# Patient Record
Sex: FEMALE | Race: WHITE | NOT HISPANIC OR LATINO | Employment: OTHER | URBAN - METROPOLITAN AREA
[De-identification: names, ages, dates, MRNs, and addresses within clinical notes are randomized per-mention and may not be internally consistent; named-entity substitution may affect disease eponyms.]

---

## 2017-01-30 RX ORDER — LISINOPRIL 10 MG/1
TABLET ORAL
Qty: 90 TABLET | Refills: 0 | OUTPATIENT
Start: 2017-01-30

## 2017-01-31 RX ORDER — LOVASTATIN 20 MG/1
TABLET ORAL
Qty: 90 TABLET | Refills: 1 | Status: SHIPPED | OUTPATIENT
Start: 2017-01-31 | End: 2017-04-26 | Stop reason: SDUPTHER

## 2017-02-21 ENCOUNTER — OFFICE VISIT (OUTPATIENT)
Dept: FAMILY MEDICINE CLINIC | Facility: CLINIC | Age: 82
End: 2017-02-21

## 2017-02-21 VITALS
WEIGHT: 188 LBS | OXYGEN SATURATION: 97 % | RESPIRATION RATE: 17 BRPM | HEIGHT: 68 IN | SYSTOLIC BLOOD PRESSURE: 156 MMHG | HEART RATE: 72 BPM | DIASTOLIC BLOOD PRESSURE: 70 MMHG | BODY MASS INDEX: 28.49 KG/M2

## 2017-02-21 DIAGNOSIS — Z96.652 STATUS POST TOTAL LEFT KNEE REPLACEMENT: ICD-10-CM

## 2017-02-21 DIAGNOSIS — E55.9 HYPOVITAMINOSIS D: ICD-10-CM

## 2017-02-21 DIAGNOSIS — D64.89 ANEMIA DUE TO OTHER CAUSE: ICD-10-CM

## 2017-02-21 DIAGNOSIS — I10 ESSENTIAL HYPERTENSION: ICD-10-CM

## 2017-02-21 DIAGNOSIS — Z79.899 DRUG THERAPY: ICD-10-CM

## 2017-02-21 DIAGNOSIS — E78.5 HYPERLIPIDEMIA, UNSPECIFIED HYPERLIPIDEMIA TYPE: ICD-10-CM

## 2017-02-21 DIAGNOSIS — M15.9 GENERALIZED OSTEOARTHRITIS: ICD-10-CM

## 2017-02-21 DIAGNOSIS — R29.898 WEAKNESS OF EXTREMITY: Primary | ICD-10-CM

## 2017-02-21 DIAGNOSIS — R53.83 FATIGUE, UNSPECIFIED TYPE: ICD-10-CM

## 2017-02-21 PROBLEM — Z96.649 HIP JOINT REPLACEMENT STATUS: Status: ACTIVE | Noted: 2017-02-21

## 2017-02-21 PROBLEM — K63.5 BENIGN COLONIC POLYP: Status: ACTIVE | Noted: 2017-02-21

## 2017-02-21 PROBLEM — H33.22 LEFT RETINAL DETACHMENT: Status: ACTIVE | Noted: 2017-02-21

## 2017-02-21 PROBLEM — M77.9 TENDINITIS: Status: ACTIVE | Noted: 2017-02-21

## 2017-02-21 PROBLEM — I87.2 CHRONIC VENOUS INSUFFICIENCY: Status: ACTIVE | Noted: 2017-02-21

## 2017-02-21 PROBLEM — M70.70 BURSITIS OF HIP: Status: ACTIVE | Noted: 2017-02-21

## 2017-02-21 PROBLEM — S86.919A MUSCLE STRAIN OF LOWER EXTREMITY: Status: ACTIVE | Noted: 2017-02-21

## 2017-02-21 PROBLEM — J06.9 ACUTE UPPER RESPIRATORY INFECTION: Status: ACTIVE | Noted: 2017-02-21

## 2017-02-21 LAB
25(OH)D3+25(OH)D2 SERPL-MCNC: 23.7 NG/ML (ref 30–100)
ALBUMIN SERPL-MCNC: 4.5 G/DL (ref 3.5–5.2)
ALBUMIN/GLOB SERPL: 1.7 G/DL
ALP SERPL-CCNC: 84 U/L (ref 39–117)
ALT SERPL-CCNC: 15 U/L (ref 1–33)
AST SERPL-CCNC: 17 U/L (ref 1–32)
BASOPHILS # BLD AUTO: 0.04 10*3/MM3 (ref 0–0.2)
BASOPHILS NFR BLD AUTO: 0.4 % (ref 0–1.5)
BILIRUB SERPL-MCNC: 0.7 MG/DL (ref 0.1–1.2)
BUN SERPL-MCNC: 16 MG/DL (ref 8–23)
BUN/CREAT SERPL: 16.5 (ref 7–25)
CALCIUM SERPL-MCNC: 9.7 MG/DL (ref 8.6–10.5)
CHLORIDE SERPL-SCNC: 104 MMOL/L (ref 98–107)
CHOLEST SERPL-MCNC: 170 MG/DL (ref 0–200)
CHOLEST/HDLC SERPL: 2.15 {RATIO}
CO2 SERPL-SCNC: 25.7 MMOL/L (ref 22–29)
CREAT SERPL-MCNC: 0.97 MG/DL (ref 0.57–1)
EOSINOPHIL # BLD AUTO: 0.57 10*3/MM3 (ref 0–0.7)
EOSINOPHIL NFR BLD AUTO: 6.3 % (ref 0.3–6.2)
ERYTHROCYTE [DISTWIDTH] IN BLOOD BY AUTOMATED COUNT: 23.5 % (ref 11.7–13)
GLOBULIN SER CALC-MCNC: 2.6 GM/DL
GLUCOSE SERPL-MCNC: 94 MG/DL (ref 65–99)
HCT VFR BLD AUTO: 33.6 % (ref 35.6–45.5)
HDLC SERPL-MCNC: 79 MG/DL (ref 40–60)
HGB BLD-MCNC: 10.5 G/DL (ref 11.9–15.5)
IMM GRANULOCYTES # BLD: 0.03 10*3/MM3 (ref 0–0.03)
IMM GRANULOCYTES NFR BLD: 0.3 % (ref 0–0.5)
LDLC SERPL CALC-MCNC: 76 MG/DL (ref 0–100)
LYMPHOCYTES # BLD AUTO: 1.86 10*3/MM3 (ref 0.9–4.8)
LYMPHOCYTES NFR BLD AUTO: 20.6 % (ref 19.6–45.3)
MCH RBC QN AUTO: 26.7 PG (ref 26.9–32)
MCHC RBC AUTO-ENTMCNC: 31.3 G/DL (ref 32.4–36.3)
MCV RBC AUTO: 85.5 FL (ref 80.5–98.2)
MONOCYTES # BLD AUTO: 0.79 10*3/MM3 (ref 0.2–1.2)
MONOCYTES NFR BLD AUTO: 8.7 % (ref 5–12)
NEUTROPHILS # BLD AUTO: 5.74 10*3/MM3 (ref 1.9–8.1)
NEUTROPHILS NFR BLD AUTO: 63.7 % (ref 42.7–76)
NRBC BLD AUTO-RTO: 0.4 /100 WBC (ref 0–0)
PLATELET # BLD AUTO: 314 10*3/MM3 (ref 140–500)
POTASSIUM SERPL-SCNC: 4.6 MMOL/L (ref 3.5–5.2)
PROT SERPL-MCNC: 7.1 G/DL (ref 6–8.5)
RBC # BLD AUTO: 3.93 10*6/MM3 (ref 3.9–5.2)
SODIUM SERPL-SCNC: 143 MMOL/L (ref 136–145)
TRIGL SERPL-MCNC: 74 MG/DL (ref 0–150)
TSH SERPL DL<=0.005 MIU/L-ACNC: 1.3 MIU/ML (ref 0.27–4.2)
VLDLC SERPL CALC-MCNC: 14.8 MG/DL (ref 5–40)
WBC # BLD AUTO: 9.03 10*3/MM3 (ref 4.5–10.7)

## 2017-02-21 PROCEDURE — 99214 OFFICE O/P EST MOD 30 MIN: CPT | Performed by: FAMILY MEDICINE

## 2017-02-21 RX ORDER — LOVASTATIN 20 MG/1
20 TABLET ORAL
COMMUNITY
End: 2017-02-21

## 2017-02-21 RX ORDER — NABUMETONE 500 MG/1
500 TABLET, FILM COATED ORAL
Status: ON HOLD | COMMUNITY
End: 2017-10-13

## 2017-02-21 RX ORDER — PROMETHAZINE HYDROCHLORIDE 25 MG/1
25 TABLET ORAL
Status: ON HOLD | COMMUNITY
Start: 2015-12-07 | End: 2017-10-13

## 2017-02-21 RX ORDER — ASPIRIN 81 MG/1
81 TABLET ORAL
COMMUNITY
End: 2017-02-21

## 2017-02-21 RX ORDER — ASPIRIN 81 MG
100 TABLET, DELAYED RELEASE (ENTERIC COATED) ORAL NIGHTLY
COMMUNITY
End: 2021-04-15

## 2017-02-21 RX ORDER — PREDNISOLONE ACETATE 10 MG/ML
1 SUSPENSION/ DROPS OPHTHALMIC
Status: ON HOLD | COMMUNITY
Start: 2015-12-07 | End: 2017-10-13

## 2017-02-21 RX ORDER — HYDROCORTISONE ACETATE 0.5 %
CREAM (GRAM) TOPICAL
COMMUNITY
End: 2017-02-21

## 2017-02-21 RX ORDER — MULTIVIT-MIN/IRON/FOLIC ACID/K 18-600-40
CAPSULE ORAL
COMMUNITY
End: 2017-02-21

## 2017-02-21 RX ORDER — LISINOPRIL 10 MG/1
10 TABLET ORAL
COMMUNITY
End: 2017-02-21

## 2017-02-21 NOTE — PROGRESS NOTES
"Subjective   Ama Billy is a 83 y.o. female.     History of Present Illness L knee is weak. No pain. Not red hot or swollen. Hard to up and known steps. No pain per se. She does not want to go to ortho, thinks meniscus is gone. Has had R knee replaced. Dr Linda 2014. Has had L hip replaced.  NO trouble walking, just steps.    Sees Dr Bello for low hgb. It has remained the same. He has decided that is \"just me\" 10.x range. Appt is due in April.    In general feels her health is good.  Knows she needs labs to be done.  The following portions of the patient's history were reviewed and updated as appropriate: allergies, current medications, past social history and problem list.    Review of Systems   Constitutional: Negative for activity change, appetite change and unexpected weight change.   HENT: Negative for nosebleeds and trouble swallowing.    Eyes: Negative for pain and visual disturbance.   Respiratory: Negative for chest tightness, shortness of breath and wheezing.    Cardiovascular: Negative for chest pain and palpitations.   Gastrointestinal: Negative for abdominal pain and blood in stool.   Endocrine: Negative.    Genitourinary: Negative for difficulty urinating and hematuria.   Musculoskeletal: Positive for arthralgias and myalgias. Negative for joint swelling.   Skin: Negative for color change and rash.   Allergic/Immunologic: Negative.    Neurological: Negative for syncope and speech difficulty.   Hematological: Negative for adenopathy.   Psychiatric/Behavioral: Negative for agitation and confusion.   All other systems reviewed and are negative.      Objective   Visit Vitals   • /70   • Pulse 72   • Resp 17   • Ht 68\" (172.7 cm)   • Wt 188 lb (85.3 kg)   • SpO2 97%   • BMI 28.59 kg/m2     Physical Exam   Constitutional: She is oriented to person, place, and time. Vital signs are normal. She appears well-developed and well-nourished. No distress.   Looks younger than stated age, MICHAEL AHMADI   HENT: "   Head: Normocephalic.   Neck: Carotid bruit is not present. No thyromegaly present.   Cardiovascular: Normal rate, regular rhythm and normal heart sounds.    Pulmonary/Chest: Effort normal and breath sounds normal.   Musculoskeletal: Normal range of motion.   L knee FROM with no crepitus, warmth or vera. Gen nontender. R knee surg scar, IS slightly warm, much crepitus. FROM as well.   Neurological: She is alert and oriented to person, place, and time. Gait normal.   Psychiatric: She has a normal mood and affect. Her behavior is normal. Judgment and thought content normal.   Vitals reviewed.      Assessment/Plan   Problem List Items Addressed This Visit        Cardiovascular and Mediastinum    Hyperlipidemia    Relevant Orders    Lipid Panel With / Chol / HDL Ratio    Hypertension       Digestive    Hypovitaminosis D    Relevant Orders    Vitamin D 25 Hydroxy       Musculoskeletal and Integument    Generalized osteoarthritis       Hematopoietic and Hemostatic    Anemia    Relevant Orders    CBC & Differential       Other    Fatigue    Relevant Orders    TSH    Status post total left knee replacement    Weakness of extremity - Primary      Other Visit Diagnoses     Drug therapy        Relevant Orders    CBC & Differential    Comprehensive Metabolic Panel           Stationary bike. Has this. Will help leg strength immed!!

## 2017-04-12 ENCOUNTER — APPOINTMENT (OUTPATIENT)
Dept: LAB | Facility: HOSPITAL | Age: 82
End: 2017-04-12

## 2017-04-12 ENCOUNTER — APPOINTMENT (OUTPATIENT)
Dept: ONCOLOGY | Facility: CLINIC | Age: 82
End: 2017-04-12

## 2017-04-19 ENCOUNTER — LAB (OUTPATIENT)
Dept: LAB | Facility: HOSPITAL | Age: 82
End: 2017-04-19
Attending: INTERNAL MEDICINE

## 2017-04-19 ENCOUNTER — OFFICE VISIT (OUTPATIENT)
Dept: ONCOLOGY | Facility: CLINIC | Age: 82
End: 2017-04-19
Attending: INTERNAL MEDICINE

## 2017-04-19 VITALS
SYSTOLIC BLOOD PRESSURE: 156 MMHG | HEIGHT: 67 IN | TEMPERATURE: 98 F | HEART RATE: 56 BPM | BODY MASS INDEX: 29.7 KG/M2 | WEIGHT: 189.2 LBS | DIASTOLIC BLOOD PRESSURE: 90 MMHG | OXYGEN SATURATION: 98 % | RESPIRATION RATE: 12 BRPM

## 2017-04-19 DIAGNOSIS — D50.8 IRON DEFICIENCY ANEMIA SECONDARY TO INADEQUATE DIETARY IRON INTAKE: Primary | ICD-10-CM

## 2017-04-19 DIAGNOSIS — D64.9 NORMOCYTIC ANEMIA: Primary | ICD-10-CM

## 2017-04-19 LAB
BASOPHILS # BLD AUTO: 0.07 10*3/MM3 (ref 0–0.1)
BASOPHILS NFR BLD AUTO: 0.7 % (ref 0–1.1)
DEPRECATED RDW RBC AUTO: 70.2 FL (ref 37–49)
EOSINOPHIL # BLD AUTO: 0.66 10*3/MM3 (ref 0–0.36)
EOSINOPHIL NFR BLD AUTO: 6.4 % (ref 1–5)
ERYTHROCYTE [DISTWIDTH] IN BLOOD BY AUTOMATED COUNT: 22.4 % (ref 11.7–14.5)
HCT VFR BLD AUTO: 32.8 % (ref 34–45)
HGB BLD-MCNC: 10.6 G/DL (ref 11.5–14.9)
IMM GRANULOCYTES # BLD: 0.08 10*3/MM3 (ref 0–0.03)
IMM GRANULOCYTES NFR BLD: 0.8 % (ref 0–0.5)
LYMPHOCYTES # BLD AUTO: 1.91 10*3/MM3 (ref 1–3.5)
LYMPHOCYTES NFR BLD AUTO: 18.6 % (ref 20–49)
MCH RBC QN AUTO: 27.9 PG (ref 27–33)
MCHC RBC AUTO-ENTMCNC: 32.3 G/DL (ref 32–35)
MCV RBC AUTO: 86.3 FL (ref 83–97)
MONOCYTES # BLD AUTO: 1.08 10*3/MM3 (ref 0.25–0.8)
MONOCYTES NFR BLD AUTO: 10.5 % (ref 4–12)
NEUTROPHILS # BLD AUTO: 6.46 10*3/MM3 (ref 1.5–7)
NEUTROPHILS NFR BLD AUTO: 63 % (ref 39–75)
NRBC BLD MANUAL-RTO: 0.4 /100 WBC (ref 0–0)
PLATELET # BLD AUTO: 335 10*3/MM3 (ref 150–375)
PMV BLD AUTO: 11.2 FL (ref 8.9–12.1)
RBC # BLD AUTO: 3.8 10*6/MM3 (ref 3.9–5)
WBC NRBC COR # BLD: 10.26 10*3/MM3 (ref 4–10)

## 2017-04-19 PROCEDURE — 85025 COMPLETE CBC W/AUTO DIFF WBC: CPT

## 2017-04-19 PROCEDURE — 36416 COLLJ CAPILLARY BLOOD SPEC: CPT

## 2017-04-19 PROCEDURE — 99213 OFFICE O/P EST LOW 20 MIN: CPT | Performed by: INTERNAL MEDICINE

## 2017-04-19 NOTE — PROGRESS NOTES
Subjective .     REASONS FOR FOLLOWUP:    1. Normocytic anemia of unclear etiology, hemoglobin in the 10 to 11 range.    2. Persistent mild eosinophilia of unknown etiology.    HISTORY OF PRESENT ILLNESS:  The patient is a 83 y.o. year old female who is here for follow-up with the above-mentioned history.    History of Present Illness   patient returns today for an annual follow-up visit with laboratory studies to review.  She reports some very mild fatigue.  She remains very active.  She does have a borderline elevated white count today at 10.2 however denies any infectious symptoms recently.    PAST MEDICAL HISTORY:    1. Hypertension.  2. Hyperlipidemia.  3. Osteoarthritis involving knees and left shoulder.  Previous steroid injection in the left shoulder with Dr. Carrero.  4. Status post hemorrhoid surgery.  5. Status post partial hysterectomy in 1971 secondary to endometriosis.  6. Status post left knee arthroscopic surgery in 1997.  7. Status post right knee arthroscopic surgery in 2003.  8. History of colonic polyps with regular colonoscopies at five year intervals.  Colonoscopy performed in 2013 by Dr. Gudino, no polyps identified.    9. Status post left total hip arthroplasty in 07/2014.  10. Status post right total knee arthroplasty in May 2015.    11. Left retinal detachment 2016.    OB/GYN HISTORY:  G-2, P-2.     HEMATOLOGIC HISTORY:  (History from previous dates can be found in the separate document.)    Current Outpatient Prescriptions on File Prior to Visit   Medication Sig Dispense Refill   • Ascorbic Acid (VITAMIN C) 500 MG capsule Take  by mouth.     • aspirin 81 MG EC tablet Take  by mouth.     • Calcium Carbonate-Vitamin D (CALCIUM 500 + D) 500-125 MG-UNIT tablet Take  by mouth.     • Docusate Sodium 100 MG capsule Take 100 mg by mouth.     • Glucosamine-Chondroit-Vit C-Mn (GLUCOSAMINE CHONDR 1500 COMPLX PO) Take  by mouth.     • lisinopril (PRINIVIL,ZESTRIL) 10 MG tablet TAKE 1 TABLET DAILY FOR  BLOOD PRESSURE 90 tablet 1   • lovastatin (MEVACOR) 20 MG tablet TAKE 1 TABLET AT BEDTIME 90 tablet 1   • nabumetone (RELAFEN) 500 MG tablet Take 500 mg by mouth.     • prednisoLONE acetate (OMNIPRED) 1 % ophthalmic suspension Apply 1 drop to eye.     • promethazine (PHENERGAN) 25 MG tablet Take 25 mg by mouth.       No current facility-administered medications on file prior to visit.        ALLERGIES:     Allergies   Allergen Reactions   • Diclofenac Sodium        SOCIAL HISTORY:  The patient is  and lives with her  in [place].  She is a retired  with [employer].  She denies any history of smoking, reports only occasional alcohol use.    FAMILY HISTORY:  Significant for father with coronary artery disease, MI at age 72.  Brother with coronary artery disease.  There is no known family history of any hematologic disorder.  There is no known family history of anemia      Review of Systems   Constitutional: Negative for activity change, appetite change, fatigue, fever and unexpected weight change.   HENT: Negative for congestion, mouth sores, nosebleeds, sore throat and voice change.    Respiratory: Negative for cough, shortness of breath and wheezing.    Cardiovascular: Negative for chest pain, palpitations and leg swelling.   Gastrointestinal: Negative for abdominal distention, abdominal pain, blood in stool, constipation, diarrhea, nausea and vomiting.   Endocrine: Negative for cold intolerance and heat intolerance.   Genitourinary: Negative for difficulty urinating, dysuria, frequency and hematuria.   Musculoskeletal: Negative for arthralgias, back pain, joint swelling and myalgias.   Skin: Negative for rash.   Neurological: Negative for dizziness, syncope, weakness, light-headedness, numbness and headaches.   Hematological: Negative for adenopathy. Does not bruise/bleed easily.   Psychiatric/Behavioral: Negative for confusion and sleep disturbance. The  patient is not nervous/anxious.          Objective      Vitals:    04/19/17 0954   BP: 156/90   Pulse: 56   Resp: 12   Temp: 98 °F (36.7 °C)   SpO2: 98%      Current Status 4/19/2017   ECOG score 0       Physical Exam   Constitutional: She is oriented to person, place, and time. She appears well-developed and well-nourished.   HENT:   Mouth/Throat: Oropharynx is clear and moist.   Eyes: Conjunctivae are normal.   Neck: No thyromegaly present.   Cardiovascular: Normal rate and regular rhythm.  Exam reveals no gallop and no friction rub.    No murmur heard.  Pulmonary/Chest: Breath sounds normal. No respiratory distress.   Abdominal: Soft. Bowel sounds are normal. She exhibits no distension. There is no tenderness.   Musculoskeletal: She exhibits edema (trace chronic bilateral lower extremity edema.).   Lymphadenopathy:        Head (right side): No submandibular adenopathy present.     She has no cervical adenopathy.     She has no axillary adenopathy.        Right: No inguinal and no supraclavicular adenopathy present.        Left: No inguinal and no supraclavicular adenopathy present.   Neurological: She is alert and oriented to person, place, and time. She displays normal reflexes. No cranial nerve deficit. She exhibits normal muscle tone.   Skin: Skin is warm and dry. No rash noted.   Psychiatric: She has a normal mood and affect. Her behavior is normal.       RECENT LABS:  Hematology WBC   Date Value Ref Range Status   04/19/2017 10.26 (H) 4.00 - 10.00 10*3/mm3 Final     RBC   Date Value Ref Range Status   04/19/2017 3.80 (L) 3.90 - 5.00 10*6/mm3 Final     Hemoglobin   Date Value Ref Range Status   04/19/2017 10.6 (L) 11.5 - 14.9 g/dL Final     Hematocrit   Date Value Ref Range Status   04/19/2017 32.8 (L) 34.0 - 45.0 % Final     Platelets   Date Value Ref Range Status   04/19/2017 335 150 - 375 10*3/mm3 Final        Assessment/Plan     1. Chronic normocytic anemia: This is of unclear etiology. The patient's  hemoglobin remains in the 10-11 range, currently at 10.6. It is certainly suspicious for possible underlying myelodysplasia. We have elected, however, to hold off on pursuing a bone marrow biopsy until her hemoglobin declines below 10, consistently. She will return for a CBC with RN review in 6 months and I will see her again a year.   2. Mild eosinophilia: The patient has been evaluated extensively for underlying rheumatologic disorder and malignancy. There is no clear etiology. Her eosinophil count has been running in the 600-700 range.  We will continue to monitor this.   3. Borderline leukocytosis: The patient has had a white count in the past in the 8-10 range.  Today her white count is 10.2 with no infectious symptoms identified.    PLAN:   1. Return in 6 months with a CBC and RN review.   2. MD visit in 12 months with a CBC.

## 2017-04-26 RX ORDER — LISINOPRIL 10 MG/1
10 TABLET ORAL DAILY
Qty: 90 TABLET | Refills: 1 | Status: SHIPPED | OUTPATIENT
Start: 2017-04-26 | End: 2017-10-18 | Stop reason: SDUPTHER

## 2017-04-26 RX ORDER — LOVASTATIN 20 MG/1
20 TABLET ORAL NIGHTLY
Qty: 90 TABLET | Refills: 1 | Status: SHIPPED | OUTPATIENT
Start: 2017-04-26 | End: 2017-10-23 | Stop reason: SDUPTHER

## 2017-10-04 ENCOUNTER — LAB (OUTPATIENT)
Dept: LAB | Facility: HOSPITAL | Age: 82
End: 2017-10-04

## 2017-10-04 ENCOUNTER — CLINICAL SUPPORT (OUTPATIENT)
Dept: ONCOLOGY | Facility: HOSPITAL | Age: 82
End: 2017-10-04

## 2017-10-04 DIAGNOSIS — D64.9 NORMOCYTIC ANEMIA: ICD-10-CM

## 2017-10-04 LAB
BASOPHILS # BLD AUTO: 0.07 10*3/MM3 (ref 0–0.1)
BASOPHILS NFR BLD AUTO: 0.7 % (ref 0–1.1)
DEPRECATED RDW RBC AUTO: 73 FL (ref 37–49)
EOSINOPHIL # BLD AUTO: 0.65 10*3/MM3 (ref 0–0.36)
EOSINOPHIL NFR BLD AUTO: 6.9 % (ref 1–5)
ERYTHROCYTE [DISTWIDTH] IN BLOOD BY AUTOMATED COUNT: 23.1 % (ref 11.7–14.5)
HCT VFR BLD AUTO: 32.2 % (ref 34–45)
HGB BLD-MCNC: 10.2 G/DL (ref 11.5–14.9)
IMM GRANULOCYTES # BLD: 0.04 10*3/MM3 (ref 0–0.03)
IMM GRANULOCYTES NFR BLD: 0.4 % (ref 0–0.5)
LYMPHOCYTES # BLD AUTO: 1.95 10*3/MM3 (ref 1–3.5)
LYMPHOCYTES NFR BLD AUTO: 20.7 % (ref 20–49)
MCH RBC QN AUTO: 27.6 PG (ref 27–33)
MCHC RBC AUTO-ENTMCNC: 31.7 G/DL (ref 32–35)
MCV RBC AUTO: 87 FL (ref 83–97)
MONOCYTES # BLD AUTO: 1.24 10*3/MM3 (ref 0.25–0.8)
MONOCYTES NFR BLD AUTO: 13.1 % (ref 4–12)
NEUTROPHILS # BLD AUTO: 5.48 10*3/MM3 (ref 1.5–7)
NEUTROPHILS NFR BLD AUTO: 58.2 % (ref 39–75)
NRBC BLD MANUAL-RTO: 0.5 /100 WBC (ref 0–0)
PLATELET # BLD AUTO: 308 10*3/MM3 (ref 150–375)
PMV BLD AUTO: 10.4 FL (ref 8.9–12.1)
RBC # BLD AUTO: 3.7 10*6/MM3 (ref 3.9–5)
WBC NRBC COR # BLD: 9.43 10*3/MM3 (ref 4–10)

## 2017-10-04 PROCEDURE — 36416 COLLJ CAPILLARY BLOOD SPEC: CPT | Performed by: INTERNAL MEDICINE

## 2017-10-04 PROCEDURE — 85025 COMPLETE CBC W/AUTO DIFF WBC: CPT | Performed by: INTERNAL MEDICINE

## 2017-10-12 ENCOUNTER — APPOINTMENT (OUTPATIENT)
Dept: GENERAL RADIOLOGY | Facility: HOSPITAL | Age: 82
End: 2017-10-12

## 2017-10-12 ENCOUNTER — APPOINTMENT (OUTPATIENT)
Dept: CT IMAGING | Facility: HOSPITAL | Age: 82
End: 2017-10-12

## 2017-10-12 ENCOUNTER — HOSPITAL ENCOUNTER (INPATIENT)
Facility: HOSPITAL | Age: 82
LOS: 3 days | Discharge: HOME | End: 2017-10-15
Attending: EMERGENCY MEDICINE | Admitting: INTERNAL MEDICINE

## 2017-10-12 DIAGNOSIS — J18.9 PNEUMONIA OF BOTH LOWER LOBES DUE TO INFECTIOUS ORGANISM: Primary | ICD-10-CM

## 2017-10-12 DIAGNOSIS — D72.829 LEUKOCYTOSIS, UNSPECIFIED TYPE: ICD-10-CM

## 2017-10-12 DIAGNOSIS — R11.2 NAUSEA AND VOMITING, INTRACTABILITY OF VOMITING NOT SPECIFIED, UNSPECIFIED VOMITING TYPE: ICD-10-CM

## 2017-10-12 DIAGNOSIS — S32.010A CLOSED COMPRESSION FRACTURE OF FIRST LUMBAR VERTEBRA, INITIAL ENCOUNTER: ICD-10-CM

## 2017-10-12 LAB
ALBUMIN SERPL-MCNC: 4.4 G/DL (ref 3.5–5.2)
ALBUMIN/GLOB SERPL: 1.2 G/DL
ALP SERPL-CCNC: 92 U/L (ref 39–117)
ALT SERPL W P-5'-P-CCNC: 13 U/L (ref 1–33)
ANION GAP SERPL CALCULATED.3IONS-SCNC: 17.8 MMOL/L
AST SERPL-CCNC: 19 U/L (ref 1–32)
BACTERIA UR QL AUTO: ABNORMAL /HPF
BASOPHILS # BLD AUTO: 0.04 10*3/MM3 (ref 0–0.2)
BASOPHILS NFR BLD AUTO: 0.2 % (ref 0–1.5)
BILIRUB SERPL-MCNC: 1.6 MG/DL (ref 0.1–1.2)
BILIRUB UR QL STRIP: NEGATIVE
BUN BLD-MCNC: 14 MG/DL (ref 8–23)
BUN/CREAT SERPL: 13.9 (ref 7–25)
CALCIUM SPEC-SCNC: 9.9 MG/DL (ref 8.6–10.5)
CHLORIDE SERPL-SCNC: 97 MMOL/L (ref 98–107)
CLARITY UR: ABNORMAL
CO2 SERPL-SCNC: 22.2 MMOL/L (ref 22–29)
COLOR UR: ABNORMAL
CREAT BLD-MCNC: 1.01 MG/DL (ref 0.57–1)
D-LACTATE SERPL-SCNC: 1.2 MMOL/L (ref 0.5–2)
DEPRECATED RDW RBC AUTO: 73.8 FL (ref 37–54)
EOSINOPHIL # BLD AUTO: 0.03 10*3/MM3 (ref 0–0.7)
EOSINOPHIL NFR BLD AUTO: 0.1 % (ref 0.3–6.2)
ERYTHROCYTE [DISTWIDTH] IN BLOOD BY AUTOMATED COUNT: 23.7 % (ref 11.7–13)
GFR SERPL CREATININE-BSD FRML MDRD: 52 ML/MIN/1.73
GLOBULIN UR ELPH-MCNC: 3.6 GM/DL
GLUCOSE BLD-MCNC: 134 MG/DL (ref 65–99)
GLUCOSE UR STRIP-MCNC: NEGATIVE MG/DL
HCT VFR BLD AUTO: 33.2 % (ref 35.6–45.5)
HGB BLD-MCNC: 10.5 G/DL (ref 11.9–15.5)
HGB UR QL STRIP.AUTO: NEGATIVE
HOLD SPECIMEN: NORMAL
HOLD SPECIMEN: NORMAL
HYALINE CASTS UR QL AUTO: ABNORMAL /LPF
IMM GRANULOCYTES # BLD: 0.09 10*3/MM3 (ref 0–0.03)
IMM GRANULOCYTES NFR BLD: 0.4 % (ref 0–0.5)
KETONES UR QL STRIP: ABNORMAL
LEUKOCYTE ESTERASE UR QL STRIP.AUTO: NEGATIVE
LIPASE SERPL-CCNC: 20 U/L (ref 13–60)
LYMPHOCYTES # BLD AUTO: 0.9 10*3/MM3 (ref 0.9–4.8)
LYMPHOCYTES NFR BLD AUTO: 3.9 % (ref 19.6–45.3)
MCH RBC QN AUTO: 27.1 PG (ref 26.9–32)
MCHC RBC AUTO-ENTMCNC: 31.6 G/DL (ref 32.4–36.3)
MCV RBC AUTO: 85.8 FL (ref 80.5–98.2)
MONOCYTES # BLD AUTO: 1.29 10*3/MM3 (ref 0.2–1.2)
MONOCYTES NFR BLD AUTO: 5.6 % (ref 5–12)
NEUTROPHILS # BLD AUTO: 20.7 10*3/MM3 (ref 1.9–8.1)
NEUTROPHILS NFR BLD AUTO: 89.8 % (ref 42.7–76)
NITRITE UR QL STRIP: NEGATIVE
NRBC BLD MANUAL-RTO: 0.4 /100 WBC (ref 0–0)
PH UR STRIP.AUTO: 5.5 [PH] (ref 5–8)
PLATELET # BLD AUTO: 299 10*3/MM3 (ref 140–500)
PMV BLD AUTO: 11.2 FL (ref 6–12)
POTASSIUM BLD-SCNC: 3.5 MMOL/L (ref 3.5–5.2)
PROT SERPL-MCNC: 8 G/DL (ref 6–8.5)
PROT UR QL STRIP: ABNORMAL
RBC # BLD AUTO: 3.87 10*6/MM3 (ref 3.9–5.2)
RBC # UR: ABNORMAL /HPF
REF LAB TEST METHOD: ABNORMAL
SODIUM BLD-SCNC: 137 MMOL/L (ref 136–145)
SP GR UR STRIP: 1.02 (ref 1–1.03)
SQUAMOUS #/AREA URNS HPF: ABNORMAL /HPF
UROBILINOGEN UR QL STRIP: ABNORMAL
WBC NRBC COR # BLD: 23.05 10*3/MM3 (ref 4.5–10.7)
WBC UR QL AUTO: ABNORMAL /HPF
WHOLE BLOOD HOLD SPECIMEN: NORMAL
WHOLE BLOOD HOLD SPECIMEN: NORMAL

## 2017-10-12 PROCEDURE — 83605 ASSAY OF LACTIC ACID: CPT | Performed by: PHYSICIAN ASSISTANT

## 2017-10-12 PROCEDURE — 83690 ASSAY OF LIPASE: CPT | Performed by: EMERGENCY MEDICINE

## 2017-10-12 PROCEDURE — 81001 URINALYSIS AUTO W/SCOPE: CPT | Performed by: EMERGENCY MEDICINE

## 2017-10-12 PROCEDURE — 99284 EMERGENCY DEPT VISIT MOD MDM: CPT

## 2017-10-12 PROCEDURE — 71020 HC CHEST PA AND LATERAL: CPT

## 2017-10-12 PROCEDURE — 87040 BLOOD CULTURE FOR BACTERIA: CPT | Performed by: PHYSICIAN ASSISTANT

## 2017-10-12 PROCEDURE — 85025 COMPLETE CBC W/AUTO DIFF WBC: CPT | Performed by: EMERGENCY MEDICINE

## 2017-10-12 PROCEDURE — 36415 COLL VENOUS BLD VENIPUNCTURE: CPT | Performed by: EMERGENCY MEDICINE

## 2017-10-12 PROCEDURE — 25010000002 CEFTRIAXONE PER 250 MG: Performed by: PHYSICIAN ASSISTANT

## 2017-10-12 PROCEDURE — 0 IOPAMIDOL 61 % SOLUTION: Performed by: PHYSICIAN ASSISTANT

## 2017-10-12 PROCEDURE — 80053 COMPREHEN METABOLIC PANEL: CPT | Performed by: EMERGENCY MEDICINE

## 2017-10-12 PROCEDURE — 74177 CT ABD & PELVIS W/CONTRAST: CPT

## 2017-10-12 RX ORDER — SODIUM CHLORIDE 0.9 % (FLUSH) 0.9 %
10 SYRINGE (ML) INJECTION AS NEEDED
Status: DISCONTINUED | OUTPATIENT
Start: 2017-10-12 | End: 2017-10-15 | Stop reason: HOSPADM

## 2017-10-12 RX ORDER — CEFTRIAXONE SODIUM 1 G/50ML
1 INJECTION, SOLUTION INTRAVENOUS ONCE
Status: COMPLETED | OUTPATIENT
Start: 2017-10-12 | End: 2017-10-12

## 2017-10-12 RX ADMIN — IOPAMIDOL 85 ML: 612 INJECTION, SOLUTION INTRAVENOUS at 21:20

## 2017-10-12 RX ADMIN — SODIUM CHLORIDE 1000 ML: 9 INJECTION, SOLUTION INTRAVENOUS at 20:31

## 2017-10-12 RX ADMIN — CEFTRIAXONE SODIUM 1 G: 1 INJECTION, SOLUTION INTRAVENOUS at 22:41

## 2017-10-12 NOTE — ED PROVIDER NOTES
EMERGENCY DEPARTMENT ENCOUNTER    CHIEF COMPLAINT  Chief Complaint: vomiting  History given by: patient  History limited by: none  Room Number: 35/35  PMD: Yoly Patel MD      HPI:  Pt is a 83 y.o. female who presents complaining of nausea, decreased appetite, vomiting, and constipation for three days ago. Pt also reports falling approximately 3 days ago and c/o bilateral hip pain. Pt had an xray at the chiropractor. Pt denies striking head during the fall and has been ambulatory with her walker since then. She had used an enema yesterday without relief.     Duration:  3 days  Onset: gradual  Timing: intermittent  Location: n/a  Radiation: n/a  Quality: n/a  Intensity/Severity: moderate  Progression: unchanged  Associated Symptoms: nausea, decreased appetite, constipation.  Aggravating Factors: none  Alleviating Factors: none  Previous Episodes: none  Treatment before arrival: enema    PAST MEDICAL HISTORY  Active Ambulatory Problems     Diagnosis Date Noted   • Normocytic anemia 04/15/2016   • Abdominal cramps 06/24/2013   • Left lower quadrant pain 06/24/2013   • Acute upper respiratory infection 02/21/2017   • Benign colonic polyp 02/21/2017   • Bursitis of hip 02/21/2017   • Diarrhea 06/24/2013   • Fatigue 02/21/2017   • Generalized osteoarthritis 02/21/2017   • Globus sensation 06/24/2013   • Hyperlipidemia 02/21/2017   • Hypertension 02/21/2017   • Irritable bowel syndrome 06/24/2013   • Muscle strain of lower extremity 02/21/2017   • Tendinitis 02/21/2017   • Chronic venous insufficiency 02/21/2017   • Status post total left knee replacement 02/21/2017   • 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   • Left retinal detachment 02/21/2017   • Hypovitaminosis D 02/21/2017   • Weakness of extremity 02/21/2017     Resolved Ambulatory Problems     Diagnosis Date Noted   • No Resolved Ambulatory Problems     Past Medical History:   Diagnosis Date   • Anemia    • Arthritis    • Eosinophilia    • History of  colonic polyps    • Hyperlipidemia    • Hypertension        PAST SURGICAL HISTORY  Past Surgical History:   Procedure Laterality Date   • COLONOSCOPY      H/O colonic polyps with regular colonoscopies at 5 year intervals.    • COLONOSCOPY  2013    By Dr. Gudino, no polyps identified.   • HEMORRHOIDECTOMY  1965   • HYSTERECTOMY  1971    Partial, secondary to endometriosis   • JOINT REPLACEMENT  2014    Left total hip arthroplasy   • KNEE SURGERY  1997    Arthroscopy of left knee 1997; right knee 2003       FAMILY HISTORY  Family History   Problem Relation Age of Onset   • Coronary artery disease Father    • Heart disease Father    • Heart attack Father 72   • Coronary artery disease Brother    • Heart disease Brother      CABG       SOCIAL HISTORY  Social History     Social History   • Marital status:      Spouse name: Trino   • Number of children: 2   • Years of education: College +     Occupational History   •  [employer] [place]     Worked as a teach in the past.   •  Retired     Social History Main Topics   • Smoking status: Never Smoker   • Smokeless tobacco: Not on file   • Alcohol use Yes      Comment: Occasional   • Drug use: No   • Sexual activity: Not on file     Other Topics Concern   • Not on file     Social History Narrative       ALLERGIES  Diclofenac sodium    REVIEW OF SYSTEMS  Review of Systems   Constitutional: Negative for fever.   HENT: Negative for sore throat.    Eyes: Negative.    Respiratory: Negative for cough and shortness of breath.    Cardiovascular: Negative for chest pain.   Gastrointestinal: Positive for constipation, nausea and vomiting. Negative for abdominal pain and diarrhea.   Genitourinary: Negative for dysuria.   Musculoskeletal: Negative for neck pain.   Skin: Negative for rash.   Allergic/Immunologic: Negative.    Neurological: Negative for weakness, numbness and headaches.   Hematological: Negative.    Psychiatric/Behavioral: Negative.     All other systems reviewed and are negative.      PHYSICAL EXAM  ED Triage Vitals   Temp Heart Rate Resp BP SpO2   10/12/17 1633 10/12/17 1633 10/12/17 1633 10/12/17 1644 10/12/17 1633   98.4 °F (36.9 °C) 84 20 180/65 93 %      Temp src Heart Rate Source Patient Position BP Location FiO2 (%)   10/12/17 1633 -- -- -- --   Tympanic           Physical Exam   Constitutional: She is oriented to person, place, and time and well-developed, well-nourished, and in no distress. No distress.   HENT:   Head: Normocephalic and atraumatic.   Eyes: EOM are normal. Pupils are equal, round, and reactive to light.   Neck: Normal range of motion. Neck supple.   Cardiovascular: Normal rate, regular rhythm and normal heart sounds.    Pulmonary/Chest: Effort normal and breath sounds normal. No respiratory distress.   Abdominal: Soft. Bowel sounds are normal. There is no tenderness. There is no rebound and no guarding.   Musculoskeletal: Normal range of motion. She exhibits no edema.   Neurological: She is alert and oriented to person, place, and time. She has normal sensation and normal strength.   Skin: Skin is warm and dry. No rash noted.   Psychiatric: Mood and affect normal.   Nursing note and vitals reviewed.      LAB RESULTS  Lab Results (last 24 hours)     Procedure Component Value Units Date/Time    CBC & Differential [[id number]] Collected:  10/12/17 1650    Specimen:  Blood Updated:  10/12/17 1713    Narrative:       The following orders were created for panel order CBC & Differential.  Procedure                               Abnormality         Status                     ---------                               -----------         ------                     Scan Slide[[id number]]                                                                  CBC Auto Differential[[id number]]        Abnormal            Final result                 Please view results for these tests on the individual orders.    Comprehensive Metabolic Panel  [[id number]]  (Abnormal) Collected:  10/12/17 1650    Specimen:  Blood Updated:  10/12/17 1721     Glucose 134 (H) mg/dL      BUN 14 mg/dL      Creatinine 1.01 (H) mg/dL      Sodium 137 mmol/L      Potassium 3.5 mmol/L      Chloride 97 (L) mmol/L      CO2 22.2 mmol/L      Calcium 9.9 mg/dL      Total Protein 8.0 g/dL      Albumin 4.40 g/dL      ALT (SGPT) 13 U/L      AST (SGOT) 19 U/L      Alkaline Phosphatase 92 U/L      Total Bilirubin 1.6 (H) mg/dL      eGFR Non African Amer 52 (L) mL/min/1.73      Globulin 3.6 gm/dL      A/G Ratio 1.2 g/dL      BUN/Creatinine Ratio 13.9     Anion Gap 17.8 mmol/L     Narrative:       The MDRD GFR formula is only valid for adults with stable renal function between ages 18 and 70.    Lipase [[id number]]  (Normal) Collected:  10/12/17 1650    Specimen:  Blood Updated:  10/12/17 1721     Lipase 20 U/L     CBC Auto Differential [[id number]]  (Abnormal) Collected:  10/12/17 1650    Specimen:  Blood Updated:  10/12/17 1713     WBC 23.05 (H) 10*3/mm3      RBC 3.87 (L) 10*6/mm3      Hemoglobin 10.5 (L) g/dL      Hematocrit 33.2 (L) %      MCV 85.8 fL      MCH 27.1 pg      MCHC 31.6 (L) g/dL      RDW 23.7 (H) %      RDW-SD 73.8 (H) fl      MPV 11.2 fL      Platelets 299 10*3/mm3      Neutrophil % 89.8 (H) %      Lymphocyte % 3.9 (L) %      Monocyte % 5.6 %      Eosinophil % 0.1 (L) %      Basophil % 0.2 %      Immature Grans % 0.4 %      Neutrophils, Absolute 20.70 (H) 10*3/mm3      Lymphocytes, Absolute 0.90 10*3/mm3      Monocytes, Absolute 1.29 (H) 10*3/mm3      Eosinophils, Absolute 0.03 10*3/mm3      Basophils, Absolute 0.04 10*3/mm3      Immature Grans, Absolute 0.09 (H) 10*3/mm3      nRBC 0.4 (H) /100 WBC     Urinalysis With / Culture If Indicated - Urine, Clean Catch [[id number]]  (Abnormal) Collected:  10/12/17 1919    Specimen:  Urine from Urine, Clean Catch Updated:  10/12/17 1943     Color, UA Dark Yellow (A)     Appearance, UA Cloudy (A)     pH, UA 5.5     Specific Gravity, UA  1.021     Glucose, UA Negative     Ketones, UA 40 mg/dL (2+) (A)     Bilirubin, UA Negative     Blood, UA Negative     Protein,  mg/dL (2+) (A)     Leuk Esterase, UA Negative     Nitrite, UA Negative     Urobilinogen, UA 1.0 E.U./dL    Urinalysis, Microscopic Only - Urine, Clean Catch [[id number]]  (Abnormal) Collected:  10/12/17 1919    Specimen:  Urine from Urine, Clean Catch Updated:  10/12/17 2043     RBC, UA 3-5 (A) /HPF      WBC, UA 3-5 (A) /HPF      Bacteria, UA 1+ (A) /HPF      Squamous Epithelial Cells, UA 3-6 (A) /HPF      Hyaline Casts, UA 7-12 /LPF      Methodology Automated Microscopy    Lactic Acid, Plasma [[id number]]  (Normal) Collected:  10/12/17 2030    Specimen:  Blood from Arm, Left Updated:  10/12/17 2055     Lactate 1.2 mmol/L     Blood Culture - Blood, [[id number]] Collected:  10/12/17 2035    Specimen:  Blood from Arm, Left Updated:  10/12/17 2035          I ordered the above labs and reviewed the results    RADIOLOGY  CT Abdomen Pelvis With Contrast   Final Result   IMPRESSION :    1. Pulmonary findings as discussed, follow-up will be needed to exclude   neoplastic process.   2. Small hiatal hernia.   3. Mild colonic diverticulosis.   4. Recent, if not acute fracture of the L1 vertebra superiorly with some   bony retropulsion contributing to moderate canal narrowing           This report was finalized on 10/12/2017 9:45 PM by Jm Hernandez MD.          XR Chest 2 View              I ordered the above noted radiological studies. Interpreted by radiologist. Reviewed by me in PACS.       PROCEDURES  Procedures      PROGRESS AND CONSULTS  ED Course   1910  Blood culture, Lactic acid, CT abd/pelvis, CXR ordered. IVF hydration.      2210   BP- 157/62 HR- 77 Temp- 98.4 °F (36.9 °C) (Tympanic) O2 sat- 94%  Rechecked the patient who is in NAD and is resting comfortably. Discussed imaging and lab results showing possible pneumonia and a compression fx to the L1. Discussed the plan for admission  and treatment with abx. Pt understands and agrees with the plan, all questions answered.    2213  Call placed to A.    2230  Dr. Gonzales, [facility] consulted and agreed to admit to telemetry.     MEDICAL DECISION MAKING  Results were reviewed/discussed with the patient and they were also made aware of online access. Pt also made aware that some labs, such as cultures, will not be resulted during ER visit and follow up with PMD is necessary.     MDM  Number of Diagnoses or Management Options     Amount and/or Complexity of Data Reviewed  Clinical lab tests: reviewed (WBC 23.05, Lipase 20, Creatinine 1.01)  Tests in the radiology section of CPT®: reviewed and ordered (CXR-There is mild atelectasis/infiltrate at the left  lung base with no evidence of consolidation or of effusion. Calcified granuloma is noted involving the left upper lobe superolaterally. The diaphragm is flattened and AP diameter increased consistent with COPD.    CT Abd/pelvis-1. Pulmonary findings as discussed, follow-up will be needed to exclude neoplastic process.  2. Small hiatal hernia. 3. Mild colonic diverticulosis.  4. Recent, if not acute fracture of the L1 vertebra superiorly with some bony retropulsion contributing to moderate canal narrowing)  Decide to obtain previous medical records or to obtain history from someone other than the patient: yes  Discuss the patient with other providers: yes (Dr. Gonzales, [facility]  Independent visualization of images, tracings, or specimens: yes           DIAGNOSIS  Final diagnoses:   Pneumonia of both lower lobes due to infectious organism   Leukocytosis, unspecified type   Nausea and vomiting, intractability of vomiting not specified, unspecified vomiting type   Closed compression fracture of first lumbar vertebra, initial encounter       DISPOSITION  ADMISSION    Discussed treatment plan and reason for admission with pt/family and admitting physician.  Pt/family voiced understanding of the plan for  admission for further testing/treatment as needed.     Latest Documented Vital Signs:  As of 10:46 PM  BP- 157/62 HR- 77 Temp- 98.4 °F (36.9 °C) (Tympanic) O2 sat- 94%    --  Documentation assistance provided by lauren Roman for Manuel Mason PA-C.  Information recorded by the scribe was done at my direction and has been verified and validated by me.       Meseret Roman  10/12/17 2234       DEMI Tyler  10/12/17 [id number]

## 2017-10-13 ENCOUNTER — APPOINTMENT (OUTPATIENT)
Dept: MRI IMAGING | Facility: HOSPITAL | Age: 82
End: 2017-10-13
Attending: INTERNAL MEDICINE
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
## 2017-10-13 PROBLEM — D72.829 LEUKOCYTOSIS: Status: ACTIVE | Noted: 2017-10-13

## 2017-10-13 PROBLEM — S32.010A CLOSED WEDGE COMPRESSION FRACTURE OF FIRST LUMBAR VERTEBRA: Status: ACTIVE | Noted: 2017-10-13

## 2017-10-13 PROBLEM — K59.00 CONSTIPATION: Status: ACTIVE | Noted: 2017-10-13

## 2017-10-13 PROBLEM — R11.2 NAUSEA & VOMITING: Status: ACTIVE | Noted: 2017-10-13

## 2017-10-13 LAB
ANION GAP SERPL CALCULATED.3IONS-SCNC: 14.9 MMOL/L
ANISOCYTOSIS BLD QL: NORMAL
B PERT DNA SPEC QL NAA+PROBE: NOT DETECTED
BASOPHILS # BLD AUTO: 0.02 10*3/MM3 (ref 0–0.2)
BASOPHILS NFR BLD AUTO: 0.1 % (ref 0–1.5)
BUN BLD-MCNC: 14 MG/DL (ref 8–23)
BUN/CREAT SERPL: 15.6 (ref 7–25)
C PNEUM DNA NPH QL NAA+NON-PROBE: NOT DETECTED
CALCIUM SPEC-SCNC: 8.9 MG/DL (ref 8.6–10.5)
CHLORIDE SERPL-SCNC: 102 MMOL/L (ref 98–107)
CO2 SERPL-SCNC: 22.1 MMOL/L (ref 22–29)
CREAT BLD-MCNC: 0.9 MG/DL (ref 0.57–1)
DACRYOCYTES BLD QL SMEAR: NORMAL
DEPRECATED RDW RBC AUTO: 72.6 FL (ref 37–54)
EOSINOPHIL # BLD AUTO: 0.28 10*3/MM3 (ref 0–0.7)
EOSINOPHIL NFR BLD AUTO: 1.6 % (ref 0.3–6.2)
ERYTHROCYTE [DISTWIDTH] IN BLOOD BY AUTOMATED COUNT: 23.3 % (ref 11.7–13)
FLUAV H1 2009 PAND RNA NPH QL NAA+PROBE: NOT DETECTED
FLUAV H1 HA GENE NPH QL NAA+PROBE: NOT DETECTED
FLUAV H3 RNA NPH QL NAA+PROBE: NOT DETECTED
FLUAV SUBTYP SPEC NAA+PROBE: NOT DETECTED
FLUBV RNA ISLT QL NAA+PROBE: NOT DETECTED
GFR SERPL CREATININE-BSD FRML MDRD: 60 ML/MIN/1.73
GLUCOSE BLD-MCNC: 104 MG/DL (ref 65–99)
HADV DNA SPEC NAA+PROBE: NOT DETECTED
HCOV 229E RNA SPEC QL NAA+PROBE: NOT DETECTED
HCOV HKU1 RNA SPEC QL NAA+PROBE: NOT DETECTED
HCOV NL63 RNA SPEC QL NAA+PROBE: NOT DETECTED
HCOV OC43 RNA SPEC QL NAA+PROBE: NOT DETECTED
HCT VFR BLD AUTO: 30.9 % (ref 35.6–45.5)
HGB BLD-MCNC: 9.6 G/DL (ref 11.9–15.5)
HMPV RNA NPH QL NAA+NON-PROBE: NOT DETECTED
HPIV1 RNA SPEC QL NAA+PROBE: NOT DETECTED
HPIV2 RNA SPEC QL NAA+PROBE: NOT DETECTED
HPIV3 RNA NPH QL NAA+PROBE: NOT DETECTED
HPIV4 P GENE NPH QL NAA+PROBE: NOT DETECTED
HYPOCHROMIA BLD QL: NORMAL
IMM GRANULOCYTES # BLD: 0.08 10*3/MM3 (ref 0–0.03)
IMM GRANULOCYTES NFR BLD: 0.4 % (ref 0–0.5)
L PNEUMO1 AG UR QL IA: NEGATIVE
LYMPHOCYTES # BLD AUTO: 1.51 10*3/MM3 (ref 0.9–4.8)
LYMPHOCYTES NFR BLD AUTO: 8.4 % (ref 19.6–45.3)
M PNEUMO IGG SER IA-ACNC: NOT DETECTED
MCH RBC QN AUTO: 26.7 PG (ref 26.9–32)
MCHC RBC AUTO-ENTMCNC: 31.1 G/DL (ref 32.4–36.3)
MCV RBC AUTO: 85.8 FL (ref 80.5–98.2)
MONOCYTES # BLD AUTO: 1.75 10*3/MM3 (ref 0.2–1.2)
MONOCYTES NFR BLD AUTO: 9.7 % (ref 5–12)
NEUTROPHILS # BLD AUTO: 14.42 10*3/MM3 (ref 1.9–8.1)
NEUTROPHILS NFR BLD AUTO: 79.8 % (ref 42.7–76)
NRBC BLD MANUAL-RTO: 0.3 /100 WBC (ref 0–0)
PLAT MORPH BLD: NORMAL
PLATELET # BLD AUTO: 272 10*3/MM3 (ref 140–500)
PMV BLD AUTO: 11.5 FL (ref 6–12)
POTASSIUM BLD-SCNC: 3.6 MMOL/L (ref 3.5–5.2)
RBC # BLD AUTO: 3.6 10*6/MM3 (ref 3.9–5.2)
RHINOVIRUS RNA SPEC NAA+PROBE: NOT DETECTED
RSV RNA NPH QL NAA+NON-PROBE: NOT DETECTED
S PNEUM AG SPEC QL LA: NEGATIVE
SODIUM BLD-SCNC: 139 MMOL/L (ref 136–145)
WBC MORPH BLD: NORMAL
WBC NRBC COR # BLD: 18.06 10*3/MM3 (ref 4.5–10.7)

## 2017-10-13 PROCEDURE — 25010000002 CEFTRIAXONE PER 250 MG: Performed by: INTERNAL MEDICINE

## 2017-10-13 PROCEDURE — 25010000002 ENOXAPARIN PER 10 MG: Performed by: INTERNAL MEDICINE

## 2017-10-13 PROCEDURE — 80048 BASIC METABOLIC PNL TOTAL CA: CPT | Performed by: INTERNAL MEDICINE

## 2017-10-13 PROCEDURE — 87581 M.PNEUMON DNA AMP PROBE: CPT | Performed by: INTERNAL MEDICINE

## 2017-10-13 PROCEDURE — 87899 AGENT NOS ASSAY W/OPTIC: CPT | Performed by: INTERNAL MEDICINE

## 2017-10-13 PROCEDURE — 85025 COMPLETE CBC W/AUTO DIFF WBC: CPT | Performed by: INTERNAL MEDICINE

## 2017-10-13 PROCEDURE — 25010000002 ONDANSETRON PER 1 MG: Performed by: INTERNAL MEDICINE

## 2017-10-13 PROCEDURE — 85007 BL SMEAR W/DIFF WBC COUNT: CPT | Performed by: INTERNAL MEDICINE

## 2017-10-13 PROCEDURE — 87486 CHLMYD PNEUM DNA AMP PROBE: CPT | Performed by: INTERNAL MEDICINE

## 2017-10-13 PROCEDURE — 87798 DETECT AGENT NOS DNA AMP: CPT | Performed by: INTERNAL MEDICINE

## 2017-10-13 PROCEDURE — 87633 RESP VIRUS 12-25 TARGETS: CPT | Performed by: INTERNAL MEDICINE

## 2017-10-13 PROCEDURE — 72148 MRI LUMBAR SPINE W/O DYE: CPT

## 2017-10-13 RX ORDER — ACETAMINOPHEN 325 MG/1
650 TABLET ORAL EVERY 4 HOURS PRN
Status: DISCONTINUED | OUTPATIENT
Start: 2017-10-13 | End: 2017-10-15 | Stop reason: HOSPADM

## 2017-10-13 RX ORDER — DOCUSATE SODIUM 100 MG/1
100 CAPSULE, LIQUID FILLED ORAL DAILY
Status: DISCONTINUED | OUTPATIENT
Start: 2017-10-13 | End: 2017-10-15 | Stop reason: HOSPADM

## 2017-10-13 RX ORDER — BISACODYL 10 MG
10 SUPPOSITORY, RECTAL RECTAL DAILY PRN
Status: DISCONTINUED | OUTPATIENT
Start: 2017-10-13 | End: 2017-10-15 | Stop reason: HOSPADM

## 2017-10-13 RX ORDER — ASCORBIC ACID 500 MG
500 TABLET ORAL DAILY
Status: DISCONTINUED | OUTPATIENT
Start: 2017-10-13 | End: 2017-10-15 | Stop reason: HOSPADM

## 2017-10-13 RX ORDER — SODIUM CHLORIDE 0.9 % (FLUSH) 0.9 %
1-10 SYRINGE (ML) INJECTION AS NEEDED
Status: DISCONTINUED | OUTPATIENT
Start: 2017-10-13 | End: 2017-10-15 | Stop reason: HOSPADM

## 2017-10-13 RX ORDER — ONDANSETRON 2 MG/ML
4 INJECTION INTRAMUSCULAR; INTRAVENOUS EVERY 6 HOURS PRN
Status: DISCONTINUED | OUTPATIENT
Start: 2017-10-13 | End: 2017-10-15 | Stop reason: HOSPADM

## 2017-10-13 RX ORDER — LISINOPRIL 10 MG/1
10 TABLET ORAL NIGHTLY
Status: DISCONTINUED | OUTPATIENT
Start: 2017-10-13 | End: 2017-10-15 | Stop reason: HOSPADM

## 2017-10-13 RX ORDER — CEFTRIAXONE SODIUM 1 G/50ML
1 INJECTION, SOLUTION INTRAVENOUS EVERY 24 HOURS
Status: DISCONTINUED | OUTPATIENT
Start: 2017-10-13 | End: 2017-10-15 | Stop reason: HOSPADM

## 2017-10-13 RX ORDER — POLYETHYLENE GLYCOL 3350 17 G/17G
17 POWDER, FOR SOLUTION ORAL 2 TIMES DAILY
Status: DISCONTINUED | OUTPATIENT
Start: 2017-10-13 | End: 2017-10-15 | Stop reason: HOSPADM

## 2017-10-13 RX ORDER — ATORVASTATIN CALCIUM 10 MG/1
10 TABLET, FILM COATED ORAL DAILY
Status: DISCONTINUED | OUTPATIENT
Start: 2017-10-13 | End: 2017-10-15 | Stop reason: HOSPADM

## 2017-10-13 RX ORDER — HYDROCODONE BITARTRATE AND ACETAMINOPHEN 5; 325 MG/1; MG/1
1 TABLET ORAL EVERY 4 HOURS PRN
Status: DISCONTINUED | OUTPATIENT
Start: 2017-10-13 | End: 2017-10-15 | Stop reason: HOSPADM

## 2017-10-13 RX ADMIN — ONDANSETRON 4 MG: 2 INJECTION INTRAMUSCULAR; INTRAVENOUS at 05:21

## 2017-10-13 RX ADMIN — OXYCODONE HYDROCHLORIDE AND ACETAMINOPHEN 500 MG: 500 TABLET ORAL at 08:15

## 2017-10-13 RX ADMIN — AZITHROMYCIN DIHYDRATE 500 MG: 500 INJECTION, POWDER, LYOPHILIZED, FOR SOLUTION INTRAVENOUS at 02:29

## 2017-10-13 RX ADMIN — POLYETHYLENE GLYCOL 3350 17 G: 17 POWDER, FOR SOLUTION ORAL at 08:15

## 2017-10-13 RX ADMIN — ENOXAPARIN SODIUM 40 MG: 40 INJECTION SUBCUTANEOUS at 08:15

## 2017-10-13 RX ADMIN — LISINOPRIL 10 MG: 10 TABLET ORAL at 20:24

## 2017-10-13 RX ADMIN — POLYETHYLENE GLYCOL 3350 17 G: 17 POWDER, FOR SOLUTION ORAL at 18:41

## 2017-10-13 RX ADMIN — LISINOPRIL 10 MG: 10 TABLET ORAL at 05:17

## 2017-10-13 RX ADMIN — CEFTRIAXONE SODIUM 1 G: 1 INJECTION, SOLUTION INTRAVENOUS at 18:41

## 2017-10-13 RX ADMIN — DOCUSATE SODIUM 100 MG: 100 CAPSULE, LIQUID FILLED ORAL at 08:15

## 2017-10-13 RX ADMIN — BISACODYL 10 MG: 10 SUPPOSITORY RECTAL at 05:20

## 2017-10-13 NOTE — PROGRESS NOTES
Discharge Planning Assessment  [facility]     Patient Name: Ama Billy  MRN: [id number]  Today's Date: 10/13/2017    Admit Date: 10/12/2017          Discharge Needs Assessment       10/13/17 1244    Living Environment    Lives With spouse    Living Arrangements house    Home Accessibility bed and bath on same level    Stair Railings at Home none    Type of Financial/Environmental Concern none    Transportation Available car;family or friend will provide    Living Environment    Provides Primary Care For no one    Quality Of Family Relationships supportive    Able to Return to Prior Living Arrangements yes    Discharge Needs Assessment    Concerns To Be Addressed denies needs/concerns at this time    Outpatient/Agency/Support Group Needs other (see comments)    Community Agency Name(S) pt states she has used [facility] thru [facility] in the past where [facility] sends someone to her home after a surgery or hospitalization.     Anticipated Changes Related to Illness none    Equipment Currently Used at Home rollator   states she does not use her rollator much--home equipment in the pt's room near dresser    Equipment Needed After Discharge none    Discharge Facility/Level Of Care Needs other (see comments)   feels like what the insurance will provide will be enough and she will not need HH at [place]    Discharge Disposition still a patient            Discharge Plan       10/13/17 1247    Case Management/Social Work Plan    Plan home via private auto with no anticipated needs    Patient/Family In Agreement With Plan yes    Additional Comments Introduced self/explained role of CCP. Face sheet data confirmed. Confirmed PCP and pharmacy, stating her local pharmacy is [facility] but most of her meds are thru mail order.  Pt denies difficulty obtaining or affording her meds. States she is IADLs at home. States she has a rollator that she does not use much. Denies previous use of HH or SNF. States there is a program thru  her insurance called [facility] and they will send nurses out to check on her for a period of time after surgery or hospitalization. Pt feels their visits will be sufficient for when she gets home and she will not need HH. Plans to have her spouse drive her home when discharged. CCP to follow..............JW        Discharge Placement     No information found                Demographic Summary       10/13/17 1243    Referral Information    Admission Type inpatient    Arrived From admitted as an inpatient;home or self-care    Referral Source admission list    Reason For Consult discharge planning    Record Reviewed medical record    Contact Information    Permission Granted to Share Information With ;family/designee            Functional Status       10/13/17 1243    Functional Status Current    Ambulation 3-->assistive equipment and person    Transferring 2-->assistive person    Toileting 3-->assistive equipment and person    Bathing 2-->assistive person    Dressing 2-->assistive person    Eating 0-->independent    Communication 0-->understands/communicates without difficulty    Swallowing (if score 2 or more for any item, consult Rehab Services) 0-->swallows foods/liquids without difficulty    Current Functional Level Comment pt encouraged to call for staff assist for needs    Change in Functional Status Since Onset of Current Illness/Injury yes    Functional Status Prior    Ambulation 1-->assistive equipment    Transferring 1-->assistive equipment    Toileting 0-->independent    Bathing 0-->independent    Dressing 0-->independent    Eating 0-->independent    Communication 0-->understands/communicates without difficulty    Swallowing 0-->swallows foods/liquids without difficulty    IADL    Medications independent    Meal Preparation independent    Housekeeping independent    Laundry independent    Shopping independent    Oral Care independent    Activity Tolerance    Current Activity Limitations none     Usual Activity Tolerance moderate    Current Activity Tolerance poor    Cognitive/Perceptual/Developmental    Current Mental Status/Cognitive Functioning no deficits noted            Psychosocial     None            Abuse/Neglect     None            Legal     None            Substance Abuse     None            Patient Forms     None          Anna Jackson, RN

## 2017-10-13 NOTE — PLAN OF CARE
Problem: Patient Care Overview (Adult)  Goal: Plan of Care Review  Outcome: Ongoing (interventions implemented as appropriate)    10/13/17 6691   Coping/Psychosocial Response Interventions   Plan Of Care Reviewed With patient   Patient Care Overview   Progress improving   Outcome Evaluation   Outcome Summary/Follow up Plan No SOA noted. O2 sats stable on RA. lungs clear and very infreq nonprod cough noted. c/o no appetite, bloating and gas, nausea and emesis after trying to eat lunch. Encouraged to only take in ice chips and sips until nausea resolves. Pt had large BM and passing flatus and started to feel better. Enc ambulation. using rolling walker when needed. Had MRI this AM. Awaiting neurosurgery to see        Goal: Adult Individualization and Mutuality  Outcome: Ongoing (interventions implemented as appropriate)  Goal: Discharge Needs Assessment  Outcome: Ongoing (interventions implemented as appropriate)    Problem: Pneumonia (Adult)  Goal: Signs and Symptoms of Listed Potential Problems Will be Absent or Manageable (Pneumonia)  Outcome: Ongoing (interventions implemented as appropriate)    Problem: Fall Risk (Adult)  Goal: Absence of Falls  Outcome: Ongoing (interventions implemented as appropriate)

## 2017-10-13 NOTE — ED PROVIDER NOTES
Pt resents with nausea and constipation for the past 3 days. She also reports a fall 3 days ago. On exam, she is in no distress and has tenderness in her low back, otherwise unremarkable exam. CT shows nodular pneumonia in the bases with leukocytosis so she will be admitted.       I supervised care provided by the midlevel provider.   We have discussed this patient's history, physical exam, and treatment plan.  I have reviewed the note and personally saw and examined the patient and agree with the plan of care. See attending note.  Documentation assistance provided by lauren Yoder for Dr. Back.  Information recorded by the scrlarrye was done at my direction and has been verified and validated by me.         Daylin Yoder  10/12/17 2515       Dk Back MD  10/12/17 6046

## 2017-10-13 NOTE — PLAN OF CARE
Problem: Patient Care Overview (Adult)  Goal: Plan of Care Review  Outcome: Ongoing (interventions implemented as appropriate)    10/13/17 0022   Coping/Psychosocial Response Interventions   Plan Of Care Reviewed With patient   Patient Care Overview   Progress no change   Outcome Evaluation   Outcome Summary/Follow up Plan admitted with Pneumonia, plan of care initiated, started on IV antibiotics, IVF given in ER,encourgaed deep breathing and coughing, lungs sound clear diminished. no SOA reported, will continue to monitor       Goal: Adult Individualization and Mutuality  Outcome: Ongoing (interventions implemented as appropriate)  Goal: Discharge Needs Assessment  Outcome: Ongoing (interventions implemented as appropriate)    Problem: Pneumonia (Adult)  Goal: Signs and Symptoms of Listed Potential Problems Will be Absent or Manageable (Pneumonia)  Outcome: Ongoing (interventions implemented as appropriate)    Problem: Fall Risk (Adult)  Goal: Identify Related Risk Factors and Signs and Symptoms  Outcome: Outcome(s) achieved Date Met:  10/13/17  Goal: Absence of Falls  Outcome: Ongoing (interventions implemented as appropriate)

## 2017-10-13 NOTE — H&P
"    Name: Ama Billy ADMIT: 10/12/2017   : 1933  PCP: Yoly Patel MD    MRN: [id number] LOS: 1 days   AGE/SEX: 83 y.o. female  ROOM: [facility]/     Chief Complaint   Patient presents with   • Cough     patient stated her last good bm was  night. patient stated she also has nonproductive cough. \"everything I eat I vomit\"   • Constipation       Subjective   Ms. Billy is a 83 y.o. female who presents to [facility] complaining of  Constipation, nausea vomiting and coughing    History of Present Illness     The patient is a very pleasant 83-year-old female who was in her usual state of health up until  evening into Monday morning.  She woke up around 2 AM on Monday morning and went to the bathroom.  Afterwards she got dizzy and fell to the ground hitting her left hip and her back.  She denies any loss of consciousness.  She experienced pain at that point he will back to bed.  The pain is progressively worsened but is mostly in the left hip.  She has also experienced nausea, vomiting and constipation.  Her last bowel movement was on  evening.  She is developed a cough and shortness of breath over the last 3 days as well.  Denies any fevers or chills or sick contacts.  Chest x-ray showed possible pneumonia and CT abdomen which was obtained for her nausea and vomiting showed bibasilar infiltrates.  Additionally on the CT scan she had an acute L1 fracture with canal narrowing.  She was started on azithromycin and ceftriaxone in the emergency room.  Her White blood cell count was quite elevated at 23,000.  She was admitted to our service for further care and management.    Past Medical History:   Diagnosis Date   • Anemia     Normocytic anemia of unclear ediology   • Arthritis     Osteoarthritis involving knees and left shoulder   • Eosinophilia     Persistent mild eosinophilia of unknown etiology   • History of colonic polyps    • Hyperlipidemia    • Hypertension  "     Past Surgical History:   Procedure Laterality Date   • COLONOSCOPY      H/O colonic polyps with regular colonoscopies at 5 year intervals.    • COLONOSCOPY  2013    By Dr. Gudino, no polyps identified.   • HEMORRHOIDECTOMY  1965   • HYSTERECTOMY  1971    Partial, secondary to endometriosis   • JOINT REPLACEMENT  2014    Left total hip arthroplasy   • KNEE SURGERY  1997    Arthroscopy of left knee 1997; right knee 2003     Family History   Problem Relation Age of Onset   • Coronary artery disease Father    • Heart disease Father    • Heart attack Father 72   • Coronary artery disease Brother    • Heart disease Brother      CABG     Social History   Substance Use Topics   • Smoking status: Never Smoker   • Smokeless tobacco: None   • Alcohol use Yes      Comment: Occasional     Prescriptions Prior to Admission   Medication Sig Dispense Refill Last Dose   • Ascorbic Acid (VITAMIN C) 500 MG capsule Take  by mouth.   Taking   • aspirin 81 MG EC tablet Take 81 mg by mouth Every Night.   Taking   • Calcium Carbonate-Vitamin D (CALCIUM 500 + D) 500-125 MG-UNIT tablet Take  by mouth.   Taking   • Docusate Sodium 100 MG capsule Take 100 mg by mouth Every Night.   Taking   • Glucosamine-Chondroit-Vit C-Mn (GLUCOSAMINE CHONDR 1500 COMPLX PO) Take  by mouth.   Taking   • lisinopril (PRINIVIL,ZESTRIL) 10 MG tablet Take 1 tablet by mouth Daily. (Patient taking differently: Take 10 mg by mouth Every Night.) 90 tablet 1    • lovastatin (MEVACOR) 20 MG tablet Take 1 tablet by mouth Every Night. 90 tablet 1      Allergies:  Diclofenac sodium    Review of Systems   Constitutional: Negative for activity change, appetite change, fatigue, fever and unexpected weight change.   HENT: Negative for nosebleeds, sore throat and trouble swallowing.    Eyes: Negative for pain and visual disturbance.   Respiratory: Positive for cough and shortness of breath. Negative for chest tightness.    Cardiovascular: Negative for chest pain, palpitations  and leg swelling.   Gastrointestinal: Positive for constipation, nausea and vomiting. Negative for abdominal pain and diarrhea.   Endocrine:        Negative for Diabetes or thyroid disease   Genitourinary: Negative for difficulty urinating, dyspareunia and hematuria.   Musculoskeletal: Positive for back pain and gait problem. Negative for neck pain and neck stiffness.   Skin: Negative for rash and wound.   Neurological: Negative for dizziness, syncope, weakness, light-headedness and headaches.   Hematological: Negative for adenopathy. Does not bruise/bleed easily.   Psychiatric/Behavioral: Negative for agitation, behavioral problems and confusion.        Objective    Vital Signs  Temp:  [97.1 °F (36.2 °C)-98.4 °F (36.9 °C)] 97.1 °F (36.2 °C)  Heart Rate:  [76-84] 76  Resp:  [16-20] 16  BP: (148-180)/(62-69) 148/69  SpO2:  [93 %-96 %] 96 %  on   ;   O2 Device: room air  Body mass index is 28.65 kg/(m^2).    Physical Exam   Constitutional: She is oriented to person, place, and time. She appears well-developed and well-nourished.   HENT:   Head: Normocephalic and atraumatic.   Mouth/Throat: Oropharynx is clear and moist. No oropharyngeal exudate.   Eyes: Conjunctivae and EOM are normal. Pupils are equal, round, and reactive to light. No scleral icterus.   Neck: Normal range of motion. Neck supple. No JVD present. No thyromegaly present.   Cardiovascular: Normal rate, regular rhythm and normal heart sounds.  Exam reveals no gallop and no friction rub.    No murmur heard.  Pulmonary/Chest: Effort normal. No stridor. No respiratory distress. She has rales (Bilateral but right greater than left).   Abdominal: Soft. Bowel sounds are normal. She exhibits distension (Minimal but good bowel sounds). There is no tenderness. There is no rebound and no guarding.   Musculoskeletal: She exhibits no edema or tenderness.   Mild tenderness to palpation of her lumbar spine   Lymphadenopathy:     She has no cervical adenopathy.    Neurological: She is alert and oriented to person, place, and time. No cranial nerve deficit.   Skin: Skin is warm and dry.   Psychiatric: She has a normal mood and affect. Her behavior is normal.       Results Review:   I reviewed the patient's new clinical results.    Results from last 7 days  Lab Units 10/12/17  1650   WBC 10*3/mm3 23.05*   HEMOGLOBIN g/dL 10.5*   PLATELETS 10*3/mm3 299     Results from last 7 days  Lab Units 10/12/17  1650   SODIUM mmol/L 137   POTASSIUM mmol/L 3.5   CHLORIDE mmol/L 97*   CO2 mmol/L 22.2   BUN mg/dL 14   CREATININE mg/dL 1.01*   GLUCOSE mg/dL 134*   ALBUMIN g/dL 4.40   BILIRUBIN mg/dL 1.6*   ALK PHOS U/L 92   AST (SGOT) U/L 19   ALT (SGPT) U/L 13   Estimated Creatinine Clearance: 48.3 mL/min (by C-G formula based on Cr of 1.01).      Results from last 7 days  Lab Units 10/12/17  1919   NITRITE UA  Negative   WBC UA /HPF 3-5*   BACTERIA UA /HPF 1+*   SQUAM EPITHEL UA /HPF 3-6*       CT Abdomen Pelvis With Contrast   Final Result   IMPRESSION :    1. Pulmonary findings as discussed, follow-up will be needed to exclude   neoplastic process.   2. Small hiatal hernia.   3. Mild colonic diverticulosis.   4. Recent, if not acute fracture of the L1 vertebra superiorly with some   bony retropulsion contributing to moderate canal narrowing           This report was finalized on 10/12/2017 9:45 PM by Jm Hernandez MD.          XR Chest 2 View         MRI Lumbar Spine Without Contrast    (Results Pending)     Assessment/Plan   Assessment:     Active Hospital Problems (** Indicates Principal Problem)    Diagnosis Date Noted   • **Pneumonia of both lower lobes due to infectious organism [J18.9] 10/12/2017   • Closed wedge compression fracture of first lumbar vertebra [S32.010A] 10/13/2017   • Fall [W19.XXXA] 10/13/2017   • Constipation [K59.00] 10/13/2017   • Nausea & vomiting [R11.2] 10/13/2017   • Leukocytosis [D72.829] 10/13/2017      Resolved Hospital Problems    Diagnosis Date Noted  Date Resolved   No resolved problems to display.       Plan:     Treat for community acquired pneumonia.  Obtain strep pneumo and legionella antigens, RVP.  Findings seen on CT scan but overall very minimal symptoms, however, she has significant leukocytosis of 23,000.  Continue ceftriaxone and azithromycin.  Consider dedicated CT of her chest but hold off for now.  With her recent fall she sustained an L1 compression fracture and I'm checking MRI and consulting neurosurgery. Fairly mild symptoms at this point.   pharmacologic DVT prophylaxis But hold aspirin for now.  Further care and management based on her condition and as her course unfolds.  I do believe a lot of her nausea and vomiting is related to her constipation and will give her an aggressive bowel regimen.    The patient's  is her healthcare surrogate and she confirms that she is a full code    I discussed the patients findings and my recommendations with patient and nursing staff.          Miguel Gonzales MD  [facility]  10/13/17  1:09 AM

## 2017-10-14 ENCOUNTER — APPOINTMENT (OUTPATIENT)
Dept: CT IMAGING | Facility: HOSPITAL | Age: 82
End: 2017-10-14

## 2017-10-14 PROBLEM — D64.9 ANEMIA: Status: ACTIVE | Noted: 2017-10-14

## 2017-10-14 LAB
ANION GAP SERPL CALCULATED.3IONS-SCNC: 13.4 MMOL/L
BASOPHILS # BLD AUTO: 0.02 10*3/MM3 (ref 0–0.2)
BASOPHILS NFR BLD AUTO: 0.2 % (ref 0–1.5)
BUN BLD-MCNC: 14 MG/DL (ref 8–23)
BUN/CREAT SERPL: 17.3 (ref 7–25)
CALCIUM SPEC-SCNC: 8.8 MG/DL (ref 8.6–10.5)
CHLORIDE SERPL-SCNC: 104 MMOL/L (ref 98–107)
CO2 SERPL-SCNC: 23.6 MMOL/L (ref 22–29)
CREAT BLD-MCNC: 0.81 MG/DL (ref 0.57–1)
DEPRECATED RDW RBC AUTO: 74.2 FL (ref 37–54)
EOSINOPHIL # BLD AUTO: 0.97 10*3/MM3 (ref 0–0.7)
EOSINOPHIL NFR BLD AUTO: 7.6 % (ref 0.3–6.2)
ERYTHROCYTE [DISTWIDTH] IN BLOOD BY AUTOMATED COUNT: 23.8 % (ref 11.7–13)
GFR SERPL CREATININE-BSD FRML MDRD: 68 ML/MIN/1.73
GLUCOSE BLD-MCNC: 85 MG/DL (ref 65–99)
HCT VFR BLD AUTO: 29.1 % (ref 35.6–45.5)
HGB BLD-MCNC: 9.2 G/DL (ref 11.9–15.5)
IMM GRANULOCYTES # BLD: 0.07 10*3/MM3 (ref 0–0.03)
IMM GRANULOCYTES NFR BLD: 0.5 % (ref 0–0.5)
LYMPHOCYTES # BLD AUTO: 1.67 10*3/MM3 (ref 0.9–4.8)
LYMPHOCYTES NFR BLD AUTO: 13 % (ref 19.6–45.3)
MCH RBC QN AUTO: 27.1 PG (ref 26.9–32)
MCHC RBC AUTO-ENTMCNC: 31.6 G/DL (ref 32.4–36.3)
MCV RBC AUTO: 85.8 FL (ref 80.5–98.2)
MONOCYTES # BLD AUTO: 1.54 10*3/MM3 (ref 0.2–1.2)
MONOCYTES NFR BLD AUTO: 12 % (ref 5–12)
NEUTROPHILS # BLD AUTO: 8.53 10*3/MM3 (ref 1.9–8.1)
NEUTROPHILS NFR BLD AUTO: 66.7 % (ref 42.7–76)
NRBC BLD MANUAL-RTO: 0.4 /100 WBC (ref 0–0)
PLATELET # BLD AUTO: 285 10*3/MM3 (ref 140–500)
PMV BLD AUTO: 11.2 FL (ref 6–12)
POTASSIUM BLD-SCNC: 3.7 MMOL/L (ref 3.5–5.2)
PROCALCITONIN SERPL-MCNC: 0.09 NG/ML (ref 0.1–0.25)
RBC # BLD AUTO: 3.39 10*6/MM3 (ref 3.9–5.2)
SODIUM BLD-SCNC: 141 MMOL/L (ref 136–145)
WBC NRBC COR # BLD: 12.8 10*3/MM3 (ref 4.5–10.7)

## 2017-10-14 PROCEDURE — 80048 BASIC METABOLIC PNL TOTAL CA: CPT | Performed by: HOSPITALIST

## 2017-10-14 PROCEDURE — 71250 CT THORAX DX C-: CPT

## 2017-10-14 PROCEDURE — 85025 COMPLETE CBC W/AUTO DIFF WBC: CPT | Performed by: INTERNAL MEDICINE

## 2017-10-14 PROCEDURE — 84145 PROCALCITONIN (PCT): CPT | Performed by: HOSPITALIST

## 2017-10-14 PROCEDURE — 25010000002 CEFTRIAXONE PER 250 MG: Performed by: INTERNAL MEDICINE

## 2017-10-14 PROCEDURE — 25010000002 ENOXAPARIN PER 10 MG: Performed by: INTERNAL MEDICINE

## 2017-10-14 RX ADMIN — AZITHROMYCIN DIHYDRATE 500 MG: 500 INJECTION, POWDER, LYOPHILIZED, FOR SOLUTION INTRAVENOUS at 01:39

## 2017-10-14 RX ADMIN — ENOXAPARIN SODIUM 40 MG: 40 INJECTION SUBCUTANEOUS at 08:25

## 2017-10-14 RX ADMIN — POLYETHYLENE GLYCOL 3350 17 G: 17 POWDER, FOR SOLUTION ORAL at 08:25

## 2017-10-14 RX ADMIN — DOCUSATE SODIUM 100 MG: 100 CAPSULE, LIQUID FILLED ORAL at 08:25

## 2017-10-14 RX ADMIN — ATORVASTATIN CALCIUM 10 MG: 10 TABLET, FILM COATED ORAL at 08:25

## 2017-10-14 RX ADMIN — POLYETHYLENE GLYCOL 3350 17 G: 17 POWDER, FOR SOLUTION ORAL at 18:05

## 2017-10-14 RX ADMIN — OXYCODONE HYDROCHLORIDE AND ACETAMINOPHEN 500 MG: 500 TABLET ORAL at 08:25

## 2017-10-14 RX ADMIN — LISINOPRIL 10 MG: 10 TABLET ORAL at 20:30

## 2017-10-14 RX ADMIN — CEFTRIAXONE SODIUM 1 G: 1 INJECTION, SOLUTION INTRAVENOUS at 18:05

## 2017-10-14 RX ADMIN — AZITHROMYCIN DIHYDRATE 500 MG: 500 INJECTION, POWDER, LYOPHILIZED, FOR SOLUTION INTRAVENOUS at 23:56

## 2017-10-14 NOTE — PLAN OF CARE
Problem: Patient Care Overview (Adult)  Goal: Plan of Care Review  Outcome: Ongoing (interventions implemented as appropriate)    10/14/17 0530   Coping/Psychosocial Response Interventions   Plan Of Care Reviewed With patient   Patient Care Overview   Progress improving   Outcome Evaluation   Outcome Summary/Follow up Plan No c/o pain. infrequent cough present. Afebrile. Bedalarm on for safety d/t previous fall . Pt is alert and oriented. Waiting for Neuro to see for back. Was vomiting but now has had a BM and no longer vomiting and no c/o pain       Goal: Adult Individualization and Mutuality  Outcome: Ongoing (interventions implemented as appropriate)  Goal: Discharge Needs Assessment  Outcome: Ongoing (interventions implemented as appropriate)    Problem: Pneumonia (Adult)  Goal: Signs and Symptoms of Listed Potential Problems Will be Absent or Manageable (Pneumonia)  Outcome: Ongoing (interventions implemented as appropriate)    Problem: Fall Risk (Adult)  Goal: Absence of Falls  Outcome: Ongoing (interventions implemented as appropriate)

## 2017-10-14 NOTE — CONSULTS
Inpatient Consult to Neurosurgery  Consult performed by: LASHONDA RAUSCH  Consult ordered by: ALISSON LOPES          Patient Care Team:  Yoly Patel MD as PCP - General  Manuel Bello Jr., MD as Consulting Physician (Hematology and Oncology)  Adamaris Padron MD as Referring Physician (Internal Medicine)    Chief complaint: Back pain    Subjective     This patient fell several days ago.  Immediately after the fall she had fairly severe pain in her back.  Over the course of the last day or 2 however the back pain has gotten a lot better and she is now able to walk up and down the wright with minimal assistance.  There was never any radiation into her legs.    History of Present Illness    Review of Systems   Respiratory: Negative for shortness of breath.    Cardiovascular: Negative for chest pain.   All other systems reviewed and are negative.       Past Medical History:   Diagnosis Date   • Anemia     Normocytic anemia of unclear ediology   • Arthritis     Osteoarthritis involving knees and left shoulder   • Eosinophilia     Persistent mild eosinophilia of unknown etiology   • History of colonic polyps    • Hyperlipidemia    • Hypertension    ,   Past Surgical History:   Procedure Laterality Date   • COLONOSCOPY      H/O colonic polyps with regular colonoscopies at 5 year intervals.    • COLONOSCOPY  2013    By Dr. Gudino, no polyps identified.   • HEMORRHOIDECTOMY  1965   • HYSTERECTOMY  1971    Partial, secondary to endometriosis   • JOINT REPLACEMENT  2014    Left total hip arthroplasy   • KNEE SURGERY  1997    Arthroscopy of left knee 1997; right knee 2003   ,   Family History   Problem Relation Age of Onset   • Coronary artery disease Father    • Heart disease Father    • Heart attack Father 72   • Coronary artery disease Brother    • Heart disease Brother      CABG   ,   Social History   Substance Use Topics   • Smoking status: Never Smoker   • Smokeless tobacco: None   • Alcohol use Yes       Comment: Occasional   ,   Prescriptions Prior to Admission   Medication Sig Dispense Refill Last Dose   • Ascorbic Acid (VITAMIN C) 500 MG capsule Take  by mouth.   Taking   • aspirin 81 MG EC tablet Take 81 mg by mouth Every Night.   Taking   • Calcium Carbonate-Vitamin D (CALCIUM 500 + D) 500-125 MG-UNIT tablet Take  by mouth.   Taking   • Docusate Sodium 100 MG capsule Take 100 mg by mouth Every Night.   Taking   • Glucosamine-Chondroit-Vit C-Mn (GLUCOSAMINE CHONDR 1500 COMPLX PO) Take  by mouth.   Taking   • lisinopril (PRINIVIL,ZESTRIL) 10 MG tablet Take 1 tablet by mouth Daily. (Patient taking differently: Take 10 mg by mouth Every Night.) 90 tablet 1    • lovastatin (MEVACOR) 20 MG tablet Take 1 tablet by mouth Every Night. 90 tablet 1     and Allergies:  Diclofenac sodium    Objective      Vital Signs  Temp:  [97.2 °F (36.2 °C)-98.6 °F (37 °C)] 97.9 °F (36.6 °C)  Heart Rate:  [72-80] 72  Resp:  [16] 16  BP: (112-150)/(68-75) 112/73    Physical Exam   Constitutional: She is oriented to person, place, and time. She appears well-developed and well-nourished.   HENT:   Head: Normocephalic and atraumatic.   Eyes: Conjunctivae are normal. Pupils are equal, round, and reactive to light.   Fundoscopic exam:       The right eye shows no papilledema. The right eye shows venous pulsations.        The left eye shows no papilledema. The left eye shows venous pulsations.   Neck: Carotid bruit is not present.   Neurological: She is alert and oriented to person, place, and time. She has normal strength and normal reflexes. No cranial nerve deficit or sensory deficit. She displays a negative Romberg sign. GCS eye subscore is 4. GCS verbal subscore is 5. GCS motor subscore is 6.       Results Review:    I reviewed the patient's new clinical results.  I reviewed her imaging which shows an L1 compression fracture.        Assessment/Plan     Principal Problem:    Pneumonia of both lower lobes due to infectious  organism  Active Problems:    Closed wedge compression fracture of first lumbar vertebra    Fall    Constipation    Nausea & vomiting    Leukocytosis    Anemia    I told the patient that since she is feeling somewhat better we should really try treating this conservatively.  I would not recommend a kyphoplasty because I think the risk and complications of a kyphoplasty are greater in the potential benefit.  In addition since she is feeling better I really don't think I would even put her in a brace.  She agrees.  We will sign off and please let us know if there is anything further we can do for her.    Assessment & Plan    I discussed the patients findings and my recommendations with patient    Rufus Marcus MD  10/14/17  3:35 PM    Time: na

## 2017-10-14 NOTE — PLAN OF CARE
Problem: Patient Care Overview (Adult)  Goal: Plan of Care Review  Outcome: Ongoing (interventions implemented as appropriate)    10/14/17 0159   Coping/Psychosocial Response Interventions   Plan Of Care Reviewed With patient   Patient Care Overview   Progress improving   Outcome Evaluation   Outcome Summary/Follow up Plan pt doing well, no complaints of pain, up ambulating with asst, infrequent NP cough, afebrile, vss.         Problem: Pneumonia (Adult)  Goal: Signs and Symptoms of Listed Potential Problems Will be Absent or Manageable (Pneumonia)  Outcome: Ongoing (interventions implemented as appropriate)    Problem: Fall Risk (Adult)  Goal: Absence of Falls  Outcome: Ongoing (interventions implemented as appropriate)

## 2017-10-14 NOTE — PROGRESS NOTES
"DAILY PROGRESS NOTE  [facility]    Patient Identification:  Name: Ama Billy  Age: 83 y.o.  Sex: female  :  1933  MRN: [id number]         Primary Care Physician: Yoly Patel MD      Subjective  Overall pt feeling much better.  BM yesterday and that helped a lot.  Tolerated breakfast well.  Back pain minimal when she is lying down.     Reviewed her history again.  State she started coughing about 2 days after her fall (which would be about Tu).  No F/S/C.  Cough is mostly dry.  No SOA.      Objective:  General Appearance:  Comfortable, well-appearing, in no acute distress and not in pain.    Vital signs: (most recent): Blood pressure 150/74, pulse 77, temperature 98.6 °F (37 °C), temperature source Oral, resp. rate 16, height 68\" (172.7 cm), weight 188 lb 6.4 oz (85.5 kg), SpO2 92 %, not currently breastfeeding.    Lungs:  Normal respiratory rate and normal effort.  Breath sounds clear to auscultation.    Heart: Normal rate.  Regular rhythm.    Abdomen: Abdomen is soft and non-distended.  Bowel sounds are normal.   There is no abdominal tenderness.     Extremities: There is no dependent edema.    Neurological: Patient is alert and oriented to person, place and time.    Skin:  Warm and dry.                Vital signs in last 24 hours:  Temp:  [97.2 °F (36.2 °C)-98.6 °F (37 °C)] 98.6 °F (37 °C)  Heart Rate:  [68-80] 77  Resp:  [16] 16  BP: (130-150)/(68-76) 150/74    Intake/Output:    Intake/Output Summary (Last 24 hours) at 10/14/17 0952  Last data filed at 10/14/17 0850   Gross per 24 hour   Intake              380 ml   Output              975 ml   Net             -595 ml           Results from last 7 days  Lab Units 10/14/17  0655 10/13/17  0532 10/12/17  1650   WBC 10*3/mm3 12.80* 18.06* 23.05*   HEMOGLOBIN g/dL 9.2* 9.6* 10.5*   PLATELETS 10*3/mm3 285 272 299     Results from last 7 days  Lab Units 10/14/17  0655 10/13/17  0532 10/12/17  1650   SODIUM mmol/L 141 139 137 "   POTASSIUM mmol/L 3.7 3.6 3.5   CHLORIDE mmol/L 104 102 97*   CO2 mmol/L 23.6 22.1 22.2   BUN mg/dL 14 14 14   CREATININE mg/dL 0.81 0.90 1.01*   GLUCOSE mg/dL 85 104* 134*   Estimated Creatinine Clearance: 60.2 mL/min (by C-G formula based on Cr of 0.81).  Results from last 7 days  Lab Units 10/14/17  0655 10/13/17  0532 10/12/17  1650   CALCIUM mg/dL 8.8 8.9 9.9   ALBUMIN g/dL  --   --  4.40     Results from last 7 days  Lab Units 10/12/17  1650   ALBUMIN g/dL 4.40   BILIRUBIN mg/dL 1.6*   ALK PHOS U/L 92   AST (SGOT) U/L 19   ALT (SGPT) U/L 13       Assessment:  Principal Problem:    Pneumonia of both lower lobes due to infectious organism:  Working dx.  Atypical. (nl ProCal) Check CT, TSpot, Cont antibiotics for now...   Active Problems:    Closed wedge compression fracture of first lumbar vertebra:  PT eval.  NS eval pending.     Fall    Constipation:  Improved.     Nausea & vomiting:  None today.     Leukocytosis:  Reactive vs infectious.     Anemia:  Check iron panel, b12....      Plan:  Please see above.     Ilia Jaeger MD  10/14/2017  9:52 AM

## 2017-10-15 VITALS
BODY MASS INDEX: 28.55 KG/M2 | HEIGHT: 68 IN | TEMPERATURE: 97 F | HEART RATE: 70 BPM | SYSTOLIC BLOOD PRESSURE: 174 MMHG | OXYGEN SATURATION: 94 % | DIASTOLIC BLOOD PRESSURE: 78 MMHG | WEIGHT: 188.4 LBS | RESPIRATION RATE: 16 BRPM

## 2017-10-15 LAB
BASOPHILS # BLD AUTO: 0.02 10*3/MM3 (ref 0–0.2)
BASOPHILS NFR BLD AUTO: 0.2 % (ref 0–1.5)
DEPRECATED RDW RBC AUTO: 73.8 FL (ref 37–54)
EOSINOPHIL # BLD AUTO: 0.9 10*3/MM3 (ref 0–0.7)
EOSINOPHIL NFR BLD AUTO: 9 % (ref 0.3–6.2)
ERYTHROCYTE [DISTWIDTH] IN BLOOD BY AUTOMATED COUNT: 23.7 % (ref 11.7–13)
ERYTHROCYTE [SEDIMENTATION RATE] IN BLOOD: 35 MM/HR (ref 0–30)
FOLATE SERPL-MCNC: 8.7 NG/ML (ref 4.78–24.2)
HCT VFR BLD AUTO: 29.2 % (ref 35.6–45.5)
HGB BLD-MCNC: 9 G/DL (ref 11.9–15.5)
IMM GRANULOCYTES # BLD: 0.05 10*3/MM3 (ref 0–0.03)
IMM GRANULOCYTES NFR BLD: 0.5 % (ref 0–0.5)
IRON 24H UR-MRATE: 33 MCG/DL (ref 37–145)
IRON SATN MFR SERPL: 13 % (ref 20–50)
LYMPHOCYTES # BLD AUTO: 1.23 10*3/MM3 (ref 0.9–4.8)
LYMPHOCYTES NFR BLD AUTO: 12.3 % (ref 19.6–45.3)
MCH RBC QN AUTO: 26.6 PG (ref 26.9–32)
MCHC RBC AUTO-ENTMCNC: 30.8 G/DL (ref 32.4–36.3)
MCV RBC AUTO: 86.4 FL (ref 80.5–98.2)
MONOCYTES # BLD AUTO: 1.14 10*3/MM3 (ref 0.2–1.2)
MONOCYTES NFR BLD AUTO: 11.4 % (ref 5–12)
NEUTROPHILS # BLD AUTO: 6.64 10*3/MM3 (ref 1.9–8.1)
NEUTROPHILS NFR BLD AUTO: 66.6 % (ref 42.7–76)
NRBC BLD MANUAL-RTO: 0.3 /100 WBC (ref 0–0)
PLATELET # BLD AUTO: 337 10*3/MM3 (ref 140–500)
PMV BLD AUTO: 10.7 FL (ref 6–12)
PROCALCITONIN SERPL-MCNC: 0.06 NG/ML (ref 0.1–0.25)
RBC # BLD AUTO: 3.38 10*6/MM3 (ref 3.9–5.2)
RETICS/RBC NFR AUTO: 1.66 % (ref 0.5–1.5)
TIBC SERPL-MCNC: 256 MCG/DL
TRANSFERRIN SERPL-MCNC: 172 MG/DL (ref 200–360)
VIT B12 BLD-MCNC: 253 PG/ML (ref 211–946)
WBC NRBC COR # BLD: 9.98 10*3/MM3 (ref 4.5–10.7)

## 2017-10-15 PROCEDURE — 85025 COMPLETE CBC W/AUTO DIFF WBC: CPT | Performed by: INTERNAL MEDICINE

## 2017-10-15 PROCEDURE — G8979 MOBILITY GOAL STATUS: HCPCS

## 2017-10-15 PROCEDURE — G8980 MOBILITY D/C STATUS: HCPCS

## 2017-10-15 PROCEDURE — 25010000002 ENOXAPARIN PER 10 MG: Performed by: INTERNAL MEDICINE

## 2017-10-15 PROCEDURE — 85045 AUTOMATED RETICULOCYTE COUNT: CPT | Performed by: HOSPITALIST

## 2017-10-15 PROCEDURE — 82607 VITAMIN B-12: CPT | Performed by: HOSPITALIST

## 2017-10-15 PROCEDURE — G8978 MOBILITY CURRENT STATUS: HCPCS

## 2017-10-15 PROCEDURE — 84466 ASSAY OF TRANSFERRIN: CPT | Performed by: HOSPITALIST

## 2017-10-15 PROCEDURE — 97110 THERAPEUTIC EXERCISES: CPT

## 2017-10-15 PROCEDURE — 84145 PROCALCITONIN (PCT): CPT | Performed by: HOSPITALIST

## 2017-10-15 PROCEDURE — 97161 PT EVAL LOW COMPLEX 20 MIN: CPT

## 2017-10-15 PROCEDURE — 83540 ASSAY OF IRON: CPT | Performed by: HOSPITALIST

## 2017-10-15 PROCEDURE — 85652 RBC SED RATE AUTOMATED: CPT | Performed by: HOSPITALIST

## 2017-10-15 PROCEDURE — 82746 ASSAY OF FOLIC ACID SERUM: CPT | Performed by: HOSPITALIST

## 2017-10-15 RX ORDER — BISACODYL 10 MG
10 SUPPOSITORY, RECTAL RECTAL ONCE
Status: COMPLETED | OUTPATIENT
Start: 2017-10-15 | End: 2017-10-15

## 2017-10-15 RX ORDER — POLYETHYLENE GLYCOL 3350 17 G/17G
17 POWDER, FOR SOLUTION ORAL 2 TIMES DAILY
Qty: 60 EACH | Refills: 0 | Status: SHIPPED | OUTPATIENT
Start: 2017-10-15 | End: 2018-01-16

## 2017-10-15 RX ORDER — AZITHROMYCIN 250 MG/1
TABLET, FILM COATED ORAL
Qty: 6 TABLET | Refills: 0 | Status: SHIPPED | OUTPATIENT
Start: 2017-10-15 | End: 2017-11-17 | Stop reason: HOSPADM

## 2017-10-15 RX ORDER — FERROUS SULFATE TAB EC 324 MG (65 MG FE EQUIVALENT) 324 (65 FE) MG
324 TABLET DELAYED RESPONSE ORAL
Qty: 30 TABLET | Refills: 0 | Status: SHIPPED | OUTPATIENT
Start: 2017-10-15 | End: 2017-11-17 | Stop reason: HOSPADM

## 2017-10-15 RX ADMIN — ATORVASTATIN CALCIUM 10 MG: 10 TABLET, FILM COATED ORAL at 09:09

## 2017-10-15 RX ADMIN — OXYCODONE HYDROCHLORIDE AND ACETAMINOPHEN 500 MG: 500 TABLET ORAL at 09:09

## 2017-10-15 RX ADMIN — ENOXAPARIN SODIUM 40 MG: 40 INJECTION SUBCUTANEOUS at 09:09

## 2017-10-15 RX ADMIN — DOCUSATE SODIUM 100 MG: 100 CAPSULE, LIQUID FILLED ORAL at 09:09

## 2017-10-15 RX ADMIN — POLYETHYLENE GLYCOL 3350 17 G: 17 POWDER, FOR SOLUTION ORAL at 09:09

## 2017-10-15 RX ADMIN — BISACODYL 10 MG: 10 SUPPOSITORY RECTAL at 13:05

## 2017-10-15 NOTE — PLAN OF CARE
Problem: Patient Care Overview (Adult)  Goal: Plan of Care Review  Outcome: Ongoing (interventions implemented as appropriate)    10/15/17 0441 10/15/17 0444   Coping/Psychosocial Response Interventions   Plan Of Care Reviewed With --  patient   Patient Care Overview   Progress --  no change   Outcome Evaluation   Outcome Summary/Follow up Plan pt slept well throughout the night, states she continues to feel constipated- pt refused ducolax supp , VSS, --        Goal: Adult Individualization and Mutuality  Outcome: Ongoing (interventions implemented as appropriate)  Goal: Discharge Needs Assessment  Outcome: Ongoing (interventions implemented as appropriate)    Problem: Pneumonia (Adult)  Goal: Signs and Symptoms of Listed Potential Problems Will be Absent or Manageable (Pneumonia)  Outcome: Ongoing (interventions implemented as appropriate)    Problem: Fall Risk (Adult)  Goal: Absence of Falls  Outcome: Ongoing (interventions implemented as appropriate)

## 2017-10-15 NOTE — DISCHARGE SUMMARY
PHYSICIAN DISCHARGE SUMMARY                                                                        [facility]    Patient Identification:  Name: Ama Billy  Age: 83 y.o.  Sex: female  :  1933  MRN: [id number]  Primary Care Physician: Yoly Patel MD    Admit date: 10/12/2017  Discharge date and time: 10/15/2017     Discharged Condition: good    Discharge Diagnoses:Principal Problem:    Atypical pneumonia  Active Problems:    Closed wedge compression fracture of first lumbar vertebra    Fall    Constipation    Nausea & vomiting    Leukocytosis    Anemia         Hospital Course:  Pleasant 83-year-old female presenting with several complaints.  This included her constipation social nausea and vomiting.  Involve the fall resulting in back and hip pain as well as complaints of a cough with an abnormal initial CT.    Concerning the cough and CT scan.  Punctate reticular nodular densities are noted in the lung bases on the CT of the abdomen.  She did have a marked leukocytosis on presentation of approximately 18 K but a normal pro-calcitonin level.  She was initially put on Rocephin and Zithromax.  Max temperature during hospitalization was 99.1.  Her cough has improved significantly.  Follow-up CT of the chest showed minimal reticular nodular changes.  Bronchial thickening at the lung bases most consistent with area of inflammatory changes or possible atypical pneumonia.  At this point her Rocephin can be discontinued and I would complete a course of Zithromax for possible atypical pneumonia.  Concerning the fall.  Workup did not show any evidence of hip injury however she did have a T1 compression fracture.  Neurosurgical consultation was obtained.  Symptomatically she has recovered very nicely with very conservative treatment however.  Was recommended to continue with conservative treatment and not to proceed with  kyphoplasty.  She has done quite well and is actually ambulatory again.  Concerning her constipation, which today is her major complaint.  She was started on MiraLAX and did have a reasonable bowel movement day before yesterday.  None since then however.  She still complains of constipation but has been declining the use of Dulcolax suppositories which have been ordered.  I did convince her to go ahead and have one today and after her bowel movement she should be able to be discharge remainder treatment follow-up as an outpatient.  Would recommend she continue her MiraLAX and follow-up with primary care physician for further instructions about regime.  Her nausea and vomiting did improve after first bowel movement.        Consults:     Consults     Date and Time Order Name Status Description    10/13/2017 0104 Inpatient Consult to Neurosurgery Completed     10/12/2017 9640 [facility] (on-call MD unless specified) Completed             Discharge Exam:  Physical Exam   Afebrile vital signs stable.  Well-developed well-nourished female in no apparent distress.  Lungs clear to auscultation good air movement.  Heart regular rate and rhythm.  Abdomen with normal bowel sounds.  No tenderness organomegaly guarding or masses.  Extremities with no clubbing cyanosis or edema.  Integument with normal color and turgor.  Alert oriented conversant cooperative and pleasant.       Disposition:  Home    Patient Instructions:    Ama Billy   Home Medication Instructions GARRICK:[id number]    Printed on:10/15/17 1310   Medication Information                      Ascorbic Acid (VITAMIN C) 500 MG capsule  Take  by mouth.             aspirin 81 MG EC tablet  Take 81 mg by mouth Every Night.             azithromycin (ZITHROMAX) 250 MG tablet  Take 2 tablets the first day, then 1 tablet daily for 4 days.             Calcium Carbonate-Vitamin D (CALCIUM 500 + D) 500-125 MG-UNIT tablet  Take  by mouth.             Docusate Sodium 100 MG  capsule  Take 100 mg by mouth Every Night.             ferrous sulfate 324 (65 Fe) MG tablet delayed-release EC tablet  Take 1 tablet by mouth Daily With Breakfast.             Glucosamine-Chondroit-Vit C-Mn (GLUCOSAMINE CHONDR 1500 COMPLX PO)  Take  by mouth.             lisinopril (PRINIVIL,ZESTRIL) 10 MG tablet  Take 1 tablet by mouth Daily.             lovastatin (MEVACOR) 20 MG tablet  Take 1 tablet by mouth Every Night.             polyethylene glycol (MIRALAX) packet  Take 17 g by mouth 2 (Two) Times a Day.               No future appointments.  Additional Instructions for the Follow-ups that You Need to Schedule     Discharge Follow-up with PCP    As directed    Follow Up Details:  1 week             Follow-up Information     Follow up with Yoly Patel MD .    Specialty:  Family Medicine    Why:  1 week    Contact information:    [address] [facility] [address]  [phone]          Discharge Order     Start     Ordered    10/15/17 1306  Discharge patient  Once     Expected Discharge Date:  10/15/17    Discharge Disposition:  Home or Self Care        10/15/17 1309            Total time spent discharging patient including evaluation,post hospitalization follow up,  medication and post hospitalization instructions and education total time exceeds 30 minutes.    Signed:  Ilia Jaeger MD  10/15/2017  1:10 PM    EMR Dragon/Transcription disclaimer:   Much of this encounter note is an electronic transcription/translation of spoken language to printed text. The electronic translation of spoken language may permit erroneous, or at times, nonsensical words or phrases to be inadvertently transcribed; Although I have reviewed the note for such errors, some may still exist.

## 2017-10-15 NOTE — PLAN OF CARE
Problem: Patient Care Overview (Adult)  Goal: Plan of Care Review    10/15/17 1039   Outcome Evaluation   Outcome Summary/Follow up Plan Pt is an 84 y/o female admitted to hospital for pneumonia. Pt has a recent history of a fall. Pt presents at baseline function but with increased pain demonstrating indpendence with majority of mobility and requiring supervision/SBA for gait and stairs. Pt scored a 25/28 on the Tinetti indicating a low fall risk. Pt was educated on monitoring for dizziness upon standing and before beginning to work and to infform her physician if she notices an increase in dizziness with turning her head or other movements. Pt presents at baseline and does not require skilled PT at this time.

## 2017-10-16 NOTE — PROGRESS NOTES
Continued Stay Note  [facility]     Patient Name: Ama Billy  MRN: [id number]  Today's Date: 10/16/2017    Admit Date: 10/12/2017          Discharge Plan     None              Discharge Codes       10/16/17 1309    Discharge Codes    Discharge Codes 01  Discharge to home        Expected Discharge Date and Time     Expected Discharge Date Expected Discharge Time    Oct 15, 2017             Melissa Trejo RN

## 2017-10-17 LAB — BACTERIA SPEC AEROBE CULT: NORMAL

## 2017-10-18 RX ORDER — LISINOPRIL 10 MG/1
TABLET ORAL
Qty: 90 TABLET | Refills: 0 | Status: SHIPPED | OUTPATIENT
Start: 2017-10-18 | End: 2017-11-17 | Stop reason: HOSPADM

## 2017-10-20 ENCOUNTER — OFFICE VISIT (OUTPATIENT)
Dept: FAMILY MEDICINE CLINIC | Facility: CLINIC | Age: 82
End: 2017-10-20

## 2017-10-20 VITALS
HEART RATE: 83 BPM | HEIGHT: 68 IN | OXYGEN SATURATION: 97 % | WEIGHT: 182 LBS | DIASTOLIC BLOOD PRESSURE: 90 MMHG | RESPIRATION RATE: 18 BRPM | SYSTOLIC BLOOD PRESSURE: 150 MMHG | BODY MASS INDEX: 27.58 KG/M2

## 2017-10-20 DIAGNOSIS — Z23 ENCOUNTER FOR IMMUNIZATION: ICD-10-CM

## 2017-10-20 DIAGNOSIS — D72.829 LEUKOCYTOSIS, UNSPECIFIED TYPE: ICD-10-CM

## 2017-10-20 DIAGNOSIS — J18.9 ATYPICAL PNEUMONIA: Primary | ICD-10-CM

## 2017-10-20 DIAGNOSIS — S32.010D CLOSED WEDGE COMPRESSION FRACTURE OF FIRST LUMBAR VERTEBRA WITH ROUTINE HEALING, SUBSEQUENT ENCOUNTER: ICD-10-CM

## 2017-10-20 DIAGNOSIS — D50.0 IRON DEFICIENCY ANEMIA DUE TO CHRONIC BLOOD LOSS: ICD-10-CM

## 2017-10-20 PROCEDURE — 99214 OFFICE O/P EST MOD 30 MIN: CPT | Performed by: FAMILY MEDICINE

## 2017-10-20 PROCEDURE — 90662 IIV NO PRSV INCREASED AG IM: CPT | Performed by: FAMILY MEDICINE

## 2017-10-20 PROCEDURE — G0008 ADMIN INFLUENZA VIRUS VAC: HCPCS | Performed by: FAMILY MEDICINE

## 2017-10-20 NOTE — PROGRESS NOTES
"Subjective   Ama Billy is a 83 y.o. female.     History of Present Illness Fell 10/9 bc was dizzy and went to hosp 10/12 and found out had pneumonia. No fever or cough. Went bc of pain in the back and R hip.  Had lumbar compression fx and then an acute L1 fx as well.  Doing better in that can walk, albeit slowly. Breathing is fine. Does not have to go up steps at home. Front steps are OK.  No appetite yet.Cannot sleep in bed bc of pain. Sleeping in recliner.   Has lost 6 pounds.   Bruises easily, on ASA.  She says Hgb on 10/4 was 10.2/. Sees Dr Bello. Is taking iron every am.Hosps hgb was in the 9 range however. Was sent home on iron.    The following portions of the patient's history were reviewed and updated as appropriate: allergies, current medications, past social history and problem list.    Review of Systems   Constitutional: Negative for activity change, appetite change and unexpected weight change.   HENT: Negative for nosebleeds and trouble swallowing.    Eyes: Negative for pain and visual disturbance.   Respiratory: Negative for chest tightness, shortness of breath and wheezing.    Cardiovascular: Negative for chest pain and palpitations.   Gastrointestinal: Negative for abdominal pain and blood in stool.   Endocrine: Negative.    Genitourinary: Negative for difficulty urinating and hematuria.   Musculoskeletal: Positive for back pain. Negative for joint swelling.   Skin: Negative for color change and rash.   Allergic/Immunologic: Negative.    Neurological: Negative for syncope and speech difficulty.   Hematological: Negative for adenopathy.   Psychiatric/Behavioral: Negative for agitation and confusion.   All other systems reviewed and are negative.      Objective   /90  Pulse 83  Resp 18  Ht 68\" (172.7 cm)  Wt 182 lb (82.6 kg)  SpO2 97%  BMI 27.67 kg/m2  Physical Exam   Constitutional: She is oriented to person, place, and time. She appears well-developed and well-nourished. No " distress.   HENT:   Head: Normocephalic and atraumatic.   Eyes: Conjunctivae and EOM are normal. Pupils are equal, round, and reactive to light. Right eye exhibits no discharge. Left eye exhibits no discharge. No scleral icterus.   Neck: Normal range of motion. Neck supple. No tracheal deviation present. No thyromegaly present.   No bruits   Cardiovascular: Normal rate, regular rhythm, normal heart sounds, intact distal pulses and normal pulses.  Exam reveals no gallop.    No murmur heard.  Pulmonary/Chest: Effort normal and breath sounds normal. No respiratory distress. She has no wheezes. She has no rales.   Musculoskeletal: Normal range of motion. She exhibits tenderness (L34 (L1 nontender)).   Lymphadenopathy:     She has no cervical adenopathy.   Neurological: She is alert and oriented to person, place, and time. She exhibits normal muscle tone. Coordination normal.   Skin: Skin is warm. No rash noted. No erythema. No pallor.   Psychiatric: She has a normal mood and affect. Her behavior is normal. Judgment and thought content normal.   Nursing note and vitals reviewed.      Assessment/Plan   Problem List Items Addressed This Visit        Respiratory    Atypical pneumonia - Primary       Musculoskeletal and Integument    Closed wedge compression fracture of first lumbar vertebra       Immune and Lymphatic    Leukocytosis       Hematopoietic and Hemostatic    Anemia    Relevant Orders    CBC & Differential        Doing well overall   Continue to try to sleep in her own bed.  Sees Dr Bello for CBC next in April.

## 2017-10-21 LAB
BASOPHILS # BLD AUTO: 0.05 10*3/MM3 (ref 0–0.2)
BASOPHILS NFR BLD AUTO: 0.5 % (ref 0–1.5)
DIFFERENTIAL COMMENT: NORMAL
EOSINOPHIL # BLD AUTO: 0.43 10*3/MM3 (ref 0–0.7)
EOSINOPHIL NFR BLD AUTO: 4.3 % (ref 0.3–6.2)
ERYTHROCYTE [DISTWIDTH] IN BLOOD BY AUTOMATED COUNT: 24 % (ref 11.7–13)
HCT VFR BLD AUTO: 36.3 % (ref 35.6–45.5)
HGB BLD-MCNC: 11.2 G/DL (ref 11.9–15.5)
IMM GRANULOCYTES # BLD: 0.1 10*3/MM3 (ref 0–0.03)
IMM GRANULOCYTES NFR BLD: 1 % (ref 0–0.5)
LYMPHOCYTES # BLD AUTO: 2.27 10*3/MM3 (ref 0.9–4.8)
LYMPHOCYTES NFR BLD AUTO: 22.5 % (ref 19.6–45.3)
MCH RBC QN AUTO: 27.3 PG (ref 26.9–32)
MCHC RBC AUTO-ENTMCNC: 30.9 G/DL (ref 32.4–36.3)
MCV RBC AUTO: 88.3 FL (ref 80.5–98.2)
MONOCYTES # BLD AUTO: 1.17 10*3/MM3 (ref 0.2–1.2)
MONOCYTES NFR BLD AUTO: 11.6 % (ref 5–12)
NEUTROPHILS # BLD AUTO: 6.09 10*3/MM3 (ref 1.9–8.1)
NEUTROPHILS NFR BLD AUTO: 60.1 % (ref 42.7–76)
NRBC BLD AUTO-RTO: 0 /100 WBC (ref 0–0)
PLATELET # BLD AUTO: 440 10*3/MM3 (ref 140–500)
PLATELET BLD QL SMEAR: NORMAL
RBC # BLD AUTO: 4.11 10*6/MM3 (ref 3.9–5.2)
RBC MORPH BLD: NORMAL
WBC # BLD AUTO: 10.11 10*3/MM3 (ref 4.5–10.7)

## 2017-10-23 RX ORDER — LOVASTATIN 20 MG/1
TABLET ORAL
Qty: 90 TABLET | Refills: 4 | Status: SHIPPED | OUTPATIENT
Start: 2017-10-23 | End: 2018-01-16 | Stop reason: SDUPTHER

## 2017-11-14 ENCOUNTER — HOSPITAL ENCOUNTER (INPATIENT)
Facility: HOSPITAL | Age: 82
LOS: 3 days | Discharge: HOME | End: 2017-11-17
Attending: EMERGENCY MEDICINE | Admitting: HOSPITALIST

## 2017-11-14 ENCOUNTER — APPOINTMENT (OUTPATIENT)
Dept: CT IMAGING | Facility: HOSPITAL | Age: 82
End: 2017-11-14

## 2017-11-14 DIAGNOSIS — E86.0 MILD DEHYDRATION: ICD-10-CM

## 2017-11-14 DIAGNOSIS — R19.7 NAUSEA VOMITING AND DIARRHEA: ICD-10-CM

## 2017-11-14 DIAGNOSIS — D64.9 SYMPTOMATIC ANEMIA: Primary | ICD-10-CM

## 2017-11-14 DIAGNOSIS — K92.0 COFFEE GROUND EMESIS: ICD-10-CM

## 2017-11-14 DIAGNOSIS — D72.829 LEUKOCYTOSIS, UNSPECIFIED TYPE: ICD-10-CM

## 2017-11-14 DIAGNOSIS — R11.2 NAUSEA VOMITING AND DIARRHEA: ICD-10-CM

## 2017-11-14 LAB
ABO GROUP BLD: NORMAL
ALBUMIN SERPL-MCNC: 4 G/DL (ref 3.5–5.2)
ALBUMIN/GLOB SERPL: 1.5 G/DL
ALP SERPL-CCNC: 77 U/L (ref 39–117)
ALT SERPL W P-5'-P-CCNC: 9 U/L (ref 1–33)
ANION GAP SERPL CALCULATED.3IONS-SCNC: 14.1 MMOL/L
AST SERPL-CCNC: 15 U/L (ref 1–32)
BASOPHILS # BLD AUTO: 0.04 10*3/MM3 (ref 0–0.2)
BASOPHILS NFR BLD AUTO: 0.2 % (ref 0–1.5)
BILIRUB SERPL-MCNC: 1 MG/DL (ref 0.1–1.2)
BILIRUB UR QL STRIP: NEGATIVE
BLD GP AB SCN SERPL QL: NEGATIVE
BUN BLD-MCNC: 36 MG/DL (ref 8–23)
BUN/CREAT SERPL: 32.1 (ref 7–25)
CALCIUM SPEC-SCNC: 9.4 MG/DL (ref 8.6–10.5)
CHLORIDE SERPL-SCNC: 99 MMOL/L (ref 98–107)
CLARITY UR: ABNORMAL
CO2 SERPL-SCNC: 25.9 MMOL/L (ref 22–29)
COLOR UR: YELLOW
CREAT BLD-MCNC: 1.12 MG/DL (ref 0.57–1)
DEPRECATED RDW RBC AUTO: 70.5 FL (ref 37–54)
EOSINOPHIL # BLD AUTO: 0.06 10*3/MM3 (ref 0–0.7)
EOSINOPHIL NFR BLD AUTO: 0.3 % (ref 0.3–6.2)
ERYTHROCYTE [DISTWIDTH] IN BLOOD BY AUTOMATED COUNT: 23.5 % (ref 11.7–13)
FERRITIN SERPL-MCNC: 588.1 NG/ML (ref 13–150)
FOLATE SERPL-MCNC: 9.27 NG/ML (ref 4.78–24.2)
GFR SERPL CREATININE-BSD FRML MDRD: 46 ML/MIN/1.73
GLOBULIN UR ELPH-MCNC: 2.7 GM/DL
GLUCOSE BLD-MCNC: 129 MG/DL (ref 65–99)
GLUCOSE UR STRIP-MCNC: NEGATIVE MG/DL
HCT VFR BLD AUTO: 21.4 % (ref 35.6–45.5)
HGB BLD-MCNC: 6.8 G/DL (ref 11.9–15.5)
HGB UR QL STRIP.AUTO: NEGATIVE
IMM GRANULOCYTES # BLD: 0.09 10*3/MM3 (ref 0–0.03)
IMM GRANULOCYTES NFR BLD: 0.5 % (ref 0–0.5)
IRON 24H UR-MRATE: 224 MCG/DL (ref 37–145)
IRON SATN MFR SERPL: 83 % (ref 20–50)
KETONES UR QL STRIP: NEGATIVE
LEUKOCYTE ESTERASE UR QL STRIP.AUTO: NEGATIVE
LYMPHOCYTES # BLD AUTO: 1.31 10*3/MM3 (ref 0.9–4.8)
LYMPHOCYTES NFR BLD AUTO: 7.6 % (ref 19.6–45.3)
MCH RBC QN AUTO: 26.6 PG (ref 26.9–32)
MCHC RBC AUTO-ENTMCNC: 31.8 G/DL (ref 32.4–36.3)
MCV RBC AUTO: 83.6 FL (ref 80.5–98.2)
MONOCYTES # BLD AUTO: 1.34 10*3/MM3 (ref 0.2–1.2)
MONOCYTES NFR BLD AUTO: 7.7 % (ref 5–12)
NEUTROPHILS # BLD AUTO: 14.49 10*3/MM3 (ref 1.9–8.1)
NEUTROPHILS NFR BLD AUTO: 83.7 % (ref 42.7–76)
NITRITE UR QL STRIP: NEGATIVE
NRBC BLD MANUAL-RTO: 1.6 /100 WBC (ref 0–0)
PH UR STRIP.AUTO: 5.5 [PH] (ref 5–8)
PLATELET # BLD AUTO: 309 10*3/MM3 (ref 140–500)
PMV BLD AUTO: 9.9 FL (ref 6–12)
POTASSIUM BLD-SCNC: 4.1 MMOL/L (ref 3.5–5.2)
PROT SERPL-MCNC: 6.7 G/DL (ref 6–8.5)
PROT UR QL STRIP: NEGATIVE
RBC # BLD AUTO: 2.56 10*6/MM3 (ref 3.9–5.2)
RH BLD: NEGATIVE
SODIUM BLD-SCNC: 139 MMOL/L (ref 136–145)
SP GR UR STRIP: 1.02 (ref 1–1.03)
TIBC SERPL-MCNC: 270 MCG/DL
TRANSFERRIN SERPL-MCNC: 181 MG/DL (ref 200–360)
UROBILINOGEN UR QL STRIP: ABNORMAL
VIT B12 BLD-MCNC: 181 PG/ML (ref 211–946)
WBC NRBC COR # BLD: 17.33 10*3/MM3 (ref 4.5–10.7)

## 2017-11-14 PROCEDURE — 86920 COMPATIBILITY TEST SPIN: CPT

## 2017-11-14 PROCEDURE — 86900 BLOOD TYPING SEROLOGIC ABO: CPT | Performed by: EMERGENCY MEDICINE

## 2017-11-14 PROCEDURE — 85018 HEMOGLOBIN: CPT | Performed by: HOSPITALIST

## 2017-11-14 PROCEDURE — 36430 TRANSFUSION BLD/BLD COMPNT: CPT

## 2017-11-14 PROCEDURE — 86900 BLOOD TYPING SEROLOGIC ABO: CPT

## 2017-11-14 PROCEDURE — 85025 COMPLETE CBC W/AUTO DIFF WBC: CPT | Performed by: EMERGENCY MEDICINE

## 2017-11-14 PROCEDURE — P9016 RBC LEUKOCYTES REDUCED: HCPCS

## 2017-11-14 PROCEDURE — 81003 URINALYSIS AUTO W/O SCOPE: CPT | Performed by: EMERGENCY MEDICINE

## 2017-11-14 PROCEDURE — 82607 VITAMIN B-12: CPT | Performed by: HOSPITALIST

## 2017-11-14 PROCEDURE — 0 IOPAMIDOL 61 % SOLUTION: Performed by: EMERGENCY MEDICINE

## 2017-11-14 PROCEDURE — 84466 ASSAY OF TRANSFERRIN: CPT | Performed by: HOSPITALIST

## 2017-11-14 PROCEDURE — 86901 BLOOD TYPING SEROLOGIC RH(D): CPT | Performed by: EMERGENCY MEDICINE

## 2017-11-14 PROCEDURE — 82746 ASSAY OF FOLIC ACID SERUM: CPT | Performed by: HOSPITALIST

## 2017-11-14 PROCEDURE — 86850 RBC ANTIBODY SCREEN: CPT | Performed by: EMERGENCY MEDICINE

## 2017-11-14 PROCEDURE — 99284 EMERGENCY DEPT VISIT MOD MDM: CPT

## 2017-11-14 PROCEDURE — 85014 HEMATOCRIT: CPT | Performed by: HOSPITALIST

## 2017-11-14 PROCEDURE — 80053 COMPREHEN METABOLIC PANEL: CPT | Performed by: EMERGENCY MEDICINE

## 2017-11-14 PROCEDURE — 83540 ASSAY OF IRON: CPT | Performed by: HOSPITALIST

## 2017-11-14 PROCEDURE — 25010000002 ONDANSETRON PER 1 MG: Performed by: EMERGENCY MEDICINE

## 2017-11-14 PROCEDURE — 74177 CT ABD & PELVIS W/CONTRAST: CPT

## 2017-11-14 PROCEDURE — 82728 ASSAY OF FERRITIN: CPT | Performed by: HOSPITALIST

## 2017-11-14 PROCEDURE — 86901 BLOOD TYPING SEROLOGIC RH(D): CPT

## 2017-11-14 RX ORDER — ONDANSETRON 2 MG/ML
4 INJECTION INTRAMUSCULAR; INTRAVENOUS EVERY 6 HOURS PRN
Status: DISCONTINUED | OUTPATIENT
Start: 2017-11-14 | End: 2017-11-17 | Stop reason: HOSPADM

## 2017-11-14 RX ORDER — SODIUM CHLORIDE 9 MG/ML
75 INJECTION, SOLUTION INTRAVENOUS CONTINUOUS
Status: DISCONTINUED | OUTPATIENT
Start: 2017-11-14 | End: 2017-11-17 | Stop reason: HOSPADM

## 2017-11-14 RX ORDER — SODIUM CHLORIDE 0.9 % (FLUSH) 0.9 %
10 SYRINGE (ML) INJECTION AS NEEDED
Status: DISCONTINUED | OUTPATIENT
Start: 2017-11-14 | End: 2017-11-17 | Stop reason: HOSPADM

## 2017-11-14 RX ORDER — SODIUM CHLORIDE 0.9 % (FLUSH) 0.9 %
1-10 SYRINGE (ML) INJECTION AS NEEDED
Status: DISCONTINUED | OUTPATIENT
Start: 2017-11-14 | End: 2017-11-17 | Stop reason: HOSPADM

## 2017-11-14 RX ORDER — ONDANSETRON 2 MG/ML
4 INJECTION INTRAMUSCULAR; INTRAVENOUS ONCE
Status: COMPLETED | OUTPATIENT
Start: 2017-11-14 | End: 2017-11-14

## 2017-11-14 RX ORDER — PANTOPRAZOLE SODIUM 40 MG/10ML
80 INJECTION, POWDER, LYOPHILIZED, FOR SOLUTION INTRAVENOUS ONCE
Status: COMPLETED | OUTPATIENT
Start: 2017-11-14 | End: 2017-11-14

## 2017-11-14 RX ORDER — ACETAMINOPHEN 325 MG/1
650 TABLET ORAL EVERY 6 HOURS PRN
Status: DISCONTINUED | OUTPATIENT
Start: 2017-11-14 | End: 2017-11-17 | Stop reason: HOSPADM

## 2017-11-14 RX ADMIN — SODIUM CHLORIDE 1000 ML: 9 INJECTION, SOLUTION INTRAVENOUS at 09:46

## 2017-11-14 RX ADMIN — SODIUM CHLORIDE 8 MG/HR: 900 INJECTION INTRAVENOUS at 23:45

## 2017-11-14 RX ADMIN — ONDANSETRON 4 MG: 2 INJECTION INTRAMUSCULAR; INTRAVENOUS at 09:46

## 2017-11-14 RX ADMIN — IOPAMIDOL 85 ML: 612 INJECTION, SOLUTION INTRAVENOUS at 09:40

## 2017-11-14 RX ADMIN — PANTOPRAZOLE SODIUM 80 MG: 40 INJECTION, POWDER, FOR SOLUTION INTRAVENOUS at 20:46

## 2017-11-14 RX ADMIN — SODIUM CHLORIDE 75 ML/HR: 9 INJECTION, SOLUTION INTRAVENOUS at 20:34

## 2017-11-14 RX ADMIN — SODIUM CHLORIDE 8 MG/HR: 900 INJECTION INTRAVENOUS at 20:46

## 2017-11-14 NOTE — H&P
HISTORY AND PHYSICAL   [facility]        Patient Identification:  Name: Ama Billy  Age: 84 y.o.  Sex: female  :  1933  MRN: [id number]                     Primary Care Physician: Yoly Patel MD    Chief Complaint:  Nausea vomiting and weakness    History of Present Illness:       The patient is an 84-year-old white female with history of arthritis, history of colon polyps, hypertension and hyperlipidemia who is admitted with a few day history of having some nausea vomiting and diarrhea with vomiting dark material and having some dark stools.  She attributed her dark stools to taking iron pills.  She's not had any fever or chills.  She recently been hospitalized for pneumonia.  Currently has not had cough or cold symptoms.  She denies having any chest pain but been very short of air.  She was weak and dizzy and was brought to the ER for evaluation via EMS.  She was found to have hemoglobin of 6 which had dropped several points from her recent admission.  She was given some IV fluids and started on transfusion with 2 units of packed cells and admitted for further evaluation treatment of her symptoms.    Past Medical History:  Past Medical History:   Diagnosis Date   • Anemia     Normocytic anemia of unclear ediology   • Arthritis     Osteoarthritis involving knees and left shoulder   • Eosinophilia     Persistent mild eosinophilia of unknown etiology   • History of colonic polyps    • Hyperlipidemia    • Hypertension      Past Surgical History:  Past Surgical History:   Procedure Laterality Date   • COLONOSCOPY      H/O colonic polyps with regular colonoscopies at 5 year intervals.    • COLONOSCOPY      By Dr. Gudino, no polyps identified.   • HEMORRHOIDECTOMY  1965   • HYSTERECTOMY  1971    Partial, secondary to endometriosis   • JOINT REPLACEMENT      Left total hip arthroplasy   • KNEE SURGERY      Arthroscopy of left knee ; right knee       Home  Meds:  Prescriptions Prior to Admission   Medication Sig Dispense Refill Last Dose   • Ascorbic Acid (VITAMIN C) 500 MG capsule Take 500 mg by mouth Daily.   Taking   • aspirin 81 MG EC tablet Take 81 mg by mouth Every Night.   Taking   • Calcium Carbonate-Vitamin D (CALCIUM 500 + D) 500-125 MG-UNIT tablet Take 1 tablet by mouth Daily.   Taking   • Docusate Sodium 100 MG capsule Take 100 mg by mouth Every Night.   Taking   • Glucosamine-Chondroit-Vit C-Mn (GLUCOSAMINE CHONDR 1500 COMPLX PO) Take 1 tablet by mouth Daily.   Taking   • lisinopril (PRINIVIL,ZESTRIL) 10 MG tablet TAKE 1 TABLET DAILY 90 tablet 0    • lovastatin (MEVACOR) 20 MG tablet TAKE 1 TABLET EVERY NIGHT 90 tablet 4    • azithromycin (ZITHROMAX) 250 MG tablet Take 2 tablets the first day, then 1 tablet daily for 4 days. 6 tablet 0    • ferrous sulfate 324 (65 Fe) MG tablet delayed-release EC tablet Take 1 tablet by mouth Daily With Breakfast. 30 tablet 0    • polyethylene glycol (MIRALAX) packet Take 17 g by mouth 2 (Two) Times a Day. 60 each 0        Allergies:  Allergies   Allergen Reactions   • Diclofenac Sodium      Immunizations:  Immunization History   Administered Date(s) Administered   • Flu Vaccine High Dose PF 65YR+ 10/20/2017     Social History:   Social History     Social History Narrative     Social History     Social History   • Marital status:      Spouse name: Trino   • Number of children: 2   • Years of education: College +     Occupational History   •  [employer]     Worked as a teach in the past.   •  Retired     Social History Main Topics   • Smoking status: Never Smoker   • Smokeless tobacco: Not on file   • Alcohol use Yes      Comment: Occasional   • Drug use: No   • Sexual activity: Not on file     Other Topics Concern   • Not on file     Social History Narrative       Family History:  Family History   Problem Relation Age of Onset   • Coronary artery disease Father    • Heart  "disease Father    • Heart attack Father 72   • Coronary artery disease Brother    • Heart disease Brother      CABG        Review of Systems  See history of present illness and past medical history.  Patient denies headache, syncope, falls, trauma, change in vision, change in hearing, change in taste, changes in weight, changes in appetite, focal weakness, numbness, or paresthesia.  Patient denies chest pain, palpitations,  orthopnea, PND, cough, sinus pressure, rhinorrhea, epistaxis, hemoptysis, hematemesis,  constipation or hematchezia.  Denies cold or heat intolerance, polydipsia, polyuria, polyphagia. Denies hematuria, pyuria, dysuria, hesitancy, frequency or urgency. Denies consumption of raw and under cooked meats foods or change in water source.  Denies fever, chills, sweats, night sweats.  Denies missing any routine medications. Remainder of ROS is negative.    Objective:  tMax 24 hrs: Temp (24hrs), Av.6 °F (37 °C), Min:97.4 °F (36.3 °C), Max:99.1 °F (37.3 °C)    Vitals Ranges:   Temp:  [97.4 °F (36.3 °C)-99.1 °F (37.3 °C)] 98.5 °F (36.9 °C)  Heart Rate:  [102-107] 107  Resp:  [16] 16  BP: (128-170)/(52-86) 136/59      Exam:  /59  Pulse 107  Temp 98.5 °F (36.9 °C) (Oral)   Resp 16  Ht 67.99\" (172.7 cm)  Wt 180 lb (81.6 kg)  SpO2 96%  BMI 27.38 kg/m2    General Appearance:    Alert, cooperative, no distress, appears stated age   Head:    Normocephalic, without obvious abnormality, atraumatic   Eyes:    PERRL, conjunctiva/corneas clear, EOM's intact, both eyes   Ears:    Normal external ear canals, both ears   Nose:   Nares normal, septum midline, mucosa normal, no drainage    or sinus tenderness   Throat:   Lips, mucosa, and tongue normal   Neck:   Supple, symmetrical, trachea midline, no adenopathy;     thyroid:  no enlargement/tenderness/nodules; no carotid    bruit or JVD   Back:     Symmetric, no curvature, ROM normal, no CVA tenderness   Lungs:     Clear to auscultation bilaterally, " respirations unlabored   Chest Wall:    No tenderness or deformity    Heart:    Regular rate and rhythm, S1 and S2 normal, no murmur, rub   or gallop   Abdomen:     Soft, non-tender, bowel sounds active all four quadrants,     no masses, no hepatomegaly, no splenomegaly   Extremities:   Extremities normal, atraumatic, no cyanosis or edema   Pulses:   2+ and symmetric all extremities   Skin:   Skin color, texture, turgor normal, no rashes or lesions   Lymph nodes:   Cervical, supraclavicular, and axillary nodes normal   Neurologic:   CNII-XII intact, normal strength, sensation intact throughout      .    Data Review:  Lab Results (last 72 hours)     Procedure Component Value Units Date/Time    Urinalysis With / Culture If Indicated - Urine, Clean Catch [[id number]]  (Abnormal) Collected:  11/14/17 0930    Specimen:  Urine from Urine, Catheter Updated:  11/14/17 0942     Color, UA Yellow     Appearance, UA Cloudy (A)     pH, UA 5.5     Specific Gravity, UA 1.023     Glucose, UA Negative     Ketones, UA Negative     Bilirubin, UA Negative     Blood, UA Negative     Protein, UA Negative     Leuk Esterase, UA Negative     Nitrite, UA Negative     Urobilinogen, UA 0.2 E.U./dL    Narrative:       Urine microscopic not indicated.    CBC & Differential [[id number]] Collected:  11/14/17 0926    Specimen:  Blood Updated:  11/14/17 0956    Narrative:       The following orders were created for panel order CBC & Differential.  Procedure                               Abnormality         Status                     ---------                               -----------         ------                     Scan Slide[[id number]]                                                                  CBC Auto Differential[[id number]]        Abnormal            Final result                 Please view results for these tests on the individual orders.    CBC Auto Differential [[id number]]  (Abnormal) Collected:  11/14/17 0926    Specimen:  Blood  Updated:  11/14/17 0956     WBC 17.33 (H) 10*3/mm3      RBC 2.56 (L) 10*6/mm3      Hemoglobin 6.8 (C) g/dL      Hematocrit 21.4 (L) %      MCV 83.6 fL      MCH 26.6 (L) pg      MCHC 31.8 (L) g/dL      RDW 23.5 (H) %      RDW-SD 70.5 (H) fl      MPV 9.9 fL      Platelets 309 10*3/mm3      Neutrophil % 83.7 (H) %      Lymphocyte % 7.6 (L) %      Monocyte % 7.7 %      Eosinophil % 0.3 %      Basophil % 0.2 %      Immature Grans % 0.5 %      Neutrophils, Absolute 14.49 (H) 10*3/mm3      Lymphocytes, Absolute 1.31 10*3/mm3      Monocytes, Absolute 1.34 (H) 10*3/mm3      Eosinophils, Absolute 0.06 10*3/mm3      Basophils, Absolute 0.04 10*3/mm3      Immature Grans, Absolute 0.09 (H) 10*3/mm3      nRBC 1.6 (H) /100 WBC     Comprehensive Metabolic Panel [[id number]]  (Abnormal) Collected:  11/14/17 0926    Specimen:  Blood Updated:  11/14/17 1011     Glucose 129 (H) mg/dL      BUN 36 (H) mg/dL      Creatinine 1.12 (H) mg/dL      Sodium 139 mmol/L      Potassium 4.1 mmol/L      Chloride 99 mmol/L      CO2 25.9 mmol/L      Calcium 9.4 mg/dL      Total Protein 6.7 g/dL      Albumin 4.00 g/dL      ALT (SGPT) 9 U/L      AST (SGOT) 15 U/L      Alkaline Phosphatase 77 U/L      Total Bilirubin 1.0 mg/dL      eGFR Non African Amer 46 (L) mL/min/1.73      Globulin 2.7 gm/dL      A/G Ratio 1.5 g/dL      BUN/Creatinine Ratio 32.1 (H)     Anion Gap 14.1 mmol/L     Narrative:       The MDRD GFR formula is only valid for adults with stable renal function between ages 18 and 70.                   Imaging Results (all)     Procedure Component Value Units Date/Time    CT Abdomen Pelvis With Contrast [[id number]] Collected:  11/14/17 1013     Updated:  11/14/17 1140    Narrative:       CT ABDOMEN AND PELVIS WITH CONTRAST-     CLINICAL: 84-year-old female in the emergency room with vomiting and  diarrhea.     COMPARISON: 10/12/2017.     FINDINGS: Basilar lung infiltrate demonstrated on 10/12/2017 has near  completely resolved.     Small hiatal  hernia is suggested, the stomach is largely collapsed and  cannot be optimally evaluated. The overall contour is within normal  limits. The duodenal bulb is typical in appearance.     Minimal diverticulosis of the sigmoid colon without diverticulitis.  Caliber of the small bowel is normal. No small bowel abnormality. No  induration of the mesentery to suggest an inflammatory process. The  appendix could not be identified, has there been prior appendectomy?     No free air or free intraperitoneal fluid. Artifact arising from the  left hip prosthesis obscures the pelvic floor contents/bladder.     The liver, gallbladder and pancreas are satisfactory in appearance. No  biliary duct dilatation. The spleen is normal in size and shape, no  adrenal abnormality seen. Caliber of the aorta is normal. Both kidneys  demonstrate a symmetric satisfactory pattern of enhancement without  mass, calculus or obstructive uropathy.     CONCLUSION: Nominal diverticulosis of the colon without diverticulitis.  The bowel is otherwise satisfactory in appearance. There appears to be  perhaps a small hiatal hernia, the stomach is collapsed, overall contour  within normal limits.     Findings of this report called to Dr. Llanes in the emergency room at  the time of completion, 10:04 AM.     Radiation dose reduction techniques were utilized, including automated  exposure control and exposure modulation based on body size.     This report was finalized on 11/14/2017 11:37 AM by Dr. Soren Martins MD.           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   • Hip joint replacement status Z96.649   • Left retinal detachment H33.22   • Hypovitaminosis D E55.9   • Weakness of extremity R29.898   • Atypical pneumonia J18.9   • Closed wedge compression fracture of first lumbar vertebra S32.010A   • Fall W19.XXXA   • Constipation K59.00   • Nausea & vomiting R11.2   • Leukocytosis D72.829   • Anemia D64.9   • Symptomatic anemia D64.9   • Mild dehydration E86.0   • Nausea vomiting and diarrhea R11.2, R19.7       Assessment:  Active Hospital Problems (** Indicates Principal Problem)    Diagnosis Date Noted   • **Symptomatic anemia [D64.9] 11/14/2017   • Mild dehydration [E86.0] 11/14/2017   • Nausea vomiting and diarrhea [R11.2, R19.7] 11/14/2017   • Colon polyps [K63.5] 02/21/2017   • Hyperlipidemia [E78.5] 02/21/2017   • Hypertension [I10] 02/21/2017   • Generalized osteoarthritis [M15.9] 02/21/2017   • Fatigue [R53.83] 02/21/2017   • Irritable bowel syndrome [K58.9] 06/24/2013      Resolved Hospital Problems    Diagnosis Date Noted Date Resolved   No resolved problems to display.       Plan:  The patient's admitted to the hospital and getting transfusion IV fluid.  We'll start her on Protonix and Protonix drip and consult GI medicine for further evaluation.  She likely needs endoscopy probably upper and lower.  We'll put her aspirin and all of her other medicines on hold for now.    Vipin Alarcon MD  11/14/2017  5:42 PM

## 2017-11-14 NOTE — ED PROVIDER NOTES
EMERGENCY DEPARTMENT ENCOUNTER    CHIEF COMPLAINT  Chief Complaint: N/V/D  History given by: pt  History limited by: nothing  Room Number: 10/10  PMD: oYly Patel MD      HPI:  Pt is a 84 y.o. female who presents complaining of N/V/D which began four days ago. The pt states that she was recently admitted to the hospital from 10/12-10/15 for pneumonia and was treated with antibiotics at that time. The pt states that she is currently on iron pills and normally has black stools. The pt also c/o generalized weakness and fatigue with mild exertion. The pt denies fever, chills, coughs and, SOA. The pt denies recent contact with sick people. The pt has had a hysterectomy, but denies other abdominal surgeries.         Duration:  four days  Onset: gradual  Timing: constant  Radiation: none  Quality: N/V/D  Intensity/Severity: moderate  Progression: unchanged  Associated Symptoms: generalized weakness and fatigue  Aggravating Factors: none  Alleviating Factors: none  Previous Episodes: none  Treatment before arrival: none    PAST MEDICAL HISTORY  Active Ambulatory Problems     Diagnosis Date Noted   • Normocytic anemia 04/15/2016   • Abdominal cramping 06/24/2013   • LLQ pain 06/24/2013   • Acute upper respiratory infection 02/21/2017   • Colon polyps 02/21/2017   • Bursitis of hip 02/21/2017   • Diarrhea 06/24/2013   • Fatigue 02/21/2017   • Generalized osteoarthritis 02/21/2017   • Globus sensation 06/24/2013   • Hyperlipidemia 02/21/2017   • Hypertension 02/21/2017   • Irritable bowel syndrome 06/24/2013   • Muscle strain of lower extremity 02/21/2017   • Tendinitis 02/21/2017   • Chronic venous insufficiency 02/21/2017   • Status post total left knee replacement 02/21/2017   • 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   • Left retinal detachment 02/21/2017   • Hypovitaminosis D 02/21/2017   • Weakness of extremity 02/21/2017   • Atypical pneumonia 10/12/2017   • Closed wedge compression fracture of first lumbar  vertebra 10/13/2017   • Fall 10/13/2017   • Constipation 10/13/2017   • Nausea & vomiting 10/13/2017   • Leukocytosis 10/13/2017   • Anemia 10/14/2017     Resolved Ambulatory Problems     Diagnosis Date Noted   • No Resolved Ambulatory Problems     Past Medical History:   Diagnosis Date   • Anemia    • Arthritis    • Eosinophilia    • History of colonic polyps    • Hyperlipidemia    • Hypertension        PAST SURGICAL HISTORY  Past Surgical History:   Procedure Laterality Date   • COLONOSCOPY      H/O colonic polyps with regular colonoscopies at 5 year intervals.    • COLONOSCOPY  2013    By Dr. Gudino, no polyps identified.   • HEMORRHOIDECTOMY  1965   • HYSTERECTOMY  1971    Partial, secondary to endometriosis   • JOINT REPLACEMENT  2014    Left total hip arthroplasy   • KNEE SURGERY  1997    Arthroscopy of left knee 1997; right knee 2003       FAMILY HISTORY  Family History   Problem Relation Age of Onset   • Coronary artery disease Father    • Heart disease Father    • Heart attack Father 72   • Coronary artery disease Brother    • Heart disease Brother      CABG       SOCIAL HISTORY  Social History     Social History   • Marital status:      Spouse name: Trino   • Number of children: 2   • Years of education: College +     Occupational History   [place]  [employer] [place]     Worked as a teach in the past.   •  Retired     Social History Main Topics   • Smoking status: Never Smoker   • Smokeless tobacco: Not on file   • Alcohol use Yes      Comment: Occasional   • Drug use: No   • Sexual activity: Not on file     Other Topics Concern   • Not on file     Social History Narrative       ALLERGIES  Diclofenac sodium    REVIEW OF SYSTEMS  Review of Systems   Constitutional: Positive for fatigue. Negative for fever.   HENT: Negative for sore throat.    Eyes: Negative.    Respiratory: Negative for cough and shortness of breath.    Cardiovascular: Negative for chest pain.    Gastrointestinal: Positive for diarrhea, nausea and vomiting. Negative for abdominal pain.   Genitourinary: Negative for dysuria.   Musculoskeletal: Negative for neck pain.   Skin: Negative for rash.   Allergic/Immunologic: Negative.    Neurological: Positive for weakness (generalized). Negative for numbness and headaches.   Hematological: Negative.    Psychiatric/Behavioral: Negative.    All other systems reviewed and are negative.      PHYSICAL EXAM  ED Triage Vitals   Temp Heart Rate Resp BP SpO2   11/14/17 0902 11/14/17 0902 11/14/17 0902 11/14/17 0902 11/14/17 0902   97.4 °F (36.3 °C) 106 16 170/86 99 %      Temp src Heart Rate Source Patient Position BP Location FiO2 (%)   -- -- -- -- --              Physical Exam   Constitutional: She is oriented to person, place, and time and well-developed, well-nourished, and in no distress. No distress.   HENT:   Head: Normocephalic and atraumatic.   Mouth/Throat: Mucous membranes are dry.   Eyes: EOM are normal. Pupils are equal, round, and reactive to light.   Pale conjunctiva    Neck: Normal range of motion. Neck supple.   Cardiovascular: Normal rate, regular rhythm and normal heart sounds.    Pulmonary/Chest: Effort normal and breath sounds normal. No respiratory distress.   Abdominal: Soft. There is no tenderness. There is no rebound and no guarding.   Genitourinary:   Genitourinary Comments: Stool is black. hemoccult positive   Musculoskeletal: Normal range of motion. She exhibits no edema.   Neurological: She is alert and oriented to person, place, and time. She has normal sensation and normal strength.   Skin: Skin is warm and dry. No rash noted.   Psychiatric: Mood and affect normal.   Nursing note and vitals reviewed.      LAB RESULTS  Lab Results (last 24 hours)     Procedure Component Value Units Date/Time    CBC & Differential [[id number]] Collected:  11/14/17 0926    Specimen:  Blood Updated:  11/14/17 0956    Narrative:       The following orders were  created for panel order CBC & Differential.  Procedure                               Abnormality         Status                     ---------                               -----------         ------                     Scan Slide[[id number]]                                                                  CBC Auto Differential[[id number]]        Abnormal            Final result                 Please view results for these tests on the individual orders.    Comprehensive Metabolic Panel [[id number]]  (Abnormal) Collected:  11/14/17 0926    Specimen:  Blood Updated:  11/14/17 1011     Glucose 129 (H) mg/dL      BUN 36 (H) mg/dL      Creatinine 1.12 (H) mg/dL      Sodium 139 mmol/L      Potassium 4.1 mmol/L      Chloride 99 mmol/L      CO2 25.9 mmol/L      Calcium 9.4 mg/dL      Total Protein 6.7 g/dL      Albumin 4.00 g/dL      ALT (SGPT) 9 U/L      AST (SGOT) 15 U/L      Alkaline Phosphatase 77 U/L      Total Bilirubin 1.0 mg/dL      eGFR Non African Amer 46 (L) mL/min/1.73      Globulin 2.7 gm/dL      A/G Ratio 1.5 g/dL      BUN/Creatinine Ratio 32.1 (H)     Anion Gap 14.1 mmol/L     Narrative:       The MDRD GFR formula is only valid for adults with stable renal function between ages 18 and 70.    CBC Auto Differential [[id number]]  (Abnormal) Collected:  11/14/17 0926    Specimen:  Blood Updated:  11/14/17 0956     WBC 17.33 (H) 10*3/mm3      RBC 2.56 (L) 10*6/mm3      Hemoglobin 6.8 (C) g/dL      Hematocrit 21.4 (L) %      MCV 83.6 fL      MCH 26.6 (L) pg      MCHC 31.8 (L) g/dL      RDW 23.5 (H) %      RDW-SD 70.5 (H) fl      MPV 9.9 fL      Platelets 309 10*3/mm3      Neutrophil % 83.7 (H) %      Lymphocyte % 7.6 (L) %      Monocyte % 7.7 %      Eosinophil % 0.3 %      Basophil % 0.2 %      Immature Grans % 0.5 %      Neutrophils, Absolute 14.49 (H) 10*3/mm3      Lymphocytes, Absolute 1.31 10*3/mm3      Monocytes, Absolute 1.34 (H) 10*3/mm3      Eosinophils, Absolute 0.06 10*3/mm3      Basophils, Absolute 0.04  10*3/mm3      Immature Grans, Absolute 0.09 (H) 10*3/mm3      nRBC 1.6 (H) /100 WBC     Urinalysis With / Culture If Indicated - Urine, Clean Catch [[id number]]  (Abnormal) Collected:  11/14/17 0930    Specimen:  Urine from Urine, Catheter Updated:  11/14/17 0942     Color, UA Yellow     Appearance, UA Cloudy (A)     pH, UA 5.5     Specific Gravity, UA 1.023     Glucose, UA Negative     Ketones, UA Negative     Bilirubin, UA Negative     Blood, UA Negative     Protein, UA Negative     Leuk Esterase, UA Negative     Nitrite, UA Negative     Urobilinogen, UA 0.2 E.U./dL    Narrative:       Urine microscopic not indicated.          I ordered the above labs and reviewed the results    RADIOLOGY  CT Abdomen Pelvis With Contrast   CONCLUSION: Nominal diverticulosis of the colon without diverticulitis.  The bowel is otherwise satisfactory in appearance. There appears to be  perhaps a small hiatal hernia, the stomach is collapsed, overall contour  within normal limits.           I ordered the above noted radiological studies. Interpreted by radiologist. Discussed w [facility] radiologist (Dr. Martins). Reviewed by me in PACS.       PROCEDURES  Critical Care  Performed by: CHRISTINA CARVER  Authorized by: CHRISTINA CARVER     Critical care provider statement:     Critical care time (minutes):  35    Critical care was necessary to treat or prevent imminent or life-threatening deterioration of the following conditions:  Dehydration and cardiac failure    Critical care was time spent personally by me on the following activities:  Blood draw for specimens, ordering and performing treatments and interventions, ordering and review of laboratory studies, ordering and review of radiographic studies, development of treatment plan with patient or surrogate, pulse oximetry, re-evaluation of patient's condition, evaluation of patient's response to treatment, examination of patient, review of old charts and obtaining history from patient or  surrogate            PROGRESS AND CONSULTS  ED Course   Value Comment By Time   Hemoglobin: (!!) 6.8 11.2 on 10/20/17 Reji Llanes MD 11/14 0956   Creatinine: (!) 1.12 0.81 one month ago Reji Llanes MD 11/14 1022     0920: Ordered labs and CT abdomen/pelvis for further evaluation. Ordered zofran for nausea. Ordered IVF.    1048: Rechecked pt, she was resting comfortably. Discussed lab results which showed that the pt is anemic. Discussed imaging results which showed diverticulosis but no diverticulitis. The pt states that she has felt generalized weakness, SOA, and light headedness with exertion. Discussed plan to perform rectal exam for further evaluation. The pt agrees with plan.     1100: Performed rectal exam with chaperone present.     1106: Placed call to [facility]. Prepared RBC, 2 units for anemia.     1120: Discussed pt's case with Dr. Alarcon ([facility]) who agrees with plan to admit pt to a monitor bed.         MEDICAL DECISION MAKING  Results were reviewed/discussed with the patient and they were also made aware of online access. Pt also made aware that some labs, such as cultures, will not be resulted during ER visit and follow up with PMD is necessary.     MDM  Number of Diagnoses or Management Options  Leukocytosis, unspecified type:   Mild dehydration:   Nausea vomiting and diarrhea:   Symptomatic anemia:   Diagnosis management comments: Patient was found to be anemic.  Her stool was Hemoccult positive.  Patient's white blood cell count was elevated.  However CT scan of the abdomen and pelvis did not show any acute abnormalities.  2 units of packed red cells were ordered for transfusion.  Case was discussed Dr. Alarcon and he agreed to admit the patient.       Amount and/or Complexity of Data Reviewed  Clinical lab tests: ordered and reviewed (Hgb: 6.8, WBC: 17.33, Creatinine: 1.12)  Tests in the radiology section of CPT®: ordered and reviewed (CT abd/pelvis: showed nominal diverticulosis of the colon  without diverticulitis )  Discussion of test results with the performing providers: yes (Dr. Martins (radiology))  Decide to obtain previous medical records or to obtain history from someone other than the patient: yes  Review and summarize past medical records: yes (Admitted 10/12-10/15/17 for atypical pneumonia and an L-1 compression fracture. The pt was treated with rocephin and Zithromax. )  Discuss the patient with other providers: yes (Dr. Alarcon ([facility]))    Critical Care  Total time providing critical care: 30-74 minutes    Patient Progress  Patient progress: stable         DIAGNOSIS  Final diagnoses:   Symptomatic anemia   Nausea vomiting and diarrhea   Mild dehydration   Leukocytosis, unspecified type       DISPOSITION  ADMISSION    Discussed treatment plan and reason for admission with pt/family and admitting physician.  Pt/family voiced understanding of the plan for admission for further testing/treatment as needed.         Latest Documented Vital Signs:  As of 11:07 AM  BP- 142/56 HR- 103 Temp- 97.4 °F (36.3 °C) O2 sat- 96%    --  Documentation assistance provided by lauren Landeros for Dr. Llanes.  Information recorded by the scribe was done at my direction and has been verified and validated by me.                Aishwarya Landeros  11/14/17 1123       Reji Llanes MD  11/14/17 1537

## 2017-11-14 NOTE — CONSULTS
"Adult Nutrition  Assessment/PES    Patient Name:  Ama Billy  YOB: 1933  MRN: [id number]  Admit Date:  11/14/2017    Assessment Date:  11/14/2017              Reason for Assessment       11/14/17 1359    Reason for Assessment    Reason For Assessment/Visit identified at risk by screening criteria    Identified At Risk By Screening Criteria MST SCORE 2+;reduced oral intake over the last month;unintentional loss of 10 lbs or more in the past 2 mos    Diagnosis Diagnosis    Gastrointestinal Vomiting;Nausea;Diarrhea    Hematological Anemia    Pulmonary/Critical Care Pneumonia                Anthropometrics       11/14/17 1400    Anthropometrics    Height 172.7 cm (67.99\")    RD Documented Current Weight  81.6 kg (180 lb)    Ideal Body Weight (IBW)    Ideal Body Weight (IBW), Female 64.53    Usual Body Weight (UBW)    Weight Loss 3.629 kg (8 lb)    Weight Loss Time Frame 1 month    Body Mass Index (BMI)    BMI Grade 25 - 29.9 - overweight            Labs/Tests/Procedures/Meds       11/14/17 1400    Labs/Tests/Procedures/Meds    Diagnostic Test/Procedure Review reviewed    Labs/Tests Review Reviewed;Glucose;BUN;Creat;WBC;Hgb Hct    Medication Review Reviewed, pertinent    Significant Vitals reviewed            Physical Findings       11/14/17 1401    Physical Findings/Assessment    Additional Documentation Physical Appearance (Group)    Physical Appearance    Overall Physical Appearance overweight    Gastrointestinal nausea;vomiting;diarrhea    Skin --   intact            Estimated/Assessed Needs       11/14/17 1402    Calculation Measurements    Weight Used For Calculations 81.6 kg (179 lb 14.3 oz)    Estimated/Assessed Energy Needs    Energy Need Method Kcal/kg    kcal/kg 30    30 Kcal/Kg (kcal) 2448    Estimated/Assessed Protein Needs    Weight Used for Protein Calculation 81.6 kg (179 lb 14.3 oz)    Protein (gm/kg) 1.0    1.0 Gm Protein (gm) 81.6    Estimated/Assessed Fluid Needs    Fluid Need " Method RDA method    RDA Method (mL)  6590            Nutrition Prescription Ordered       11/14/17 1403    Nutrition Prescription PO    Current PO Diet NPO              Problem/Interventions:        Problem 1       11/14/17 1404    Nutrition Diagnoses Problem 1    Problem 1 Inadequate Nutrient Intake    Etiology (related to) Medical Diagnosis    Gastrointestinal Diarrhea;Nausea;Vomiting                    Intervention Goal       11/14/17 1404    Intervention Goal    General Maintain nutrition    PO Initiate feeding;Tolerate PO    Weight Maintain weight            Nutrition Intervention       11/14/17 1405    Nutrition Intervention    RD/Tech Action Follow Tx progress;Care plan reviewd;Await begin PO              Education/Evaluation       11/14/17 1405    Education    Education Will Instruct as appropriate    Monitor/Evaluation    Monitor Per protocol        Electronically signed by:  Eduarda Escobar RD  11/14/17 2:05 PM

## 2017-11-14 NOTE — PLAN OF CARE
Problem: Patient Care Overview (Adult)  Goal: Plan of Care Review  Outcome: Ongoing (interventions implemented as appropriate)    11/14/17 4375   Coping/Psychosocial Response Interventions   Plan Of Care Reviewed With patient   Patient Care Overview   Progress no change   Outcome Evaluation   Outcome Summary/Follow up Plan Patient admitted from ER with nausea, vomiting, diarhea, and low hgb. Transfused 2 units PRBC. Consult for GI to see. Holding patient's home meds for now and will start a protonix drip. VSS, will continue to monitor.         Problem: Fall Risk (Adult)  Goal: Identify Related Risk Factors and Signs and Symptoms  Outcome: Ongoing (interventions implemented as appropriate)  Goal: Absence of Falls  Outcome: Ongoing (interventions implemented as appropriate)    Problem: Infection, Risk/Actual (Adult)  Goal: Identify Related Risk Factors and Signs and Symptoms  Outcome: Ongoing (interventions implemented as appropriate)  Goal: Infection Prevention/Resolution  Outcome: Ongoing (interventions implemented as appropriate)

## 2017-11-15 ENCOUNTER — ANESTHESIA EVENT (OUTPATIENT)
Dept: GASTROENTEROLOGY | Facility: HOSPITAL | Age: 82
End: 2017-11-15

## 2017-11-15 ENCOUNTER — ANESTHESIA (OUTPATIENT)
Dept: GASTROENTEROLOGY | Facility: HOSPITAL | Age: 82
End: 2017-11-15

## 2017-11-15 PROBLEM — K92.2 GI BLEED: Status: ACTIVE | Noted: 2017-11-15

## 2017-11-15 PROBLEM — K92.0 COFFEE GROUND EMESIS: Status: ACTIVE | Noted: 2017-11-14

## 2017-11-15 PROBLEM — K20.90 ESOPHAGITIS: Status: ACTIVE | Noted: 2017-11-15

## 2017-11-15 PROBLEM — D62 ACUTE BLOOD LOSS ANEMIA: Status: ACTIVE | Noted: 2017-11-15

## 2017-11-15 LAB
ABO + RH BLD: NORMAL
ABO + RH BLD: NORMAL
ANION GAP SERPL CALCULATED.3IONS-SCNC: 13.5 MMOL/L
BASOPHILS # BLD AUTO: 0.03 10*3/MM3 (ref 0–0.2)
BASOPHILS NFR BLD AUTO: 0.2 % (ref 0–1.5)
BH BB BLOOD EXPIRATION DATE: NORMAL
BH BB BLOOD EXPIRATION DATE: NORMAL
BH BB BLOOD TYPE BARCODE: 600
BH BB BLOOD TYPE BARCODE: 600
BH BB DISPENSE STATUS: NORMAL
BH BB DISPENSE STATUS: NORMAL
BH BB PRODUCT CODE: NORMAL
BH BB PRODUCT CODE: NORMAL
BH BB UNIT NUMBER: NORMAL
BH BB UNIT NUMBER: NORMAL
BUN BLD-MCNC: 18 MG/DL (ref 8–23)
BUN/CREAT SERPL: 22.2 (ref 7–25)
CALCIUM SPEC-SCNC: 8.8 MG/DL (ref 8.6–10.5)
CHLORIDE SERPL-SCNC: 106 MMOL/L (ref 98–107)
CO2 SERPL-SCNC: 21.5 MMOL/L (ref 22–29)
CREAT BLD-MCNC: 0.81 MG/DL (ref 0.57–1)
CROSSMATCH INTERPRETATION: NORMAL
CROSSMATCH INTERPRETATION: NORMAL
DEPRECATED RDW RBC AUTO: 67.4 FL (ref 37–54)
EOSINOPHIL # BLD AUTO: 0.44 10*3/MM3 (ref 0–0.7)
EOSINOPHIL NFR BLD AUTO: 3.4 % (ref 0.3–6.2)
ERYTHROCYTE [DISTWIDTH] IN BLOOD BY AUTOMATED COUNT: 21.3 % (ref 11.7–13)
GFR SERPL CREATININE-BSD FRML MDRD: 67 ML/MIN/1.73
GLUCOSE BLD-MCNC: 88 MG/DL (ref 65–99)
HCT VFR BLD AUTO: 24 % (ref 35.6–45.5)
HCT VFR BLD AUTO: 25.7 % (ref 35.6–45.5)
HCT VFR BLD AUTO: 26.1 % (ref 35.6–45.5)
HGB BLD-MCNC: 7.7 G/DL (ref 11.9–15.5)
HGB BLD-MCNC: 8 G/DL (ref 11.9–15.5)
HGB BLD-MCNC: 8.3 G/DL (ref 11.9–15.5)
IMM GRANULOCYTES # BLD: 0.1 10*3/MM3 (ref 0–0.03)
IMM GRANULOCYTES NFR BLD: 0.8 % (ref 0–0.5)
LYMPHOCYTES # BLD AUTO: 1.18 10*3/MM3 (ref 0.9–4.8)
LYMPHOCYTES NFR BLD AUTO: 9 % (ref 19.6–45.3)
MCH RBC QN AUTO: 26.8 PG (ref 26.9–32)
MCHC RBC AUTO-ENTMCNC: 31.1 G/DL (ref 32.4–36.3)
MCV RBC AUTO: 86 FL (ref 80.5–98.2)
MONOCYTES # BLD AUTO: 1.36 10*3/MM3 (ref 0.2–1.2)
MONOCYTES NFR BLD AUTO: 10.4 % (ref 5–12)
NEUTROPHILS # BLD AUTO: 9.93 10*3/MM3 (ref 1.9–8.1)
NEUTROPHILS NFR BLD AUTO: 76.2 % (ref 42.7–76)
NRBC BLD MANUAL-RTO: 1.8 /100 WBC (ref 0–0)
PLATELET # BLD AUTO: 289 10*3/MM3 (ref 140–500)
PMV BLD AUTO: 11.1 FL (ref 6–12)
POTASSIUM BLD-SCNC: 3.8 MMOL/L (ref 3.5–5.2)
RBC # BLD AUTO: 2.99 10*6/MM3 (ref 3.9–5.2)
SODIUM BLD-SCNC: 141 MMOL/L (ref 136–145)
UNIT  ABO: NORMAL
UNIT  ABO: NORMAL
UNIT  RH: NORMAL
UNIT  RH: NORMAL
WBC NRBC COR # BLD: 13.04 10*3/MM3 (ref 4.5–10.7)

## 2017-11-15 PROCEDURE — 85018 HEMOGLOBIN: CPT | Performed by: HOSPITALIST

## 2017-11-15 PROCEDURE — 43239 EGD BIOPSY SINGLE/MULTIPLE: CPT | Performed by: INTERNAL MEDICINE

## 2017-11-15 PROCEDURE — 25010000002 PROPOFOL 10 MG/ML EMULSION: Performed by: NURSE ANESTHETIST, CERTIFIED REGISTERED

## 2017-11-15 PROCEDURE — 85025 COMPLETE CBC W/AUTO DIFF WBC: CPT | Performed by: HOSPITALIST

## 2017-11-15 PROCEDURE — 88305 TISSUE EXAM BY PATHOLOGIST: CPT | Performed by: INTERNAL MEDICINE

## 2017-11-15 PROCEDURE — 0DB68ZX EXCISION OF STOMACH, VIA NATURAL OR ARTIFICIAL OPENING ENDOSCOPIC, DIAGNOSTIC: ICD-10-PCS | Performed by: INTERNAL MEDICINE

## 2017-11-15 PROCEDURE — 80048 BASIC METABOLIC PNL TOTAL CA: CPT | Performed by: HOSPITALIST

## 2017-11-15 PROCEDURE — 88312 SPECIAL STAINS GROUP 1: CPT | Performed by: INTERNAL MEDICINE

## 2017-11-15 PROCEDURE — 99222 1ST HOSP IP/OBS MODERATE 55: CPT | Performed by: INTERNAL MEDICINE

## 2017-11-15 PROCEDURE — 0DB38ZX EXCISION OF LOWER ESOPHAGUS, VIA NATURAL OR ARTIFICIAL OPENING ENDOSCOPIC, DIAGNOSTIC: ICD-10-PCS | Performed by: INTERNAL MEDICINE

## 2017-11-15 PROCEDURE — 0DB98ZX EXCISION OF DUODENUM, VIA NATURAL OR ARTIFICIAL OPENING ENDOSCOPIC, DIAGNOSTIC: ICD-10-PCS | Performed by: INTERNAL MEDICINE

## 2017-11-15 PROCEDURE — 85014 HEMATOCRIT: CPT | Performed by: HOSPITALIST

## 2017-11-15 RX ORDER — PANTOPRAZOLE SODIUM 40 MG/1
40 TABLET, DELAYED RELEASE ORAL
Status: DISCONTINUED | OUTPATIENT
Start: 2017-11-15 | End: 2017-11-17 | Stop reason: HOSPADM

## 2017-11-15 RX ORDER — PROPOFOL 10 MG/ML
VIAL (ML) INTRAVENOUS AS NEEDED
Status: DISCONTINUED | OUTPATIENT
Start: 2017-11-15 | End: 2017-11-15 | Stop reason: SURG

## 2017-11-15 RX ORDER — SUCRALFATE ORAL 1 G/10ML
1 SUSPENSION ORAL 2 TIMES DAILY
Status: DISCONTINUED | OUTPATIENT
Start: 2017-11-15 | End: 2017-11-17 | Stop reason: HOSPADM

## 2017-11-15 RX ORDER — SODIUM CHLORIDE, SODIUM LACTATE, POTASSIUM CHLORIDE, CALCIUM CHLORIDE 600; 310; 30; 20 MG/100ML; MG/100ML; MG/100ML; MG/100ML
30 INJECTION, SOLUTION INTRAVENOUS CONTINUOUS
Status: DISCONTINUED | OUTPATIENT
Start: 2017-11-15 | End: 2017-11-17 | Stop reason: HOSPADM

## 2017-11-15 RX ADMIN — PROPOFOL 30 MG: 10 INJECTION, EMULSION INTRAVENOUS at 15:04

## 2017-11-15 RX ADMIN — SODIUM CHLORIDE, POTASSIUM CHLORIDE, SODIUM LACTATE AND CALCIUM CHLORIDE 30 ML/HR: 600; 310; 30; 20 INJECTION, SOLUTION INTRAVENOUS at 14:24

## 2017-11-15 RX ADMIN — PROPOFOL 30 MG: 10 INJECTION, EMULSION INTRAVENOUS at 15:02

## 2017-11-15 RX ADMIN — SODIUM CHLORIDE 75 ML/HR: 9 INJECTION, SOLUTION INTRAVENOUS at 09:24

## 2017-11-15 RX ADMIN — SUCRALFATE 1 G: 1 SUSPENSION ORAL at 17:42

## 2017-11-15 RX ADMIN — PROPOFOL 30 MG: 10 INJECTION, EMULSION INTRAVENOUS at 15:06

## 2017-11-15 RX ADMIN — PROPOFOL 30 MG: 10 INJECTION, EMULSION INTRAVENOUS at 15:00

## 2017-11-15 RX ADMIN — PANTOPRAZOLE SODIUM 40 MG: 40 TABLET, DELAYED RELEASE ORAL at 17:42

## 2017-11-15 RX ADMIN — PROPOFOL 150 MG: 10 INJECTION, EMULSION INTRAVENOUS at 14:58

## 2017-11-15 RX ADMIN — SODIUM CHLORIDE 8 MG/HR: 900 INJECTION INTRAVENOUS at 11:50

## 2017-11-15 RX ADMIN — SODIUM CHLORIDE 8 MG/HR: 900 INJECTION INTRAVENOUS at 06:15

## 2017-11-15 NOTE — PROGRESS NOTES
Discharge Planning Assessment  [facility]     Patient Name: Ama Billy  MRN: [id number]  Today's Date: 11/15/2017    Admit Date: 11/14/2017          Discharge Needs Assessment       11/15/17 0915    Living Environment    Lives With spouse    Living Arrangements house    Home Accessibility stairs to enter home    Number of Stairs to Enter Home 13    Stair Railings at Home outside, present on left side    Transportation Available car;family or friend will provide    Living Environment    Quality Of Family Relationships supportive    Able to Return to Prior Living Arrangements yes    Discharge Needs Assessment    Concerns To Be Addressed no discharge needs identified    Anticipated Changes Related to Illness none    Equipment Currently Used at Home none    Equipment Needed After Discharge none    Discharge Planning Comments  Trino Billy [phone]            Discharge Plan       11/15/17 0916    Case Management/Social Work Plan    Plan Return home with     Patient/Family In Agreement With Plan yes    Additional Comments Spoke with patient at bedside.  Patient lives with , is IADL, uses no DME, has never used HH or been to SNF.  Patient states nurse from [facility] follows up with her.  Her PCP Dr. Patel is retiring, she has an [place] with [facility] [place] (she thinks it is Dr. Canas) for January, so that she will have a PCP.  Patient plans to return home at DC and does not anticipate any DC needs.  CCP will follow as needed.        Discharge Placement     No information found                Demographic Summary       11/15/17 0910    Referral Information    Admission Type inpatient    Arrived From admitted as an inpatient;home or self-care    Referral Source admission list    Reason For Consult discharge planning    Record Reviewed history and physical    Primary Care Physician Information    Name Dr. Yoly Patel            Functional Status       11/15/17 0913    Functional  Status Current    Ambulation 2-->assistive person    Transferring 2-->assistive person    Toileting 2-->assistive person    Bathing 2-->assistive person    Dressing 2-->assistive person    Eating 0-->independent    Communication 0-->understands/communicates without difficulty    Change in Functional Status Since Onset of Current Illness/Injury no    Functional Status Prior    Ambulation 0-->independent    Transferring 0-->independent    Toileting 0-->independent    Bathing 0-->independent    Dressing 0-->independent    Eating 0-->independent    Communication 0-->understands/communicates without difficulty    IADL    Medications independent    Meal Preparation independent    Housekeeping independent    Laundry independent    Shopping independent    Oral Care independent    Activity Tolerance    Usual Activity Tolerance good    Current Activity Tolerance moderate    Cognitive/Perceptual/Developmental    Current Mental Status/Cognitive Functioning no deficits noted    Recent Changes in Mental Status/Cognitive Functioning no changes            Psychosocial     None            Abuse/Neglect     None            Legal     None            Substance Abuse     None            Patient Forms     None          Becky S. [place], RN

## 2017-11-15 NOTE — ANESTHESIA POSTPROCEDURE EVALUATION
"Patient: Ama Billy    Procedure Summary     Date Anesthesia Start Anesthesia Stop Room / Location    11/15/17 4575 [id number]  [facility]       Procedure Diagnosis Surgeon Provider    ESOPHAGOGASTRODUODENOSCOPY WITH COLD BIOPSIES (N/A Esophagus) Coffee ground emesis  (Coffee ground emesis [K92.0]) MD Mary Jo White MD          Anesthesia Type: MAC  Last vitals  BP   105/68 (11/15/17 1526)   Temp   36.9 °C (98.4 °F) (11/15/17 1419)   Pulse   75 (11/15/17 1526)   Resp   16 (11/15/17 1526)     SpO2   99 % (11/15/17 1526)     Post Anesthesia Care and Evaluation    Patient location during evaluation: PACU  Patient participation: complete - patient participated  Level of consciousness: awake  Pain score: 0  Pain management: adequate  Airway patency: patent  Anesthetic complications: No anesthetic complications    Cardiovascular status: acceptable  Respiratory status: acceptable  Hydration status: acceptable    Comments: Blood pressure 105/68, pulse 75, temperature 36.9 °C (98.4 °F), temperature source Oral, resp. rate 16, height 67.99\" (172.7 cm), weight 180 lb (81.6 kg), SpO2 99 %, not currently breastfeeding.    No anesthesia care post op    "

## 2017-11-15 NOTE — CONSULTS
"[facility]  Initial Inpatient Consult Note    Referring Provider: Dr Alarcon    Reason for Consultation: \"gi bleed\"  Subjective     History of present illness:  84-year-old woman with a past medical history as below who was admitted last night for worsening nausea, vomiting, and diarrhea.  She was recently hospitalized in the middle of October for pneumonia.  She had been doing well at home until approximately 4 days ago.  She awoke nauseated.  She subsequently vomited and reports that the emesis was \"black chunks.\"  She has also had some diarrhea that she describes as black.  She has been on iron pills since discharge for hospitalization.  She has had persistent nausea and vomiting every morning.  She reports that consistently the emesis has been black.  She has had poor by mouth intake.  She's had increasing weakness.  In the emergency room her hemoglobin was found to be about 2 points lower than it was upon discharge last month.  Review of the ER physicians note indicates that her stool was Hemoccult positive.    He denies any NSAID use.  She denies any history of ulcers.  She denies any significant abdominal pain.  She does have persistent nausea.  She has not vomited this morning or had any diarrhea.  She is not having any chest pain or difficulty breathing.  She does not take any blood thinners at home.    Last colonoscopy was in 2008; she was told she needed no further screening at that time.  She has never had an EGD.    She has no family history of GI malignancy.    Past Medical History:  Past Medical History:   Diagnosis Date   • Anemia     Normocytic anemia of unclear ediology   • Arthritis     Osteoarthritis involving knees and left shoulder   • Eosinophilia     Persistent mild eosinophilia of unknown etiology   • History of colonic polyps    • Hyperlipidemia    • Hypertension        Past Surgical History:  Past Surgical History:   Procedure Laterality Date   • COLONOSCOPY      H/O " colonic polyps with regular colonoscopies at 5 year intervals.    • COLONOSCOPY  2013    By Dr. Gudino, no polyps identified.   • HEMORRHOIDECTOMY  1965   • HYSTERECTOMY  1971    Partial, secondary to endometriosis   • JOINT REPLACEMENT  2014    Left total hip arthroplasy   • KNEE SURGERY  1997    Arthroscopy of left knee 1997; right knee 2003        Social History:   Social History   Substance Use Topics   • Smoking status: Never Smoker   • Smokeless tobacco: Not on file   • Alcohol use Yes      Comment: Occasional        Family History:  Family History   Problem Relation Age of Onset   • Coronary artery disease Father    • Heart disease Father    • Heart attack Father 72   • Coronary artery disease Brother    • Heart disease Brother      CABG       Home Meds:  Prescriptions Prior to Admission   Medication Sig Dispense Refill Last Dose   • Ascorbic Acid (VITAMIN C) 500 MG capsule Take 500 mg by mouth Daily.   Taking   • aspirin 81 MG EC tablet Take 81 mg by mouth Every Night.   Taking   • Calcium Carbonate-Vitamin D (CALCIUM 500 + D) 500-125 MG-UNIT tablet Take 1 tablet by mouth Daily.   Taking   • Docusate Sodium 100 MG capsule Take 100 mg by mouth Every Night.   Taking   • Glucosamine-Chondroit-Vit C-Mn (GLUCOSAMINE CHONDR 1500 COMPLX PO) Take 1 tablet by mouth Daily.   Taking   • lisinopril (PRINIVIL,ZESTRIL) 10 MG tablet TAKE 1 TABLET DAILY 90 tablet 0    • lovastatin (MEVACOR) 20 MG tablet TAKE 1 TABLET EVERY NIGHT 90 tablet 4    • azithromycin (ZITHROMAX) 250 MG tablet Take 2 tablets the first day, then 1 tablet daily for 4 days. 6 tablet 0    • ferrous sulfate 324 (65 Fe) MG tablet delayed-release EC tablet Take 1 tablet by mouth Daily With Breakfast. 30 tablet 0    • polyethylene glycol (MIRALAX) packet Take 17 g by mouth 2 (Two) Times a Day. 60 each 0        Current Meds: pantoprazole gtt       Allergies:  Allergies   Allergen Reactions   • Diclofenac Sodium        Review of Systems  All systems were  reviewed and negative except for:  Constitution:  positive for malaise  Gastrointestinal: postitive for  diarrhea, nausea and vomiting     Objective     Vital Signs  Temp:  [97.4 °F (36.3 °C)-99.1 °F (37.3 °C)] 98.2 °F (36.8 °C)  Heart Rate:  [] 89  Resp:  [16] 16  BP: (128-170)/(49-86) 129/54    Physical Exam:  Constitutional:    Alert, cooperative, in no acute distress, appears stated age   Eyes:            Lids and lashes normal, conjunctivae and sclerae normal, no   icterus   Ears, nose, mouth and throat:   Normal appearance of external ears and nose, no oral lesions, no thrush, oral mucosa moist   Respiratory:     Clear to auscultation, respirations regular, even and                   unlabored    Cardiovascular:    Regular rhythm and normal rate, normal S1 and S2, no            murmur, no gallop, palpable distal pulses, no lower extremity edema   Gastrointestinal:    Soft, non-distended, non-tender to palpation, no guarding, no rebound tenderness, normal bowel sounds, no palpable masses or organomegaly  Rectal exam: deferred   Musculoskeletal:   Normal station, no atrophy, no tenderness to palpation, normal digits and nails   Skin:   Normal color, no bleeding, bruising, rashes or lesions   Lymphatics:   No palpable cervical or supraclavicular adenopathy   Psychiatric:  Judgement and insight: normal   Orientation to person, place and time: normal   Mood and affect: normal       Results Review:   I reviewed the patient's new clinical results.      Results from last 7 days  Lab Units 11/15/17  0618 11/14/17  2352 11/14/17  0926   WBC 10*3/mm3 13.04*  --  17.33*   HEMOGLOBIN g/dL 8.0* 7.7* 6.8*   HEMATOCRIT % 25.7* 24.0* 21.4*   PLATELETS 10*3/mm3 289  --  309         Results from last 7 days  Lab Units 11/15/17  0618 11/14/17  0926   SODIUM mmol/L 141 139   POTASSIUM mmol/L 3.8 4.1   CHLORIDE mmol/L 106 99   CO2 mmol/L 21.5* 25.9   BUN mg/dL 18 36*   CREATININE mg/dL 0.81 1.12*   CALCIUM mg/dL 8.8 9.4    BILIRUBIN mg/dL  --  1.0   ALK PHOS U/L  --  77   ALT (SGPT) U/L  --  9   AST (SGOT) U/L  --  15   GLUCOSE mg/dL 88 129*               Lab Results  Lab Value Date/Time   LIPASE 20 10/12/2017 1650       Radiology:  Imaging Results (last 72 hours)     Procedure Component Value Units Date/Time    CT Abdomen Pelvis With Contrast [[id number]] Collected:  11/14/17 1013     Updated:  11/14/17 1140    Narrative:       CT ABDOMEN AND PELVIS WITH CONTRAST-     CLINICAL: 84-year-old female in the emergency room with vomiting and  diarrhea.     COMPARISON: 10/12/2017.     FINDINGS: Basilar lung infiltrate demonstrated on 10/12/2017 has near  completely resolved.     Small hiatal hernia is suggested, the stomach is largely collapsed and  cannot be optimally evaluated. The overall contour is within normal  limits. The duodenal bulb is typical in appearance.     Minimal diverticulosis of the sigmoid colon without diverticulitis.  Caliber of the small bowel is normal. No small bowel abnormality. No  induration of the mesentery to suggest an inflammatory process. The  appendix could not be identified, has there been prior appendectomy?     No free air or free intraperitoneal fluid. Artifact arising from the  left hip prosthesis obscures the pelvic floor contents/bladder.     The liver, gallbladder and pancreas are satisfactory in appearance. No  biliary duct dilatation. The spleen is normal in size and shape, no  adrenal abnormality seen. Caliber of the aorta is normal. Both kidneys  demonstrate a symmetric satisfactory pattern of enhancement without  mass, calculus or obstructive uropathy.     CONCLUSION: Nominal diverticulosis of the colon without diverticulitis.  The bowel is otherwise satisfactory in appearance. There appears to be  perhaps a small hiatal hernia, the stomach is collapsed, overall contour  within normal limits.     Findings of this report called to Dr. Llanes in the emergency room at  the time of completion,  10:04 AM.     Radiation dose reduction techniques were utilized, including automated  exposure control and exposure modulation based on body size.     This report was finalized on 11/14/2017 11:37 AM by Dr. Soren Martins MD.             Assessment/Plan     Principal Problem:    Symptomatic anemia  Active Problems:    Colon polyps    Fatigue    Generalized osteoarthritis    Hyperlipidemia    Hypertension    Irritable bowel syndrome    Mild dehydration    Nausea vomiting and diarrhea      Impression  1. Anemia: with black emesis and stools-- concerning for UGI bleed.  She does have a chronic anemia also  2. Nausea, vomiting, diarrhea: possibly all due to UGI bleed vs acute infectious process.  No obstruction on her CT scan  3. Coffee ground emesis: concern for UGI bleed  4. Melena: as above vs related to iron supplementation    Plan  -Continue ppi gtt  -EGD later today-- she has had clears this morning so will need 6 hours NPO prior to test  -follow up C diff results given recent antibiotics    I discussed the patients findings and my recommendations with patient and nursing staff    Hortensia Peralta MD  [facility]      Dictated utilizing Dragon dictation

## 2017-11-15 NOTE — PLAN OF CARE
Problem: Patient Care Overview (Adult)  Goal: Plan of Care Review  Outcome: Ongoing (interventions implemented as appropriate)    11/15/17 3134   Coping/Psychosocial Response Interventions   Plan Of Care Reviewed With patient   Patient Care Overview   Progress improving   Outcome Evaluation   Outcome Summary/Follow up Plan Patient has no complaints of pain. Had an EGD today that showed small hiatal hernia, esophagitis and gastritis. Clear liquid diet resumed and can advance diet as tolerated. VSS, will continue to monitor.         Problem: Fall Risk (Adult)  Goal: Identify Related Risk Factors and Signs and Symptoms  Outcome: Ongoing (interventions implemented as appropriate)  Goal: Absence of Falls  Outcome: Ongoing (interventions implemented as appropriate)    Problem: Infection, Risk/Actual (Adult)  Goal: Identify Related Risk Factors and Signs and Symptoms  Outcome: Ongoing (interventions implemented as appropriate)  Goal: Infection Prevention/Resolution  Outcome: Ongoing (interventions implemented as appropriate)

## 2017-11-15 NOTE — PROGRESS NOTES
"DAILY PROGRESS NOTE  [facility]    Patient Identification:  Name: Ama Billy  Age: 84 y.o.  Sex: female  :  1933  MRN: [id number]         Primary Care Physician: Yoly Patel MD    Subjective:  Interval History:she feels better. No nausea, vomiting or diarrhea. Had EGD.    Objective:    Scheduled Meds:    pantoprazole 40 mg Oral BID AC   sucralfate 1 g Oral BID     Continuous Infusions:    lactated ringers 30 mL/hr Last Rate: 30 mL/hr (11/15/17 1424)   sodium chloride 75 mL/hr Last Rate: 75 mL/hr (11/15/17 0924)       Vital signs in last 24 hours:  Temp:  [98.2 °F (36.8 °C)-99.1 °F (37.3 °C)] 98.4 °F (36.9 °C)  Heart Rate:  [75-98] 75  Resp:  [12-17] 16  BP: (105-144)/(43-68) 105/68    Intake/Output:    Intake/Output Summary (Last 24 hours) at 11/15/17 1755  Last data filed at 11/15/17 1505   Gross per 24 hour   Intake             1500 ml   Output              900 ml   Net              600 ml       Exam:  /68 (BP Location: Left arm, Patient Position: Lying)  Pulse 75  Temp 98.4 °F (36.9 °C) (Oral)   Resp 16  Ht 67.99\" (172.7 cm)  Wt 180 lb (81.6 kg)  SpO2 99%  BMI 27.38 kg/m2    General Appearance:    Alert, cooperative, no distress   Head:    Normocephalic, without obvious abnormality, atraumatic   Eyes:       Throat:   Lips, tongue, gums normal   Neck:   Supple, symmetrical, trachea midline, no JVD   Lungs:     Clear to auscultation bilaterally, respirations unlabored   Chest Wall:    No tenderness or deformity    Heart:    Regular rate and rhythm, S1 and S2 normal, no murmur,no  Rub or gallop   Abdomen:     Soft, non-tender, bowel sounds active, no masses, no organomegaly    Extremities:   Extremities normal, atraumatic, no cyanosis or edema   Pulses:      Skin:   Skin is warm and dry,  no rashes or palpable lesions   Neurologic:   no focal deficits noted      [unfilled]  Data Review:    Results from last 7 days  Lab Units 11/15/17  0618 17  0926   SODIUM " mmol/L 141 139   POTASSIUM mmol/L 3.8 4.1   CHLORIDE mmol/L 106 99   CO2 mmol/L 21.5* 25.9   BUN mg/dL 18 36*   CREATININE mg/dL 0.81 1.12*   GLUCOSE mg/dL 88 129*   CALCIUM mg/dL 8.8 9.4       Results from last 7 days  Lab Units 11/15/17  1608 11/15/17  0618 11/14/17  2352 11/14/17  0926   WBC 10*3/mm3  --  13.04*  --  17.33*   HEMOGLOBIN g/dL 8.3* 8.0* 7.7* 6.8*   HEMATOCRIT % 26.1* 25.7* 24.0* 21.4*   PLATELETS 10*3/mm3  --  289  --  309             No results found for: TROPONINT        Results from last 7 days  Lab Units 11/14/17  0926   ALK PHOS U/L 77   BILIRUBIN mg/dL 1.0   ALT (SGPT) U/L 9   AST (SGOT) U/L 15             No results found for: POCGLU        Patient Active Problem List   Diagnosis Code   • Normocytic anemia D64.9   • Abdominal cramping R10.9   • LLQ pain R10.32   • Acute upper respiratory infection J06.9   • Colon polyps K63.5   • Bursitis of hip M70.70   • Diarrhea R19.7   • Fatigue R53.83   • Generalized osteoarthritis M15.9   • Globus sensation F45.8   • Hyperlipidemia E78.5   • Hypertension I10   • Irritable bowel syndrome K58.9   • Muscle strain of lower extremity S86.919A   • Tendinitis M77.9   • Chronic venous insufficiency I87.2   • 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   • Hip joint replacement status Z96.649   • Left retinal detachment H33.22   • Hypovitaminosis D E55.9   • Weakness of extremity R29.898   • Atypical pneumonia J18.9   • Closed wedge compression fracture of first lumbar vertebra S32.010A   • Fall W19.XXXA   • Constipation K59.00   • Nausea & vomiting R11.2   • Leukocytosis D72.829   • Anemia D64.9   • Symptomatic anemia D64.9   • Mild dehydration E86.0   • Nausea vomiting and diarrhea R11.2, R19.7   • Coffee ground emesis K92.0   • Esophagitis K20.9   • GI bleed K92.2   • Acute blood loss anemia D62       Assessment:  Active Hospital Problems (** Indicates Principal Problem)    Diagnosis Date Noted   • **Coffee ground emesis [K92.0] 11/14/2017   •  Esophagitis [K20.9] 11/15/2017   • GI bleed [K92.2] 11/15/2017   • Acute blood loss anemia [D62] 11/15/2017   • Symptomatic anemia [D64.9] 11/14/2017   • Mild dehydration [E86.0] 11/14/2017   • Nausea vomiting and diarrhea [R11.2, R19.7] 11/14/2017   • Colon polyps [K63.5] 02/21/2017   • Hyperlipidemia [E78.5] 02/21/2017   • Hypertension [I10] 02/21/2017   • Generalized osteoarthritis [M15.9] 02/21/2017   • Fatigue [R53.83] 02/21/2017   • Irritable bowel syndrome [K58.9] 06/24/2013      Resolved Hospital Problems    Diagnosis Date Noted Date Resolved   No resolved problems to display.       Plan:  Continue with protonix drip and follow HGB. Transfuse as needed. EGD noted.    Vipin Alarcon MD  11/15/2017  5:55 PM

## 2017-11-15 NOTE — PLAN OF CARE
Problem: Patient Care Overview (Adult)  Goal: Plan of Care Review  Outcome: Ongoing (interventions implemented as appropriate)    11/14/17 6330   Outcome Evaluation   Outcome Summary/Follow up Plan Patient stable this evening, recieved 2 units of blood for low hgb; labs ordered for in the am; patient is to be seen by GI regarding possible gi bleed, stool specimen ordered to determine if blood in stool; patient was started on protonix drip with initial bolus given; remains on clear liquids with ivf infusing. will continue to monitor overnight.        Goal: Adult Individualization and Mutuality  Outcome: Ongoing (interventions implemented as appropriate)  Goal: Discharge Needs Assessment  Outcome: Ongoing (interventions implemented as appropriate)    Problem: Fall Risk (Adult)  Goal: Identify Related Risk Factors and Signs and Symptoms  Outcome: Ongoing (interventions implemented as appropriate)  Goal: Absence of Falls  Outcome: Ongoing (interventions implemented as appropriate)    Problem: Infection, Risk/Actual (Adult)  Goal: Identify Related Risk Factors and Signs and Symptoms  Outcome: Ongoing (interventions implemented as appropriate)  Goal: Infection Prevention/Resolution  Outcome: Ongoing (interventions implemented as appropriate)

## 2017-11-15 NOTE — ANESTHESIA PREPROCEDURE EVALUATION
Anesthesia Evaluation     Patient summary reviewed and Nursing notes reviewed   NPO Solid Status: > 8 hours  NPO Liquid Status: > 8 hours     Airway   Mallampati: I  TM distance: >3 FB  Neck ROM: full  no difficulty expected  Dental - normal exam   (+) poor dentition    Comment: partials    Pulmonary - normal exam     ROS comment: Cant lay flat since fall  R side pain  Cardiovascular - normal exam    (+) hypertension, hyperlipidemia      Neuro/Psych  GI/Hepatic/Renal/Endo      Musculoskeletal     (+) back pain (fx vertebrae L1),   Abdominal  - normal exam    Bowel sounds: normal.   Substance History      OB/GYN          Other                                        Anesthesia Plan    ASA 2     MAC     Anesthetic plan and risks discussed with patient.

## 2017-11-16 LAB
ANION GAP SERPL CALCULATED.3IONS-SCNC: 11.8 MMOL/L
BASOPHILS # BLD AUTO: 0.01 10*3/MM3 (ref 0–0.2)
BASOPHILS NFR BLD AUTO: 0.1 % (ref 0–1.5)
BUN BLD-MCNC: 11 MG/DL (ref 8–23)
BUN/CREAT SERPL: 15.1 (ref 7–25)
CALCIUM SPEC-SCNC: 8.3 MG/DL (ref 8.6–10.5)
CHLORIDE SERPL-SCNC: 107 MMOL/L (ref 98–107)
CK MB SERPL-CCNC: 1.49 NG/ML
CO2 SERPL-SCNC: 22.2 MMOL/L (ref 22–29)
CREAT BLD-MCNC: 0.73 MG/DL (ref 0.57–1)
DEPRECATED RDW RBC AUTO: 65.9 FL (ref 37–54)
EOSINOPHIL # BLD AUTO: 0.58 10*3/MM3 (ref 0–0.7)
EOSINOPHIL NFR BLD AUTO: 3.6 % (ref 0.3–6.2)
ERYTHROCYTE [DISTWIDTH] IN BLOOD BY AUTOMATED COUNT: 20.8 % (ref 11.7–13)
GFR SERPL CREATININE-BSD FRML MDRD: 76 ML/MIN/1.73
GLUCOSE BLD-MCNC: 91 MG/DL (ref 65–99)
HCT VFR BLD AUTO: 23.3 % (ref 35.6–45.5)
HCT VFR BLD AUTO: 23.5 % (ref 35.6–45.5)
HCT VFR BLD AUTO: 24.2 % (ref 35.6–45.5)
HCT VFR BLD AUTO: 24.9 % (ref 35.6–45.5)
HGB BLD-MCNC: 7.3 G/DL (ref 11.9–15.5)
HGB BLD-MCNC: 7.4 G/DL (ref 11.9–15.5)
HGB BLD-MCNC: 7.6 G/DL (ref 11.9–15.5)
HGB BLD-MCNC: 7.7 G/DL (ref 11.9–15.5)
IMM GRANULOCYTES # BLD: 0.07 10*3/MM3 (ref 0–0.03)
IMM GRANULOCYTES NFR BLD: 0.4 % (ref 0–0.5)
LYMPHOCYTES # BLD AUTO: 1.25 10*3/MM3 (ref 0.9–4.8)
LYMPHOCYTES NFR BLD AUTO: 7.8 % (ref 19.6–45.3)
MAGNESIUM SERPL-MCNC: 1.9 MG/DL (ref 1.6–2.4)
MCH RBC QN AUTO: 26.9 PG (ref 26.9–32)
MCHC RBC AUTO-ENTMCNC: 31.3 G/DL (ref 32.4–36.3)
MCV RBC AUTO: 86 FL (ref 80.5–98.2)
MONOCYTES # BLD AUTO: 1.19 10*3/MM3 (ref 0.2–1.2)
MONOCYTES NFR BLD AUTO: 7.4 % (ref 5–12)
NEUTROPHILS # BLD AUTO: 12.94 10*3/MM3 (ref 1.9–8.1)
NEUTROPHILS NFR BLD AUTO: 80.7 % (ref 42.7–76)
NRBC BLD MANUAL-RTO: 0.7 /100 WBC (ref 0–0)
PLATELET # BLD AUTO: 293 10*3/MM3 (ref 140–500)
PMV BLD AUTO: 10.5 FL (ref 6–12)
POTASSIUM BLD-SCNC: 3.5 MMOL/L (ref 3.5–5.2)
RBC # BLD AUTO: 2.71 10*6/MM3 (ref 3.9–5.2)
SODIUM BLD-SCNC: 141 MMOL/L (ref 136–145)
TROPONIN T SERPL-MCNC: <0.01 NG/ML (ref 0–0.03)
TSH SERPL DL<=0.05 MIU/L-ACNC: 0.46 MIU/ML (ref 0.27–4.2)
WBC NRBC COR # BLD: 16.04 10*3/MM3 (ref 4.5–10.7)

## 2017-11-16 PROCEDURE — 83735 ASSAY OF MAGNESIUM: CPT | Performed by: HOSPITALIST

## 2017-11-16 PROCEDURE — 85018 HEMOGLOBIN: CPT | Performed by: HOSPITALIST

## 2017-11-16 PROCEDURE — 85014 HEMATOCRIT: CPT | Performed by: HOSPITALIST

## 2017-11-16 PROCEDURE — 93010 ELECTROCARDIOGRAM REPORT: CPT | Performed by: INTERNAL MEDICINE

## 2017-11-16 PROCEDURE — 99232 SBSQ HOSP IP/OBS MODERATE 35: CPT | Performed by: INTERNAL MEDICINE

## 2017-11-16 PROCEDURE — 84443 ASSAY THYROID STIM HORMONE: CPT | Performed by: INTERNAL MEDICINE

## 2017-11-16 PROCEDURE — 80048 BASIC METABOLIC PNL TOTAL CA: CPT | Performed by: HOSPITALIST

## 2017-11-16 PROCEDURE — 85025 COMPLETE CBC W/AUTO DIFF WBC: CPT | Performed by: HOSPITALIST

## 2017-11-16 PROCEDURE — 82553 CREATINE MB FRACTION: CPT | Performed by: HOSPITALIST

## 2017-11-16 PROCEDURE — 93005 ELECTROCARDIOGRAM TRACING: CPT | Performed by: INTERNAL MEDICINE

## 2017-11-16 PROCEDURE — 93005 ELECTROCARDIOGRAM TRACING: CPT | Performed by: HOSPITALIST

## 2017-11-16 PROCEDURE — 84484 ASSAY OF TROPONIN QUANT: CPT | Performed by: HOSPITALIST

## 2017-11-16 RX ORDER — DILTIAZEM HYDROCHLORIDE 5 MG/ML
5 INJECTION INTRAVENOUS ONCE
Status: DISCONTINUED | OUTPATIENT
Start: 2017-11-16 | End: 2017-11-17 | Stop reason: HOSPADM

## 2017-11-16 RX ORDER — METOPROLOL SUCCINATE 25 MG/1
25 TABLET, EXTENDED RELEASE ORAL
Status: DISCONTINUED | OUTPATIENT
Start: 2017-11-16 | End: 2017-11-17 | Stop reason: HOSPADM

## 2017-11-16 RX ADMIN — PANTOPRAZOLE SODIUM 40 MG: 40 TABLET, DELAYED RELEASE ORAL at 06:28

## 2017-11-16 RX ADMIN — SUCRALFATE 1 G: 1 SUSPENSION ORAL at 17:13

## 2017-11-16 RX ADMIN — PANTOPRAZOLE SODIUM 40 MG: 40 TABLET, DELAYED RELEASE ORAL at 17:13

## 2017-11-16 RX ADMIN — METOPROLOL SUCCINATE 25 MG: 25 TABLET, FILM COATED, EXTENDED RELEASE ORAL at 17:13

## 2017-11-16 RX ADMIN — SODIUM CHLORIDE 75 ML/HR: 9 INJECTION, SOLUTION INTRAVENOUS at 00:14

## 2017-11-16 RX ADMIN — SODIUM CHLORIDE 75 ML/HR: 9 INJECTION, SOLUTION INTRAVENOUS at 13:36

## 2017-11-16 RX ADMIN — SUCRALFATE 1 G: 1 SUSPENSION ORAL at 09:14

## 2017-11-16 NOTE — CONSULTS
Patient Name: Ama Billy  Age/Sex: 84 y.o. female  : 1933  MRN: [id number]    Date of Admission: 2017  Date of Encounter Visit: 17  Encounter Provider: Ibeth Tiwari MD  Place of Service: [facility] CARDIOLOGY      Referring Provider: Vipin Alarcon MD  Patient Care Team:  Yoly Patel MD as PCP - General  Manuel Bello Jr., MD as Consulting Physician (Hematology and Oncology)  Adamaris Padron MD as Referring Physician (Internal Medicine)    Subjective:     Consulted for:  Afib    Chief Complaint: anemic      History of Present Illness:  Ama Billy is a 84 y.o. female with no documented history of CAD, CHF or arrhythmias.  She does carry a history of rheumatic fever, anemia, hypertension, and hyperlipidemia.  She was recently hospitalized from 10/12-10/15 for pneumonia.  She had seen Dr. Howard back in  for chest pain and had a routine treadmill which was negative.     Patient presented to the emergency room on  with a complaint of nausea, vomiting and diarrhea for 4 days.  This has been associated with generalized weakness and fatigue.  She notes that her stools are always dark and she is on iron pills.   CT of the abdomen and pelvis showed multiple diverticulosis without diverticulitis. She was noted to be anemic on admission. Patient was transfused, hydrated and placed on a Protonix drip. Gastroenterology had seen the patient and performed an EGD yesterday that showed a small hiatal hernia and acute erosive esophagitis.  Early this morning the patient went into atrial fibrillation for approximately 3 hours and converted back into sinus rhythm even prior to starting a diltiazem gtt. She denies any symptoms with atrial fibrillation.  She otherwise denies any recent issues with dyspnea on exertion, chest pain or palpitations.                Past Medical History:  Past Medical History:   Diagnosis Date   • Anemia      Normocytic anemia of unclear ediology   • Arthritis     Osteoarthritis involving knees and left shoulder   • Eosinophilia     Persistent mild eosinophilia of unknown etiology   • History of colonic polyps    • Hyperlipidemia    • Hypertension        Past Surgical History:   Procedure Laterality Date   • COLONOSCOPY      H/O colonic polyps with regular colonoscopies at 5 year intervals.    • COLONOSCOPY  2013    By Dr. Gudino, no polyps identified.   • ENDOSCOPY N/A 11/15/2017    Procedure: ESOPHAGOGASTRODUODENOSCOPY WITH COLD BIOPSIES;  Surgeon: Bulmaro Childers MD;  Location: [facility] ENDOSCOPY;  Service:    • HEMORRHOIDECTOMY  1965   • HYSTERECTOMY  1971    Partial, secondary to endometriosis   • JOINT REPLACEMENT  2014    Left total hip arthroplasy   • KNEE SURGERY  1997    Arthroscopy of left knee 1997; right knee 2003       Home Medications:   Prescriptions Prior to Admission   Medication Sig Dispense Refill Last Dose   • Ascorbic Acid (VITAMIN C) 500 MG capsule Take 500 mg by mouth Daily.   Taking   • aspirin 81 MG EC tablet Take 81 mg by mouth Every Night.   Taking   • Calcium Carbonate-Vitamin D (CALCIUM 500 + D) 500-125 MG-UNIT tablet Take 1 tablet by mouth Daily.   Taking   • Docusate Sodium 100 MG capsule Take 100 mg by mouth Every Night.   Taking   • Glucosamine-Chondroit-Vit C-Mn (GLUCOSAMINE CHONDR 1500 COMPLX PO) Take 1 tablet by mouth Daily.   Taking   • lisinopril (PRINIVIL,ZESTRIL) 10 MG tablet TAKE 1 TABLET DAILY 90 tablet 0    • lovastatin (MEVACOR) 20 MG tablet TAKE 1 TABLET EVERY NIGHT 90 tablet 4    • azithromycin (ZITHROMAX) 250 MG tablet Take 2 tablets the first day, then 1 tablet daily for 4 days. 6 tablet 0    • ferrous sulfate 324 (65 Fe) MG tablet delayed-release EC tablet Take 1 tablet by mouth Daily With Breakfast. 30 tablet 0    • polyethylene glycol (MIRALAX) packet Take 17 g by mouth 2 (Two) Times a Day. 60 each 0        Allergies:  Allergies   Allergen Reactions   • Diclofenac Sodium         Past Social History:  Social History     Social History   • Marital status:      Spouse name: Trino   • Number of children: 2   • Years of education: College +     [place]upational History   [place]  [employer] [place]     Worked as a teach in the past.   •  Retired     Social History Main Topics   • Smoking status: Never Smoker   • Smokeless tobacco: Not on file   • Alcohol use Yes      Comment: Occasional   • Drug use: No   • Sexual activity: Not on file     Other Topics Concern   • Not on file     Social History Narrative        Past Family History:  Family History   Problem Relation Age of Onset   • Coronary artery disease Father    • Heart disease Father    • Heart attack Father 72   • Coronary artery disease Brother    • Heart disease Brother      CABG         Review of Systems:  All systems reviewed. Pertinent positives identified in HPI. All other systems are negative.   REVIEW OF SYSTEMS:   CONSTITUTIONAL: No weight loss, fever, chills, weakness or fatigue.   HEENT: Eyes: No visual loss, blurred vision, double vision or yellow sclerae. Ears, Nose, Throat: No hearing loss, sneezing, congestion, runny nose or sore throat.   SKIN: No rash or itching.     RESPIRATORY: No shortness of breath, hemoptysis, cough or sputum.   GASTROINTESTINAL: No anorexia, nausea, vomiting or diarrhea. No abdominal pain, bright red blood per rectum or melena.  GENITOURINARY: No burning on urination, hematuria or increased frequency.  NEUROLOGICAL: No headache, dizziness, syncope, paralysis, ataxia, numbness or tingling in the extremities. No change in bowel or bladder control.   MUSCULOSKELETAL: No muscle, back pain, joint pain or stiffness.   HEMATOLOGIC: No anemia, bleeding or bruising.   LYMPHATICS: No enlarged nodes. No history of splenectomy.   PSYCHIATRIC: No history of depression, anxiety, hallucinations.   ENDOCRINOLOGIC: No reports of sweating, cold or heat intolerance. No polyuria or  polydipsia.       Objective:     Objective:  Temp:  [98.1 °F (36.7 °C)-98.7 °F (37.1 °C)] 98.2 °F (36.8 °C)  Heart Rate:  [] 84  Resp:  [12-18] 18  BP: (105-151)/(43-68) 120/55    Intake/Output Summary (Last 24 hours) at 11/16/17 1450  Last data filed at 11/16/17 0014   Gross per 24 hour   Intake             1200 ml   Output                0 ml   Net             1200 ml     Body mass index is 27.38 kg/(m^2).  Last 3 weights    11/14/17  0909 11/14/17  1749   Weight: 180 lb (81.6 kg) 180 lb (81.6 kg)           Physical Exam:   General Appearance Well developed, cooperative and well nourished and no acute distress   Head Normocephalic, without abnormality, atraumatic   Ears Ears appear intact with no abnormalities noted   Throat No oral lesions, no thrush, oral mucosa moist   Neck No adenopathy, supple, trachea midline, no thyromegaly, no carotid bruit, no JVD   Back No skin lesions, erythema or scars, no tenderness to percussion or palptaion,range of motion is normal   Lungs Clear to auscultation,respirations regular, even and unlabored   Heart Regular rhythm and normal rate, normal S1 and S2, no murmur, no gallop, no rub, no click   Chest wall No abnormalities observed   Abdomen Normal bowel sounds, no masses, no hepatomegaly,    Extremities Moves all extremities well, no edema, no cyanosis, no redness   Pulses Pulses palpable and equal bilaterally. Normal radial, carotid, femoral, dorsalis pedis and posterior tibial pulses bilaterally. Normal abdominal aorta   Skin No bleeding, bruising or rash   Psyhiatric Alert and oriented x 3, normal mood and affect       Lab Review:       Results from last 7 days  Lab Units 11/16/17  0401 11/15/17  0618 11/14/17  0926   SODIUM mmol/L 141 141 139   POTASSIUM mmol/L 3.5 3.8 4.1   CHLORIDE mmol/L 107 106 99   CO2 mmol/L 22.2 21.5* 25.9   BUN mg/dL 11 18 36*   CREATININE mg/dL 0.73 0.81 1.12*   GLUCOSE mg/dL 91 88 129*   CALCIUM mg/dL 8.3* 8.8 9.4         Results from last 7  days  Lab Units 11/16/17  0401   TROPONIN T ng/mL <0.010       Results from last 7 days  Lab Units 11/16/17  0755 11/16/17  0401   WBC 10*3/mm3  --  16.04*   HEMOGLOBIN g/dL 7.4* 7.3*   HEMATOCRIT % 23.5* 23.3*   PLATELETS 10*3/mm3  --  293               Results from last 7 days  Lab Units 11/16/17  0401   MAGNESIUM mg/dL 1.9                   Results from last 7 days  Lab Units 11/16/17  0401   TSH mIU/mL 0.457       Imaging:    Imaging Results (most recent)     Procedure Component Value Units Date/Time    CT Abdomen Pelvis With Contrast [[id number]] Collected:  11/14/17 1013     Updated:  11/14/17 1140    Narrative:       CT ABDOMEN AND PELVIS WITH CONTRAST-     CLINICAL: 84-year-old female in the emergency room with vomiting and  diarrhea.     COMPARISON: 10/12/2017.     FINDINGS: Basilar lung infiltrate demonstrated on 10/12/2017 has near  completely resolved.     Small hiatal hernia is suggested, the stomach is largely collapsed and  cannot be optimally evaluated. The overall contour is within normal  limits. The duodenal bulb is typical in appearance.     Minimal diverticulosis of the sigmoid colon without diverticulitis.  Caliber of the small bowel is normal. No small bowel abnormality. No  induration of the mesentery to suggest an inflammatory process. The  appendix could not be identified, has there been prior appendectomy?     No free air or free intraperitoneal fluid. Artifact arising from the  left hip prosthesis obscures the pelvic floor contents/bladder.     The liver, gallbladder and pancreas are satisfactory in appearance. No  biliary duct dilatation. The spleen is normal in size and shape, no  adrenal abnormality seen. Caliber of the aorta is normal. Both kidneys  demonstrate a symmetric satisfactory pattern of enhancement without  mass, calculus or obstructive uropathy.     CONCLUSION: Nominal diverticulosis of the colon without diverticulitis.  The bowel is otherwise satisfactory in appearance.  There appears to be  perhaps a small hiatal hernia, the stomach is collapsed, overall contour  within normal limits.     Findings of this report called to Dr. Llanes in the emergency room at  the time of completion, 10:04 AM.     Radiation dose reduction techniques were utilized, including automated  exposure control and exposure modulation based on body size.     This report was finalized on 11/14/2017 11:37 AM by Dr. Soren Martins MD.                  EKG:           BASELINE:         I personally viewed and interpreted the patient's EKG/Telemetry data.    Assessment/Plan:     1. Paroxsymal atrial fibrillation.  Now back in sinus rhythm.  Off diltiazem gtt. Raz any symptoms with atrial fibrillation.  Not a candidate for anticoagulation with GI bleed.  2. GI bleed. Due to esophagitis. Treatment with carafate and PPI per GI.  3. Anemia. Seriel H/H's.  4. Hypertension.  5. Hyperlipidemia    -  Will start beta blocker and check an echocardiogram.     Thank you for allowing me to participate in the care of Ama Billy. Feel free to contact me directly with any further questions or concerns.    Ibeth Tiwari MD  [facility]  11/16/17  2:50 PM

## 2017-11-16 NOTE — PLAN OF CARE
Problem: Patient Care Overview (Adult)  Goal: Plan of Care Review  Outcome: Ongoing (interventions implemented as appropriate)    11/15/17 1732 11/16/17 0238 11/16/17 0246   Coping/Psychosocial Response Interventions   Plan Of Care Reviewed With --  patient --    Patient Care Overview   Progress improving --  --    Outcome Evaluation   Outcome Summary/Follow up Plan --  --  Patient had egd today with small hiatal hernia and gastritis, patients diet advanced to full liquid and if tolerates will further advance; Patient started on protonix bid; denies pain, no respiratory distress, vitals WNL, patient up with stand by assist, adequate intake with ivf infusing; will continue to monitor, poss dc in am       Goal: Adult Individualization and Mutuality  Outcome: Ongoing (interventions implemented as appropriate)  Goal: Discharge Needs Assessment  Outcome: Ongoing (interventions implemented as appropriate)    Problem: Fall Risk (Adult)  Goal: Identify Related Risk Factors and Signs and Symptoms  Outcome: Ongoing (interventions implemented as appropriate)  Goal: Absence of Falls  Outcome: Ongoing (interventions implemented as appropriate)    Problem: Infection, Risk/Actual (Adult)  Goal: Identify Related Risk Factors and Signs and Symptoms  Outcome: Ongoing (interventions implemented as appropriate)  Goal: Infection Prevention/Resolution  Outcome: Ongoing (interventions implemented as appropriate)

## 2017-11-16 NOTE — PLAN OF CARE
Problem: Patient Care Overview (Adult)  Goal: Plan of Care Review  Outcome: Ongoing (interventions implemented as appropriate)    11/15/17 1732 11/16/17 0917 11/16/17 1837   Coping/Psychosocial Response Interventions   Plan Of Care Reviewed With --  patient --    Patient Care Overview   Progress improving --  --    Outcome Evaluation   Outcome Summary/Follow up Plan --  --  pt has had no n/v today; monitoring hemoglobin; has tolerated regular diet all day...though appetite hasn't been great; no pain; ivf; plan for echo tomorrow...and started metoprolol today as cardiology was consulted through the night       Goal: Adult Individualization and Mutuality  Outcome: Ongoing (interventions implemented as appropriate)  Goal: Discharge Needs Assessment  Outcome: Ongoing (interventions implemented as appropriate)    Problem: Fall Risk (Adult)  Goal: Identify Related Risk Factors and Signs and Symptoms  Outcome: Ongoing (interventions implemented as appropriate)  Goal: Absence of Falls  Outcome: Ongoing (interventions implemented as appropriate)    Problem: Infection, Risk/Actual (Adult)  Goal: Identify Related Risk Factors and Signs and Symptoms  Outcome: Ongoing (interventions implemented as appropriate)  Goal: Infection Prevention/Resolution  Outcome: Ongoing (interventions implemented as appropriate)

## 2017-11-16 NOTE — PROGRESS NOTES
[facility]/GI Progress Note   Chief Complaint:  Coffee ground emesis, melena, n/v/d    Subjective     Interval History: No BM since admission, per RN no vomiting and tolerating diet.  Went into afib last night for a period of 3 hours and converted back to SR w/o intervention.  Cards now seeing.  No co's of abd pain.      History taken from: patient chart RN    Review of Systems:    The following systems were reviewed and negative;  gastrointestinal    Objective     Vital Signs  Temp:  [98.1 °F (36.7 °C)-98.7 °F (37.1 °C)] 98.5 °F (36.9 °C)  Heart Rate:  [] 102  Resp:  [12-18] 18  BP: (105-151)/(43-68) 131/61  Body mass index is 27.38 kg/(m^2).    Intake/Output Summary (Last 24 hours) at 11/16/17 0803  Last data filed at 11/16/17 0014   Gross per 24 hour   Intake             1200 ml   Output                0 ml   Net             1200 ml          Physical Exam:   General: patient awake, alert and cooperative.  Resting in bed, no distress   Eyes: Normal lids and lashes, no scleral icterus, conjunctival pallor   Neck: supple, normal ROM, no tracheal deviation   Skin: warm and dry, not jaundiced   Cardiovascular: regular rhythm and rate, no murmurs auscultated   Pulm: clear to auscultation bilaterally, regular and unlabored   Abdomen: soft, nontender, nondistended; normal bowel sounds   Rectal: deferred   Extremities: no rash or edema   Neurologic: Normal mood and behavior    All Medications Have Been Reviewed     Results Review:       Results from last 7 days  Lab Units 11/16/17  0401 11/16/17  0019 11/15/17  1608 11/15/17  0618  11/14/17  0926   WBC 10*3/mm3 16.04*  --   --  13.04*  --  17.33*   HEMOGLOBIN g/dL 7.3* 7.7* 8.3* 8.0*  < > 6.8*   HEMATOCRIT % 23.3* 24.9* 26.1* 25.7*  < > 21.4*   PLATELETS 10*3/mm3 293  --   --  289  --  309   < > = values in this interval not displayed.      Results from last 7 days  Lab Units 11/16/17  0401 11/15/17  0618 11/14/17  0926   SODIUM mmol/L 141 141 139   POTASSIUM  mmol/L 3.5 3.8 4.1   CHLORIDE mmol/L 107 106 99   CO2 mmol/L 22.2 21.5* 25.9   BUN mg/dL 11 18 36*   CREATININE mg/dL 0.73 0.81 1.12*   CALCIUM mg/dL 8.3* 8.8 9.4   BILIRUBIN mg/dL  --   --  1.0   ALK PHOS U/L  --   --  77   ALT (SGPT) U/L  --   --  9   AST (SGOT) U/L  --   --  15   GLUCOSE mg/dL 91 88 129*             RADIOLOGY:    Imaging Results (last 72 hours)     Procedure Component Value Units Date/Time    CT Abdomen Pelvis With Contrast [[id number]] Collected:  11/14/17 1013     Updated:  11/14/17 1140    Narrative:       CT ABDOMEN AND PELVIS WITH CONTRAST-     CLINICAL: 84-year-old female in the emergency room with vomiting and  diarrhea.     COMPARISON: 10/12/2017.     FINDINGS: Basilar lung infiltrate demonstrated on 10/12/2017 has near  completely resolved.     Small hiatal hernia is suggested, the stomach is largely collapsed and  cannot be optimally evaluated. The overall contour is within normal  limits. The duodenal bulb is typical in appearance.     Minimal diverticulosis of the sigmoid colon without diverticulitis.  Caliber of the small bowel is normal. No small bowel abnormality. No  induration of the mesentery to suggest an inflammatory process. The  appendix could not be identified, has there been prior appendectomy?     No free air or free intraperitoneal fluid. Artifact arising from the  left hip prosthesis obscures the pelvic floor contents/bladder.     The liver, gallbladder and pancreas are satisfactory in appearance. No  biliary duct dilatation. The spleen is normal in size and shape, no  adrenal abnormality seen. Caliber of the aorta is normal. Both kidneys  demonstrate a symmetric satisfactory pattern of enhancement without  mass, calculus or obstructive uropathy.     CONCLUSION: Nominal diverticulosis of the colon without diverticulitis.  The bowel is otherwise satisfactory in appearance. There appears to be  perhaps a small hiatal hernia, the stomach is collapsed, overall  contour  within normal limits.     Findings of this report called to Dr. Llanes in the emergency room at  the time of completion, 10:04 AM.     Radiation dose reduction techniques were utilized, including automated  exposure control and exposure modulation based on body size.     This report was finalized on 11/14/2017 11:37 AM by Dr. Soren Martins MD.             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   • Left retinal detachment H33.22   • Hypovitaminosis D E55.9   • Weakness of extremity R29.898   • Atypical pneumonia J18.9   • Closed wedge compression fracture of first lumbar vertebra S32.010A   • Fall W19.XXXA   • Constipation K59.00   • Nausea & vomiting R11.2   • Leukocytosis D72.829   • Anemia D64.9   • Symptomatic anemia D64.9   • Mild dehydration E86.0   • Nausea vomiting and diarrhea R11.2, R19.7   • Coffee ground emesis K92.0   • Esophagitis K20.9   • GI bleed K92.2   • Acute blood loss anemia D62     Kierra Buckner, APRN  11/16/17  8:03 AM      Overnight events noted.  Cards following pt for a fib event.  Ate a little - no pain or difficulty swallowing. Denies n/v, no abdominal pain.    abd - s/nt/nd +bs    Labs reviewed by me    Impression:  1. UGIB due to esophagitis  2. Gastritis  3. Acute on chronic anemia    Plan:  PPI BID x 3 months  Carafate bid x 4 weeks  Repeat EGD in 3 months to assess healing  Follow-up on EGD  path  Continue to follow h/h

## 2017-11-17 ENCOUNTER — APPOINTMENT (OUTPATIENT)
Dept: CARDIOLOGY | Facility: HOSPITAL | Age: 82
End: 2017-11-17
Attending: INTERNAL MEDICINE

## 2017-11-17 VITALS
RESPIRATION RATE: 16 BRPM | TEMPERATURE: 98.7 F | HEIGHT: 68 IN | WEIGHT: 188 LBS | DIASTOLIC BLOOD PRESSURE: 63 MMHG | HEART RATE: 77 BPM | SYSTOLIC BLOOD PRESSURE: 139 MMHG | BODY MASS INDEX: 28.49 KG/M2 | OXYGEN SATURATION: 96 %

## 2017-11-17 PROBLEM — E86.0 MILD DEHYDRATION: Status: RESOLVED | Noted: 2017-11-14 | Resolved: 2017-11-17

## 2017-11-17 PROBLEM — R11.2 NAUSEA VOMITING AND DIARRHEA: Status: RESOLVED | Noted: 2017-11-14 | Resolved: 2017-11-17

## 2017-11-17 PROBLEM — K92.0 COFFEE GROUND EMESIS: Status: ACTIVE | Noted: 2017-11-14

## 2017-11-17 PROBLEM — R19.7 NAUSEA VOMITING AND DIARRHEA: Status: RESOLVED | Noted: 2017-11-14 | Resolved: 2017-11-17

## 2017-11-17 PROBLEM — K92.0 COFFEE GROUND EMESIS: Status: RESOLVED | Noted: 2017-11-14 | Resolved: 2017-11-17

## 2017-11-17 LAB
ALBUMIN SERPL-MCNC: 2.9 G/DL (ref 3.5–5.2)
ALBUMIN/GLOB SERPL: 1.2 G/DL
ALP SERPL-CCNC: 64 U/L (ref 39–117)
ALT SERPL W P-5'-P-CCNC: 8 U/L (ref 1–33)
ANION GAP SERPL CALCULATED.3IONS-SCNC: 10.9 MMOL/L
AORTIC DIMENSIONLESS INDEX: 0.9 (DI)
AST SERPL-CCNC: 11 U/L (ref 1–32)
BASOPHILS # BLD AUTO: 0.01 10*3/MM3 (ref 0–0.2)
BASOPHILS NFR BLD AUTO: 0.1 % (ref 0–1.5)
BH CV ECHO MEAS - ACS: 1.9 CM
BH CV ECHO MEAS - AO MEAN PG (FULL): 1 MMHG
BH CV ECHO MEAS - AO MEAN PG: 5 MMHG
BH CV ECHO MEAS - AO ROOT AREA (BSA CORRECTED): 1.4
BH CV ECHO MEAS - AO ROOT AREA: 5.7 CM^2
BH CV ECHO MEAS - AO ROOT DIAM: 2.7 CM
BH CV ECHO MEAS - AO V2 MAX: 147 CM/SEC
BH CV ECHO MEAS - AO V2 MEAN: 105 CM/SEC
BH CV ECHO MEAS - AO V2 VTI: 32.8 CM
BH CV ECHO MEAS - AVA(I,A): 2.3 CM^2
BH CV ECHO MEAS - AVA(I,D): 2.3 CM^2
BH CV ECHO MEAS - BSA(HAYCOCK): 2 M^2
BH CV ECHO MEAS - BSA: 2 M^2
BH CV ECHO MEAS - BZI_BMI: 28.6 KILOGRAMS/M^2
BH CV ECHO MEAS - BZI_METRIC_HEIGHT: 172.7 CM
BH CV ECHO MEAS - BZI_METRIC_WEIGHT: 85.3 KG
BH CV ECHO MEAS - CONTRAST EF (2CH): 68.1 ML/M^2
BH CV ECHO MEAS - CONTRAST EF 4CH: 67.9 ML/M^2
BH CV ECHO MEAS - EDV(CUBED): 91.1 ML
BH CV ECHO MEAS - EDV(MOD-SP2): 72 ML
BH CV ECHO MEAS - EDV(MOD-SP4): 53 ML
BH CV ECHO MEAS - EDV(TEICH): 92.4 ML
BH CV ECHO MEAS - EF(CUBED): 64 %
BH CV ECHO MEAS - EF(MOD-SP2): 68.1 %
BH CV ECHO MEAS - EF(MOD-SP4): 67.9 %
BH CV ECHO MEAS - EF(TEICH): 55.7 %
BH CV ECHO MEAS - ESV(CUBED): 32.8 ML
BH CV ECHO MEAS - ESV(MOD-SP2): 23 ML
BH CV ECHO MEAS - ESV(MOD-SP4): 17 ML
BH CV ECHO MEAS - ESV(TEICH): 41 ML
BH CV ECHO MEAS - FS: 28.9 %
BH CV ECHO MEAS - IVS/LVPW: 1.1
BH CV ECHO MEAS - IVSD: 0.9 CM
BH CV ECHO MEAS - LAT PEAK E' VEL: 8 CM/SEC
BH CV ECHO MEAS - LV DIASTOLIC VOL/BSA (35-75): 26.6 ML/M^2
BH CV ECHO MEAS - LV MASS(C)D: 123.1 GRAMS
BH CV ECHO MEAS - LV MASS(C)DI: 61.8 GRAMS/M^2
BH CV ECHO MEAS - LV MEAN PG: 4 MMHG
BH CV ECHO MEAS - LV SYSTOLIC VOL/BSA (12-30): 8.5 ML/M^2
BH CV ECHO MEAS - LV V1 MAX: 152 CM/SEC
BH CV ECHO MEAS - LV V1 MEAN: 90.1 CM/SEC
BH CV ECHO MEAS - LV V1 VTI: 29.1 CM
BH CV ECHO MEAS - LVIDD: 4.5 CM
BH CV ECHO MEAS - LVIDS: 3.2 CM
BH CV ECHO MEAS - LVLD AP2: 6.8 CM
BH CV ECHO MEAS - LVLD AP4: 6.9 CM
BH CV ECHO MEAS - LVLS AP2: 6.3 CM
BH CV ECHO MEAS - LVLS AP4: 5.9 CM
BH CV ECHO MEAS - LVOT AREA (M): 2.5 CM^2
BH CV ECHO MEAS - LVOT AREA: 2.5 CM^2
BH CV ECHO MEAS - LVOT DIAM: 1.8 CM
BH CV ECHO MEAS - LVPWD: 0.8 CM
BH CV ECHO MEAS - MED PEAK E' VEL: 7 CM/SEC
BH CV ECHO MEAS - MR MAX PG: 132.7 MMHG
BH CV ECHO MEAS - MR MAX VEL: 576 CM/SEC
BH CV ECHO MEAS - MV A DUR: 148 SEC
BH CV ECHO MEAS - MV A MAX VEL: 100 CM/SEC
BH CV ECHO MEAS - MV DEC SLOPE: 534 CM/SEC^2
BH CV ECHO MEAS - MV DEC TIME: 123 SEC
BH CV ECHO MEAS - MV E MAX VEL: 92.6 CM/SEC
BH CV ECHO MEAS - MV E/A: 0.93
BH CV ECHO MEAS - MV MEAN PG: 3 MMHG
BH CV ECHO MEAS - MV P1/2T MAX VEL: 136 CM/SEC
BH CV ECHO MEAS - MV P1/2T: 74.6 MSEC
BH CV ECHO MEAS - MV V2 MEAN: 85.8 CM/SEC
BH CV ECHO MEAS - MV V2 VTI: 38.5 CM
BH CV ECHO MEAS - MVA P1/2T LCG: 1.6 CM^2
BH CV ECHO MEAS - MVA(P1/2T): 2.9 CM^2
BH CV ECHO MEAS - MVA(VTI): 1.9 CM^2
BH CV ECHO MEAS - PA ACC SLOPE: 19.8 CM/SEC^2
BH CV ECHO MEAS - PA ACC TIME: 0.14 SEC
BH CV ECHO MEAS - PA MAX PG (FULL): 1 MMHG
BH CV ECHO MEAS - PA MAX PG: 4.4 MMHG
BH CV ECHO MEAS - PA PR(ACCEL): 17.4 MMHG
BH CV ECHO MEAS - PA V2 MAX: 105 CM/SEC
BH CV ECHO MEAS - PULM A REVS DUR: 113 SEC
BH CV ECHO MEAS - PULM A REVS VEL: 38 CM/SEC
BH CV ECHO MEAS - PULM DIAS VEL: 52.3 CM/SEC
BH CV ECHO MEAS - PULM S/D: 1.4
BH CV ECHO MEAS - PULM SYS VEL: 74.8 CM/SEC
BH CV ECHO MEAS - PVA(V,A): 3.3 CM^2
BH CV ECHO MEAS - PVA(V,D): 3.3 CM^2
BH CV ECHO MEAS - QP/QS: 1.1
BH CV ECHO MEAS - RAP SYSTOLE: 3 MMHG
BH CV ECHO MEAS - RV MAX PG: 3.4 MMHG
BH CV ECHO MEAS - RV MEAN PG: 2 MMHG
BH CV ECHO MEAS - RV V1 MAX: 92.3 CM/SEC
BH CV ECHO MEAS - RV V1 MEAN: 69.6 CM/SEC
BH CV ECHO MEAS - RV V1 VTI: 21.9 CM
BH CV ECHO MEAS - RVOT AREA: 3.8 CM^2
BH CV ECHO MEAS - RVOT DIAM: 2.2 CM
BH CV ECHO MEAS - RVSP: 43.4 MMHG
BH CV ECHO MEAS - SI(AO): 94.3 ML/M^2
BH CV ECHO MEAS - SI(CUBED): 29.3 ML/M^2
BH CV ECHO MEAS - SI(LVOT): 37.2 ML/M^2
BH CV ECHO MEAS - SI(MOD-SP2): 24.6 ML/M^2
BH CV ECHO MEAS - SI(MOD-SP4): 18.1 ML/M^2
BH CV ECHO MEAS - SI(TEICH): 25.9 ML/M^2
BH CV ECHO MEAS - SV(AO): 187.8 ML
BH CV ECHO MEAS - SV(CUBED): 58.4 ML
BH CV ECHO MEAS - SV(LVOT): 74.1 ML
BH CV ECHO MEAS - SV(MOD-SP2): 49 ML
BH CV ECHO MEAS - SV(MOD-SP4): 36 ML
BH CV ECHO MEAS - SV(RVOT): 83.2 ML
BH CV ECHO MEAS - SV(TEICH): 51.5 ML
BH CV ECHO MEAS - TAPSE (>1.6): 2 CM2
BH CV ECHO MEAS - TR MAX VEL: 318 CM/SEC
BH CV VAS BP RIGHT ARM: NORMAL MMHG
BH CV XLRA - RV BASE: 3.5 CM
BH CV XLRA - TDI S': 16 CM/SEC
BILIRUB SERPL-MCNC: 1 MG/DL (ref 0.1–1.2)
BUN BLD-MCNC: 9 MG/DL (ref 8–23)
BUN/CREAT SERPL: 12 (ref 7–25)
CALCIUM SPEC-SCNC: 8.6 MG/DL (ref 8.6–10.5)
CHLORIDE SERPL-SCNC: 109 MMOL/L (ref 98–107)
CO2 SERPL-SCNC: 24.1 MMOL/L (ref 22–29)
CREAT BLD-MCNC: 0.75 MG/DL (ref 0.57–1)
DEPRECATED RDW RBC AUTO: 66 FL (ref 37–54)
E/E' RATIO: 12
EOSINOPHIL # BLD AUTO: 1.03 10*3/MM3 (ref 0–0.7)
EOSINOPHIL NFR BLD AUTO: 10.2 % (ref 0.3–6.2)
ERYTHROCYTE [DISTWIDTH] IN BLOOD BY AUTOMATED COUNT: 20.8 % (ref 11.7–13)
GFR SERPL CREATININE-BSD FRML MDRD: 74 ML/MIN/1.73
GLOBULIN UR ELPH-MCNC: 2.5 GM/DL
GLUCOSE BLD-MCNC: 91 MG/DL (ref 65–99)
HCT VFR BLD AUTO: 23.3 % (ref 35.6–45.5)
HGB BLD-MCNC: 7.4 G/DL (ref 11.9–15.5)
IMM GRANULOCYTES # BLD: 0.08 10*3/MM3 (ref 0–0.03)
IMM GRANULOCYTES NFR BLD: 0.8 % (ref 0–0.5)
LAB AP CASE REPORT: NORMAL
LEFT ATRIUM VOLUME INDEX: 21.6 ML/M2
LYMPHOCYTES # BLD AUTO: 1.25 10*3/MM3 (ref 0.9–4.8)
LYMPHOCYTES NFR BLD AUTO: 12.4 % (ref 19.6–45.3)
Lab: NORMAL
MAXIMAL PREDICTED HEART RATE: 136 BPM
MCH RBC QN AUTO: 27.6 PG (ref 26.9–32)
MCHC RBC AUTO-ENTMCNC: 31.8 G/DL (ref 32.4–36.3)
MCV RBC AUTO: 86.9 FL (ref 80.5–98.2)
MONOCYTES # BLD AUTO: 1.29 10*3/MM3 (ref 0.2–1.2)
MONOCYTES NFR BLD AUTO: 12.8 % (ref 5–12)
NEUTROPHILS # BLD AUTO: 6.42 10*3/MM3 (ref 1.9–8.1)
NEUTROPHILS NFR BLD AUTO: 63.7 % (ref 42.7–76)
PATH REPORT.FINAL DX SPEC: NORMAL
PATH REPORT.GROSS SPEC: NORMAL
PLATELET # BLD AUTO: 325 10*3/MM3 (ref 140–500)
PMV BLD AUTO: 10.1 FL (ref 6–12)
POTASSIUM BLD-SCNC: 3.5 MMOL/L (ref 3.5–5.2)
PROT SERPL-MCNC: 5.4 G/DL (ref 6–8.5)
RBC # BLD AUTO: 2.68 10*6/MM3 (ref 3.9–5.2)
SODIUM BLD-SCNC: 144 MMOL/L (ref 136–145)
STRESS TARGET HR: 116 BPM
WBC NRBC COR # BLD: 10.08 10*3/MM3 (ref 4.5–10.7)

## 2017-11-17 PROCEDURE — 80053 COMPREHEN METABOLIC PANEL: CPT | Performed by: HOSPITALIST

## 2017-11-17 PROCEDURE — 99232 SBSQ HOSP IP/OBS MODERATE 35: CPT | Performed by: INTERNAL MEDICINE

## 2017-11-17 PROCEDURE — 93306 TTE W/DOPPLER COMPLETE: CPT | Performed by: INTERNAL MEDICINE

## 2017-11-17 PROCEDURE — 85025 COMPLETE CBC W/AUTO DIFF WBC: CPT | Performed by: HOSPITALIST

## 2017-11-17 PROCEDURE — 93306 TTE W/DOPPLER COMPLETE: CPT

## 2017-11-17 RX ORDER — PANTOPRAZOLE SODIUM 40 MG/1
40 TABLET, DELAYED RELEASE ORAL
Qty: 60 TABLET | Refills: 0 | Status: SHIPPED | OUTPATIENT
Start: 2017-11-17 | End: 2018-01-16

## 2017-11-17 RX ORDER — METOPROLOL SUCCINATE 25 MG/1
25 TABLET, EXTENDED RELEASE ORAL
Qty: 30 TABLET | Refills: 0 | Status: SHIPPED | OUTPATIENT
Start: 2017-11-18 | End: 2017-12-20 | Stop reason: SDUPTHER

## 2017-11-17 RX ORDER — LISINOPRIL 5 MG/1
5 TABLET ORAL
Qty: 30 TABLET | Refills: 0 | Status: SHIPPED | OUTPATIENT
Start: 2017-11-18 | End: 2018-01-16 | Stop reason: SDUPTHER

## 2017-11-17 RX ORDER — DOCUSATE SODIUM 100 MG/1
100 CAPSULE, LIQUID FILLED ORAL 2 TIMES DAILY
Status: DISCONTINUED | OUTPATIENT
Start: 2017-11-17 | End: 2017-11-17 | Stop reason: HOSPADM

## 2017-11-17 RX ORDER — IRON POLYSACCHARIDE COMPLEX 150 MG
150 CAPSULE ORAL DAILY
Qty: 30 CAPSULE | Refills: 0 | Status: SHIPPED | OUTPATIENT
Start: 2017-11-17 | End: 2018-01-16

## 2017-11-17 RX ORDER — LISINOPRIL 5 MG/1
5 TABLET ORAL
Status: DISCONTINUED | OUTPATIENT
Start: 2017-11-17 | End: 2017-11-17 | Stop reason: HOSPADM

## 2017-11-17 RX ORDER — IRON POLYSACCHARIDE COMPLEX 150 MG
150 CAPSULE ORAL DAILY
Status: DISCONTINUED | OUTPATIENT
Start: 2017-11-17 | End: 2017-11-17 | Stop reason: HOSPADM

## 2017-11-17 RX ORDER — SUCRALFATE ORAL 1 G/10ML
1 SUSPENSION ORAL 2 TIMES DAILY
Qty: 560 ML | Refills: 0 | Status: SHIPPED | OUTPATIENT
Start: 2017-11-17 | End: 2017-12-15

## 2017-11-17 RX ADMIN — SODIUM CHLORIDE 75 ML/HR: 9 INJECTION, SOLUTION INTRAVENOUS at 02:52

## 2017-11-17 RX ADMIN — LISINOPRIL 5 MG: 5 TABLET ORAL at 11:04

## 2017-11-17 RX ADMIN — METOPROLOL SUCCINATE 25 MG: 25 TABLET, FILM COATED, EXTENDED RELEASE ORAL at 09:08

## 2017-11-17 RX ADMIN — PANTOPRAZOLE SODIUM 40 MG: 40 TABLET, DELAYED RELEASE ORAL at 09:09

## 2017-11-17 RX ADMIN — SUCRALFATE 1 G: 1 SUSPENSION ORAL at 09:09

## 2017-11-17 RX ADMIN — DOCUSATE SODIUM 100 MG: 100 CAPSULE, LIQUID FILLED ORAL at 09:09

## 2017-11-17 NOTE — PROGRESS NOTES
Case Management Discharge Note    Final Note: Patient will be DC'd home.    Discharge Placement     No information found        Other: Other (Private car)    Discharge Codes: 01  Discharge to home

## 2017-11-17 NOTE — PROGRESS NOTES
"   LOS: 3 days   Patient Care Team:  Yoly Patel MD as PCP - General  Manuel Bello Jr., MD as Consulting Physician (Hematology and Oncology)  Adamaris Padron MD as Referring Physician (Internal Medicine)    Chief Complaint: none today    Subjective     HPI Comments: Doing well today. No GI or cardiac symptoms. Eager to go home.    Vomiting    Pertinent negatives include no chest pain, coughing, diarrhea or fever.   Diarrhea    Pertinent negatives include no coughing, fever or vomiting.   Weakness - Generalized   Pertinent negatives include no anorexia, chest pain, coughing, fever, nausea, vomiting or weakness.       Subjective:  Symptoms:  Stable.  No shortness of breath, malaise, cough, chest pain, weakness, headache, chest pressure, anorexia, diarrhea or anxiety.    Diet:  Poor intake.  No nausea or vomiting.    Activity level: Normal.    Pain:  She reports no pain.        History taken from: patient chart family RN    Objective     Vital Signs  Temp:  [97.8 °F (36.6 °C)-98.7 °F (37.1 °C)] 98.7 °F (37.1 °C)  Heart Rate:  [77-82] 77  Resp:  [16-18] 16  BP: (127-154)/(54-72) 139/63    Objective:  General Appearance:  Comfortable and in no acute distress.    Vital signs: (most recent): Blood pressure 139/63, pulse 77, temperature 98.7 °F (37.1 °C), temperature source Oral, resp. rate 16, height 68\" (172.7 cm), weight 188 lb (85.3 kg), SpO2 96 %, not currently breastfeeding.  Vital signs are normal.  No fever.    Output: Producing urine and producing stool.    HEENT: Normal HEENT exam.    Lungs:  Normal respiratory rate and normal effort.  Breath sounds clear to auscultation.    Heart: Normal rate.  Regular rhythm.    Abdomen: Abdomen is soft.  Bowel sounds are normal.   There is no abdominal tenderness.     Extremities: There is no dependent edema.    Pulses: Distal pulses are intact.    Neurological: Patient is alert and oriented to person, place and time.    Pupils:  Pupils are equal, round, and " reactive to light.    Skin:  Warm and dry.              Results Review:     I reviewed the patient's new clinical results.  I reviewed the patient's new imaging results and agree with the interpretation.  I reviewed the patient's other test results and agree with the interpretation  Discussed with patient, son, and RN    Medication Review: reviewed and adjusted    Assessment/Plan     Principal Problem:    Coffee ground emesis  Active Problems:    Colon polyps    Fatigue    Generalized osteoarthritis    Hyperlipidemia    Hypertension    Irritable bowel syndrome    Symptomatic anemia    Mild dehydration    Nausea vomiting and diarrhea    Esophagitis    GI bleed    Acute blood loss anemia      Assessment:  (1. UGIB due to erosive esophagitis  2. N/V/D  3. Acute blood loss anemia  4. Leukocytosis  5. PAF--resolved  6. HTN  7. Hyperlipidemia).     Plan:   (S/p EGD  BID Protonix for 3 months  BID Carafate for 4 weeks  F/u with GI in 3 months for repeat EGD  Path report on duodenal biopsies pending  NSR now, on Metoprolol now, Lisinopril restarted  Echo okay  F/u with Dr. Tiwari (Card) in 4 weeks  Continue to monitor H&H after dc (f/u with PCP in 1 week)  Restart PO iron at dc, try Niferex as didn't tolerate FeSO4  Hgb stable today      ).       Manuel Cordova MD  11/17/17  1:40 PM    Time: Discharge 40 min

## 2017-11-17 NOTE — PROGRESS NOTES
Hospital Follow Up        Chief Complaint: Follow up paroxsymal atrial fibrillation    Interval History: Complains of constipation.      Objective:     Objective:  Temp:  [97.8 °F (36.6 °C)-98.5 °F (36.9 °C)] 98.2 °F (36.8 °C)  Heart Rate:  [77-82] 77  Resp:  [16-18] 16  BP: (127-154)/(54-72) 150/54     Intake/Output Summary (Last 24 hours) at 11/17/17 0719  Last data filed at 11/17/17 0330   Gross per 24 hour   Intake                0 ml   Output              300 ml   Net             -300 ml     Body mass index is 27.38 kg/(m^2).  Last 3 weights    11/14/17  0909 11/14/17  1749   Weight: 180 lb (81.6 kg) 180 lb (81.6 kg)     Weight change:       Physical Exam:   General : Alert, cooperative, in no acute distress.  Neuro: alert,cooperative and oriented  Lungs: CTAB. Normal respiratory effort and rate.  CV:: Regular rate and rhythm, normal S1 and S2, no murmurs, gallops or rubs.  ABD: Soft, nontender, non-distended. positive bowel sounds  Extr: No edema or cyanosis, moves all extremities    Lab Review:     Results from last 7 days  Lab Units 11/17/17  0606 11/16/17  0401  11/14/17  0926   SODIUM mmol/L 144 141  < > 139   POTASSIUM mmol/L 3.5 3.5  < > 4.1   CHLORIDE mmol/L 109* 107  < > 99   CO2 mmol/L 24.1 22.2  < > 25.9   BUN mg/dL 9 11  < > 36*   CREATININE mg/dL 0.75 0.73  < > 1.12*   GLUCOSE mg/dL 91 91  < > 129*   CALCIUM mg/dL 8.6 8.3*  < > 9.4   AST (SGOT) U/L 11  --   --  15   ALT (SGPT) U/L 8  --   --  9   < > = values in this interval not displayed.    Results from last 7 days  Lab Units 11/16/17  0401   TROPONIN T ng/mL <0.010       Results from last 7 days  Lab Units 11/17/17  0606 11/16/17  1603  11/16/17  0401   WBC 10*3/mm3 10.08  --   --  16.04*   HEMOGLOBIN g/dL 7.4* 7.6*  < > 7.3*   HEMATOCRIT % 23.3* 24.2*  < > 23.3*   PLATELETS 10*3/mm3 325  --   --  293   < > = values in this interval not displayed.        Results from last 7 days  Lab Units 11/16/17  0401   MAGNESIUM mg/dL 1.9                Results from last 7 days  Lab Units 11/16/17  0401   TSH mIU/mL 0.457     I reviewed the patient's new clinical results.  I personally viewed and interpreted the patient's EKG  Current Medications:   Scheduled Meds:  diltiaZEM 5 mg Intravenous Once   metoprolol succinate XL 25 mg Oral Q24H   pantoprazole 40 mg Oral BID AC   sucralfate 1 g Oral BID     Continuous Infusions:  diltiaZEM 5-15 mg/hr Last Rate: Stopped (11/16/17 3233)   lactated ringers 30 mL/hr Last Rate: 30 mL/hr (11/15/17 5964)   sodium chloride 75 mL/hr Last Rate: 75 mL/hr (11/17/17 8092)       Allergies:  Allergies   Allergen Reactions   • Diclofenac Sodium        Assessment/Plan:     1. Paroxsymal atrial fibrillation.  Now back in sinus rhythm.  Off diltiazem gtt. Denied any symptoms with atrial fibrillation.  Not a candidate for anticoagulation with GI bleed.  2. GI bleed. Due to esophagitis. Treatment with carafate and PPI per GI.  3. Anemia. Remains low around 7.4 but relatively stable.  4. Hypertension.  5. Hyperlipidemia    -  Will follow up echo today.    -  Continue metoprolol at current dose.  -  With BP trending up will resume lisinopril at lower dose  -  If echo looks ok she will ok for discharge from cardiac standpoint with follow up with me in 4 weeks.      Ibeth Tiwari MD  11/17/17  7:19 AM     Addendum:   Echo reviewed and appears normal.  She is ok for discharge from cardiac standpoint.  Will see again as inpatient as needed.  Follow up with me in 4 weeks.

## 2017-11-17 NOTE — DISCHARGE SUMMARY
NAME: Ama Billy ADMIT: 2017   : 1933  PCP: Yoly Patel MD    MRN: [id number] LOS: 3 days   AGE/SEX: 84 y.o. female  ROOM: [facility]     Date of Admission:  2017  Date of Discharge:  2017    PCP: Yoly Patel MD      CHIEF COMPLAINT  Vomiting; Diarrhea; and Weakness - Generalized      DISCHARGE DIAGNOSIS  Active Hospital Problems (** Indicates Principal Problem)    Diagnosis Date Noted   • **GI bleed [K92.2] 11/15/2017   • Esophagitis [K20.9] 11/15/2017   • Acute blood loss anemia [D62] 11/15/2017   • Symptomatic anemia [D64.9] 2017   • Colon polyps [K63.5] 2017   • Hyperlipidemia [E78.5] 2017   • Hypertension [I10] 2017   • Generalized osteoarthritis [M15.9] 2017   • Fatigue [R53.83] 2017   • Irritable bowel syndrome [K58.9] 2013      Resolved Hospital Problems    Diagnosis Date Noted Date Resolved   • Mild dehydration [E86.0] 2017   • Nausea vomiting and diarrhea [R11.2, R19.7] 2017   • Coffee ground emesis [K92.0] 2017       SECONDARY DIAGNOSES  Past Medical History:   Diagnosis Date   • Anemia     Normocytic anemia of unclear ediology   • Arthritis     Osteoarthritis involving knees and left shoulder   • Eosinophilia     Persistent mild eosinophilia of unknown etiology   • History of colonic polyps    • Hyperlipidemia    • Hypertension        CONSULTS   Consult Orders (all)     Start     Ordered    17 0419  Inpatient Consult to Cardiology  Once     Specialty:  Cardiology  Provider:  Rika White MD    17 0420    17 1749  Inpatient Consult to Gastroenterology  Once     Specialty:  Gastroenterology  Provider:  Bulmaro Childers MD    17 1750    17 1313  Inpatient Consult to Case Management   Once     Provider:  (Not yet assigned)    17 1313    17 1313  Inpatient Consult to Nutrition  Once     Provider:  (Not yet  assigned)    11/14/17 1313    11/14/17 1107  [facility] (on-call MD unless specified)  Once     Specialty:  Internal Medicine  Provider:  (Not yet assigned)    11/14/17 1106        Treatment Team     Provider Relationship Specialty Contact    Manuel Cordova MD Attending --  [phone]    Bulmaro Childers MD Consulting Physician, Surgeon Gastroenterology  [phone]    Ashley Law, RN Registered Nurse --      Malissa Allen, RN Case Manager --  [phone]    Anay Walker Certified Nursing Assistant --      Hortensia Antunez, RRT Respiratory Therapist Respiratory Therapy  [phone]          PROCEDURES PERFORMED  EGD        HOSPITAL COURSE  Very pleasant 83yo woman admitted with coffee ground emesis and symptomatic anemia. EGD revealed erosive esophagitis. Treated initially with IV PPI, she has been transitioned to oral PPI and carafate. Hgb has remained stable in the low 7's and she is not symptomatic. She is to f/u with GI in 3 months for repeat EGD.   She was found to be in afib while here. Cardiology was consulted. She converted back to NSR before IV Diltiazem could be started. She has remained in NSR since. Cardiology has her on oral Metoprolol presently and she is tolerating well. Dr. Tiwari would like to f/u with her in 4 weeks.   She will need f/u with PCP in 1 week--chiefly to monitor Hgb. I have started her on Niferex daily as she does not tolerate FeSO4 and refused to take it.      PHYSICAL EXAM  Temp:  [97.8 °F (36.6 °C)-98.7 °F (37.1 °C)] 98.7 °F (37.1 °C)  Heart Rate:  [77-82] 77  Resp:  [16-18] 16  BP: (127-154)/(54-72) 139/63  Body mass index is 28.59 kg/(m^2).  Physical Exam  General Appearance:  Comfortable and in no acute distress.    Output: Producing urine and producing stool.    HEENT: Normal HEENT exam.    Lungs:  Normal respiratory rate and normal effort.  Breath sounds clear to auscultation.    Heart: Normal rate.  Regular rhythm.    Abdomen: Abdomen is soft.  Bowel sounds  are normal.   There is no abdominal tenderness.     Extremities: There is no dependent edema.    Pulses: Distal pulses are intact.    Neurological: Patient is alert and oriented to person, place and time.    Pupils:  Pupils are equal, round, and reactive to light.    Skin:  Warm and dry    CONDITION ON DISCHARGE  Stable.      DISCHARGE DISPOSITION   Home      DISCHARGE MEDICATIONS   Ama Billy   Home Medication Instructions GARRICK:[id number]    Printed on:11/17/17 1412   Medication Information                      aspirin 81 MG EC tablet  Take 81 mg by mouth Every Night.             Calcium Carbonate-Vitamin D (CALCIUM 500 + D) 500-125 MG-UNIT tablet  Take 1 tablet by mouth Daily.             Docusate Sodium 100 MG capsule  Take 100 mg by mouth Every Night.             iron polysaccharides (NIFEREX) 150 MG capsule  Take 1 capsule by mouth Daily.             lisinopril (PRINIVIL,ZESTRIL) 5 MG tablet  Take 1 tablet by mouth Daily.             lovastatin (MEVACOR) 20 MG tablet  TAKE 1 TABLET EVERY NIGHT             metoprolol succinate XL (TOPROL-XL) 25 MG 24 hr tablet  Take 1 tablet by mouth Daily.             pantoprazole (PROTONIX) 40 MG EC tablet  Take 1 tablet by mouth 2 (Two) Times a Day Before Meals.             polyethylene glycol (MIRALAX) packet  Take 17 g by mouth 2 (Two) Times a Day.             sucralfate (CARAFATE) 1 GM/10ML suspension  Take 10 mL by mouth 2 (Two) Times a Day for 28 days.                Future Appointments  Date Time Provider Department Center   11/30/2017 2:00 PM MD DUARTE Grya [facility] None   1/16/2018 11:00 AM MD CARINA Preston Jr.K [facility] None     Additional Instructions for the Follow-ups that You Need to Schedule     Discharge Follow-up with Specified Provider: Dr. Tomlinson (GI); 3 Months    As directed    To:  Dr. Tomlinson (GI)   Follow Up:  3 Months       Discharge Follow-up with Specified Provider: Dr. Tiwari (Card); 1 Month    As directed    To:  Dr. Tiwari  (Card)   Follow Up:  1 Month       Discharge Follow-up with Specified Provider: Dr. Patel (PCP); 1 Week    As directed    To:  Dr. Patel (PCP)   Follow Up:  1 Week   Follow Up Details:  Will need Hgb checked             Follow-up Information     Follow up with Yoly Patel MD Follow up in 14 day(s).    Specialty:  Family Medicine    Why:  Follow up appointment with Dr. Amanda MD on November 30th @ 2:00 PM    Contact information:    [address] [facility] [id number] [phone]            TEST  RESULTS PENDING AT DISCHARGE   Order Current Status    Tissue Pathology Exam - Tissue, Small Intestine, Duodenum In process             Manuel Cordova MD  [facility]  11/17/17  2:12 PM      Time: greater than 30 minutes.

## 2017-11-17 NOTE — PLAN OF CARE
Problem: Patient Care Overview (Adult)  Goal: Plan of Care Review  Outcome: Ongoing (interventions implemented as appropriate)    11/17/17 0319   Coping/Psychosocial Response Interventions   Plan Of Care Reviewed With patient   Patient Care Overview   Progress improving   Outcome Evaluation   Outcome Summary/Follow up Plan No n/v this shift. Denies pain. Sleeping well. VSS. Will continue to monitor.       Goal: Adult Individualization and Mutuality  Outcome: Ongoing (interventions implemented as appropriate)  Goal: Discharge Needs Assessment  Outcome: Ongoing (interventions implemented as appropriate)    Problem: Fall Risk (Adult)  Goal: Identify Related Risk Factors and Signs and Symptoms  Outcome: Ongoing (interventions implemented as appropriate)  Goal: Absence of Falls  Outcome: Ongoing (interventions implemented as appropriate)    Problem: Infection, Risk/Actual (Adult)  Goal: Identify Related Risk Factors and Signs and Symptoms  Outcome: Ongoing (interventions implemented as appropriate)  Goal: Infection Prevention/Resolution  Outcome: Ongoing (interventions implemented as appropriate)

## 2017-11-17 NOTE — PROGRESS NOTES
"   LOS: 2 days   Patient Care Team:  Yoly Patel MD as PCP - General  Manuel Bello Jr., MD as Consulting Physician (Hematology and Oncology)  Adamaris Padron MD as Referring Physician (Internal Medicine)    Chief Complaint: none today    Subjective     HPI Comments: Feeling okay today. No complaints    Vomiting    Pertinent negatives include no chest pain, coughing, diarrhea or fever.   Diarrhea    Pertinent negatives include no coughing, fever or vomiting.   Weakness - Generalized   Pertinent negatives include no anorexia, chest pain, coughing, fever, nausea, vomiting or weakness.       Subjective:  Symptoms:  Stable.  No shortness of breath, malaise, cough, chest pain, weakness, headache, chest pressure, anorexia, diarrhea or anxiety.    Diet:  Poor intake.  No nausea or vomiting.    Activity level: Normal.    Pain:  She reports no pain.        History taken from: patient chart    Objective     Vital Signs  Temp:  [97.8 °F (36.6 °C)-98.5 °F (36.9 °C)] 97.8 °F (36.6 °C)  Heart Rate:  [] 82  Resp:  [18] 18  BP: (120-151)/(54-61) 127/54    Objective:  General Appearance:  Comfortable and in no acute distress.    Vital signs: (most recent): Blood pressure 127/54, pulse 82, temperature 97.8 °F (36.6 °C), temperature source Oral, resp. rate 18, height 67.99\" (172.7 cm), weight 180 lb (81.6 kg), SpO2 98 %, not currently breastfeeding.  Vital signs are normal.  No fever.    Output: Producing urine and producing stool.    HEENT: Normal HEENT exam.    Lungs:  Normal respiratory rate and normal effort.  Breath sounds clear to auscultation.    Heart: Normal rate.  Regular rhythm.    Abdomen: Abdomen is soft.  Bowel sounds are normal.   There is no abdominal tenderness.     Extremities: There is no dependent edema.    Pulses: Distal pulses are intact.    Neurological: Patient is alert and oriented to person, place and time.    Pupils:  Pupils are equal, round, and reactive to light.    Skin:  Warm and " dry.              Results Review:     I reviewed the patient's new clinical results.  I reviewed the patient's other test results and agree with the interpretation  Discussed with patient    Medication Review: reviewed    Assessment/Plan     Principal Problem:    Coffee ground emesis  Active Problems:    Colon polyps    Fatigue    Generalized osteoarthritis    Hyperlipidemia    Hypertension    Irritable bowel syndrome    Symptomatic anemia    Mild dehydration    Nausea vomiting and diarrhea    Esophagitis    GI bleed    Acute blood loss anemia      Assessment:  (1. UGIB due to erosive esophagitis  2. N/V/D  3. Acute blood loss anemia  4. Leukocytosis  5. PAF--resolved  6. HTN  7. Hyperlipidemia).     Plan:   (S/p EGD  BID Protonix for 3 months  BID Carafate for 4 weeks  F/u with GI in 3 months for repeat EGD  Path report on duodenal biopsies pending  NSR now, on Metoprolol now instead of Lisinopril  Echo pending  Continue to monitor H&H  Restart PO iron at dc, try Niferex as didn't tolerate FeSO4  Maybe home tomorrow if Hgb stable and Echo okay  ).       Manuel Cordova MD  11/16/17  9:54 PM    Time: 20min

## 2017-11-17 NOTE — PROGRESS NOTES
[facility]/GI Progress Note   Chief Complaint:  Melena, coffee ground emesis, n/v/d    Subjective     Interval History: No further melena, no BM since admission.  Hx of intermittent constipation prior to admission.  Diarrhea prior to admission and just started po intake within last 36 hours, positive flatus, no abd pain. No nausea/vomiting, tolerating regular diet.  Aware path from EGD pending.  Will need repeat EGD, our office to arrange f/u 3 months.  Hopeful for d/c home    History taken from: patient chart    Review of Systems:    The following systems were reviewed and negative;  gastrointestinal    Objective     Vital Signs  Temp:  [97.8 °F (36.6 °C)-98.5 °F (36.9 °C)] 98.2 °F (36.8 °C)  Heart Rate:  [77-82] 77  Resp:  [16-18] 16  BP: (127-154)/(54-72) 150/54  Body mass index is 27.38 kg/(m^2).    Intake/Output Summary (Last 24 hours) at 11/17/17 0809  Last data filed at 11/17/17 0330   Gross per 24 hour   Intake                0 ml   Output              300 ml   Net             -300 ml          Physical Exam:   General: patient awake, alert and cooperative   Eyes: Normal lids and lashes, no scleral icterus   Neck: supple, normal ROM, no tracheal deviation   Skin: warm and dry, not jaundiced   Cardiovascular: regular rhythm and rate, no murmurs auscultated   Pulm: clear to auscultation bilaterally, regular and unlabored   Abdomen: soft, nontender, nondistended; normal bowel sounds   Rectal: deferred   Extremities: no rash or edema   Neurologic: Normal mood and behavior    All Medications Have Been Reviewed     Results Review:       Results from last 7 days  Lab Units 11/17/17  0606 11/16/17  1603 11/16/17  0755 11/16/17  0401  11/15/17  0618   WBC 10*3/mm3 10.08  --   --  16.04*  --  13.04*   HEMOGLOBIN g/dL 7.4* 7.6* 7.4* 7.3*  < > 8.0*   HEMATOCRIT % 23.3* 24.2* 23.5* 23.3*  < > 25.7*   PLATELETS 10*3/mm3 325  --   --  293  --  289   < > = values in this interval not displayed.      Results from last 7  days  Lab Units 11/17/17  0606 11/16/17  0401 11/15/17  0618 11/14/17  0926   SODIUM mmol/L 144 141 141 139   POTASSIUM mmol/L 3.5 3.5 3.8 4.1   CHLORIDE mmol/L 109* 107 106 99   CO2 mmol/L 24.1 22.2 21.5* 25.9   BUN mg/dL 9 11 18 36*   CREATININE mg/dL 0.75 0.73 0.81 1.12*   CALCIUM mg/dL 8.6 8.3* 8.8 9.4   BILIRUBIN mg/dL 1.0  --   --  1.0   ALK PHOS U/L 64  --   --  77   ALT (SGPT) U/L 8  --   --  9   AST (SGOT) U/L 11  --   --  15   GLUCOSE mg/dL 91 91 88 129*             RADIOLOGY:    Imaging Results (last 72 hours)     Procedure Component Value Units Date/Time    CT Abdomen Pelvis With Contrast [[id number]] Collected:  11/14/17 1013     Updated:  11/14/17 1140    Narrative:       CT ABDOMEN AND PELVIS WITH CONTRAST-     CLINICAL: 84-year-old female in the emergency room with vomiting and  diarrhea.     COMPARISON: 10/12/2017.     FINDINGS: Basilar lung infiltrate demonstrated on 10/12/2017 has near  completely resolved.     Small hiatal hernia is suggested, the stomach is largely collapsed and  cannot be optimally evaluated. The overall contour is within normal  limits. The duodenal bulb is typical in appearance.     Minimal diverticulosis of the sigmoid colon without diverticulitis.  Caliber of the small bowel is normal. No small bowel abnormality. No  induration of the mesentery to suggest an inflammatory process. The  appendix could not be identified, has there been prior appendectomy?     No free air or free intraperitoneal fluid. Artifact arising from the  left hip prosthesis obscures the pelvic floor contents/bladder.     The liver, gallbladder and pancreas are satisfactory in appearance. No  biliary duct dilatation. The spleen is normal in size and shape, no  adrenal abnormality seen. Caliber of the aorta is normal. Both kidneys  demonstrate a symmetric satisfactory pattern of enhancement without  mass, calculus or obstructive uropathy.     CONCLUSION: Nominal diverticulosis of the colon without  diverticulitis.  The bowel is otherwise satisfactory in appearance. There appears to be  perhaps a small hiatal hernia, the stomach is collapsed, overall contour  within normal limits.     Findings of this report called to Dr. Llanes in the emergency room at  the time of completion, 10:04 AM.     Radiation dose reduction techniques were utilized, including automated  exposure control and exposure modulation based on body size.     This report was finalized on 11/14/2017 11:37 AM by Dr. Soren Martins MD.             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   • Left retinal detachment H33.22   • Hypovitaminosis D E55.9   • Weakness of extremity R29.898   • Atypical pneumonia J18.9   • Closed wedge compression fracture of first lumbar vertebra S32.010A   • Fall W19.XXXA   • Constipation K59.00   • Nausea & vomiting R11.2   • Leukocytosis D72.829   • Anemia D64.9   • Symptomatic anemia D64.9   • Mild dehydration E86.0   • Nausea vomiting and diarrhea R11.2, R19.7   • Coffee ground emesis K92.0   • Esophagitis K20.9   • GI bleed K92.2   • Acute blood loss anemia D62     Kierra Buckner, APRN  11/17/17  8:09 AM      Hb stable - d/c plans noted - repeat egd in 3 months

## 2017-11-20 ENCOUNTER — TELEPHONE (OUTPATIENT)
Dept: SOCIAL WORK | Facility: HOSPITAL | Age: 82
End: 2017-11-20

## 2017-11-20 NOTE — TELEPHONE ENCOUNTER
Call Center was contacted by patient due to her protonix not being at the pharmacy. I called patient today and her pharmacy just contacted her and her protonix is there and she is going to pick it up.

## 2017-11-30 ENCOUNTER — OFFICE VISIT (OUTPATIENT)
Dept: FAMILY MEDICINE CLINIC | Facility: CLINIC | Age: 82
End: 2017-11-30

## 2017-11-30 VITALS
WEIGHT: 176 LBS | HEIGHT: 68 IN | HEART RATE: 66 BPM | DIASTOLIC BLOOD PRESSURE: 80 MMHG | RESPIRATION RATE: 16 BRPM | SYSTOLIC BLOOD PRESSURE: 130 MMHG | BODY MASS INDEX: 26.67 KG/M2 | OXYGEN SATURATION: 97 %

## 2017-11-30 DIAGNOSIS — K29.01 GASTROINTESTINAL HEMORRHAGE ASSOCIATED WITH ACUTE GASTRITIS: ICD-10-CM

## 2017-11-30 DIAGNOSIS — D50.0 IRON DEFICIENCY ANEMIA DUE TO CHRONIC BLOOD LOSS: ICD-10-CM

## 2017-11-30 DIAGNOSIS — K20.90 ESOPHAGITIS: Primary | ICD-10-CM

## 2017-11-30 DIAGNOSIS — I48.91 ATRIAL FIBRILLATION, RAPID (HCC): ICD-10-CM

## 2017-11-30 PROBLEM — D50.9 IRON DEFICIENCY ANEMIA: Status: ACTIVE | Noted: 2017-11-30

## 2017-11-30 PROCEDURE — 99214 OFFICE O/P EST MOD 30 MIN: CPT | Performed by: FAMILY MEDICINE

## 2017-11-30 NOTE — PROGRESS NOTES
"Subjective   Ama Billy is a 84 y.o. female.     History of Present Illness  Nov 1`4 was back to hosp for 4 days for vomiting, diarrhea and weakness. Hgb was 6.8 and had blood transfusion. She says she. never found out why this happened, yet records show gastritis and esophagitis with small HH. Stools were black, and the vomiting was coffee grounds. She continues to take protonix.  EGD showed mild esophagitis, small HH and mild gastritis. She denies stomach pain.    Heart was out of rhythm once before the transfusion. Afib with rapid response on  11/14. She says just for a few min.  Will see Dr Tiwari Dec 20.    The following portions of the patient's history were reviewed and updated as appropriate: allergies, current medications, past social history and problem list.    Review of Systems   Constitutional: Negative for activity change, appetite change and unexpected weight change.   HENT: Negative for nosebleeds and trouble swallowing.    Eyes: Negative for pain and visual disturbance.   Respiratory: Negative for chest tightness, shortness of breath and wheezing.    Cardiovascular: Negative for chest pain and palpitations.   Gastrointestinal: Negative for abdominal pain and blood in stool.   Endocrine: Negative.    Genitourinary: Negative for difficulty urinating and hematuria.   Musculoskeletal: Negative for joint swelling.   Skin: Negative for color change and rash.   Allergic/Immunologic: Negative.    Neurological: Negative for syncope and speech difficulty.   Hematological: Negative for adenopathy.   Psychiatric/Behavioral: Negative for agitation and confusion.   All other systems reviewed and are negative.      Objective   /80  Pulse 66  Resp 16  Ht 68\" (172.7 cm)  Wt 176 lb (79.8 kg)  SpO2 97%  BMI 26.76 kg/m2  Physical Exam   Constitutional: She is oriented to person, place, and time. She appears well-developed and well-nourished. No distress.   HENT:   Head: Normocephalic and atraumatic. "   Eyes: Conjunctivae and EOM are normal. Pupils are equal, round, and reactive to light. Right eye exhibits no discharge. Left eye exhibits no discharge. No scleral icterus.   Neck: Normal range of motion. Neck supple. No tracheal deviation present. No thyromegaly present.   Cardiovascular: Normal rate, regular rhythm, normal heart sounds, intact distal pulses and normal pulses.  Exam reveals no gallop.    No murmur heard.  Pulmonary/Chest: Effort normal and breath sounds normal. No respiratory distress. She has no wheezes. She has no rales.   Abdominal: Soft. Bowel sounds are normal. She exhibits no distension. Tenderness: minimal in the epigastrium.   Musculoskeletal: Normal range of motion.   Neurological: She is alert and oriented to person, place, and time. She exhibits normal muscle tone. Coordination normal.   Skin: Skin is warm. No rash noted. No erythema. No pallor.   Psychiatric: She has a normal mood and affect. Her behavior is normal. Judgment and thought content normal.   Nursing note and vitals reviewed.      Assessment/Plan   Problem List Items Addressed This Visit        Cardiovascular and Mediastinum    Atrial fibrillation, rapid       Digestive    Esophagitis - Primary    GI bleed       Hematopoietic and Hemostatic    Anemia    Relevant Orders    CBC & Differential           Dr Tiwari has replied not ojeda to give anticoagulant in light of recent UGI bleed.

## 2017-12-01 LAB
BASOPHILS # BLD AUTO: 0.02 10*3/MM3 (ref 0–0.2)
BASOPHILS NFR BLD AUTO: 0.2 % (ref 0–1.5)
EOSINOPHIL # BLD AUTO: 0.68 10*3/MM3 (ref 0–0.7)
EOSINOPHIL NFR BLD AUTO: 7.2 % (ref 0.3–6.2)
ERYTHROCYTE [DISTWIDTH] IN BLOOD BY AUTOMATED COUNT: 21.9 % (ref 11.7–13)
HCT VFR BLD AUTO: 30.5 % (ref 35.6–45.5)
HGB BLD-MCNC: 9 G/DL (ref 11.9–15.5)
IMM GRANULOCYTES # BLD: 0.02 10*3/MM3 (ref 0–0.03)
IMM GRANULOCYTES NFR BLD: 0.2 % (ref 0–0.5)
LYMPHOCYTES # BLD AUTO: 2.2 10*3/MM3 (ref 0.9–4.8)
LYMPHOCYTES NFR BLD AUTO: 23.3 % (ref 19.6–45.3)
MCH RBC QN AUTO: 27.1 PG (ref 26.9–32)
MCHC RBC AUTO-ENTMCNC: 29.5 G/DL (ref 32.4–36.3)
MCV RBC AUTO: 91.9 FL (ref 80.5–98.2)
MONOCYTES # BLD AUTO: 1.13 10*3/MM3 (ref 0.2–1.2)
MONOCYTES NFR BLD AUTO: 12 % (ref 5–12)
NEUTROPHILS # BLD AUTO: 5.4 10*3/MM3 (ref 1.9–8.1)
NEUTROPHILS NFR BLD AUTO: 57.1 % (ref 42.7–76)
NRBC BLD AUTO-RTO: 0 /100 WBC (ref 0–0)
PLATELET # BLD AUTO: 351 10*3/MM3 (ref 140–500)
RBC # BLD AUTO: 3.32 10*6/MM3 (ref 3.9–5.2)
WBC # BLD AUTO: 9.45 10*3/MM3 (ref 4.5–10.7)

## 2017-12-07 ENCOUNTER — TELEPHONE (OUTPATIENT)
Dept: GASTROENTEROLOGY | Facility: CLINIC | Age: 82
End: 2017-12-07

## 2017-12-07 NOTE — TELEPHONE ENCOUNTER
----- Message from Bulmaro Childers MD sent at 11/26/2017 11:47 AM EST -----  Pathology is benign, office visit nurse practitioner 6 weeks, EGD recall 3 months

## 2017-12-20 ENCOUNTER — OFFICE VISIT (OUTPATIENT)
Dept: CARDIOLOGY | Facility: CLINIC | Age: 82
End: 2017-12-20

## 2017-12-20 VITALS
HEIGHT: 68 IN | HEART RATE: 64 BPM | DIASTOLIC BLOOD PRESSURE: 90 MMHG | SYSTOLIC BLOOD PRESSURE: 152 MMHG | BODY MASS INDEX: 26.37 KG/M2 | WEIGHT: 174 LBS

## 2017-12-20 DIAGNOSIS — I48.0 PAROXYSMAL ATRIAL FIBRILLATION (HCC): Primary | ICD-10-CM

## 2017-12-20 DIAGNOSIS — I87.2 CHRONIC VENOUS INSUFFICIENCY: ICD-10-CM

## 2017-12-20 DIAGNOSIS — I10 ESSENTIAL HYPERTENSION: ICD-10-CM

## 2017-12-20 DIAGNOSIS — E78.5 HYPERLIPIDEMIA, UNSPECIFIED HYPERLIPIDEMIA TYPE: ICD-10-CM

## 2017-12-20 DIAGNOSIS — K29.01 GASTROINTESTINAL HEMORRHAGE ASSOCIATED WITH ACUTE GASTRITIS: ICD-10-CM

## 2017-12-20 PROCEDURE — 93000 ELECTROCARDIOGRAM COMPLETE: CPT | Performed by: INTERNAL MEDICINE

## 2017-12-20 PROCEDURE — 99213 OFFICE O/P EST LOW 20 MIN: CPT | Performed by: INTERNAL MEDICINE

## 2017-12-20 RX ORDER — METOPROLOL SUCCINATE 25 MG/1
25 TABLET, EXTENDED RELEASE ORAL
Qty: 90 TABLET | Refills: 1 | Status: SHIPPED | OUTPATIENT
Start: 2017-12-20 | End: 2018-05-31 | Stop reason: SDUPTHER

## 2017-12-20 NOTE — PROGRESS NOTES
Subjective:     Encounter Date:12/20/2017      Patient ID: Ama Billy is a 84 y.o. female.    Chief Complaint:  History of Present Illness    This is an 84-year-old with a history of rheumatic fever, hypertension, hyperlipidemia, paroxysmal atrial fibrillation, recent GI bleed due to esophagitis, who presents for hospital follow-up.    I saw the patient initially when she was hospitalized 11/2017 for a GI bleed.  The patient presented on 11/14/2017 with complaints of nausea, vomiting, and diarrhea associated with generalized weakness and fatigue.  She was noted to be severely anemic on admission.  She was transfused and placed on extra.  She was then seen by gastroenterology and underwent an EGD that showed evidence of acute erosive esophagitis.  The following day she went into atrial fibrillation with rapid ventricular rate that lasted approximately 3 hours and converted back to sinus rhythm prior to even starting a diltiazem drip.  She denies any symptoms with atrial fibrillation.  She had no recurrent atrial fibrillation during that admission.  Due to her GI bleed and only a single episode of atrial fibrillation I did not recommend starting anticoagulation.  I did start metoprolol succinate 25 mg a day during that admission.  As part of her workup she did undergo an echocardiogram that showed normal left ventricle systolic function wall motion with an estimated ejection fraction of 60%, normal diastolic function, no significant valvular disease.    The patient presents today for hospital follow-up.  She's been feeling well overall.  She denies any palpitations, shortness of breath, chest pain, PND or orthopnea, presyncope or syncope, or lower extremity edema.  She's had no further issues with melena.  She's been tolerating the metoprolol succinate well.  She is wondering since her blood pressures are mildly elevated the office today if she should increase her lisinopril back up to 10 mg a day which she  had been taking in the past.  She has not been checking her blood pressures at home.    Review of Systems   Constitution: Negative for weakness and malaise/fatigue.   HENT: Negative for hearing loss, hoarse voice, nosebleeds and sore throat.    Eyes: Negative for pain.   Cardiovascular: Negative for chest pain, claudication, cyanosis, dyspnea on exertion, irregular heartbeat, leg swelling, near-syncope, orthopnea, palpitations, paroxysmal nocturnal dyspnea and syncope.   Respiratory: Negative for shortness of breath and snoring.    Endocrine: Negative for cold intolerance, heat intolerance, polydipsia, polyphagia and polyuria.   Skin: Negative for itching and rash.   Musculoskeletal: Positive for back pain. Negative for arthritis, falls, joint pain, joint swelling, muscle cramps, muscle weakness and myalgias.   Gastrointestinal: Negative for constipation, diarrhea, dysphagia, heartburn, hematemesis, hematochezia, melena, nausea and vomiting.   Genitourinary: Negative for frequency, hematuria and hesitancy.   Neurological: Negative for excessive daytime sleepiness, dizziness, headaches, light-headedness and numbness.   Psychiatric/Behavioral: Negative for depression. The patient is not nervous/anxious.           Current Outpatient Prescriptions:   •  aspirin 81 MG EC tablet, Take 81 mg by mouth Every Night., Disp: , Rfl:   •  Calcium Carbonate-Vitamin D (CALCIUM 500 + D) 500-125 MG-UNIT tablet, Take 1 tablet by mouth Daily., Disp: , Rfl:   •  Docusate Sodium 100 MG capsule, Take 100 mg by mouth Every Night., Disp: , Rfl:   •  iron polysaccharides (NIFEREX) 150 MG capsule, Take 1 capsule by mouth Daily., Disp: 30 capsule, Rfl: 0  •  lisinopril (PRINIVIL,ZESTRIL) 5 MG tablet, Take 1 tablet by mouth Daily., Disp: 30 tablet, Rfl: 0  •  lovastatin (MEVACOR) 20 MG tablet, TAKE 1 TABLET EVERY NIGHT, Disp: 90 tablet, Rfl: 4  •  metoprolol succinate XL (TOPROL-XL) 25 MG 24 hr tablet, Take 1 tablet by mouth Daily., Disp: 90  "tablet, Rfl: 1  •  pantoprazole (PROTONIX) 40 MG EC tablet, Take 1 tablet by mouth 2 (Two) Times a Day Before Meals., Disp: 60 tablet, Rfl: 0  •  polyethylene glycol (MIRALAX) packet, Take 17 g by mouth 2 (Two) Times a Day., Disp: 60 each, Rfl: 0    Past Medical History:   Diagnosis Date   • Anemia     Normocytic anemia of unclear ediology   • Arthritis     Osteoarthritis involving knees and left shoulder   • Eosinophilia     Persistent mild eosinophilia of unknown etiology   • History of colonic polyps    • Hyperlipidemia    • Hypertension      Past Surgical History:   Procedure Laterality Date   • COLONOSCOPY      H/O colonic polyps with regular colonoscopies at 5 year intervals.    • COLONOSCOPY  2013    By Dr. Gudino, no polyps identified.   • ENDOSCOPY N/A 11/15/2017    Procedure: ESOPHAGOGASTRODUODENOSCOPY WITH COLD BIOPSIES;  Surgeon: Bulmaro Childers MD;  Location: [facility] ENDOSCOPY;  Service:    • HEMORRHOIDECTOMY  1965   • HYSTERECTOMY  1971    Partial, secondary to endometriosis   • JOINT REPLACEMENT  2014    Left total hip arthroplasy   • KNEE SURGERY  1997    Arthroscopy of left knee 1997; right knee 2003     Family History   Problem Relation Age of Onset   • Coronary artery disease Father    • Heart disease Father    • Heart attack Father 72   • Coronary artery disease Brother    • Heart disease Brother      CABG     Social History   Substance Use Topics   • Smoking status: Never Smoker   • Smokeless tobacco: None   • Alcohol use Yes      Comment: Occasional           ECG 12 Lead  Date/Time: 12/20/2017 3:31 PM  Performed by: NICK RAIN  Authorized by: NICK RAIN   Comparison: compared with previous ECG   Similar to previous ECG  Rhythm: sinus rhythm               Objective:         Visit Vitals   • /90 (BP Location: Right arm, Patient Position: Sitting)   • Pulse 64   • Ht 172.7 cm (68\")   • Wt 78.9 kg (174 lb)   • BMI 26.46 kg/m2          Physical Exam   Constitutional: She is oriented to " person, place, and time. She appears well-developed and well-nourished.   HENT:   Head: Normocephalic and atraumatic.   Eyes: Conjunctivae, EOM and lids are normal. Pupils are equal, round, and reactive to light.   Neck: Normal range of motion and full passive range of motion without pain. Neck supple. No JVD present. Carotid bruit is not present.   Cardiovascular: Normal rate, regular rhythm, S1 normal and S2 normal.  Exam reveals no gallop.    No murmur heard.  Pulses:       Radial pulses are 2+ on the right side, and 2+ on the left side.   No bilateral lower extremity edema   Pulmonary/Chest: Effort normal and breath sounds normal.   Abdominal: Soft. Normal appearance.   Lymphadenopathy:     She has no cervical adenopathy.   Neurological: She is alert and oriented to person, place, and time.   Skin: Skin is warm, dry and intact.   Psychiatric: She has a normal mood and affect.       Lab Review:       Assessment:          Diagnosis Plan   1. Paroxysmal atrial fibrillation  metoprolol succinate XL (TOPROL-XL) 25 MG 24 hr tablet   2. Essential hypertension     3. Hyperlipidemia, unspecified hyperlipidemia type     4. Chronic venous insufficiency     5. Gastrointestinal hemorrhage associated with acute gastritis            Plan:       1.  Paroxysmal atrial fibrillation.  One episode that occurred during the hospitalization for acute GI bleed.  She was asymptomatic with that episode however so there is no way of knowing if she has had a recurrent episode since then.  We'll continue the metoprolol succinate.  I would not recommend adequate hydration at this time with her recent GI bleed.  Consider starting anything, she has confirmed recurrent atrial fibrillation and until it has been confirmed that her esophagitis has resolved.  2.  Hypertension.  Mildly elevated today.  I told her was okay to go ahead and increase her lisinopril to 10 mg a day.  Asked her to monitor blood pressures at home.  3.  Hyperlipidemia.  On  lovastatin.  4.  Esophagitis.  Followed by gastroenterology.    We'll plan on seeing the patient back again in 6 months.    Atrial Fibrillation and Atrial Flutter  Assessment  • The patient has paroxysmal atrial fibrillation  • This is non-valvular in etiology  • The patient's CHADS2-VASc score is 4  • A [id number] score of 2 or more is considered a high risk for a thromboembolic event  • Aspirin prescribed    Plan  • Attempt to maintain sinus rhythm  • Continue aspirin for antithrombotic therapy, bleeding issues discussed  • Continue beta blocker for rhythm control  • Continue beta blocker for rate control

## 2018-01-16 ENCOUNTER — OFFICE VISIT (OUTPATIENT)
Dept: INTERNAL MEDICINE | Facility: CLINIC | Age: 83
End: 2018-01-16

## 2018-01-16 VITALS
BODY MASS INDEX: 26.8 KG/M2 | HEIGHT: 68 IN | RESPIRATION RATE: 16 BRPM | HEART RATE: 95 BPM | DIASTOLIC BLOOD PRESSURE: 80 MMHG | WEIGHT: 176.8 LBS | SYSTOLIC BLOOD PRESSURE: 150 MMHG | OXYGEN SATURATION: 98 % | TEMPERATURE: 98.1 F

## 2018-01-16 DIAGNOSIS — I10 ESSENTIAL HYPERTENSION: ICD-10-CM

## 2018-01-16 DIAGNOSIS — E78.2 MIXED HYPERLIPIDEMIA: ICD-10-CM

## 2018-01-16 DIAGNOSIS — M67.911 ROTATOR CUFF DISORDER, RIGHT: ICD-10-CM

## 2018-01-16 DIAGNOSIS — E55.9 HYPOVITAMINOSIS D: ICD-10-CM

## 2018-01-16 DIAGNOSIS — I48.0 PAROXYSMAL ATRIAL FIBRILLATION (HCC): ICD-10-CM

## 2018-01-16 DIAGNOSIS — S32.010S CLOSED WEDGE COMPRESSION FRACTURE OF FIRST LUMBAR VERTEBRA, SEQUELA: ICD-10-CM

## 2018-01-16 DIAGNOSIS — Z23 NEED FOR PNEUMOCOCCAL VACCINE: ICD-10-CM

## 2018-01-16 DIAGNOSIS — D50.0 IRON DEFICIENCY ANEMIA DUE TO CHRONIC BLOOD LOSS: Primary | ICD-10-CM

## 2018-01-16 PROBLEM — J06.9 ACUTE UPPER RESPIRATORY INFECTION: Status: RESOLVED | Noted: 2017-02-21 | Resolved: 2018-01-16

## 2018-01-16 PROBLEM — J18.9 ATYPICAL PNEUMONIA: Status: RESOLVED | Noted: 2017-10-12 | Resolved: 2018-01-16

## 2018-01-16 PROCEDURE — 90670 PCV13 VACCINE IM: CPT | Performed by: FAMILY MEDICINE

## 2018-01-16 PROCEDURE — G0009 ADMIN PNEUMOCOCCAL VACCINE: HCPCS | Performed by: FAMILY MEDICINE

## 2018-01-16 PROCEDURE — 99214 OFFICE O/P EST MOD 30 MIN: CPT | Performed by: FAMILY MEDICINE

## 2018-01-16 RX ORDER — LOVASTATIN 20 MG/1
20 TABLET ORAL NIGHTLY
Qty: 90 TABLET | Refills: 3 | Status: SHIPPED | OUTPATIENT
Start: 2018-01-16 | End: 2019-01-13 | Stop reason: SDUPTHER

## 2018-01-16 RX ORDER — LISINOPRIL 5 MG/1
5 TABLET ORAL
Qty: 90 TABLET | Refills: 3 | Status: SHIPPED | OUTPATIENT
Start: 2018-01-16 | End: 2018-01-19 | Stop reason: SDUPTHER

## 2018-01-16 NOTE — PROGRESS NOTES
Subjective   Ama Billy is a 84 y.o. female.     Chief Complaint   Patient presents with   • Back Pain   • Hypertension   • Hyperlipidemia   • Atrial Fibrillation         History of Present Illness   Patient is seen here with a history of hospitalization last year with pneumonia and wedge compression fracture of the vertebrae.  She is also readmitted the hospital with GI bleed and iron deficient anemia.  We discussed getting labs today.  Also Prevnar 13.  We'll wait on getting a DEXA scan.  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    She had transient A. fib in the hospital is now on metoprolol but has not had A. fib since.    She has had arthropathy of the right shoulder which appears to be rotator cuff without injury.  She has had a history of arthropathy of the left shoulder.  With injection by Dr. Carrero.      The following portions of the patient's history were reviewed and updated as appropriate: allergies, current medications, past social history and problem list.    Review of Systems   Constitutional: Negative.    HENT: Negative.    Eyes: Negative.    Respiratory: Negative.    Cardiovascular: Negative.    Gastrointestinal: Negative.    Endocrine: Negative.    Genitourinary: Negative.    Musculoskeletal: Negative.    Skin: Negative.    Allergic/Immunologic: Negative.    Neurological: Negative.    Hematological: Negative.    Psychiatric/Behavioral: Negative.        Objective   Vitals:    01/16/18 1146   BP: 150/80   Pulse: 95   Resp: 16   Temp: 98.1 °F (36.7 °C)   SpO2: 98%     Physical Exam   Constitutional: She is oriented to person, place, and time. She appears well-developed and well-nourished.   HENT:   Head: Normocephalic and atraumatic.   Right Ear: Tympanic membrane and external ear normal.   Left Ear: Tympanic membrane and external ear normal.   Nose: Nose normal.   Mouth/Throat: Oropharynx is clear and moist.   Eyes: Conjunctivae and EOM are normal. Pupils are equal,  round, and reactive to light.   Neck: Normal range of motion. Neck supple. No JVD present. No thyromegaly present.   Cardiovascular: Normal rate, regular rhythm, normal heart sounds and intact distal pulses.    Pulmonary/Chest: Effort normal and breath sounds normal.   Abdominal: Soft. Bowel sounds are normal.   Musculoskeletal: Normal range of motion.   Lymphadenopathy:     She has no cervical adenopathy.   Neurological: She is alert and oriented to person, place, and time. No cranial nerve deficit. Coordination normal.   Skin: Skin is warm and dry. No rash noted.   Psychiatric: She has a normal mood and affect. Her behavior is normal. Judgment and thought content normal.   Vitals reviewed.      Assessment/Plan   Problem List Items Addressed This Visit        Cardiovascular and Mediastinum    Paroxysmal atrial fibrillation    Relevant Orders    CBC & Differential    Comprehensive Metabolic Panel    Iron and TIBC    Ferritin    Lipid Panel With / Chol / HDL Ratio    Vitamin D 25 Hydroxy    Urinalysis With / Microscopic If Indicated - Urine, Clean Catch    Hyperlipidemia    Relevant Medications    lovastatin (MEVACOR) 20 MG tablet    Other Relevant Orders    CBC & Differential    Comprehensive Metabolic Panel    Iron and TIBC    Ferritin    Lipid Panel With / Chol / HDL Ratio    Vitamin D 25 Hydroxy    Urinalysis With / Microscopic If Indicated - Urine, Clean Catch    Hypertension    Relevant Medications    lisinopril (PRINIVIL,ZESTRIL) 5 MG tablet    Other Relevant Orders    CBC & Differential    Comprehensive Metabolic Panel    Iron and TIBC    Ferritin    Lipid Panel With / Chol / HDL Ratio    Vitamin D 25 Hydroxy    Urinalysis With / Microscopic If Indicated - Urine, Clean Catch       Digestive    Hypovitaminosis D    Relevant Orders    CBC & Differential    Comprehensive Metabolic Panel    Iron and TIBC    Ferritin    Lipid Panel With / Chol / HDL Ratio    Vitamin D 25 Hydroxy    Urinalysis With / Microscopic If  Indicated - Urine, Clean Catch       Musculoskeletal and Integument    Closed wedge compression fracture of first lumbar vertebra    Relevant Orders    CBC & Differential    Comprehensive Metabolic Panel    Iron and TIBC    Ferritin    Lipid Panel With / Chol / HDL Ratio    Vitamin D 25 Hydroxy    Urinalysis With / Microscopic If Indicated - Urine, Clean Catch       Hematopoietic and Hemostatic    Iron deficiency anemia - Primary    Relevant Orders    CBC & Differential    Comprehensive Metabolic Panel    Iron and TIBC    Ferritin    Lipid Panel With / Chol / HDL Ratio    Vitamin D 25 Hydroxy    Urinalysis With / Microscopic If Indicated - Urine, Clean Catch      Other Visit Diagnoses     Need for pneumococcal vaccine        Relevant Orders    Pneumococcal Conjugate Vaccine 13-Valent All (PCV13) (Completed)    Rotator cuff disorder, right        Relevant Orders    Ambulatory Referral to Orthopedic Surgery      Plan: Screening labs going iron studies.  Recheck in 6 months.  Prevnar 13.  Medications remain the same.  Medicare wellness visit on next visit.

## 2018-01-19 LAB
25(OH)D3+25(OH)D2 SERPL-MCNC: 27.9 NG/ML (ref 30–100)
ALBUMIN SERPL-MCNC: 4.3 G/DL (ref 3.5–5.2)
ALBUMIN/GLOB SERPL: 1.5 G/DL
ALP SERPL-CCNC: 93 U/L (ref 39–117)
ALT SERPL-CCNC: 11 U/L (ref 1–33)
APPEARANCE UR: CLEAR
AST SERPL-CCNC: 13 U/L (ref 1–32)
BASOPHILS # BLD AUTO: 0.03 10*3/MM3 (ref 0–0.2)
BASOPHILS NFR BLD AUTO: 0.3 % (ref 0–1.5)
BILIRUB SERPL-MCNC: 0.7 MG/DL (ref 0.1–1.2)
BILIRUB UR QL STRIP: NEGATIVE
BUN SERPL-MCNC: 16 MG/DL (ref 8–23)
BUN/CREAT SERPL: 16.5 (ref 7–25)
CALCIUM SERPL-MCNC: 9.5 MG/DL (ref 8.6–10.5)
CHLORIDE SERPL-SCNC: 105 MMOL/L (ref 98–107)
CHOLEST SERPL-MCNC: 166 MG/DL (ref 0–200)
CHOLEST/HDLC SERPL: 2.27 {RATIO}
CO2 SERPL-SCNC: 26.3 MMOL/L (ref 22–29)
COLOR UR: YELLOW
CREAT SERPL-MCNC: 0.97 MG/DL (ref 0.57–1)
DIFFERENTIAL COMMENT: NORMAL
EOSINOPHIL # BLD AUTO: 0.58 10*3/MM3 (ref 0–0.7)
EOSINOPHIL NFR BLD AUTO: 6.2 % (ref 0.3–6.2)
ERYTHROCYTE [DISTWIDTH] IN BLOOD BY AUTOMATED COUNT: 22.1 % (ref 11.7–13)
FERRITIN SERPL-MCNC: 277.6 NG/ML (ref 13–150)
GLOBULIN SER CALC-MCNC: 2.9 GM/DL
GLUCOSE SERPL-MCNC: 96 MG/DL (ref 65–99)
GLUCOSE UR QL: NEGATIVE
HCT VFR BLD AUTO: 34.8 % (ref 35.6–45.5)
HDLC SERPL-MCNC: 73 MG/DL (ref 40–60)
HGB BLD-MCNC: 10.5 G/DL (ref 11.9–15.5)
HGB UR QL STRIP: NEGATIVE
IMM GRANULOCYTES # BLD: 0.03 10*3/MM3 (ref 0–0.03)
IMM GRANULOCYTES NFR BLD: 0.3 % (ref 0–0.5)
IRON SATN MFR SERPL: 23 % (ref 20–50)
IRON SERPL-MCNC: 89 MCG/DL (ref 37–145)
KETONES UR QL STRIP: NEGATIVE
LDLC SERPL CALC-MCNC: 77 MG/DL (ref 0–100)
LEUKOCYTE ESTERASE UR QL STRIP: NEGATIVE
LYMPHOCYTES # BLD AUTO: 1.83 10*3/MM3 (ref 0.9–4.8)
LYMPHOCYTES NFR BLD AUTO: 19.4 % (ref 19.6–45.3)
MCH RBC QN AUTO: 27.2 PG (ref 26.9–32)
MCHC RBC AUTO-ENTMCNC: 30.2 G/DL (ref 32.4–36.3)
MCV RBC AUTO: 90.2 FL (ref 80.5–98.2)
MONOCYTES # BLD AUTO: 1.05 10*3/MM3 (ref 0.2–1.2)
MONOCYTES NFR BLD AUTO: 11.1 % (ref 5–12)
NEUTROPHILS # BLD AUTO: 5.9 10*3/MM3 (ref 1.9–8.1)
NEUTROPHILS NFR BLD AUTO: 62.7 % (ref 42.7–76)
NITRITE UR QL STRIP: NEGATIVE
NRBC BLD AUTO-RTO: 0.4 /100 WBC (ref 0–0)
PH UR STRIP: 5.5 [PH] (ref 5–8)
PLATELET # BLD AUTO: 295 10*3/MM3 (ref 140–500)
PLATELET BLD QL SMEAR: NORMAL
POTASSIUM SERPL-SCNC: 4.4 MMOL/L (ref 3.5–5.2)
PROT SERPL-MCNC: 7.2 G/DL (ref 6–8.5)
PROT UR QL STRIP: NEGATIVE
RBC # BLD AUTO: 3.86 10*6/MM3 (ref 3.9–5.2)
RBC MORPH BLD: NORMAL
SODIUM SERPL-SCNC: 146 MMOL/L (ref 136–145)
SP GR UR: 1.02 (ref 1–1.03)
TIBC SERPL-MCNC: 388 MCG/DL
TRIGL SERPL-MCNC: 80 MG/DL (ref 0–150)
UIBC SERPL-MCNC: 299 MCG/DL
UROBILINOGEN UR STRIP-MCNC: NORMAL MG/DL
VLDLC SERPL CALC-MCNC: 16 MG/DL (ref 5–40)
WBC # BLD AUTO: 9.42 10*3/MM3 (ref 4.5–10.7)

## 2018-01-19 RX ORDER — LISINOPRIL 5 MG/1
5 TABLET ORAL
Qty: 90 TABLET | Refills: 3 | Status: SHIPPED | OUTPATIENT
Start: 2018-01-19 | End: 2018-01-23 | Stop reason: SDUPTHER

## 2018-01-23 RX ORDER — LISINOPRIL 5 MG/1
5 TABLET ORAL
Qty: 90 TABLET | Refills: 1 | Status: SHIPPED | OUTPATIENT
Start: 2018-01-23 | End: 2018-06-20 | Stop reason: SDUPTHER

## 2018-02-06 ENCOUNTER — OFFICE VISIT (OUTPATIENT)
Dept: GASTROENTEROLOGY | Facility: CLINIC | Age: 83
End: 2018-02-06

## 2018-02-06 VITALS
DIASTOLIC BLOOD PRESSURE: 80 MMHG | BODY MASS INDEX: 26.22 KG/M2 | HEIGHT: 68 IN | SYSTOLIC BLOOD PRESSURE: 126 MMHG | TEMPERATURE: 98 F | WEIGHT: 173 LBS

## 2018-02-06 DIAGNOSIS — K20.90 ESOPHAGITIS: ICD-10-CM

## 2018-02-06 DIAGNOSIS — K62.5 RECTAL BLEEDING: ICD-10-CM

## 2018-02-06 DIAGNOSIS — D50.0 IRON DEFICIENCY ANEMIA DUE TO CHRONIC BLOOD LOSS: Primary | ICD-10-CM

## 2018-02-06 PROCEDURE — 99214 OFFICE O/P EST MOD 30 MIN: CPT | Performed by: INTERNAL MEDICINE

## 2018-02-06 RX ORDER — SODIUM CHLORIDE, SODIUM LACTATE, POTASSIUM CHLORIDE, CALCIUM CHLORIDE 600; 310; 30; 20 MG/100ML; MG/100ML; MG/100ML; MG/100ML
30 INJECTION, SOLUTION INTRAVENOUS CONTINUOUS
Status: CANCELLED | OUTPATIENT
Start: 2018-02-27

## 2018-02-06 NOTE — PROGRESS NOTES
"Chief Complaint   Patient presents with   • GI Bleeding     Subjective     HPI  Ama Billy is a 84 y.o. female who presents for follow-up from her November 2017 hospitalization (14th-17th) for anemia with melena and coffee ground emesis.  EGD 11/15/17 with Dr Childers notable for hiatal hernia, LA-B esophagitis.  Biopsies with ulcerative esophagitis and without H pylori infection.   She is no longer on a ppi.  No further episodes of the emesis or melena. No dysphagia.  No heartburn.      She has felt good since getting out of the hospital. Eating and drinking well.  Energy levels are normal. Single episode of rectal bleeding about 2 weeks ago.  Bright red blood surrounding the stool, in the toilet water, and with wiping.  Describes it as enough to \"alarm\" her.  Painless. Subsequent BMs following that were normal so she stopped being concerned.  She does have a history of hemorrhoids requiring surgery \"years ago.\"  She does have a history of constipation and takes a daily stool softener.  No family history of colon cancer or polyps.  She thinks she had a colon polyp in the past.    Recent labs with Dr Canas show improved Hb and ferritin 1/19/18.    Review of [place] records show a colonoscopy with Dr Gudino 7/2013, his notes state that it was normal and to repeat in 5 years.    Past Medical History:   Diagnosis Date   • Anemia     Normocytic anemia of unclear ediology   • Arthritis     Osteoarthritis involving knees and left shoulder   • Eosinophilia     Persistent mild eosinophilia of unknown etiology   • History of colonic polyps    • Hyperlipidemia    • Hypertension        Social History     Social History   • Marital status:      Spouse name: Trino   • Number of children: 2   • Years of education: College +     [place]upational History   [place]  [employer] [place]     Worked as a teach in the past.   •  Retired     Social History Main Topics   • Smoking status: Never Smoker   • " Smokeless tobacco: None   • Alcohol use Yes      Comment: Occasional   • Drug use: No   • Sexual activity: Not Asked     Other Topics Concern   • None     Social History Narrative         Current Outpatient Prescriptions:   •  aspirin 81 MG EC tablet, Take 81 mg by mouth Every Night., Disp: , Rfl:   •  Calcium Carbonate-Vitamin D (CALCIUM 500 + D) 500-125 MG-UNIT tablet, Take 1 tablet by mouth Daily., Disp: , Rfl:   •  Docusate Sodium 100 MG capsule, Take 100 mg by mouth Every Night., Disp: , Rfl:   •  lisinopril (PRINIVIL,ZESTRIL) 5 MG tablet, Take 1 tablet by mouth Daily., Disp: 90 tablet, Rfl: 1  •  lovastatin (MEVACOR) 20 MG tablet, Take 1 tablet by mouth Every Night., Disp: 90 tablet, Rfl: 3  •  metoprolol succinate XL (TOPROL-XL) 25 MG 24 hr tablet, Take 1 tablet by mouth Daily., Disp: 90 tablet, Rfl: 1    Review of Systems   Constitutional: Negative for activity change, appetite change and fatigue.   HENT: Negative for sore throat and trouble swallowing.    Gastrointestinal: Positive for anal bleeding. Negative for abdominal distention, abdominal pain and blood in stool.   Endocrine: Negative for cold intolerance and heat intolerance.   Genitourinary: Negative for difficulty urinating, dysuria and frequency.   Musculoskeletal: Negative for arthralgias, back pain and myalgias.   Hematological: Negative for adenopathy. Does not bruise/bleed easily.   All other systems reviewed and are negative.      Objective   Vitals:    02/06/18 1428   BP: 126/80   Temp: 98 °F (36.7 °C)     Last Weight    02/06/18  1428   Weight: 78.5 kg (173 lb)     Body mass index is 26.3 kg/(m^2).      Physical Exam   Constitutional: She is oriented to person, place, and time. She appears well-developed and well-nourished. No distress.   HENT:   Head: Normocephalic and atraumatic.   Right Ear: External ear normal.   Left Ear: External ear normal.   Nose: Nose normal.   Mouth/Throat: Oropharynx is clear and moist.   Eyes: Conjunctivae and  EOM are normal. Right eye exhibits no discharge. Left eye exhibits no discharge. No scleral icterus.   Neck: Normal range of motion. Neck supple. No thyromegaly present.   No supraclavicular adenopathy   Cardiovascular: Normal rate, regular rhythm, normal heart sounds and intact distal pulses.  Exam reveals no gallop.    No murmur heard.  No lower extremity edema   Pulmonary/Chest: Effort normal and breath sounds normal. No respiratory distress. She has no wheezes.   Abdominal: Soft. Normal appearance and bowel sounds are normal. She exhibits no distension and no mass. There is no hepatosplenomegaly. There is tenderness. There is no rigidity, no rebound and no guarding. No hernia.   Mildly ttp LLQ   Genitourinary:   Genitourinary Comments: Rectal exam deferred   Musculoskeletal: Normal range of motion. She exhibits no edema or tenderness.   No atrophy of upper or lower extremities.  Normal digits and nails of both hands.   Lymphadenopathy:     She has no cervical adenopathy.   Neurological: She is alert and oriented to person, place, and time. She displays no atrophy. Coordination normal.   Skin: Skin is warm and dry. No rash noted. She is not diaphoretic. No erythema.   Psychiatric: She has a normal mood and affect. Her behavior is normal. Judgment and thought content normal.   Vitals reviewed.      WBC   Date Value Ref Range Status   01/19/2018 9.42 4.50 - 10.70 10*3/mm3 Final   11/17/2017 10.08 4.50 - 10.70 10*3/mm3 Final     RBC   Date Value Ref Range Status   01/19/2018 3.86 (L) 3.90 - 5.20 10*6/mm3 Final   11/17/2017 2.68 (L) 3.90 - 5.20 10*6/mm3 Final     Hemoglobin   Date Value Ref Range Status   01/19/2018 10.5 (L) 11.9 - 15.5 g/dL Final   11/17/2017 7.4 (L) 11.9 - 15.5 g/dL Final     Hematocrit   Date Value Ref Range Status   01/19/2018 34.8 (L) 35.6 - 45.5 % Final   11/17/2017 23.3 (L) 35.6 - 45.5 % Final     MCV   Date Value Ref Range Status   01/19/2018 90.2 80.5 - 98.2 fL Final   11/17/2017 86.9 80.5 -  98.2 fL Final     MCH   Date Value Ref Range Status   01/19/2018 27.2 26.9 - 32.0 pg Final   11/17/2017 27.6 26.9 - 32.0 pg Final     MCHC   Date Value Ref Range Status   01/19/2018 30.2 (L) 32.4 - 36.3 g/dL Final   11/17/2017 31.8 (L) 32.4 - 36.3 g/dL Final     RDW   Date Value Ref Range Status   01/19/2018 22.1 (H) 11.7 - 13.0 % Final   11/17/2017 20.8 (H) 11.7 - 13.0 % Final     RDW-SD   Date Value Ref Range Status   11/17/2017 66.0 (H) 37.0 - 54.0 fl Final     MPV   Date Value Ref Range Status   11/17/2017 10.1 6.0 - 12.0 fL Final     Platelets   Date Value Ref Range Status   01/19/2018 295 140 - 500 10*3/mm3 Final   11/17/2017 325 140 - 500 10*3/mm3 Final     Neutrophil %   Date Value Ref Range Status   11/17/2017 63.7 42.7 - 76.0 % Final     Neutrophil Rel %   Date Value Ref Range Status   01/19/2018 62.7 42.7 - 76.0 % Final     Lymphocyte %   Date Value Ref Range Status   11/17/2017 12.4 (L) 19.6 - 45.3 % Final     Lymphocyte Rel %   Date Value Ref Range Status   01/19/2018 19.4 (L) 19.6 - 45.3 % Final     Monocyte %   Date Value Ref Range Status   11/17/2017 12.8 (H) 5.0 - 12.0 % Final     Monocyte Rel %   Date Value Ref Range Status   01/19/2018 11.1 5.0 - 12.0 % Final     Eosinophil %   Date Value Ref Range Status   11/17/2017 10.2 (H) 0.3 - 6.2 % Final     Eosinophil Rel %   Date Value Ref Range Status   01/19/2018 6.2 0.3 - 6.2 % Final     Basophil %   Date Value Ref Range Status   11/17/2017 0.1 0.0 - 1.5 % Final     Basophil Rel %   Date Value Ref Range Status   01/19/2018 0.3 0.0 - 1.5 % Final     Immature Grans %   Date Value Ref Range Status   11/17/2017 0.8 (H) 0.0 - 0.5 % Final     Neutrophils, Absolute   Date Value Ref Range Status   11/17/2017 6.42 1.90 - 8.10 10*3/mm3 Final     Neutrophils Absolute   Date Value Ref Range Status   01/19/2018 5.90 1.90 - 8.10 10*3/mm3 Final     Lymphocytes, Absolute   Date Value Ref Range Status   11/17/2017 1.25 0.90 - 4.80 10*3/mm3 Final     Lymphocytes  Absolute   Date Value Ref Range Status   01/19/2018 1.83 0.90 - 4.80 10*3/mm3 Final     Monocytes, Absolute   Date Value Ref Range Status   11/17/2017 1.29 (H) 0.20 - 1.20 10*3/mm3 Final     Monocytes Absolute   Date Value Ref Range Status   01/19/2018 1.05 0.20 - 1.20 10*3/mm3 Final     Eosinophils, Absolute   Date Value Ref Range Status   11/17/2017 1.03 (H) 0.00 - 0.70 10*3/mm3 Final     Eosinophils Absolute   Date Value Ref Range Status   01/19/2018 0.58 0.00 - 0.70 10*3/mm3 Final     Basophils, Absolute   Date Value Ref Range Status   11/17/2017 0.01 0.00 - 0.20 10*3/mm3 Final     Basophils Absolute   Date Value Ref Range Status   01/19/2018 0.03 0.00 - 0.20 10*3/mm3 Final     Immature Grans, Absolute   Date Value Ref Range Status   11/17/2017 0.08 (H) 0.00 - 0.03 10*3/mm3 Final     nRBC   Date Value Ref Range Status   01/19/2018 0.4 (H) 0.0 - 0.0 /100 WBC Final   11/16/2017 0.7 (H) 0.0 - 0.0 /100 WBC Final       Lab Results   Component Value Date    GLUCOSE 91 11/17/2017    BUN 16 01/19/2018    CREATININE 0.97 01/19/2018    EGFRIFNONA 55 (L) 01/19/2018    EGFRIFAFRI 66 01/19/2018    BCR 16.5 01/19/2018    CO2 26.3 01/19/2018    CALCIUM 9.5 01/19/2018    PROTENTOTREF 7.2 01/19/2018    ALBUMIN 4.30 01/19/2018    LABIL2 1.5 01/19/2018    AST 13 01/19/2018    ALT 11 01/19/2018         Imaging Results (last 7 days)     ** No results found for the last 168 hours. **            Assessment/Plan    1. Iron deficiency anemia: due to acute UGI bleed.  Improved on January recheck.    2. Esophagitis: LA-B, ulcers on pathology.  No NSAID use, no GERD symptoms, suspect silent reflux    3. Rectal bleeding: single episode, fairly pronounced by her description    Plan  -Advised EGD and colonoscopy for further evaluation of her esophagitis and rectal bleeding.  I'm certainly concerned that she is not on a PPI and had LA class B esophagitis.  I would like to rule out ongoing esophagitis and Canales's esophagus.  I did recommend  that she pursue colonoscopy at the same time given episode of rectal bleeding as well as the fact that Dr. Gudino recommended a repeat colonoscopy in 5 years which would be at this time.  She wishes to wait to see if further bleeding continues.  I did advise her that there certainly could be a benign etiology to the bleeding but also there may be the possibility of something more worrisome such as a cancer or polyp.  She verbalizes understanding of this risk but still wishes to wait.  She is willing to undergo the EGD.  We'll have her continue to monitor her stools, proceed with EGD.  Ama was seen today for gi bleeding.    Diagnoses and all orders for this visit:    Iron deficiency anemia due to chronic blood loss    Esophagitis  -     Case Request; Standing  -     Implement Anesthesia Orders Day of Procedure; Standing  -     Obtain Informed Consent; Standing  -     lactated ringers infusion; Infuse 30 mL/hr into a venous catheter Continuous.  -     Case Request    Rectal bleeding        Dictated utilizing Dragon dictation

## 2018-02-06 NOTE — PATIENT INSTRUCTIONS
Schedule the EGD test    Continue to monitor your stools    For any additional questions, concerns or changes to your condition after today's office visit please contact the office at [phone].

## 2018-02-27 ENCOUNTER — ANESTHESIA EVENT (OUTPATIENT)
Dept: GASTROENTEROLOGY | Facility: HOSPITAL | Age: 83
End: 2018-02-27

## 2018-02-27 ENCOUNTER — ANESTHESIA (OUTPATIENT)
Dept: GASTROENTEROLOGY | Facility: HOSPITAL | Age: 83
End: 2018-02-27

## 2018-02-27 ENCOUNTER — HOSPITAL ENCOUNTER (OUTPATIENT)
Facility: HOSPITAL | Age: 83
Setting detail: HOSPITAL OUTPATIENT SURGERY
Discharge: HOME | End: 2018-02-27
Attending: INTERNAL MEDICINE | Admitting: INTERNAL MEDICINE

## 2018-02-27 VITALS
BODY MASS INDEX: 26.37 KG/M2 | HEART RATE: 57 BPM | OXYGEN SATURATION: 99 % | TEMPERATURE: 97.6 F | DIASTOLIC BLOOD PRESSURE: 62 MMHG | SYSTOLIC BLOOD PRESSURE: 144 MMHG | HEIGHT: 68 IN | RESPIRATION RATE: 18 BRPM | WEIGHT: 174 LBS

## 2018-02-27 DIAGNOSIS — K20.90 ESOPHAGITIS: ICD-10-CM

## 2018-02-27 PROCEDURE — 88305 TISSUE EXAM BY PATHOLOGIST: CPT | Performed by: INTERNAL MEDICINE

## 2018-02-27 PROCEDURE — 25010000002 PROPOFOL 10 MG/ML EMULSION: Performed by: ANESTHESIOLOGY

## 2018-02-27 PROCEDURE — 43239 EGD BIOPSY SINGLE/MULTIPLE: CPT | Performed by: INTERNAL MEDICINE

## 2018-02-27 PROCEDURE — 88312 SPECIAL STAINS GROUP 1: CPT | Performed by: INTERNAL MEDICINE

## 2018-02-27 RX ORDER — PROPOFOL 10 MG/ML
VIAL (ML) INTRAVENOUS CONTINUOUS PRN
Status: DISCONTINUED | OUTPATIENT
Start: 2018-02-27 | End: 2018-02-27 | Stop reason: SURG

## 2018-02-27 RX ORDER — PANTOPRAZOLE SODIUM 40 MG/1
40 TABLET, DELAYED RELEASE ORAL 2 TIMES DAILY
Qty: 180 TABLET | Refills: 1 | Status: SHIPPED | OUTPATIENT
Start: 2018-02-27 | End: 2018-08-08 | Stop reason: SDUPTHER

## 2018-02-27 RX ORDER — PROMETHAZINE HYDROCHLORIDE 25 MG/1
25 TABLET ORAL ONCE AS NEEDED
Status: DISCONTINUED | OUTPATIENT
Start: 2018-02-27 | End: 2018-02-27 | Stop reason: HOSPADM

## 2018-02-27 RX ORDER — PROMETHAZINE HYDROCHLORIDE 25 MG/ML
12.5 INJECTION, SOLUTION INTRAMUSCULAR; INTRAVENOUS ONCE AS NEEDED
Status: DISCONTINUED | OUTPATIENT
Start: 2018-02-27 | End: 2018-02-27 | Stop reason: HOSPADM

## 2018-02-27 RX ORDER — PROPOFOL 10 MG/ML
VIAL (ML) INTRAVENOUS AS NEEDED
Status: DISCONTINUED | OUTPATIENT
Start: 2018-02-27 | End: 2018-02-27 | Stop reason: SURG

## 2018-02-27 RX ORDER — SODIUM CHLORIDE 0.9 % (FLUSH) 0.9 %
1-10 SYRINGE (ML) INJECTION AS NEEDED
Status: DISCONTINUED | OUTPATIENT
Start: 2018-02-27 | End: 2018-02-27 | Stop reason: HOSPADM

## 2018-02-27 RX ORDER — PROMETHAZINE HYDROCHLORIDE 25 MG/1
25 SUPPOSITORY RECTAL ONCE AS NEEDED
Status: DISCONTINUED | OUTPATIENT
Start: 2018-02-27 | End: 2018-02-27 | Stop reason: HOSPADM

## 2018-02-27 RX ORDER — LIDOCAINE HYDROCHLORIDE 20 MG/ML
INJECTION, SOLUTION INFILTRATION; PERINEURAL AS NEEDED
Status: DISCONTINUED | OUTPATIENT
Start: 2018-02-27 | End: 2018-02-27 | Stop reason: SURG

## 2018-02-27 RX ORDER — SODIUM CHLORIDE, SODIUM LACTATE, POTASSIUM CHLORIDE, CALCIUM CHLORIDE 600; 310; 30; 20 MG/100ML; MG/100ML; MG/100ML; MG/100ML
30 INJECTION, SOLUTION INTRAVENOUS CONTINUOUS
Status: DISCONTINUED | OUTPATIENT
Start: 2018-02-27 | End: 2018-02-27 | Stop reason: HOSPADM

## 2018-02-27 RX ADMIN — LIDOCAINE HYDROCHLORIDE 50 MG: 20 INJECTION, SOLUTION INFILTRATION; PERINEURAL at 12:00

## 2018-02-27 RX ADMIN — PROPOFOL 180 MCG/KG/MIN: 10 INJECTION, EMULSION INTRAVENOUS at 12:00

## 2018-02-27 RX ADMIN — SODIUM CHLORIDE, POTASSIUM CHLORIDE, SODIUM LACTATE AND CALCIUM CHLORIDE: 600; 310; 30; 20 INJECTION, SOLUTION INTRAVENOUS at 12:00

## 2018-02-27 RX ADMIN — PROPOFOL 60 MG: 10 INJECTION, EMULSION INTRAVENOUS at 12:00

## 2018-02-27 NOTE — ANESTHESIA PREPROCEDURE EVALUATION
Anesthesia Evaluation     Patient summary reviewed and Nursing notes reviewed   NPO Solid Status: > 8 hours  NPO Liquid Status: > 2 hours           Airway   Mallampati: II  Dental - normal exam     Pulmonary - normal exam   Cardiovascular - normal exam    (+) hypertension, dysrhythmias Atrial Fib, PVD, hyperlipidemia,       Neuro/Psych  GI/Hepatic/Renal/Endo    (+)  GI bleeding,     Musculoskeletal     (+) back pain,   Abdominal    Substance History      OB/GYN          Other   (+) arthritis                   Anesthesia Plan    ASA 2     MAC     intravenous induction   Anesthetic plan and risks discussed with patient.

## 2018-02-27 NOTE — ANESTHESIA POSTPROCEDURE EVALUATION
"Patient: Ama Billy    Procedure Summary     Date Anesthesia Start Anesthesia Stop Room / Location    02/27/18 1200 1219  [facility]       Procedure Diagnosis Surgeon Provider    ESOPHAGOGASTRODUODENOSCOPY WITH BIOPSIES (N/A Esophagus) Esophagitis  (Esophagitis [K20.9]) MD Jesusita Kinney MD          Anesthesia Type: MAC  Last vitals  BP   142/92 (02/27/18 1240)   Temp   36.4 °C (97.6 °F) (02/27/18 1220)   Pulse   55 (02/27/18 1240)   Resp   18 (02/27/18 1240)     SpO2   100 % (02/27/18 1240)     Post Anesthesia Care and Evaluation    Patient location during evaluation: PACU  Patient participation: complete - patient participated  Level of consciousness: awake  Pain score: 0  Pain management: adequate  Airway patency: patent  Anesthetic complications: No anesthetic complications    Cardiovascular status: acceptable  Respiratory status: acceptable  Hydration status: acceptable    Comments: Blood pressure 142/92, pulse 55, temperature 36.4 °C (97.6 °F), temperature source Oral, resp. rate 18, height 172.7 cm (68\"), weight 78.9 kg (174 lb), SpO2 100 %, not currently breastfeeding.    No anesthesia care post op    "

## 2018-02-28 LAB
CYTO UR: NORMAL
LAB AP CASE REPORT: NORMAL
LAB AP INTRADEPARTMENTAL CONSULT: NORMAL
Lab: NORMAL
PATH REPORT.FINAL DX SPEC: NORMAL
PATH REPORT.GROSS SPEC: NORMAL

## 2018-03-01 ENCOUNTER — TELEPHONE (OUTPATIENT)
Dept: GASTROENTEROLOGY | Facility: CLINIC | Age: 83
End: 2018-03-01

## 2018-03-01 NOTE — TELEPHONE ENCOUNTER
----- Message from Pan Botello sent at 3/1/2018 10:16 AM EST -----  Regarding: PANTOPRAZLE   Contact: [phone]  PT CALLED ASKING FOR RX

## 2018-03-01 NOTE — PROGRESS NOTES
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    Continue pantoprazole twice daily x 12 weeks, then once daily    Follow up in ~3 months

## 2018-03-01 NOTE — TELEPHONE ENCOUNTER
Called pt and pt is asking if Dr Peralta sent her pantoprazole to her pharmacy.  Advised pt that Dr Peralta sent it to [facility] and to let us know if she has any problems.  Pt verb understanding.

## 2018-03-06 ENCOUNTER — TELEPHONE (OUTPATIENT)
Dept: GASTROENTEROLOGY | Facility: CLINIC | Age: 83
End: 2018-03-06

## 2018-03-06 NOTE — TELEPHONE ENCOUNTER
Called pt and advised per Dr Peralta that the egd bx showed mild gastritis , esophagitis, and disla's esophagus without dysplasia.     She recommends to continue pantoprazole twice daily for 12 wks and then once daily .  And f/u in around 3 mo. Pt verb understanding and made f/u appt for 06/12/2018 at 230pm.

## 2018-03-06 NOTE — TELEPHONE ENCOUNTER
----- Message from Hortensia Peralta MD sent at 3/1/2018  2:31 PM EST -----  EGD biopsies with mild gastritis, esophagitis, and Canales's esophagus without dysplasia.    Continue pantoprazole twice daily x 12 weeks, then once daily    Follow up in ~3 months

## 2018-05-25 ENCOUNTER — TELEPHONE (OUTPATIENT)
Dept: GASTROENTEROLOGY | Facility: CLINIC | Age: 83
End: 2018-05-25

## 2018-05-25 DIAGNOSIS — K62.5 RECTAL BLEEDING: Primary | ICD-10-CM

## 2018-05-25 NOTE — TELEPHONE ENCOUNTER
Call to pt. [facility] for past 2 days had noted bright red blood in toilet water and when wipes with BM.  Denies any problem passing stool - having normal bowel pattern.  Denies rectal or abd pain.  Reports h/o hemorrhoidectomy.  [facility] also was hosp 11/2017 for GI bleed, so anxious because seeing blood.  Denies spitting up blood.    Advise pt will update Dr Peralta and in the meantime, if bleeding worsens, begins spitting up blood, develops abd pain - go to ER.  Verb understanding.

## 2018-05-25 NOTE — TELEPHONE ENCOUNTER
Call to pt.  Advise per DR Peralta to get OTC hemorrhoid cream and docusate stool softener.    Check CBC on Tuesday.      To ER for any worsening bleeding, bleeding when not having BM's, other concerning changes.      Pt verb understanding.  Lab appt scheduled for 5/29 @ 3pm.

## 2018-05-25 NOTE — TELEPHONE ENCOUNTER
Have her get otc hemorrhoid cream and docusate stool softener.    Check CBC on Tuesday    To ER for any worsening bleeding, bleeding when not having BMs, other concerning changes

## 2018-05-25 NOTE — TELEPHONE ENCOUNTER
----- Message from Mirtha Bar sent at 5/25/2018  9:00 AM EDT -----  Regarding: pt called   Contact: [phone]  Pt is calling stating, the last 2 days she has had blood in her stool? Pt Is asking for a call back.

## 2018-05-29 ENCOUNTER — RESULTS ENCOUNTER (OUTPATIENT)
Dept: GASTROENTEROLOGY | Facility: CLINIC | Age: 83
End: 2018-05-29

## 2018-05-29 DIAGNOSIS — K62.5 RECTAL BLEEDING: ICD-10-CM

## 2018-05-30 LAB
BASOPHILS # BLD AUTO: 0.02 10*3/MM3 (ref 0–0.2)
BASOPHILS NFR BLD AUTO: 0.2 % (ref 0–1.5)
DIFFERENTIAL COMMENT: NORMAL
EOSINOPHIL # BLD AUTO: 0.53 10*3/MM3 (ref 0–0.7)
EOSINOPHIL NFR BLD AUTO: 5.4 % (ref 0.3–6.2)
ERYTHROCYTE [DISTWIDTH] IN BLOOD BY AUTOMATED COUNT: 22.1 % (ref 11.7–13)
HCT VFR BLD AUTO: 34.6 % (ref 35.6–45.5)
HGB BLD-MCNC: 10.7 G/DL (ref 11.9–15.5)
IMM GRANULOCYTES # BLD: 0.03 10*3/MM3 (ref 0–0.03)
IMM GRANULOCYTES NFR BLD: 0.3 % (ref 0–0.5)
LYMPHOCYTES # BLD AUTO: 2.16 10*3/MM3 (ref 0.9–4.8)
LYMPHOCYTES NFR BLD AUTO: 22.2 % (ref 19.6–45.3)
MCH RBC QN AUTO: 27.2 PG (ref 26.9–32)
MCHC RBC AUTO-ENTMCNC: 30.9 G/DL (ref 32.4–36.3)
MCV RBC AUTO: 88 FL (ref 80.5–98.2)
MONOCYTES # BLD AUTO: 1.25 10*3/MM3 (ref 0.2–1.2)
MONOCYTES NFR BLD AUTO: 12.8 % (ref 5–12)
NEUTROPHILS # BLD AUTO: 5.77 10*3/MM3 (ref 1.9–8.1)
NEUTROPHILS NFR BLD AUTO: 59.4 % (ref 42.7–76)
NRBC BLD AUTO-RTO: 0 /100 WBC (ref 0–0)
PLATELET # BLD AUTO: 291 10*3/MM3 (ref 140–500)
PLATELET BLD QL SMEAR: NORMAL
RBC # BLD AUTO: 3.93 10*6/MM3 (ref 3.9–5.2)
RBC MORPH BLD: NORMAL
WBC # BLD AUTO: 9.73 10*3/MM3 (ref 4.5–10.7)

## 2018-05-31 DIAGNOSIS — I48.0 PAROXYSMAL ATRIAL FIBRILLATION (HCC): ICD-10-CM

## 2018-05-31 RX ORDER — METOPROLOL SUCCINATE 25 MG/1
TABLET, EXTENDED RELEASE ORAL
Qty: 90 TABLET | Refills: 1 | Status: SHIPPED | OUTPATIENT
Start: 2018-05-31 | End: 2018-11-27 | Stop reason: SDUPTHER

## 2018-06-01 ENCOUNTER — TELEPHONE (OUTPATIENT)
Dept: GASTROENTEROLOGY | Facility: CLINIC | Age: 83
End: 2018-06-01

## 2018-06-01 NOTE — TELEPHONE ENCOUNTER
----- Message from Hortensia Peralta MD sent at 5/31/2018  7:44 AM EDT -----  Her hemoglobin is stable, but remains lower than what it should be.    If she persists with rectal bleeding, I recommend proceeding with a colonoscopy, as I had recommended during our February office visit.

## 2018-06-04 NOTE — TELEPHONE ENCOUNTER
Called pt and advised per dr Peralta that her hgb is stable , but remains lower that what it should be.  If she persists with rectal bleeding, she recommends proceeding with a c/s as she had recommended in Feb.  Pt verb understanding and reports that she has not had any further bleeding in approx 2 wks.   Pt also states she has an appt with Dr Peralta on 06/12.

## 2018-06-12 ENCOUNTER — OFFICE VISIT (OUTPATIENT)
Dept: GASTROENTEROLOGY | Facility: CLINIC | Age: 83
End: 2018-06-12

## 2018-06-12 VITALS
HEIGHT: 68 IN | SYSTOLIC BLOOD PRESSURE: 142 MMHG | TEMPERATURE: 98.6 F | BODY MASS INDEX: 27.61 KG/M2 | DIASTOLIC BLOOD PRESSURE: 92 MMHG | WEIGHT: 182.2 LBS

## 2018-06-12 DIAGNOSIS — K22.70 BARRETT'S ESOPHAGUS WITHOUT DYSPLASIA: ICD-10-CM

## 2018-06-12 DIAGNOSIS — K62.5 RECTAL BLEEDING: Primary | ICD-10-CM

## 2018-06-12 DIAGNOSIS — D50.0 IRON DEFICIENCY ANEMIA DUE TO CHRONIC BLOOD LOSS: ICD-10-CM

## 2018-06-12 PROCEDURE — 99214 OFFICE O/P EST MOD 30 MIN: CPT | Performed by: INTERNAL MEDICINE

## 2018-06-12 RX ORDER — SODIUM CHLORIDE, SODIUM LACTATE, POTASSIUM CHLORIDE, CALCIUM CHLORIDE 600; 310; 30; 20 MG/100ML; MG/100ML; MG/100ML; MG/100ML
30 INJECTION, SOLUTION INTRAVENOUS CONTINUOUS
Status: CANCELLED | OUTPATIENT
Start: 2018-07-03

## 2018-06-12 NOTE — PROGRESS NOTES
"Chief Complaint   Patient presents with   • Follow-up   • Anemia     Subjective     HPI  Ama Billy is a 84 y.o. female who presents follow up of iron deficiency anemia, rectal bleeding, and esophagitis.  She was hospitalized in November for hematemesis.  She had an EGD showing esophagitis.  I saw her in follow-up in February.  For iron deficiency anemia, I recommended further workup with EGD and colonoscopy.  She had been having \"alarming\" amounts of blood in her stools.  However she refused to undergo colonoscopy for evaluation.  She had an EGD in March showing Canales's esophagus.    She had called back with persistent episodes of rectal bleeding. This happened 2 nights at the end of May her BMs.  Described as \"Quite a bit\" of blood coming out with the stool, in the toilet, and around the stool. No pain.  Labs checked and Hb stable to January, though still low in the mid 10s.    Takes a daily stool softener, miralax as needed for chronic constipation.      No heartburn, reflux, or difficulty swallowing.  No abominal pain or excess fatigue.    Her last colonoscopy was in 2008 and was normal, prior to that she had a colonoscopy in 2003--pathology shows that she had a adenomatous polyp removed at that time.    Past Medical History:   Diagnosis Date   • Anemia     Normocytic anemia of unclear ediology   • Arthritis     Osteoarthritis involving knees and left shoulder   • Eosinophilia     Persistent mild eosinophilia of unknown etiology   • History of colonic polyps    • Hyperlipidemia    • Hypertension        Social History     Social History   • Marital status:      Spouse name: Trino   • Number of children: 2   • Years of education: College +     [place]up[place]  [employer] [place]     Worked as a teach in the past.   •  Retired     Social History Main Topics   • Smoking status: Never Smoker   • Smokeless tobacco: Never Used   • Alcohol use No      Comment: " Occasional   • Drug use: No   • Sexual activity: Defer     Other Topics Concern   • Not on file         Current Outpatient Prescriptions:   •  aspirin 81 MG EC tablet, Take 81 mg by mouth Every Night., Disp: , Rfl:   •  Calcium Carbonate-Vitamin D (CALCIUM 500 + D) 500-125 MG-UNIT tablet, Take 1 tablet by mouth Daily., Disp: , Rfl:   •  Docusate Sodium 100 MG capsule, Take 100 mg by mouth Every Night., Disp: , Rfl:   •  lisinopril (PRINIVIL,ZESTRIL) 5 MG tablet, Take 1 tablet by mouth Daily., Disp: 90 tablet, Rfl: 1  •  lovastatin (MEVACOR) 20 MG tablet, Take 1 tablet by mouth Every Night., Disp: 90 tablet, Rfl: 3  •  metoprolol succinate XL (TOPROL-XL) 25 MG 24 hr tablet, TAKE 1 TABLET DAILY, Disp: 90 tablet, Rfl: 1  •  pantoprazole (PROTONIX) 40 MG EC tablet, Take 1 tablet by mouth 2 (Two) Times a Day., Disp: 180 tablet, Rfl: 1    Review of Systems   Constitutional: Negative for activity change, appetite change, chills and fever.   HENT: Negative for trouble swallowing.    Respiratory: Negative.    Cardiovascular: Negative.  Negative for chest pain.   Gastrointestinal: Positive for anal bleeding. Negative for abdominal distention, abdominal pain, constipation, diarrhea, nausea and vomiting.   Genitourinary: Negative for dysuria, frequency and hematuria.   Musculoskeletal: Positive for back pain.       Objective   Vitals:    06/12/18 1418   BP: 142/92   Temp: 98.6 °F (37 °C)     1    06/12/18  1418   Weight: 82.6 kg (182 lb 3.2 oz)     Body mass index is 27.7 kg/m².      Physical Exam   Constitutional: She is oriented to person, place, and time. She appears well-developed and well-nourished. No distress.   HENT:   Head: Normocephalic and atraumatic.   Right Ear: External ear normal.   Left Ear: External ear normal.   Nose: Nose normal.   Mouth/Throat: Oropharynx is clear and moist.   Eyes: Conjunctivae and EOM are normal. Right eye exhibits no discharge. Left eye exhibits no discharge. No scleral icterus.   Neck:  Normal range of motion. Neck supple. No thyromegaly present.   No supraclavicular adenopathy   Cardiovascular: Normal rate, regular rhythm, normal heart sounds and intact distal pulses.  Exam reveals no gallop.    No murmur heard.  No lower extremity edema   Pulmonary/Chest: Effort normal and breath sounds normal. No respiratory distress. She has no wheezes.   Abdominal: Soft. Normal appearance and bowel sounds are normal. She exhibits no distension and no mass. There is no hepatosplenomegaly. There is no tenderness. There is no rigidity, no rebound and no guarding. No hernia.   Genitourinary:   Genitourinary Comments: Rectal exam with no external hemorrhoids, no palpable internal masses felt, ?  Internal hemorrhoids, no blood   Musculoskeletal: Normal range of motion. She exhibits no edema or tenderness.   No atrophy of upper or lower extremities.  Normal digits and nails of both hands.   Lymphadenopathy:     She has no cervical adenopathy.   Neurological: She is alert and oriented to person, place, and time. She displays no atrophy. Coordination normal.   Skin: Skin is warm and dry. No rash noted. She is not diaphoretic. No erythema.   Psychiatric: She has a normal mood and affect. Her behavior is normal. Judgment and thought content normal.   Vitals reviewed.      WBC   Date Value Ref Range Status   01/19/2018 9.42 4.50 - 10.70 10*3/mm3 Final   11/17/2017 10.08 4.50 - 10.70 10*3/mm3 Final     RBC   Date Value Ref Range Status   05/29/2018 3.93 3.90 - 5.20 10*6/mm3 Final     Hemoglobin   Date Value Ref Range Status   05/29/2018 10.7 (L) 11.9 - 15.5 g/dL Final   11/17/2017 7.4 (L) 11.9 - 15.5 g/dL Final     Hematocrit   Date Value Ref Range Status   05/29/2018 34.6 (L) 35.6 - 45.5 % Final   11/17/2017 23.3 (L) 35.6 - 45.5 % Final     MCV   Date Value Ref Range Status   05/29/2018 88.0 80.5 - 98.2 fL Final   11/17/2017 86.9 80.5 - 98.2 fL Final     MCH   Date Value Ref Range Status   05/29/2018 27.2 26.9 - 32.0 pg  Final   11/17/2017 27.6 26.9 - 32.0 pg Final     MCHC   Date Value Ref Range Status   05/29/2018 30.9 (L) 32.4 - 36.3 g/dL Final   11/17/2017 31.8 (L) 32.4 - 36.3 g/dL Final     RDW   Date Value Ref Range Status   05/29/2018 22.1 (H) 11.7 - 13.0 % Final   11/17/2017 20.8 (H) 11.7 - 13.0 % Final     RDW-SD   Date Value Ref Range Status   11/17/2017 66.0 (H) 37.0 - 54.0 fl Final     MPV   Date Value Ref Range Status   11/17/2017 10.1 6.0 - 12.0 fL Final     Platelets   Date Value Ref Range Status   05/29/2018 291 140 - 500 10*3/mm3 Final   11/17/2017 325 140 - 500 10*3/mm3 Final     Neutrophil %   Date Value Ref Range Status   11/17/2017 63.7 42.7 - 76.0 % Final     Neutrophil Rel %   Date Value Ref Range Status   05/29/2018 59.4 42.7 - 76.0 % Final     Lymphocyte %   Date Value Ref Range Status   11/17/2017 12.4 (L) 19.6 - 45.3 % Final     Lymphocyte Rel %   Date Value Ref Range Status   05/29/2018 22.2 19.6 - 45.3 % Final     Monocyte %   Date Value Ref Range Status   11/17/2017 12.8 (H) 5.0 - 12.0 % Final     Monocyte Rel %   Date Value Ref Range Status   05/29/2018 12.8 (H) 5.0 - 12.0 % Final     Eosinophil %   Date Value Ref Range Status   11/17/2017 10.2 (H) 0.3 - 6.2 % Final     Eosinophil Rel %   Date Value Ref Range Status   05/29/2018 5.4 0.3 - 6.2 % Final     Basophil %   Date Value Ref Range Status   11/17/2017 0.1 0.0 - 1.5 % Final     Basophil Rel %   Date Value Ref Range Status   05/29/2018 0.2 0.0 - 1.5 % Final     Immature Grans %   Date Value Ref Range Status   11/17/2017 0.8 (H) 0.0 - 0.5 % Final     Neutrophils, Absolute   Date Value Ref Range Status   11/17/2017 6.42 1.90 - 8.10 10*3/mm3 Final     Neutrophils Absolute   Date Value Ref Range Status   05/29/2018 5.77 1.90 - 8.10 10*3/mm3 Final     Lymphocytes, Absolute   Date Value Ref Range Status   11/17/2017 1.25 0.90 - 4.80 10*3/mm3 Final     Lymphocytes Absolute   Date Value Ref Range Status   05/29/2018 2.16 0.90 - 4.80 10*3/mm3 Final      Monocytes, Absolute   Date Value Ref Range Status   11/17/2017 1.29 (H) 0.20 - 1.20 10*3/mm3 Final     Monocytes Absolute   Date Value Ref Range Status   05/29/2018 1.25 (H) 0.20 - 1.20 10*3/mm3 Final     Eosinophils, Absolute   Date Value Ref Range Status   11/17/2017 1.03 (H) 0.00 - 0.70 10*3/mm3 Final     Eosinophils Absolute   Date Value Ref Range Status   05/29/2018 0.53 0.00 - 0.70 10*3/mm3 Final     Basophils, Absolute   Date Value Ref Range Status   11/17/2017 0.01 0.00 - 0.20 10*3/mm3 Final     Basophils Absolute   Date Value Ref Range Status   05/29/2018 0.02 0.00 - 0.20 10*3/mm3 Final     Immature Grans, Absolute   Date Value Ref Range Status   11/17/2017 0.08 (H) 0.00 - 0.03 10*3/mm3 Final     nRBC   Date Value Ref Range Status   05/29/2018 0.0 0.0 - 0.0 /100 WBC Final   11/16/2017 0.7 (H) 0.0 - 0.0 /100 WBC Final       Lab Results   Component Value Date    GLUCOSE 91 11/17/2017    BUN 16 01/19/2018    CREATININE 0.97 01/19/2018    EGFRIFNONA 55 (L) 01/19/2018    EGFRIFAFRI 66 01/19/2018    BCR 16.5 01/19/2018    CO2 26.3 01/19/2018    CALCIUM 9.5 01/19/2018    PROTENTOTREF 7.2 01/19/2018    ALBUMIN 4.30 01/19/2018    LABIL2 1.5 01/19/2018    AST 13 01/19/2018    ALT 11 01/19/2018         Imaging Results (last 7 days)     ** No results found for the last 168 hours. **            Assessment/Plan    1.  Rectal bleeding: This has persisted.  In the setting of her history of adenomatous polyp and iron deficiency I think it is reasonable to proceed with colonoscopy.  She is 84 but she is otherwise healthy and active    2.  Iron deficiency: Chronic issue for her.  Previously followed by Dr. Bello at the [facility]    3.  Canales's esophagus: Identified on her last EGD.  She is on ppi.  No dysplasia    Plan  We discussed proceeding with colonoscopy.  She is agreeable.  I think ruling out a polyp or other issue while she is an active 84-year-old is reasonable  Continue daily pantoprazole  Continue daily  docusate, MiraLAX for constipation  Further recommendations after her colonoscopy    Ama was seen today for follow-up and anemia.    Diagnoses and all orders for this visit:    Rectal bleeding  -     Case Request; Standing  -     Implement Anesthesia Orders Day of Procedure; Standing  -     Obtain Informed Consent; Standing  -     Verify bowel prep was successful; Standing  -     lactated ringers infusion; Infuse 30 mL/hr into a venous catheter Continuous.  -     Case Request    Iron deficiency anemia due to chronic blood loss  -     Case Request; Standing  -     Implement Anesthesia Orders Day of Procedure; Standing  -     Obtain Informed Consent; Standing  -     Verify bowel prep was successful; Standing  -     lactated ringers infusion; Infuse 30 mL/hr into a venous catheter Continuous.  -     Case Request    Canales's esophagus without dysplasia        Dictated utilizing Dragon dictation

## 2018-06-12 NOTE — PATIENT INSTRUCTIONS
Schedule the colonoscopy    Continue the stool softener and miralax    For any additional questions, concerns or changes to your condition after today's office visit please contact the office at [phone].

## 2018-06-20 ENCOUNTER — OFFICE VISIT (OUTPATIENT)
Dept: CARDIOLOGY | Facility: CLINIC | Age: 83
End: 2018-06-20

## 2018-06-20 VITALS
BODY MASS INDEX: 27.68 KG/M2 | SYSTOLIC BLOOD PRESSURE: 190 MMHG | WEIGHT: 182.6 LBS | HEART RATE: 55 BPM | DIASTOLIC BLOOD PRESSURE: 82 MMHG | HEIGHT: 68 IN

## 2018-06-20 DIAGNOSIS — I87.2 CHRONIC VENOUS INSUFFICIENCY: ICD-10-CM

## 2018-06-20 DIAGNOSIS — I48.0 PAROXYSMAL ATRIAL FIBRILLATION (HCC): Primary | ICD-10-CM

## 2018-06-20 DIAGNOSIS — M79.89 SWELLING OF LOWER EXTREMITY: ICD-10-CM

## 2018-06-20 DIAGNOSIS — E78.2 MIXED HYPERLIPIDEMIA: ICD-10-CM

## 2018-06-20 DIAGNOSIS — I10 ESSENTIAL HYPERTENSION: ICD-10-CM

## 2018-06-20 PROCEDURE — 93000 ELECTROCARDIOGRAM COMPLETE: CPT | Performed by: INTERNAL MEDICINE

## 2018-06-20 PROCEDURE — 99214 OFFICE O/P EST MOD 30 MIN: CPT | Performed by: INTERNAL MEDICINE

## 2018-06-20 RX ORDER — LISINOPRIL 10 MG/1
10 TABLET ORAL
Qty: 90 TABLET | Refills: 1 | Status: SHIPPED | OUTPATIENT
Start: 2018-06-20 | End: 2019-01-16 | Stop reason: DRUGHIGH

## 2018-06-20 NOTE — PROGRESS NOTES
Subjective:     Encounter Date:06/20/2018      Patient ID: Ama Billy is a 84 y.o. female.    Chief Complaint:  History of Present Illness    This is an 84-year-old with a history of rheumatic fever, hypertension, hyperlipidemia, paroxysmal atrial fibrillation, recent GI bleed due to esophagitis, who presents for hospital follow-up.     I saw the patient initially when she was hospitalized 11/2017 for a GI bleed.  The patient presented on 11/14/2017 with complaints of nausea, vomiting, and diarrhea associated with generalized weakness and fatigue.  She was noted to be severely anemic on admission requiring a transfusion.  She was then seen by gastroenterology and underwent an EGD that showed evidence of acute erosive esophagitis.  The following day she went into atrial fibrillation with rapid ventricular rate that lasted approximately 3 hours and converted back to sinus rhythm prior to even starting a diltiazem drip.  She denies any symptoms with atrial fibrillation.  She had no recurrent atrial fibrillation during that admission.  Due to her GI bleed and only a single episode of atrial fibrillation I did not recommend starting anticoagulation.  I did start metoprolol succinate 25 mg a day during that admission.  As part of her workup she did undergo an echocardiogram that showed normal left ventricle systolic function wall motion with an estimated ejection fraction of 60%, normal diastolic function, no significant valvular disease.     Today she presents for routine 6 month follow-up.  Since his eyelashes continue to have issues with mildly elevated blood pressures.  She also reports recent issues with blood in her stool.  She was recently evaluated by Dr. Peralta and it was recommended that she proceed with a colonoscopy which she has finally agreed to have done.  She denies any palpitations, shortness of breath, PND or orthopnea, near sick appears syncope, dizziness or lightheadedness.  She's been having  a little bit more issues with lower extremity edema lately partly because she's noticed more swelling in her feet than before.  She does admit that she is not very good about staying away from sodium.    Review of Systems   Constitution: Positive for malaise/fatigue. Negative for weakness.   HENT: Negative for hearing loss, hoarse voice, nosebleeds and sore throat.    Eyes: Negative for pain.   Cardiovascular: Negative for chest pain, claudication, cyanosis, dyspnea on exertion, irregular heartbeat, leg swelling, near-syncope, orthopnea, palpitations, paroxysmal nocturnal dyspnea and syncope.   Respiratory: Negative for shortness of breath and snoring.    Endocrine: Negative for cold intolerance, heat intolerance, polydipsia, polyphagia and polyuria.   Skin: Negative for itching and rash.   Musculoskeletal: Negative for arthritis, falls, joint pain, joint swelling, muscle cramps, muscle weakness and myalgias.   Gastrointestinal: Positive for hematochezia. Negative for constipation, diarrhea, dysphagia, heartburn, hematemesis, melena, nausea and vomiting.   Genitourinary: Negative for frequency, hematuria and hesitancy.   Neurological: Negative for excessive daytime sleepiness, dizziness, headaches, light-headedness and numbness.   Psychiatric/Behavioral: Negative for depression. The patient is not nervous/anxious.           Current Outpatient Prescriptions:   •  aspirin 81 MG EC tablet, Take 81 mg by mouth Every Night., Disp: , Rfl:   •  Calcium Carbonate-Vitamin D (CALCIUM 500 + D) 500-125 MG-UNIT tablet, Take 1 tablet by mouth Daily., Disp: , Rfl:   •  Docusate Sodium 100 MG capsule, Take 100 mg by mouth Every Night., Disp: , Rfl:   •  lisinopril (PRINIVIL,ZESTRIL) 5 MG tablet, Take 1 tablet by mouth Daily., Disp: 90 tablet, Rfl: 1  •  lovastatin (MEVACOR) 20 MG tablet, Take 1 tablet by mouth Every Night., Disp: 90 tablet, Rfl: 3  •  metoprolol succinate XL (TOPROL-XL) 25 MG 24 hr tablet, TAKE 1 TABLET DAILY,  "Disp: 90 tablet, Rfl: 1  •  pantoprazole (PROTONIX) 40 MG EC tablet, Take 1 tablet by mouth 2 (Two) Times a Day., Disp: 180 tablet, Rfl: 1    Past Medical History:   Diagnosis Date   • Anemia     Normocytic anemia of unclear ediology   • Arthritis     Osteoarthritis involving knees and left shoulder   • Eosinophilia     Persistent mild eosinophilia of unknown etiology   • History of colonic polyps    • Hyperlipidemia    • Hypertension      Past Surgical History:   Procedure Laterality Date   • COLONOSCOPY      H/O colonic polyps with regular colonoscopies at 5 year intervals.    • COLONOSCOPY  2013    By Dr. Gudino, no polyps identified.   • ENDOSCOPY N/A 11/15/2017    Small HH, LA Grade B reflux, acute and erosive ulcerative esophagitis,    • ENDOSCOPY N/A 2/27/2018    LA Grade B esophagitis, HH, gastritis, Path: Canales's w/out dysplasia   • HEMORRHOIDECTOMY  1965   • HYSTERECTOMY  1971    Partial, secondary to endometriosis   • JOINT REPLACEMENT  2014    Left total hip arthroplasy   • KNEE SURGERY  1997    Arthroscopy of left knee 1997; right knee 2003     Family History   Problem Relation Age of Onset   • Coronary artery disease Father    • Heart disease Father    • Heart attack Father 72   • Coronary artery disease Brother    • Heart disease Brother         CABG     Social History   Substance Use Topics   • Smoking status: Never Smoker   • Smokeless tobacco: Never Used   • Alcohol use No      Comment: Occasional           ECG 12 Lead  Date/Time: 6/20/2018 3:56 PM  Performed by: NICK RAIN  Authorized by: NICK RAIN   Comparison: compared with previous ECG   Similar to previous ECG  Rhythm: sinus rhythm               Objective:         Visit Vitals  BP (!) 190/82 (BP Location: Left arm)   Pulse 55   Ht 172.7 cm (68\")   Wt 82.8 kg (182 lb 9.6 oz)   LMP  (LMP Unknown)   BMI 27.76 kg/m²          Physical Exam   Constitutional: She is oriented to person, place, and time. She appears well-developed and " well-nourished.   HENT:   Head: Normocephalic and atraumatic.   Eyes: Conjunctivae, EOM and lids are normal. Pupils are equal, round, and reactive to light.   Neck: Normal range of motion and full passive range of motion without pain. Neck supple. No JVD present. Carotid bruit is not present.   Cardiovascular: Normal rate, regular rhythm, S1 normal and S2 normal.  Exam reveals no gallop.    No murmur heard.  Pulses:       Radial pulses are 2+ on the right side, and 2+ on the left side.   No bilateral lower extremity edema   Pulmonary/Chest: Effort normal and breath sounds normal.   Abdominal: Soft. Normal appearance.   Lymphadenopathy:     She has no cervical adenopathy.   Neurological: She is alert and oriented to person, place, and time.   Skin: Skin is warm, dry and intact.   Psychiatric: She has a normal mood and affect.       Lab Review:       Assessment:          Diagnosis Plan   1. Paroxysmal atrial fibrillation     2. Chronic venous insufficiency     3. Essential hypertension     4. Mixed hyperlipidemia            Plan:       1.  Hypertension.  Elevated today in the office.  It sounds like they've been staying high at home.  Some of this may be in part due to discretions with sodium use.  We'll go ahead and increase her lisinopril to 10 mg a day.    2.  Paroxysmal atrial fibrillation.  Isolated episode, associated with symptoms that occurred in the setting of acute illness.  Continue management with metoprolol succinate.  Would not recommend anticoagulation at this time due to only one documented episodes of far and her continued issues with GI bleeding.  3.  Bilateral lower extremity chronic venous insufficiency.  Suspect her swelling is related to this.  Her recent echocardiogram was unremarkable.  Encouraged her to watch her salt intake.  4.  Hyperlipidemia.  On lovastatin.    We'll plan on seeing the patient back again in 6 months.    Atrial Fibrillation and Atrial Flutter  Assessment  • The patient has  paroxysmal atrial fibrillation  • This is non-valvular in etiology  • The patient's CHADS2-VASc score is 4  • A [id number] score of 2 or more is considered a high risk for a thromboembolic event  • Aspirin prescribed    Plan  • Attempt to maintain sinus rhythm  • Continue aspirin for antithrombotic therapy, bleeding issues discussed  • Continue beta blocker for rhythm control  • Continue beta blocker for rate control

## 2018-07-03 ENCOUNTER — HOSPITAL ENCOUNTER (OUTPATIENT)
Facility: HOSPITAL | Age: 83
Setting detail: HOSPITAL OUTPATIENT SURGERY
Discharge: HOME | End: 2018-07-03
Attending: INTERNAL MEDICINE | Admitting: INTERNAL MEDICINE

## 2018-07-03 ENCOUNTER — ANESTHESIA (OUTPATIENT)
Dept: GASTROENTEROLOGY | Facility: HOSPITAL | Age: 83
End: 2018-07-03

## 2018-07-03 ENCOUNTER — ANESTHESIA EVENT (OUTPATIENT)
Dept: GASTROENTEROLOGY | Facility: HOSPITAL | Age: 83
End: 2018-07-03

## 2018-07-03 VITALS
DIASTOLIC BLOOD PRESSURE: 67 MMHG | RESPIRATION RATE: 16 BRPM | BODY MASS INDEX: 26.51 KG/M2 | SYSTOLIC BLOOD PRESSURE: 156 MMHG | TEMPERATURE: 98.4 F | HEART RATE: 71 BPM | OXYGEN SATURATION: 98 % | WEIGHT: 174.38 LBS

## 2018-07-03 DIAGNOSIS — D50.0 IRON DEFICIENCY ANEMIA DUE TO CHRONIC BLOOD LOSS: ICD-10-CM

## 2018-07-03 DIAGNOSIS — K62.5 RECTAL BLEEDING: ICD-10-CM

## 2018-07-03 PROCEDURE — 25010000002 PROPOFOL 10 MG/ML EMULSION: Performed by: ANESTHESIOLOGY

## 2018-07-03 PROCEDURE — 88305 TISSUE EXAM BY PATHOLOGIST: CPT | Performed by: INTERNAL MEDICINE

## 2018-07-03 PROCEDURE — 45380 COLONOSCOPY AND BIOPSY: CPT | Performed by: INTERNAL MEDICINE

## 2018-07-03 RX ORDER — LIDOCAINE HYDROCHLORIDE 20 MG/ML
INJECTION, SOLUTION INFILTRATION; PERINEURAL AS NEEDED
Status: DISCONTINUED | OUTPATIENT
Start: 2018-07-03 | End: 2018-07-03 | Stop reason: SURG

## 2018-07-03 RX ORDER — SODIUM CHLORIDE, SODIUM LACTATE, POTASSIUM CHLORIDE, CALCIUM CHLORIDE 600; 310; 30; 20 MG/100ML; MG/100ML; MG/100ML; MG/100ML
1000 INJECTION, SOLUTION INTRAVENOUS CONTINUOUS
Status: DISCONTINUED | OUTPATIENT
Start: 2018-07-03 | End: 2018-07-03 | Stop reason: HOSPADM

## 2018-07-03 RX ORDER — PROPOFOL 10 MG/ML
VIAL (ML) INTRAVENOUS AS NEEDED
Status: DISCONTINUED | OUTPATIENT
Start: 2018-07-03 | End: 2018-07-03 | Stop reason: SURG

## 2018-07-03 RX ADMIN — SODIUM CHLORIDE, POTASSIUM CHLORIDE, SODIUM LACTATE AND CALCIUM CHLORIDE: 600; 310; 30; 20 INJECTION, SOLUTION INTRAVENOUS at 09:40

## 2018-07-03 RX ADMIN — PROPOFOL 220 MG: 10 INJECTION, EMULSION INTRAVENOUS at 09:50

## 2018-07-03 RX ADMIN — LIDOCAINE HYDROCHLORIDE 50 MG: 20 INJECTION, SOLUTION INFILTRATION; PERINEURAL at 09:50

## 2018-07-03 RX ADMIN — SODIUM CHLORIDE, POTASSIUM CHLORIDE, SODIUM LACTATE AND CALCIUM CHLORIDE 1000 ML: 600; 310; 30; 20 INJECTION, SOLUTION INTRAVENOUS at 08:29

## 2018-07-03 NOTE — ANESTHESIA POSTPROCEDURE EVALUATION
Patient: Ama Billy    Procedure Summary     Date:  07/03/18 Room / Location:  [facility] ENDOSCOPY 8 /  [facility]    Anesthesia Start:  0942 Anesthesia Stop:  1018    Procedure:  COLONOSCOPY into cecum and ti with cold biopsy polypectomy (N/A ) Diagnosis:       Rectal bleeding      Iron deficiency anemia due to chronic blood loss      (Rectal bleeding [K62.5])      (Iron deficiency anemia due to chronic blood loss [D50.0])    Surgeon:  Hortensia Peralta MD Provider:  Manuel Mendenhall MD    Anesthesia Type:  MAC ASA Status:  3          Anesthesia Type: MAC  Last vitals  BP   140/74 (07/03/18 0817)   Temp   36.9 °C (98.4 °F) (07/03/18 0817)   Pulse   70 (07/03/18 0817)   Resp   16 (07/03/18 0817)     SpO2   97 % (07/03/18 0817)     Post Anesthesia Care and Evaluation    Patient location during evaluation: bedside  Patient participation: complete - patient participated  Level of consciousness: awake and alert  Pain management: adequate  Airway patency: patent  Anesthetic complications: No anesthetic complications    Cardiovascular status: acceptable  Respiratory status: acceptable  Hydration status: acceptable    Comments: /74 (BP Location: Left arm, Patient Position: Lying)   Pulse 70   Temp 36.9 °C (98.4 °F) (Oral)   Resp 16   Wt 79.1 kg (174 lb 6 oz)   LMP  (LMP Unknown)   SpO2 97%   BMI 26.51 kg/m²

## 2018-07-03 NOTE — H&P (VIEW-ONLY)
"Chief Complaint   Patient presents with   • Follow-up   • Anemia     Subjective     HPI  Ama Billy is a 84 y.o. female who presents follow up of iron deficiency anemia, rectal bleeding, and esophagitis.  She was hospitalized in November for hematemesis.  She had an EGD showing esophagitis.  I saw her in follow-up in February.  For iron deficiency anemia, I recommended further workup with EGD and colonoscopy.  She had been having \"alarming\" amounts of blood in her stools.  However she refused to undergo colonoscopy for evaluation.  She had an EGD in March showing Canales's esophagus.    She had called back with persistent episodes of rectal bleeding. This happened 2 nights at the end of May her BMs.  Described as \"Quite a bit\" of blood coming out with the stool, in the toilet, and around the stool. No pain.  Labs checked and Hb stable to January, though still low in the mid 10s.    Takes a daily stool softener, miralax as needed for chronic constipation.      No heartburn, reflux, or difficulty swallowing.  No abominal pain or excess fatigue.    Her last colonoscopy was in 2008 and was normal, prior to that she had a colonoscopy in 2003--pathology shows that she had a adenomatous polyp removed at that time.    Past Medical History:   Diagnosis Date   • Anemia     Normocytic anemia of unclear ediology   • Arthritis     Osteoarthritis involving knees and left shoulder   • Eosinophilia     Persistent mild eosinophilia of unknown etiology   • History of colonic polyps    • Hyperlipidemia    • Hypertension        Social History     Social History   • Marital status:      Spouse name: Trino   • Number of children: 2   • Years of education: College +     Occupational History   •  [employer]     Worked as a teach in the past.   •  Retired     Social History Main Topics   • Smoking status: Never Smoker   • Smokeless tobacco: Never Used   • Alcohol use No      Comment: " Occasional   • Drug use: No   • Sexual activity: Defer     Other Topics Concern   • Not on file         Current Outpatient Prescriptions:   •  aspirin 81 MG EC tablet, Take 81 mg by mouth Every Night., Disp: , Rfl:   •  Calcium Carbonate-Vitamin D (CALCIUM 500 + D) 500-125 MG-UNIT tablet, Take 1 tablet by mouth Daily., Disp: , Rfl:   •  Docusate Sodium 100 MG capsule, Take 100 mg by mouth Every Night., Disp: , Rfl:   •  lisinopril (PRINIVIL,ZESTRIL) 5 MG tablet, Take 1 tablet by mouth Daily., Disp: 90 tablet, Rfl: 1  •  lovastatin (MEVACOR) 20 MG tablet, Take 1 tablet by mouth Every Night., Disp: 90 tablet, Rfl: 3  •  metoprolol succinate XL (TOPROL-XL) 25 MG 24 hr tablet, TAKE 1 TABLET DAILY, Disp: 90 tablet, Rfl: 1  •  pantoprazole (PROTONIX) 40 MG EC tablet, Take 1 tablet by mouth 2 (Two) Times a Day., Disp: 180 tablet, Rfl: 1    Review of Systems   Constitutional: Negative for activity change, appetite change, chills and fever.   HENT: Negative for trouble swallowing.    Respiratory: Negative.    Cardiovascular: Negative.  Negative for chest pain.   Gastrointestinal: Positive for anal bleeding. Negative for abdominal distention, abdominal pain, constipation, diarrhea, nausea and vomiting.   Genitourinary: Negative for dysuria, frequency and hematuria.   Musculoskeletal: Positive for back pain.       Objective   Vitals:    06/12/18 1418   BP: 142/92   Temp: 98.6 °F (37 °C)     1    06/12/18  1418   Weight: 82.6 kg (182 lb 3.2 oz)     Body mass index is 27.7 kg/m².      Physical Exam   Constitutional: She is oriented to person, place, and time. She appears well-developed and well-nourished. No distress.   HENT:   Head: Normocephalic and atraumatic.   Right Ear: External ear normal.   Left Ear: External ear normal.   Nose: Nose normal.   Mouth/Throat: Oropharynx is clear and moist.   Eyes: Conjunctivae and EOM are normal. Right eye exhibits no discharge. Left eye exhibits no discharge. No scleral icterus.   Neck:  Normal range of motion. Neck supple. No thyromegaly present.   No supraclavicular adenopathy   Cardiovascular: Normal rate, regular rhythm, normal heart sounds and intact distal pulses.  Exam reveals no gallop.    No murmur heard.  No lower extremity edema   Pulmonary/Chest: Effort normal and breath sounds normal. No respiratory distress. She has no wheezes.   Abdominal: Soft. Normal appearance and bowel sounds are normal. She exhibits no distension and no mass. There is no hepatosplenomegaly. There is no tenderness. There is no rigidity, no rebound and no guarding. No hernia.   Genitourinary:   Genitourinary Comments: Rectal exam with no external hemorrhoids, no palpable internal masses felt, ?  Internal hemorrhoids, no blood   Musculoskeletal: Normal range of motion. She exhibits no edema or tenderness.   No atrophy of upper or lower extremities.  Normal digits and nails of both hands.   Lymphadenopathy:     She has no cervical adenopathy.   Neurological: She is alert and oriented to person, place, and time. She displays no atrophy. Coordination normal.   Skin: Skin is warm and dry. No rash noted. She is not diaphoretic. No erythema.   Psychiatric: She has a normal mood and affect. Her behavior is normal. Judgment and thought content normal.   Vitals reviewed.      WBC   Date Value Ref Range Status   01/19/2018 9.42 4.50 - 10.70 10*3/mm3 Final   11/17/2017 10.08 4.50 - 10.70 10*3/mm3 Final     RBC   Date Value Ref Range Status   05/29/2018 3.93 3.90 - 5.20 10*6/mm3 Final     Hemoglobin   Date Value Ref Range Status   05/29/2018 10.7 (L) 11.9 - 15.5 g/dL Final   11/17/2017 7.4 (L) 11.9 - 15.5 g/dL Final     Hematocrit   Date Value Ref Range Status   05/29/2018 34.6 (L) 35.6 - 45.5 % Final   11/17/2017 23.3 (L) 35.6 - 45.5 % Final     MCV   Date Value Ref Range Status   05/29/2018 88.0 80.5 - 98.2 fL Final   11/17/2017 86.9 80.5 - 98.2 fL Final     MCH   Date Value Ref Range Status   05/29/2018 27.2 26.9 - 32.0 pg  Final   11/17/2017 27.6 26.9 - 32.0 pg Final     MCHC   Date Value Ref Range Status   05/29/2018 30.9 (L) 32.4 - 36.3 g/dL Final   11/17/2017 31.8 (L) 32.4 - 36.3 g/dL Final     RDW   Date Value Ref Range Status   05/29/2018 22.1 (H) 11.7 - 13.0 % Final   11/17/2017 20.8 (H) 11.7 - 13.0 % Final     RDW-SD   Date Value Ref Range Status   11/17/2017 66.0 (H) 37.0 - 54.0 fl Final     MPV   Date Value Ref Range Status   11/17/2017 10.1 6.0 - 12.0 fL Final     Platelets   Date Value Ref Range Status   05/29/2018 291 140 - 500 10*3/mm3 Final   11/17/2017 325 140 - 500 10*3/mm3 Final     Neutrophil %   Date Value Ref Range Status   11/17/2017 63.7 42.7 - 76.0 % Final     Neutrophil Rel %   Date Value Ref Range Status   05/29/2018 59.4 42.7 - 76.0 % Final     Lymphocyte %   Date Value Ref Range Status   11/17/2017 12.4 (L) 19.6 - 45.3 % Final     Lymphocyte Rel %   Date Value Ref Range Status   05/29/2018 22.2 19.6 - 45.3 % Final     Monocyte %   Date Value Ref Range Status   11/17/2017 12.8 (H) 5.0 - 12.0 % Final     Monocyte Rel %   Date Value Ref Range Status   05/29/2018 12.8 (H) 5.0 - 12.0 % Final     Eosinophil %   Date Value Ref Range Status   11/17/2017 10.2 (H) 0.3 - 6.2 % Final     Eosinophil Rel %   Date Value Ref Range Status   05/29/2018 5.4 0.3 - 6.2 % Final     Basophil %   Date Value Ref Range Status   11/17/2017 0.1 0.0 - 1.5 % Final     Basophil Rel %   Date Value Ref Range Status   05/29/2018 0.2 0.0 - 1.5 % Final     Immature Grans %   Date Value Ref Range Status   11/17/2017 0.8 (H) 0.0 - 0.5 % Final     Neutrophils, Absolute   Date Value Ref Range Status   11/17/2017 6.42 1.90 - 8.10 10*3/mm3 Final     Neutrophils Absolute   Date Value Ref Range Status   05/29/2018 5.77 1.90 - 8.10 10*3/mm3 Final     Lymphocytes, Absolute   Date Value Ref Range Status   11/17/2017 1.25 0.90 - 4.80 10*3/mm3 Final     Lymphocytes Absolute   Date Value Ref Range Status   05/29/2018 2.16 0.90 - 4.80 10*3/mm3 Final      Monocytes, Absolute   Date Value Ref Range Status   11/17/2017 1.29 (H) 0.20 - 1.20 10*3/mm3 Final     Monocytes Absolute   Date Value Ref Range Status   05/29/2018 1.25 (H) 0.20 - 1.20 10*3/mm3 Final     Eosinophils, Absolute   Date Value Ref Range Status   11/17/2017 1.03 (H) 0.00 - 0.70 10*3/mm3 Final     Eosinophils Absolute   Date Value Ref Range Status   05/29/2018 0.53 0.00 - 0.70 10*3/mm3 Final     Basophils, Absolute   Date Value Ref Range Status   11/17/2017 0.01 0.00 - 0.20 10*3/mm3 Final     Basophils Absolute   Date Value Ref Range Status   05/29/2018 0.02 0.00 - 0.20 10*3/mm3 Final     Immature Grans, Absolute   Date Value Ref Range Status   11/17/2017 0.08 (H) 0.00 - 0.03 10*3/mm3 Final     nRBC   Date Value Ref Range Status   05/29/2018 0.0 0.0 - 0.0 /100 WBC Final   11/16/2017 0.7 (H) 0.0 - 0.0 /100 WBC Final       Lab Results   Component Value Date    GLUCOSE 91 11/17/2017    BUN 16 01/19/2018    CREATININE 0.97 01/19/2018    EGFRIFNONA 55 (L) 01/19/2018    EGFRIFAFRI 66 01/19/2018    BCR 16.5 01/19/2018    CO2 26.3 01/19/2018    CALCIUM 9.5 01/19/2018    PROTENTOTREF 7.2 01/19/2018    ALBUMIN 4.30 01/19/2018    LABIL2 1.5 01/19/2018    AST 13 01/19/2018    ALT 11 01/19/2018         Imaging Results (last 7 days)     ** No results found for the last 168 hours. **            Assessment/Plan    1.  Rectal bleeding: This has persisted.  In the setting of her history of adenomatous polyp and iron deficiency I think it is reasonable to proceed with colonoscopy.  She is 84 but she is otherwise healthy and active    2.  Iron deficiency: Chronic issue for her.  Previously followed by Dr[facility] Bello at the [facility]    3.  Canales's esophagus: Identified on her last EGD.  She is on ppi.  No dysplasia    Plan  We discussed proceeding with colonoscopy.  She is agreeable.  I think ruling out a polyp or other issue while she is an active 84-year-old is reasonable  Continue daily pantoprazole  Continue daily  docusate, MiraLAX for constipation  Further recommendations after her colonoscopy    Ama was seen today for follow-up and anemia.    Diagnoses and all orders for this visit:    Rectal bleeding  -     Case Request; Standing  -     Implement Anesthesia Orders Day of Procedure; Standing  -     Obtain Informed Consent; Standing  -     Verify bowel prep was successful; Standing  -     lactated ringers infusion; Infuse 30 mL/hr into a venous catheter Continuous.  -     Case Request    Iron deficiency anemia due to chronic blood loss  -     Case Request; Standing  -     Implement Anesthesia Orders Day of Procedure; Standing  -     Obtain Informed Consent; Standing  -     Verify bowel prep was successful; Standing  -     lactated ringers infusion; Infuse 30 mL/hr into a venous catheter Continuous.  -     Case Request    Canales's esophagus without dysplasia        Dictated utilizing Dragon dictation

## 2018-07-03 NOTE — ANESTHESIA PREPROCEDURE EVALUATION
Anesthesia Evaluation     Patient summary reviewed and Nursing notes reviewed   NPO Solid Status: > 8 hours  NPO Liquid Status: > 4 hours           Airway   Mallampati: II  TM distance: >3 FB  Neck ROM: full  no difficulty expected  Dental - normal exam     Pulmonary - normal exam   Cardiovascular - normal exam    (+) hypertension, dysrhythmias Paroxysmal Atrial Fib, PVD, hyperlipidemia,       Neuro/Psych  GI/Hepatic/Renal/Endo    (+)  GI bleeding,     Musculoskeletal     (+) back pain,   Abdominal  - normal exam   Substance History      OB/GYN          Other   (+) arthritis                     Anesthesia Plan    ASA 3     MAC     Anesthetic plan and risks discussed with patient.

## 2018-07-05 LAB
CYTO UR: NORMAL
LAB AP CASE REPORT: NORMAL
PATH REPORT.FINAL DX SPEC: NORMAL
PATH REPORT.GROSS SPEC: NORMAL

## 2018-07-05 NOTE — PROGRESS NOTES
The polyp removed from her rectum was a tubular adenoma.    Further surveillance colonoscopy is recommended at this time.    Use as needed Preparation H suppositories for her internal hemorrhoids which are likely the cause of the rectal bleeding she has had

## 2018-07-12 ENCOUNTER — TELEPHONE (OUTPATIENT)
Dept: GASTROENTEROLOGY | Facility: CLINIC | Age: 83
End: 2018-07-12

## 2018-07-12 NOTE — TELEPHONE ENCOUNTER
Call to pt.  Advise per Dr Peralta that ok for a probiotic and gas-x.    Pt verb understanding.    Message to DR Peralta.

## 2018-07-12 NOTE — TELEPHONE ENCOUNTER
----- Message from Hortensia Peralta MD sent at 7/5/2018  1:43 PM EDT -----  The polyp removed from her rectum was a tubular adenoma.    Further surveillance colonoscopy is recommended at this time.    Use as needed Preparation H suppositories for her internal hemorrhoids which are likely the cause of the rectal bleeding she has had

## 2018-07-12 NOTE — TELEPHONE ENCOUNTER
----- Message from Soledad Brown sent at 7/12/2018 10:23 AM EDT -----  Regarding: PT CALLED - STOMACH ISSUES & DIARRHEA  Contact: [phone]  PT STATED SHE HASN'T BEEN ABLE TO EAT MUCH. THIS STARTED AFTER HER PROCEDURE ON 07/03. PT FEELING VERY WEEK. PT ASK IF SOMETHING COULD BE CALLED IN TO PHARM?

## 2018-07-12 NOTE — TELEPHONE ENCOUNTER
"Returned patient's phone call, she states since her colonoscopy she her stomach has\"ached\" and has felt bloated. She states yesterday she had 4 loose stools(not diarrhea) and her appetite has been diminished.  She questions if there a medication she can take to help decrease the bloating? Advised an update will be sent to Dr. Peralta for advisement. She verb understanding and is agreeable to the plan.   "

## 2018-07-12 NOTE — TELEPHONE ENCOUNTER
Notes recorded by Hortensia Peralta MD on 7/5/2018 at 1:43 PM EDT  The polyp removed from her rectum was a tubular adenoma.    Further surveillance colonoscopy is recommended at this time.    Use as needed Preparation H suppositories for her internal hemorrhoids which are likely the cause of the rectal bleeding she has had

## 2018-07-16 ENCOUNTER — OFFICE VISIT (OUTPATIENT)
Dept: INTERNAL MEDICINE | Facility: CLINIC | Age: 83
End: 2018-07-16

## 2018-07-16 VITALS
HEART RATE: 65 BPM | DIASTOLIC BLOOD PRESSURE: 67 MMHG | OXYGEN SATURATION: 95 % | WEIGHT: 181 LBS | TEMPERATURE: 98.6 F | BODY MASS INDEX: 27.52 KG/M2 | SYSTOLIC BLOOD PRESSURE: 142 MMHG

## 2018-07-16 DIAGNOSIS — D64.9 NORMOCYTIC ANEMIA: ICD-10-CM

## 2018-07-16 DIAGNOSIS — I10 ESSENTIAL HYPERTENSION: Primary | ICD-10-CM

## 2018-07-16 DIAGNOSIS — E78.2 MIXED HYPERLIPIDEMIA: ICD-10-CM

## 2018-07-16 DIAGNOSIS — M79.89 SWELLING OF LOWER EXTREMITY: ICD-10-CM

## 2018-07-16 DIAGNOSIS — K58.8 OTHER IRRITABLE BOWEL SYNDROME: ICD-10-CM

## 2018-07-16 DIAGNOSIS — Z00.00 MEDICARE ANNUAL WELLNESS VISIT, SUBSEQUENT: ICD-10-CM

## 2018-07-16 PROCEDURE — G0439 PPPS, SUBSEQ VISIT: HCPCS | Performed by: FAMILY MEDICINE

## 2018-07-16 PROCEDURE — 99214 OFFICE O/P EST MOD 30 MIN: CPT | Performed by: FAMILY MEDICINE

## 2018-07-16 NOTE — PROGRESS NOTES
Subjective   Ama Billy is a 84 y.o. female.     Chief Complaint   Patient presents with   • Annual Exam   • GI Problem   • Hypertension   • Hyperlipidemia   • Anemia         History of Present Illness   Patient is recovering from lower endoscopy after which she has been taking probiotics since the prep.  There is removal of a polyp and she has had prior upper endoscopy when she had GI bleed earlier in the year.  Last fall she fell and had a vertebral compression fracture occurred from that as well.  She's been a value for chronic anemia with the past 2 years.  Dr. Whitmore's assessment would be possible low-grade myelodysplasia but she has not gone through bone marrow biopsy yet.  She is recovering from the previous colonoscopy.  Medicare wellness visit is performed and reviewed treatment of hypertension hyperlipidemia as well as her anemia.      The following portions of the patient's history were reviewed and updated as appropriate: allergies, current medications, past social history and problem list.    Review of Systems   Constitutional: Positive for fever.   HENT: Negative.    Eyes: Negative.    Respiratory: Negative.    Cardiovascular: Negative.    Gastrointestinal: Negative.    Endocrine: Negative.    Genitourinary: Negative.    Musculoskeletal: Negative.    Skin: Negative.    Allergic/Immunologic: Negative.    Neurological: Negative.    Hematological: Negative.    Psychiatric/Behavioral: Negative.        Objective   Vitals:    07/16/18 1013   BP: 142/67   Pulse: 65   Temp: 98.6 °F (37 °C)   SpO2: 95%     Physical Exam   Constitutional: She appears well-developed.   HENT:   Head: Normocephalic.   Right Ear: External ear normal.   Left Ear: External ear normal.   Mouth/Throat: Oropharynx is clear and moist.   Eyes: Pupils are equal, round, and reactive to light.   Neck: Normal range of motion. Neck supple.   Cardiovascular: Normal rate, regular rhythm and normal heart sounds.    Pulmonary/Chest: Effort  normal and breath sounds normal.   Abdominal: Soft. Bowel sounds are normal.   Musculoskeletal: Normal range of motion.   Neurological: She is alert.   Skin: Skin is warm and dry.   Psychiatric: She has a normal mood and affect.   Vitals reviewed.      Assessment/Plan   Problem List Items Addressed This Visit        Cardiovascular and Mediastinum    Hyperlipidemia    Hypertension - Primary       Digestive    Irritable bowel syndrome       Hematopoietic and Hemostatic    Normocytic anemia       Other    Swelling of lower extremity      Other Visit Diagnoses     Medicare annual wellness visit, subsequent          Medications remain the same.  I reviewed her follow-up with cardiology as well as gastroenterology.  See her back in 6 months for recheck and Medicare wellness performed reviewed treatment of hypertension hyperlipidemia.

## 2018-07-16 NOTE — PATIENT INSTRUCTIONS
Medicare Wellness  Personal Prevention Plan of Service     Date of Office Visit:  2018  Encounter Provider:  Con Canas Jr., MD  Place of Service:  [facility]  Patient Name: Ama Billy  :  1933    As part of the Medicare Wellness portion of your visit today, we are providing you with this personalized preventive plan of services (PPPS). This plan is based upon recommendations of the [place] Preventive Services Task Force (USPSTF) and the Advisory Committee on Immunization Practices (ACIP).    This lists the preventive care services that should be considered, and provides dates of when you are due. Items listed as completed are up-to-date and do not require any further intervention.    Health Maintenance   Topic Date Due   • TDAP/TD VACCINES (1 - Tdap) 1952   • ZOSTER VACCINE (1 of 2) 1983   • MAMMOGRAM  2016   • MEDICARE ANNUAL WELLNESS  2016   • INFLUENZA VACCINE  2018   • PNEUMOCOCCAL VACCINES (65+ LOW/MEDIUM RISK) (2 of 2 - PPSV23) 2019   • LIPID PANEL  2019       No orders of the defined types were placed in this encounter.      No Follow-up on file.

## 2018-07-16 NOTE — PROGRESS NOTES
QUICK REFERENCE INFORMATION:  The ABCs of the Annual Wellness Visit    Subsequent Medicare Wellness Visit    HEALTH RISK ASSESSMENT    11/12/1933    Recent Hospitalizations:  Recently treated at the following:  [facility].        Current Medical Providers:  Patient Care Team:  Con Canas Jr., MD as PCP - General (Family Medicine)        Smoking Status:  History   Smoking Status   • Never Smoker   Smokeless Tobacco   • Never Used       Alcohol Consumption:  History   Alcohol Use No     Comment: Occasional       Depression Screen:   PHQ-2/PHQ-9 Depression Screening 7/16/2018   Little interest or pleasure in doing things 0   Feeling down, depressed, or hopeless 0   Total Score 0       Health Habits and Functional and Cognitive Screening:  Functional & Cognitive Status 7/16/2018   Do you have difficulty preparing food and eating? No   Do you have difficulty bathing yourself, getting dressed or grooming yourself? No   Do you have difficulty using the toilet? No   Do you have difficulty moving around from place to place? No   Do you have trouble with steps or getting out of a bed or a chair? No   In the past year have you fallen or experienced a near fall? Yes   Current Diet Well Balanced Diet   Dental Exam Up to date   Eye Exam Up to date   Exercise (times per week) 0 times per week   Current Exercise Activities Include None   Do you need help using the phone?  No   Are you deaf or do you have serious difficulty hearing?  No   Do you need help with transportation? No   Do you need help shopping? No   Do you need help preparing meals?  No   Do you need help with housework?  No   Do you need help with laundry? No   Do you need help taking your medications? No   Do you need help managing money? No   Do you ever drive or ride in a car without wearing a seat belt? No   Have you felt unusual stress, anger or loneliness in the last month? No   Who do you live with? Spouse   If you need help, do you have trouble  finding someone available to you? No   Have you been bothered in the last four weeks by sexual problems? No   Do you have difficulty concentrating, remembering or making decisions? No           Does the patient have evidence of cognitive impairment? No    Aspirin use counseling: Taking ASA appropriately as indicated      Recent Lab Results:  CMP:  Lab Results   Component Value Date    GLU 96 01/19/2018    BUN 16 01/19/2018    CREATININE 0.97 01/19/2018    EGFRIFNONA 55 (L) 01/19/2018    EGFRIFAFRI 66 01/19/2018    BCR 16.5 01/19/2018     (H) 01/19/2018    K 4.4 01/19/2018    CO2 26.3 01/19/2018    CALCIUM 9.5 01/19/2018    PROTENTOTREF 7.2 01/19/2018    ALBUMIN 4.30 01/19/2018    LABGLOBREF 2.9 01/19/2018    LABIL2 1.5 01/19/2018    BILITOT 0.7 01/19/2018    ALKPHOS 93 01/19/2018    AST 13 01/19/2018    ALT 11 01/19/2018     Lipid Panel:  Lab Results   Component Value Date    TRIG 80 01/19/2018    HDL 73 (H) 01/19/2018    VLDL 16 01/19/2018     HbA1c:       Visual Acuity:  No exam data present    Age-appropriate Screening Schedule:  Refer to the list below for future screening recommendations based on patient's age, sex and/or medical conditions. Orders for these recommended tests are listed in the plan section. The patient has been provided with a written plan.    Health Maintenance   Topic Date Due   • TDAP/TD VACCINES (1 - Tdap) 11/12/1952   • ZOSTER VACCINE (1 of 2) 11/12/1983   • MAMMOGRAM  11/21/2016   • INFLUENZA VACCINE  08/01/2018   • PNEUMOCOCCAL VACCINES (65+ LOW/MEDIUM RISK) (2 of 2 - PPSV23) 01/16/2019   • LIPID PANEL  01/19/2019        Subjective   History of Present Illness    Ama Billy is a 84 y.o. female who presents for an Subsequent Wellness Visit.    The following portions of the patient's history were reviewed and updated as appropriate: allergies, current medications, past family history, past medical history, past social history, past surgical history and problem  list.    Outpatient Medications Prior to Visit   Medication Sig Dispense Refill   • aspirin 81 MG EC tablet Take 81 mg by mouth Every Night.     • Calcium Carbonate-Vitamin D (CALCIUM 500 + D) 500-125 MG-UNIT tablet Take 1 tablet by mouth Daily.     • Docusate Sodium 100 MG capsule Take 100 mg by mouth Every Night.     • lisinopril (PRINIVIL,ZESTRIL) 10 MG tablet Take 1 tablet by mouth Daily. 90 tablet 1   • lovastatin (MEVACOR) 20 MG tablet Take 1 tablet by mouth Every Night. 90 tablet 3   • metoprolol succinate XL (TOPROL-XL) 25 MG 24 hr tablet TAKE 1 TABLET DAILY 90 tablet 1   • pantoprazole (PROTONIX) 40 MG EC tablet Take 1 tablet by mouth 2 (Two) Times a Day. 180 tablet 1     No facility-administered medications prior to visit.        Patient Active Problem List   Diagnosis   • Normocytic anemia   • Abdominal cramping   • LLQ pain   • Colon polyps   • Bursitis of hip   • Diarrhea   • Fatigue   • Generalized osteoarthritis   • Globus sensation   • Hyperlipidemia   • Hypertension   • Irritable bowel syndrome   • Muscle strain of lower extremity   • Tendinitis   • Chronic venous insufficiency   • Status post total left knee replacement   • Hip joint replacement status   • Left retinal detachment   • Hypovitaminosis D   • Weakness of extremity   • Closed wedge compression fracture of first lumbar vertebra (CMS/HCC)   • Fall   • Constipation   • Nausea & vomiting   • Leukocytosis   • Anemia   • Symptomatic anemia   • Esophagitis   • GI bleed   • Acute blood loss anemia   • Iron deficiency anemia   • Paroxysmal atrial fibrillation (CMS/HCC)   • Rectal bleeding   • Iron deficiency anemia due to chronic blood loss   • Swelling of lower extremity       Advance Care Planning:  has an advance directive - a copy has been provided and is in file    Identification of Risk Factors:  Risk factors include: cardiovascular risk and increased fall risk.    Review of Systems    Compared to one year ago, the patient feels her  physical health is worse.  Compared to one year ago, the patient feels her mental health is the same.    Objective     Physical Exam    Vitals:    07/16/18 1013   BP: 142/67   BP Location: Left arm   Patient Position: Sitting   Cuff Size: Adult   Pulse: 65   Temp: 98.6 °F (37 °C)   TempSrc: Tympanic   SpO2: 95%   Weight: 82.1 kg (181 lb)   PainSc: 0-No pain       Patient's Body mass index is 27.52 kg/m². BMI is above normal parameters. Recommendations include: educational material.      Assessment/Plan   Patient Self-Management and Personalized Health Advice  The patient has been provided with information about: prevention of cardiac or vascular disease and fall prevention and preventive services including:   · Counseling for cardiovascular disease risk reduction, Fall Risk assessment done.    Visit Diagnoses:    ICD-10-CM ICD-9-CM   1. Essential hypertension I10 401.9   2. Mixed hyperlipidemia E78.2 272.2   3. Other irritable bowel syndrome K58.8 564.1   4. Swelling of lower extremity M79.89 729.81       No orders of the defined types were placed in this encounter.      Outpatient Encounter Prescriptions as of 7/16/2018   Medication Sig Dispense Refill   • aspirin 81 MG EC tablet Take 81 mg by mouth Every Night.     • Calcium Carbonate-Vitamin D (CALCIUM 500 + D) 500-125 MG-UNIT tablet Take 1 tablet by mouth Daily.     • Docusate Sodium 100 MG capsule Take 100 mg by mouth Every Night.     • lisinopril (PRINIVIL,ZESTRIL) 10 MG tablet Take 1 tablet by mouth Daily. 90 tablet 1   • lovastatin (MEVACOR) 20 MG tablet Take 1 tablet by mouth Every Night. 90 tablet 3   • metoprolol succinate XL (TOPROL-XL) 25 MG 24 hr tablet TAKE 1 TABLET DAILY 90 tablet 1   • pantoprazole (PROTONIX) 40 MG EC tablet Take 1 tablet by mouth 2 (Two) Times a Day. 180 tablet 1     No facility-administered encounter medications on file as of 7/16/2018.        Reviewed use of high risk medication in the elderly: yes  Reviewed for potential of  harmful drug interactions in the elderly: yes    Follow Up:  No Follow-up on file.     An After Visit Summary and PPPS with all of these plans were given to the patient.

## 2018-08-10 RX ORDER — PANTOPRAZOLE SODIUM 40 MG/1
TABLET, DELAYED RELEASE ORAL
Qty: 180 TABLET | Refills: 0 | Status: SHIPPED | OUTPATIENT
Start: 2018-08-10 | End: 2018-11-06 | Stop reason: SDUPTHER

## 2018-11-07 RX ORDER — PANTOPRAZOLE SODIUM 40 MG/1
40 TABLET, DELAYED RELEASE ORAL
Qty: 90 TABLET | Refills: 0 | Status: SHIPPED | OUTPATIENT
Start: 2018-11-07 | End: 2019-02-06 | Stop reason: SDUPTHER

## 2018-11-07 NOTE — TELEPHONE ENCOUNTER
[facility] request received for pantoprazole 40 mg 1 tab po BID, #180, R0.  Pt with h/o Canales's. Message to Dr Peralta.

## 2018-11-27 DIAGNOSIS — I48.0 PAROXYSMAL ATRIAL FIBRILLATION (HCC): ICD-10-CM

## 2018-11-27 RX ORDER — METOPROLOL SUCCINATE 25 MG/1
TABLET, EXTENDED RELEASE ORAL
Qty: 90 TABLET | Refills: 1 | Status: SHIPPED | OUTPATIENT
Start: 2018-11-27 | End: 2019-05-26 | Stop reason: SDUPTHER

## 2018-12-13 ENCOUNTER — OFFICE VISIT (OUTPATIENT)
Dept: GASTROENTEROLOGY | Facility: CLINIC | Age: 83
End: 2018-12-13

## 2018-12-13 VITALS
BODY MASS INDEX: 28.55 KG/M2 | HEIGHT: 68 IN | DIASTOLIC BLOOD PRESSURE: 92 MMHG | WEIGHT: 188.4 LBS | SYSTOLIC BLOOD PRESSURE: 158 MMHG | TEMPERATURE: 98.3 F

## 2018-12-13 DIAGNOSIS — K22.70 BARRETT'S ESOPHAGUS WITHOUT DYSPLASIA: ICD-10-CM

## 2018-12-13 DIAGNOSIS — K21.9 GASTROESOPHAGEAL REFLUX DISEASE, ESOPHAGITIS PRESENCE NOT SPECIFIED: ICD-10-CM

## 2018-12-13 DIAGNOSIS — K58.2 IRRITABLE BOWEL SYNDROME WITH BOTH CONSTIPATION AND DIARRHEA: Primary | ICD-10-CM

## 2018-12-13 PROCEDURE — 99214 OFFICE O/P EST MOD 30 MIN: CPT | Performed by: NURSE PRACTITIONER

## 2019-01-14 RX ORDER — LOVASTATIN 20 MG/1
TABLET ORAL
Qty: 90 TABLET | Refills: 1 | Status: SHIPPED | OUTPATIENT
Start: 2019-01-14 | End: 2019-07-13 | Stop reason: SDUPTHER

## 2019-01-16 ENCOUNTER — OFFICE VISIT (OUTPATIENT)
Dept: INTERNAL MEDICINE | Facility: CLINIC | Age: 84
End: 2019-01-16

## 2019-01-16 VITALS
HEART RATE: 61 BPM | TEMPERATURE: 97.5 F | WEIGHT: 189 LBS | BODY MASS INDEX: 28.74 KG/M2 | OXYGEN SATURATION: 96 % | SYSTOLIC BLOOD PRESSURE: 202 MMHG | DIASTOLIC BLOOD PRESSURE: 96 MMHG

## 2019-01-16 DIAGNOSIS — D50.8 OTHER IRON DEFICIENCY ANEMIA: ICD-10-CM

## 2019-01-16 DIAGNOSIS — I10 HYPERTENSION, UNSPECIFIED TYPE: ICD-10-CM

## 2019-01-16 DIAGNOSIS — I48.0 PAROXYSMAL ATRIAL FIBRILLATION (HCC): Primary | ICD-10-CM

## 2019-01-16 DIAGNOSIS — E78.49 OTHER HYPERLIPIDEMIA: ICD-10-CM

## 2019-01-16 DIAGNOSIS — E55.9 HYPOVITAMINOSIS D: ICD-10-CM

## 2019-01-16 PROCEDURE — 99214 OFFICE O/P EST MOD 30 MIN: CPT | Performed by: FAMILY MEDICINE

## 2019-01-16 RX ORDER — LISINOPRIL 20 MG/1
20 TABLET ORAL DAILY
Qty: 90 TABLET | Refills: 3 | Status: SHIPPED | OUTPATIENT
Start: 2019-01-16 | End: 2019-03-04 | Stop reason: SDUPTHER

## 2019-01-16 NOTE — PROGRESS NOTES
Subjective   Ama Billy is a 85 y.o. female.     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  She is a see Dr. Tiwari cardiology next week.  She has atrial fibrillation vitals hyperlipidemia hypertension and anemia of uncertain etiology.  We discussed alternatives for blood pressure will increase lisinopril 20 mg daily number follow-up with cardiology.  She has a follow-up with gastroenterology as well.  Previous upper and lower endoscopies were reviewed.      The following portions of the patient's history were reviewed and updated as appropriate: allergies, current medications, past social history and problem list.    Review of Systems   Constitutional: Negative.    HENT: Negative.    Eyes: Negative.    Respiratory: Negative.    Cardiovascular: Positive for leg swelling.   Gastrointestinal: Negative.    Endocrine: Negative.    Genitourinary: Negative.    Musculoskeletal: Positive for arthralgias.   Skin: Negative.    Allergic/Immunologic: Negative.    Neurological: Negative.    Hematological: Negative.    Psychiatric/Behavioral: Negative.        Objective   Vitals:    01/16/19 1259   BP: (!) 202/96   Pulse: 61   Temp: 97.5 °F (36.4 °C)   SpO2: 96%     Physical Exam   Constitutional: She is oriented to person, place, and time. She appears well-developed and well-nourished.   HENT:   Head: Normocephalic and atraumatic.   Right Ear: Tympanic membrane and external ear normal.   Left Ear: Tympanic membrane and external ear normal.   Nose: Nose normal.   Mouth/Throat: Oropharynx is clear and moist.   Eyes: Conjunctivae and EOM are normal. Pupils are equal, round, and reactive to light.   Neck: Normal range of motion. Neck supple. No JVD present. No thyromegaly present.   Cardiovascular: Normal rate, regular rhythm, normal heart sounds  and intact distal pulses.   Pulmonary/Chest: Effort normal and breath sounds normal.   Abdominal: Soft. Bowel sounds are normal.   Musculoskeletal: Normal range of motion.   Lymphadenopathy:     She has no cervical adenopathy.   Neurological: She is alert and oriented to person, place, and time. No cranial nerve deficit. Coordination normal.   Skin: Skin is warm and dry. No rash noted.   Psychiatric: She has a normal mood and affect. Her behavior is normal. Judgment and thought content normal.   Vitals reviewed.      Assessment/Plan   Problem List Items Addressed This Visit        Cardiovascular and Mediastinum    Paroxysmal atrial fibrillation (CMS/HCC) - Primary    Relevant Orders    CBC & Differential    Comprehensive Metabolic Panel    Lipid Panel With / Chol / HDL Ratio    TSH    T4, Free    T3, Free    Urinalysis With Microscopic If Indicated (No Culture) - Urine, Clean Catch    Vitamin D 25 Hydroxy    Vitamin B12    Hyperlipidemia    Relevant Orders    CBC & Differential    Comprehensive Metabolic Panel    Lipid Panel With / Chol / HDL Ratio    TSH    T4, Free    T3, Free    Urinalysis With Microscopic If Indicated (No Culture) - Urine, Clean Catch    Vitamin D 25 Hydroxy    Vitamin B12    Hypertension    Relevant Medications    lisinopril (PRINIVIL,ZESTRIL) 20 MG tablet    Other Relevant Orders    CBC & Differential    Comprehensive Metabolic Panel    Lipid Panel With / Chol / HDL Ratio    TSH    T4, Free    T3, Free    Urinalysis With Microscopic If Indicated (No Culture) - Urine, Clean Catch    Vitamin D 25 Hydroxy    Vitamin B12       Digestive    Hypovitaminosis D    Relevant Orders    CBC & Differential    Comprehensive Metabolic Panel    Lipid Panel With / Chol / HDL Ratio    TSH    T4, Free    T3, Free    Urinalysis With Microscopic If Indicated (No Culture) - Urine, Clean Catch    Vitamin D 25 Hydroxy    Vitamin B12       Hematopoietic and Hemostatic    Iron deficiency anemia    Relevant Orders     CBC & Differential    Comprehensive Metabolic Panel    Lipid Panel With / Chol / HDL Ratio    TSH    T4, Free    T3, Free    Urinalysis With Microscopic If Indicated (No Culture) - Urine, Clean Catch    Vitamin D 25 Hydroxy    Vitamin B12      Plan: Screening labs recheck in 6 months and Medicare wellness follow-up with cardiology increase lisinopril 20 mg daily.

## 2019-01-23 ENCOUNTER — OFFICE VISIT (OUTPATIENT)
Dept: CARDIOLOGY | Facility: CLINIC | Age: 84
End: 2019-01-23

## 2019-01-23 VITALS
SYSTOLIC BLOOD PRESSURE: 170 MMHG | BODY MASS INDEX: 28.34 KG/M2 | WEIGHT: 187 LBS | DIASTOLIC BLOOD PRESSURE: 110 MMHG | HEART RATE: 54 BPM | HEIGHT: 68 IN

## 2019-01-23 DIAGNOSIS — I10 HYPERTENSION, UNSPECIFIED TYPE: Primary | ICD-10-CM

## 2019-01-23 DIAGNOSIS — I48.0 PAROXYSMAL ATRIAL FIBRILLATION (HCC): ICD-10-CM

## 2019-01-23 DIAGNOSIS — E78.49 OTHER HYPERLIPIDEMIA: ICD-10-CM

## 2019-01-23 PROCEDURE — 99214 OFFICE O/P EST MOD 30 MIN: CPT | Performed by: INTERNAL MEDICINE

## 2019-01-23 PROCEDURE — 93000 ELECTROCARDIOGRAM COMPLETE: CPT | Performed by: INTERNAL MEDICINE

## 2019-01-23 NOTE — PROGRESS NOTES
Subjective:     Encounter Date:01/23/2019      Patient ID: Ama Billy is a 85 y.o. female.    Chief Complaint:  History of Present Illness    This is an 85-year-old with a history of rheumatic fever, hypertension, hyperlipidemia, paroxysmal atrial fibrillation, recent GI bleed due to esophagitis, who presents for follow-up.     I saw the patient initially when she was hospitalized 11/2017 for a GI bleed.  The patient presented on 11/14/2017 with complaints of nausea, vomiting, and diarrhea associated with generalized weakness and fatigue.  She was noted to be severely anemic on admission requiring a transfusion.  She was then seen by gastroenterology and underwent an EGD that showed evidence of acute erosive esophagitis.  The following day she went into atrial fibrillation with rapid ventricular rate that lasted approximately 3 hours and converted back to sinus rhythm prior to even starting a diltiazem drip.  She denies any symptoms with atrial fibrillation.  She had no recurrent atrial fibrillation during that admission.  Due to her GI bleed and only a single episode of atrial fibrillation I did not recommend starting anticoagulation.  I did start metoprolol succinate 25 mg a day during that admission.  As part of her workup she did undergo an echocardiogram that showed normal left ventricle systolic function wall motion with an estimated ejection fraction of 60%, normal diastolic function, no significant valvular disease.     When I saw her back in follow up in 6/2018 she was having issues with elevated blood pressures and blood in her stool.  She admitted to dietary indiscretions with sodium.  At that office visit have recommended that she increase her lisinopril to 10 mg a day and work on limiting her sodium intake.  She was evaluated by Dr. Peralta and it was recommended that she proceed with a colonoscopy which she has finally agreed to have done.  She underwent colonoscopy that showed a polyp that it  ended up being a tubular adenoma and internal hemorrhoids which was thought to be the cause of her bleeding.      She presents for routine follow-up.  She was evaluated by Dr. Canas recently on 1/16/2019 at which time she was noted to be hypertensive.  He recommended increasing her lisinopril to 20 mg a day.  Patient reports that she did not start doing this until she got the new prescription 2 or 3 days ago.  She denies any chest pain, PND orthopnea, near-syncope or syncope.  She continues to have issues with chronic lower extremity edema.  She does note increased issues with dyspnea on exertion lately.      Review of Systems   Constitution: Negative for weakness and malaise/fatigue.   HENT: Negative for hearing loss, hoarse voice, nosebleeds and sore throat.    Eyes: Negative for pain.   Cardiovascular: Positive for dyspnea on exertion and leg swelling. Negative for chest pain, claudication, cyanosis, irregular heartbeat, near-syncope, orthopnea, palpitations, paroxysmal nocturnal dyspnea and syncope.   Respiratory: Negative for shortness of breath and snoring.    Endocrine: Negative for cold intolerance, heat intolerance, polydipsia, polyphagia and polyuria.   Skin: Negative for itching and rash.   Musculoskeletal: Negative for arthritis, falls, joint pain, joint swelling, muscle cramps, muscle weakness and myalgias.   Gastrointestinal: Negative for constipation, diarrhea, dysphagia, heartburn, hematemesis, hematochezia, melena, nausea and vomiting.   Genitourinary: Negative for frequency, hematuria and hesitancy.   Neurological: Negative for excessive daytime sleepiness, dizziness, headaches, light-headedness and numbness.   Psychiatric/Behavioral: Negative for depression. The patient is not nervous/anxious.           Current Outpatient Medications:   •  aspirin 81 MG EC tablet, Take 81 mg by mouth Every Night., Disp: , Rfl:   •  Calcium Carbonate-Vitamin D (CALCIUM 500 + D) 500-125 MG-UNIT tablet, Take 1 tablet  by mouth Daily., Disp: , Rfl:   •  Docusate Sodium 100 MG capsule, Take 100 mg by mouth Every Night., Disp: , Rfl:   •  lisinopril (PRINIVIL,ZESTRIL) 20 MG tablet, Take 1 tablet by mouth Daily., Disp: 90 tablet, Rfl: 3  •  lovastatin (MEVACOR) 20 MG tablet, TAKE 1 TABLET EVERY NIGHT, Disp: 90 tablet, Rfl: 1  •  metoprolol succinate XL (TOPROL-XL) 25 MG 24 hr tablet, TAKE 1 TABLET DAILY, Disp: 90 tablet, Rfl: 1  •  pantoprazole (PROTONIX) 40 MG EC tablet, Take 1 tablet by mouth Every Morning Before Breakfast., Disp: 90 tablet, Rfl: 0    Past Medical History:   Diagnosis Date   • Anemia     Normocytic anemia of unclear ediology   • Arthritis     Osteoarthritis involving knees and left shoulder   • Chronic venous insufficiency    • Eosinophilia     Persistent mild eosinophilia of unknown etiology   • History of colonic polyps    • Hyperlipidemia    • Hypertension    • Paroxysmal atrial fibrillation (CMS/HCC)      Past Surgical History:   Procedure Laterality Date   • COLONOSCOPY      H/O colonic polyps with regular colonoscopies at 5 year intervals.    • COLONOSCOPY  2013    By Dr. Gudino, no polyps identified.   • COLONOSCOPY N/A 7/3/2018    IH, one TA w/low grade dysplasi   • ENDOSCOPY N/A 11/15/2017    Small HH, LA Grade B reflux, acute and erosive ulcerative esophagitis,    • ENDOSCOPY N/A 2/27/2018    LA Grade B esophagitis, HH, gastritis, Path: Canales's w/out dysplasia   • HEMORRHOIDECTOMY  1965   • HYSTERECTOMY  1971    Partial, secondary to endometriosis   • JOINT REPLACEMENT  2014    Left total hip arthroplasy   • KNEE SURGERY  1997    Arthroscopy of left knee 1997; right knee 2003     Family History   Problem Relation Age of Onset   • Coronary artery disease Father    • Heart disease Father    • Heart attack Father 72   • Coronary artery disease Brother    • Heart disease Brother         CABG   • Malig Hyperthermia Neg Hx      Social History     Tobacco Use   • Smoking status: Never Smoker   • Smokeless  "tobacco: Never Used   Substance Use Topics   • Alcohol use: No     Comment: Occasional   • Drug use: No           ECG 12 Lead  Date/Time: 1/23/2019 3:58 PM  Performed by: Ibeth Tiwari MD  Authorized by: Ibeth Tiwari MD   Comparison: compared with previous ECG   Similar to previous ECG  Rhythm: sinus rhythm               Objective:         Visit Vitals  BP (!) 170/110 (BP Location: Right arm)   Pulse 54   Ht 172.7 cm (68\")   Wt 84.8 kg (187 lb)   LMP  (LMP Unknown)   BMI 28.43 kg/m²          Physical Exam   Constitutional: She is oriented to person, place, and time. She appears well-developed and well-nourished.   HENT:   Head: Normocephalic and atraumatic.   Eyes: Conjunctivae, EOM and lids are normal. Pupils are equal, round, and reactive to light.   Neck: Normal range of motion and full passive range of motion without pain. Neck supple. No JVD present. Carotid bruit is not present.   Cardiovascular: Normal rate, regular rhythm, S1 normal and S2 normal. Exam reveals no gallop.   No murmur heard.  Pulses:       Radial pulses are 2+ on the right side, and 2+ on the left side.   No bilateral lower extremity edema   Pulmonary/Chest: Effort normal and breath sounds normal.   Abdominal: Soft. Normal appearance.   Lymphadenopathy:     She has no cervical adenopathy.   Neurological: She is alert and oriented to person, place, and time.   Skin: Skin is warm, dry and intact.   Psychiatric: She has a normal mood and affect.       Lab Review:       Assessment:          Diagnosis Plan   1. Hypertension, unspecified type     2. Paroxysmal atrial fibrillation (CMS/HCC)     3. Other hyperlipidemia            Plan:       1.  Hypertension.  Still elevated in the office today.  She recently just increased her lisinopril to 20 mg a day.  I would like to wait a little bit longer to see the effect of this on her blood pressures.  Blood pressures remain elevated despite the increase will need to increase it further to 40 mg a " day.  We'll have her return in 2 weeks for blood pressure check.  2.  Dyspnea on exertion.  Suspect this is related to her elevated blood pressures.  If her symptoms do not improve with better control of her blood pressures will need to consider a cardiac workup.  3.  Paroxysmal atrial fibrillation.  Remains in sinus rhythm today.  On metoprolol succinate.  No plans for anticoagulation at this time since she is only had one documented episode of atrial fibrillation and her issues with GI bleeding.  4.  Hyperlipidemia.  On lovastatin.    Patient will return in 2 weeks for blood pressure check.    Atrial Fibrillation and Atrial Flutter  Assessment  • The patient has paroxysmal atrial fibrillation  • This is non-valvular in etiology  • The patient's CHADS2-VASc score is 4  • A [id number] score of 2 or more is considered a high risk for a thromboembolic event  • Aspirin prescribed    Plan  • Attempt to maintain sinus rhythm  • Continue aspirin for antithrombotic therapy, bleeding issues discussed  • Continue beta blocker for rhythm control  • Continue beta blocker for rate control

## 2019-01-29 LAB
25(OH)D3+25(OH)D2 SERPL-MCNC: 28.7 NG/ML (ref 30–100)
ALBUMIN SERPL-MCNC: 4.5 G/DL (ref 3.5–5.2)
ALBUMIN/GLOB SERPL: 1.6 G/DL
ALP SERPL-CCNC: 96 U/L (ref 39–117)
ALT SERPL-CCNC: 10 U/L (ref 1–33)
APPEARANCE UR: CLEAR
AST SERPL-CCNC: 17 U/L (ref 1–32)
BASOPHILS # BLD AUTO: 0.06 10*3/MM3 (ref 0–0.2)
BASOPHILS NFR BLD AUTO: 0.6 % (ref 0–1.5)
BILIRUB SERPL-MCNC: 0.9 MG/DL (ref 0.1–1.2)
BILIRUB UR QL STRIP: NEGATIVE
BUN SERPL-MCNC: 16 MG/DL (ref 8–23)
BUN/CREAT SERPL: 15.5 (ref 7–25)
CALCIUM SERPL-MCNC: 9.8 MG/DL (ref 8.6–10.5)
CHLORIDE SERPL-SCNC: 106 MMOL/L (ref 98–107)
CHOLEST SERPL-MCNC: 178 MG/DL (ref 0–200)
CHOLEST/HDLC SERPL: 2.31 {RATIO}
CO2 SERPL-SCNC: 25.5 MMOL/L (ref 22–29)
COLOR UR: YELLOW
CREAT SERPL-MCNC: 1.03 MG/DL (ref 0.57–1)
EOSINOPHIL # BLD AUTO: 0.66 10*3/MM3 (ref 0–0.7)
EOSINOPHIL NFR BLD AUTO: 7.1 % (ref 0.3–6.2)
ERYTHROCYTE [DISTWIDTH] IN BLOOD BY AUTOMATED COUNT: 23.2 % (ref 11.7–13)
GLOBULIN SER CALC-MCNC: 2.9 GM/DL
GLUCOSE SERPL-MCNC: 85 MG/DL (ref 65–99)
GLUCOSE UR QL: NEGATIVE
HCT VFR BLD AUTO: 37.9 % (ref 35.6–45.5)
HDLC SERPL-MCNC: 77 MG/DL (ref 40–60)
HGB BLD-MCNC: 11.6 G/DL (ref 11.9–15.5)
HGB UR QL STRIP: NEGATIVE
IMM GRANULOCYTES # BLD AUTO: 0.03 10*3/MM3 (ref 0–0.03)
IMM GRANULOCYTES NFR BLD AUTO: 0.3 % (ref 0–0.5)
KETONES UR QL STRIP: NEGATIVE
LDLC SERPL CALC-MCNC: 86 MG/DL (ref 0–100)
LEUKOCYTE ESTERASE UR QL STRIP: NEGATIVE
LYMPHOCYTES # BLD AUTO: 2.16 10*3/MM3 (ref 0.9–4.8)
LYMPHOCYTES NFR BLD AUTO: 23.2 % (ref 19.6–45.3)
MCH RBC QN AUTO: 27.9 PG (ref 26.9–32)
MCHC RBC AUTO-ENTMCNC: 30.6 G/DL (ref 32.4–36.3)
MCV RBC AUTO: 91.1 FL (ref 80.5–98.2)
MONOCYTES # BLD AUTO: 0.87 10*3/MM3 (ref 0.2–1.2)
MONOCYTES NFR BLD AUTO: 9.3 % (ref 5–12)
NEUTROPHILS # BLD AUTO: 5.55 10*3/MM3 (ref 1.9–8.1)
NEUTROPHILS NFR BLD AUTO: 59.5 % (ref 42.7–76)
NITRITE UR QL STRIP: NEGATIVE
PH UR STRIP: 6 [PH] (ref 5–8)
PLATELET # BLD AUTO: 325 10*3/MM3 (ref 140–500)
POTASSIUM SERPL-SCNC: 4.6 MMOL/L (ref 3.5–5.2)
PROT SERPL-MCNC: 7.4 G/DL (ref 6–8.5)
PROT UR QL STRIP: NEGATIVE
RBC # BLD AUTO: 4.16 10*6/MM3 (ref 3.9–5.2)
SODIUM SERPL-SCNC: 144 MMOL/L (ref 136–145)
SP GR UR: 1.01 (ref 1–1.03)
T3FREE SERPL-MCNC: 2.9 PG/ML (ref 2–4.4)
T4 FREE SERPL-MCNC: 1.24 NG/DL (ref 0.93–1.7)
TRIGL SERPL-MCNC: 75 MG/DL (ref 0–150)
TSH SERPL DL<=0.005 MIU/L-ACNC: 1.52 MIU/ML (ref 0.27–4.2)
UROBILINOGEN UR STRIP-MCNC: NORMAL MG/DL
VIT B12 SERPL-MCNC: 308 PG/ML (ref 211–946)
VLDLC SERPL CALC-MCNC: 15 MG/DL (ref 5–40)
WBC # BLD AUTO: 9.33 10*3/MM3 (ref 4.5–10.7)

## 2019-02-05 ENCOUNTER — CLINICAL SUPPORT (OUTPATIENT)
Dept: CARDIOLOGY | Facility: CLINIC | Age: 84
End: 2019-02-05

## 2019-02-05 VITALS — DIASTOLIC BLOOD PRESSURE: 100 MMHG | SYSTOLIC BLOOD PRESSURE: 180 MMHG | HEART RATE: 53 BPM

## 2019-02-05 NOTE — PROGRESS NOTES
Pt presented to the office for BP check today as instructed.  Medicines were reviewed and correct.  Pt had no complaints other than Bilateral leg swelling which she said she's had since her 30's.  She also C/O SOB with going up steps.  Pt denies all other symptoms.    BP today at the office is 180/100  HR 53  Taken twice 5 mins apart.      Pt said her BP at home today was 170/60's and 5 min later was 140/60's  HR unknown.      Per Dr. Tiwari have pt increase Lisinopril to 40 mgs daily and see her in 6 wks.  This information was told to the pt who verbalized understanding.    Pt said she will need a new Rx sent to [facility] due to the increase in dosage.        Thanks,  Darby

## 2019-02-06 RX ORDER — PANTOPRAZOLE SODIUM 40 MG/1
TABLET, DELAYED RELEASE ORAL
Qty: 90 TABLET | Refills: 0 | Status: SHIPPED | OUTPATIENT
Start: 2019-02-06 | End: 2019-05-07 | Stop reason: SDUPTHER

## 2019-02-06 NOTE — TELEPHONE ENCOUNTER
[facility] request received for pantoprazole 40 mg 1 tab po daily, #90, R0.    Per o/v note of 12/13/18 - pt taking protonix bid.  Upcoming appt with Dr Peralta on 3/28.    Call to pt.  States taking 1x/day.  Advise will fill - keep upcoming appt with DR Peralta for reeval.  Verb understanding.

## 2019-03-04 RX ORDER — LISINOPRIL 40 MG/1
40 TABLET ORAL DAILY
Qty: 90 TABLET | Refills: 1 | Status: SHIPPED | OUTPATIENT
Start: 2019-03-04 | End: 2019-08-14 | Stop reason: SDUPTHER

## 2019-03-19 ENCOUNTER — OFFICE VISIT (OUTPATIENT)
Dept: CARDIOLOGY | Facility: CLINIC | Age: 84
End: 2019-03-19

## 2019-03-19 VITALS
WEIGHT: 188 LBS | BODY MASS INDEX: 28.49 KG/M2 | HEART RATE: 60 BPM | DIASTOLIC BLOOD PRESSURE: 84 MMHG | HEIGHT: 68 IN | SYSTOLIC BLOOD PRESSURE: 164 MMHG

## 2019-03-19 DIAGNOSIS — I48.0 PAROXYSMAL ATRIAL FIBRILLATION (HCC): Primary | ICD-10-CM

## 2019-03-19 DIAGNOSIS — E78.49 OTHER HYPERLIPIDEMIA: ICD-10-CM

## 2019-03-19 DIAGNOSIS — I10 HYPERTENSION, UNSPECIFIED TYPE: ICD-10-CM

## 2019-03-19 PROCEDURE — 99214 OFFICE O/P EST MOD 30 MIN: CPT | Performed by: INTERNAL MEDICINE

## 2019-03-19 PROCEDURE — 93000 ELECTROCARDIOGRAM COMPLETE: CPT | Performed by: INTERNAL MEDICINE

## 2019-03-19 RX ORDER — AMLODIPINE BESYLATE 5 MG/1
5 TABLET ORAL DAILY
Qty: 90 TABLET | Refills: 1 | Status: SHIPPED | OUTPATIENT
Start: 2019-03-19 | End: 2019-08-28 | Stop reason: SDUPTHER

## 2019-03-19 NOTE — PROGRESS NOTES
Subjective:     Encounter Date:03/19/2019      Patient ID: Ama Billy is a 85 y.o. female.    Chief Complaint:  History of Present Illness    This is an 85-year-old with a history of rheumatic fever, hypertension, hyperlipidemia, paroxysmal atrial fibrillation, recent GI bleed due to esophagitis, who presents for follow-up.     I saw the patient initially when she was hospitalized 11/2017 for a GI bleed.  The patient presented on 11/14/2017 with complaints of nausea, vomiting, and diarrhea associated with generalized weakness and fatigue.  She was noted to be severely anemic on admission requiring a transfusion.  She was then seen by gastroenterology and underwent an EGD that showed evidence of acute erosive esophagitis.  The following day she went into atrial fibrillation with rapid ventricular rate that lasted approximately 3 hours and converted back to sinus rhythm prior to even starting a diltiazem drip.  She denies any symptoms with atrial fibrillation.  She had no recurrent atrial fibrillation during that admission.  Due to her GI bleed and only a single episode of atrial fibrillation I did not recommend starting anticoagulation.  I did start metoprolol succinate 25 mg a day during that admission.  As part of her workup she did undergo an echocardiogram that showed normal left ventricle systolic function wall motion with an estimated ejection fraction of 60%, normal diastolic function, no significant valvular disease.     When I saw her back in follow up in 6/2018 she was having issues with elevated blood pressures and blood in her stool.  She admitted to dietary indiscretions with sodium.  At that office visit have recommended that she increase her lisinopril to 10 mg a day and work on limiting her sodium intake.  She was evaluated by Dr. Peralta and it was recommended that she proceed with a colonoscopy which she has finally agreed to have done.  She underwent colonoscopy that showed a polyp that it  ended up being a tubular adenoma and internal hemorrhoids which was thought to be the cause of her bleeding.      Since then we have been working on getting her blood pressures under control.  Since I saw her last we have increased her lisinopril to 40 mg a day.  Today she presents for routine follow-up.  Her blood pressures have improved some but not quite under control.  She denies any chest pain, palpations, PND orthopnea, near-syncope or syncope, or lower extremity edema.  She does have some dyspnea on exertion primarily when she is climbing stairs but this is stable and unchanged.    Review of Systems   Constitution: Positive for malaise/fatigue. Negative for weakness.   HENT: Negative for hearing loss, hoarse voice, nosebleeds and sore throat.    Eyes: Negative for pain.   Cardiovascular: Positive for dyspnea on exertion. Negative for chest pain, claudication, cyanosis, irregular heartbeat, leg swelling, near-syncope, orthopnea, palpitations, paroxysmal nocturnal dyspnea and syncope.   Respiratory: Negative for shortness of breath and snoring.    Endocrine: Negative for cold intolerance, heat intolerance, polydipsia, polyphagia and polyuria.   Skin: Negative for itching and rash.   Musculoskeletal: Negative for arthritis, falls, joint pain, joint swelling, muscle cramps, muscle weakness and myalgias.   Gastrointestinal: Negative for constipation, diarrhea, dysphagia, heartburn, hematemesis, hematochezia, melena, nausea and vomiting.   Genitourinary: Negative for frequency, hematuria and hesitancy.   Neurological: Negative for excessive daytime sleepiness, dizziness, headaches, light-headedness and numbness.   Psychiatric/Behavioral: Negative for depression. The patient is not nervous/anxious.           Current Outpatient Medications:   •  aspirin 81 MG EC tablet, Take 81 mg by mouth Every Night., Disp: , Rfl:   •  Calcium Carbonate-Vitamin D (CALCIUM 500 + D) 500-125 MG-UNIT tablet, Take 1 tablet by mouth  Daily., Disp: , Rfl:   •  Docusate Sodium 100 MG capsule, Take 100 mg by mouth Every Night., Disp: , Rfl:   •  lisinopril (PRINIVIL,ZESTRIL) 40 MG tablet, Take 1 tablet by mouth Daily., Disp: 90 tablet, Rfl: 1  •  lovastatin (MEVACOR) 20 MG tablet, TAKE 1 TABLET EVERY NIGHT, Disp: 90 tablet, Rfl: 1  •  metoprolol succinate XL (TOPROL-XL) 25 MG 24 hr tablet, TAKE 1 TABLET DAILY, Disp: 90 tablet, Rfl: 1  •  pantoprazole (PROTONIX) 40 MG EC tablet, TAKE 1 TABLET EVERY MORNING BEFORE BREAKFAST, Disp: 90 tablet, Rfl: 0    Past Medical History:   Diagnosis Date   • Anemia     Normocytic anemia of unclear ediology   • Arthritis     Osteoarthritis involving knees and left shoulder   • Chronic venous insufficiency    • Eosinophilia     Persistent mild eosinophilia of unknown etiology   • History of colonic polyps    • Hyperlipidemia    • Hypertension    • Paroxysmal atrial fibrillation (CMS/HCC)      Past Surgical History:   Procedure Laterality Date   • COLONOSCOPY      H/O colonic polyps with regular colonoscopies at 5 year intervals.    • COLONOSCOPY  2013    By Dr. Gudino, no polyps identified.   • COLONOSCOPY N/A 7/3/2018    IH, one TA w/low grade dysplasi   • ENDOSCOPY N/A 11/15/2017    Small HH, LA Grade B reflux, acute and erosive ulcerative esophagitis,    • ENDOSCOPY N/A 2/27/2018    LA Grade B esophagitis, HH, gastritis, Path: Canales's w/out dysplasia   • HEMORRHOIDECTOMY  1965   • HYSTERECTOMY  1971    Partial, secondary to endometriosis   • JOINT REPLACEMENT  2014    Left total hip arthroplasy   • KNEE SURGERY  1997    Arthroscopy of left knee 1997; right knee 2003     Family History   Problem Relation Age of Onset   • Coronary artery disease Father    • Heart disease Father    • Heart attack Father 72   • Coronary artery disease Brother    • Heart disease Brother         CABG   • Malig Hyperthermia Neg Hx      Social History     Tobacco Use   • Smoking status: Never Smoker   • Smokeless tobacco: Never Used  "  Substance Use Topics   • Alcohol use: No     Comment: Occasional   • Drug use: No           ECG 12 Lead  Date/Time: 3/19/2019 5:41 PM  Performed by: Ibeth Tiwari MD  Authorized by: Ibeth Tiwari MD   Comparison: compared with previous ECG   Similar to previous ECG  Rhythm: sinus rhythm               Objective:         Visit Vitals  /84 (BP Location: Right arm, Patient Position: Sitting, Cuff Size: Adult)   Pulse 60   Ht 172.7 cm (68\")   Wt 85.3 kg (188 lb)   LMP  (LMP Unknown)   BMI 28.59 kg/m²          Physical Exam   Constitutional: She is oriented to person, place, and time. She appears well-developed and well-nourished.   HENT:   Head: Normocephalic and atraumatic.   Eyes: Conjunctivae, EOM and lids are normal. Pupils are equal, round, and reactive to light.   Neck: Normal range of motion and full passive range of motion without pain. Neck supple. No JVD present. Carotid bruit is not present.   Cardiovascular: Normal rate, regular rhythm, S1 normal and S2 normal. Exam reveals no gallop.   No murmur heard.  Pulses:       Radial pulses are 2+ on the right side, and 2+ on the left side.   No bilateral lower extremity edema   Pulmonary/Chest: Effort normal and breath sounds normal.   Abdominal: Soft. Normal appearance.   Lymphadenopathy:     She has no cervical adenopathy.   Neurological: She is alert and oriented to person, place, and time.   Skin: Skin is warm, dry and intact.   Psychiatric: She has a normal mood and affect.       Lab Review:       Assessment:          Diagnosis Plan   1. Paroxysmal atrial fibrillation (CMS/HCC)     2. Hypertension, unspecified type     3. Other hyperlipidemia            Plan:         1.  Hypertension.  Improved but still elevated.  We will go ahead and add amlodipine 5 mg a day.  We discussed working on exercising and working on weight loss.  We also discussed sodium restriction.  Emphasized the need to work on these things in order to avoid any further additional " medications.  2.  Paroxysmal atrial fibrillation.  Remains in sinus rhythm today.  On metoprolol succinate.  No plans for anticoagulation at this time since she has had a history of a GI bleed and only one documented episode of atrial fibrillation.  3.  Hyperlipidemia.  On lovastatin which is managed by Dr. Canas.    We will see the patient back again in 3 months.    Atrial Fibrillation and Atrial Flutter  Assessment  • The patient has paroxysmal atrial fibrillation  • This is non-valvular in etiology  • The patient's CHADS2-VASc score is 4  • A [id number] score of 2 or more is considered a high risk for a thromboembolic event  • Aspirin prescribed    Plan  • Attempt to maintain sinus rhythm  • Continue aspirin for antithrombotic therapy, bleeding issues discussed  • Continue beta blocker for rhythm control  • Continue beta blocker for rate control

## 2019-03-28 ENCOUNTER — OFFICE VISIT (OUTPATIENT)
Dept: GASTROENTEROLOGY | Facility: CLINIC | Age: 84
End: 2019-03-28

## 2019-03-28 VITALS
HEIGHT: 68 IN | WEIGHT: 189.4 LBS | TEMPERATURE: 97.8 F | BODY MASS INDEX: 28.7 KG/M2 | DIASTOLIC BLOOD PRESSURE: 80 MMHG | SYSTOLIC BLOOD PRESSURE: 150 MMHG

## 2019-03-28 DIAGNOSIS — K59.00 CONSTIPATION, UNSPECIFIED CONSTIPATION TYPE: Primary | ICD-10-CM

## 2019-03-28 DIAGNOSIS — K62.5 RECTAL BLEEDING: ICD-10-CM

## 2019-03-28 DIAGNOSIS — K22.70 BARRETT'S ESOPHAGUS WITHOUT DYSPLASIA: ICD-10-CM

## 2019-03-28 PROCEDURE — 99214 OFFICE O/P EST MOD 30 MIN: CPT | Performed by: INTERNAL MEDICINE

## 2019-03-28 NOTE — PROGRESS NOTES
Chief Complaint   Patient presents with   • Constipation     Subjective     HPI  Ama Billy is a 85 y.o. female who presents for follow up.  She has a history of nondysplastic Canales's esophagus, irritable bowel syndrome, rectal bleeding secondary to internal hemorrhoids.    She does have occasional issues with intermittent constipation.  She does well for a number of days, having regular BMs, and then will get constipated.  This resolves with miralax-- usually one does.  She does take docusate every night.  No further rectal bleeding.    Doing well on pantoprazole.  Taking this once per day.  Not having to avoid any foods.      Past Medical History:   Diagnosis Date   • Anemia     Normocytic anemia of unclear ediology   • Arthritis     Osteoarthritis involving knees and left shoulder   • Chronic venous insufficiency    • Eosinophilia     Persistent mild eosinophilia of unknown etiology   • History of colonic polyps    • Hyperlipidemia    • Hypertension    • Paroxysmal atrial fibrillation (CMS/HCC)        Social History     Socioeconomic History   • Marital status:      Spouse name: Trino   • Number of children: 2   • Years of education: College +   • Highest education level: Not on file   Occupational History   • Occupation:      Employer: [employer] [place]     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     Employer: RETIRED   Tobacco Use   • Smoking status: Never Smoker   • Smokeless tobacco: Never Used   Substance and Sexual Activity   • Alcohol use: No     Frequency: Never   • Drug use: No   • Sexual activity: Defer         Current Outpatient Medications:   •  amLODIPine (NORVASC) 5 MG tablet, Take 1 tablet by mouth Daily., Disp: 90 tablet, Rfl: 1  •  aspirin 81 MG EC tablet, Take 81 mg by mouth Every Night., Disp: , Rfl:   •  Calcium Carbonate-Vitamin D (CALCIUM 500 + D) 500-125 MG-UNIT tablet, Take 1 tablet by mouth Daily., Disp: , Rfl:   •  Docusate Sodium 100 MG  capsule, Take 100 mg by mouth Every Night., Disp: , Rfl:   •  lisinopril (PRINIVIL,ZESTRIL) 40 MG tablet, Take 1 tablet by mouth Daily., Disp: 90 tablet, Rfl: 1  •  lovastatin (MEVACOR) 20 MG tablet, TAKE 1 TABLET EVERY NIGHT, Disp: 90 tablet, Rfl: 1  •  metoprolol succinate XL (TOPROL-XL) 25 MG 24 hr tablet, TAKE 1 TABLET DAILY, Disp: 90 tablet, Rfl: 1  •  pantoprazole (PROTONIX) 40 MG EC tablet, TAKE 1 TABLET EVERY MORNING BEFORE BREAKFAST, Disp: 90 tablet, Rfl: 0    Review of Systems   Constitutional: Negative for activity change, appetite change, chills and fever.   HENT: Negative for trouble swallowing.    Respiratory: Negative.    Cardiovascular: Negative.  Negative for chest pain.   Gastrointestinal: Positive for constipation. Negative for abdominal distention, abdominal pain, anal bleeding, diarrhea, nausea and vomiting.   Genitourinary: Negative for dysuria, frequency and hematuria.       Objective   Vitals:    03/28/19 0934   BP: 150/80   Temp: 97.8 °F (36.6 °C)         03/28/19 0934   Weight: 85.9 kg (189 lb 6.4 oz)     Body mass index is 28.8 kg/m².      Physical Exam   Constitutional: She is oriented to person, place, and time. She appears well-developed and well-nourished. No distress.   HENT:   Head: Normocephalic and atraumatic.   Right Ear: External ear normal.   Left Ear: External ear normal.   Nose: Nose normal.   Mouth/Throat: Oropharynx is clear and moist.   Eyes: Conjunctivae and EOM are normal. Right eye exhibits no discharge. Left eye exhibits no discharge. No scleral icterus.   Neck: Normal range of motion. Neck supple. No thyromegaly present.   No supraclavicular adenopathy   Cardiovascular: Normal rate, regular rhythm, normal heart sounds and intact distal pulses. Exam reveals no gallop.   No murmur heard.  No lower extremity edema   Pulmonary/Chest: Effort normal and breath sounds normal. No respiratory distress. She has no wheezes.   Abdominal: Soft. Normal appearance and bowel sounds  are normal. She exhibits no distension and no mass. There is no hepatosplenomegaly. There is no tenderness. There is no rigidity, no rebound and no guarding. No hernia.   Genitourinary:   Genitourinary Comments: Rectal exam deferred   Musculoskeletal: Normal range of motion. She exhibits no edema or tenderness.   No atrophy of upper or lower extremities.  Normal digits and nails of both hands.   Lymphadenopathy:     She has no cervical adenopathy.   Neurological: She is alert and oriented to person, place, and time. She displays no atrophy. Coordination normal.   Skin: Skin is warm and dry. No rash noted. She is not diaphoretic. No erythema.   Psychiatric: She has a normal mood and affect. Her behavior is normal. Judgment and thought content normal.   Vitals reviewed.      WBC   Date Value Ref Range Status   01/28/2019 9.33 4.50 - 10.70 10*3/mm3 Final     RBC   Date Value Ref Range Status   01/28/2019 4.16 3.90 - 5.20 10*6/mm3 Final     Hemoglobin   Date Value Ref Range Status   01/28/2019 11.6 (L) 11.9 - 15.5 g/dL Final   11/17/2017 7.4 (L) 11.9 - 15.5 g/dL Final     Hematocrit   Date Value Ref Range Status   01/28/2019 37.9 35.6 - 45.5 % Final   11/17/2017 23.3 (L) 35.6 - 45.5 % Final     MCV   Date Value Ref Range Status   01/28/2019 91.1 80.5 - 98.2 fL Final   11/17/2017 86.9 80.5 - 98.2 fL Final     MCH   Date Value Ref Range Status   01/28/2019 27.9 26.9 - 32.0 pg Final   11/17/2017 27.6 26.9 - 32.0 pg Final     MCHC   Date Value Ref Range Status   01/28/2019 30.6 (L) 32.4 - 36.3 g/dL Final   11/17/2017 31.8 (L) 32.4 - 36.3 g/dL Final     RDW   Date Value Ref Range Status   01/28/2019 23.2 (H) 11.7 - 13.0 % Final   11/17/2017 20.8 (H) 11.7 - 13.0 % Final     RDW-SD   Date Value Ref Range Status   11/17/2017 66.0 (H) 37.0 - 54.0 fl Final     MPV   Date Value Ref Range Status   11/17/2017 10.1 6.0 - 12.0 fL Final     Platelets   Date Value Ref Range Status   01/28/2019 325 140 - 500 10*3/mm3 Final   11/17/2017  325 140 - 500 10*3/mm3 Final     Neutrophil Rel %   Date Value Ref Range Status   01/28/2019 59.5 42.7 - 76.0 % Final     Neutrophil %   Date Value Ref Range Status   11/17/2017 63.7 42.7 - 76.0 % Final     Lymphocyte Rel %   Date Value Ref Range Status   01/28/2019 23.2 19.6 - 45.3 % Final     Lymphocyte %   Date Value Ref Range Status   11/17/2017 12.4 (L) 19.6 - 45.3 % Final     Monocyte Rel %   Date Value Ref Range Status   01/28/2019 9.3 5.0 - 12.0 % Final     Monocyte %   Date Value Ref Range Status   11/17/2017 12.8 (H) 5.0 - 12.0 % Final     Eosinophil Rel %   Date Value Ref Range Status   01/28/2019 7.1 (H) 0.3 - 6.2 % Final     Eosinophil %   Date Value Ref Range Status   11/17/2017 10.2 (H) 0.3 - 6.2 % Final     Basophil Rel %   Date Value Ref Range Status   01/28/2019 0.6 0.0 - 1.5 % Final     Basophil %   Date Value Ref Range Status   11/17/2017 0.1 0.0 - 1.5 % Final     Immature Grans %   Date Value Ref Range Status   11/17/2017 0.8 (H) 0.0 - 0.5 % Final     Neutrophils Absolute   Date Value Ref Range Status   01/28/2019 5.55 1.90 - 8.10 10*3/mm3 Final     Neutrophils, Absolute   Date Value Ref Range Status   11/17/2017 6.42 1.90 - 8.10 10*3/mm3 Final     Lymphocytes Absolute   Date Value Ref Range Status   01/28/2019 2.16 0.90 - 4.80 10*3/mm3 Final     Lymphocytes, Absolute   Date Value Ref Range Status   11/17/2017 1.25 0.90 - 4.80 10*3/mm3 Final     Monocytes Absolute   Date Value Ref Range Status   01/28/2019 0.87 0.20 - 1.20 10*3/mm3 Final     Monocytes, Absolute   Date Value Ref Range Status   11/17/2017 1.29 (H) 0.20 - 1.20 10*3/mm3 Final     Eosinophils Absolute   Date Value Ref Range Status   01/28/2019 0.66 0.00 - 0.70 10*3/mm3 Final     Eosinophils, Absolute   Date Value Ref Range Status   11/17/2017 1.03 (H) 0.00 - 0.70 10*3/mm3 Final     Basophils Absolute   Date Value Ref Range Status   01/28/2019 0.06 0.00 - 0.20 10*3/mm3 Final     Basophils, Absolute   Date Value Ref Range Status    11/17/2017 0.01 0.00 - 0.20 10*3/mm3 Final     Immature Grans, Absolute   Date Value Ref Range Status   11/17/2017 0.08 (H) 0.00 - 0.03 10*3/mm3 Final     nRBC   Date Value Ref Range Status   05/29/2018 0.0 0.0 - 0.0 /100 WBC Final   11/16/2017 0.7 (H) 0.0 - 0.0 /100 WBC Final       Lab Results   Component Value Date    GLUCOSE 91 11/17/2017    BUN 16 01/28/2019    CREATININE 1.03 (H) 01/28/2019    EGFRIFNONA 51 (L) 01/28/2019    EGFRIFAFRI 62 01/28/2019    BCR 15.5 01/28/2019    CO2 25.5 01/28/2019    CALCIUM 9.8 01/28/2019    PROTENTOTREF 7.4 01/28/2019    ALBUMIN 4.50 01/28/2019    LABIL2 1.6 01/28/2019    AST 17 01/28/2019    ALT 10 01/28/2019         Imaging Results (last 7 days)     ** No results found for the last 168 hours. **            Assessment/Plan    Constipation: Improving, stable with MiraLAX    Canales's esophagus: On PPI    Rectal bleeding: Resolved, secondary to internal hemorrhoids    Plan  Continue PPI--will plan to decrease her pantoprazole to 20 mg every morning at her next prescription  Continue MiraLAX as needed, docusate  Monitor stools    Ama was seen today for constipation.    Diagnoses and all orders for this visit:    Constipation, unspecified constipation type    Canales's esophagus without dysplasia    Rectal bleeding        Dictated utilizing Dragon dictation

## 2019-03-28 NOTE — PATIENT INSTRUCTIONS
Continue pantoprazole. Will decrease this to 20mg at your next prescription    Continue docusate, as needed miralax    For any additional questions, concerns or changes to your condition after today's office visit please contact the office at [phone].

## 2019-05-08 NOTE — TELEPHONE ENCOUNTER
Message sent to Brenda NP to see if she wants the dose reduced to 20mg as per noted in last office visit.

## 2019-05-09 RX ORDER — PANTOPRAZOLE SODIUM 40 MG/1
TABLET, DELAYED RELEASE ORAL
Qty: 90 TABLET | Refills: 0 | Status: SHIPPED | OUTPATIENT
Start: 2019-05-09 | End: 2019-08-07 | Stop reason: DRUGHIGH

## 2019-05-26 DIAGNOSIS — I48.0 PAROXYSMAL ATRIAL FIBRILLATION (HCC): ICD-10-CM

## 2019-05-28 RX ORDER — METOPROLOL SUCCINATE 25 MG/1
TABLET, EXTENDED RELEASE ORAL
Qty: 90 TABLET | Refills: 0 | Status: SHIPPED | OUTPATIENT
Start: 2019-05-28 | End: 2019-06-25

## 2019-06-25 ENCOUNTER — OFFICE VISIT (OUTPATIENT)
Dept: CARDIOLOGY | Facility: CLINIC | Age: 84
End: 2019-06-25

## 2019-06-25 VITALS
DIASTOLIC BLOOD PRESSURE: 70 MMHG | HEART RATE: 49 BPM | BODY MASS INDEX: 28.64 KG/M2 | WEIGHT: 189 LBS | SYSTOLIC BLOOD PRESSURE: 138 MMHG | HEIGHT: 68 IN

## 2019-06-25 DIAGNOSIS — R06.09 DYSPNEA ON EXERTION: ICD-10-CM

## 2019-06-25 DIAGNOSIS — I10 HYPERTENSION, UNSPECIFIED TYPE: Primary | ICD-10-CM

## 2019-06-25 DIAGNOSIS — R42 LIGHTHEADEDNESS: ICD-10-CM

## 2019-06-25 DIAGNOSIS — I87.2 CHRONIC VENOUS INSUFFICIENCY: ICD-10-CM

## 2019-06-25 DIAGNOSIS — E78.49 OTHER HYPERLIPIDEMIA: ICD-10-CM

## 2019-06-25 DIAGNOSIS — R53.1 WEAKNESS: ICD-10-CM

## 2019-06-25 DIAGNOSIS — I48.0 PAROXYSMAL ATRIAL FIBRILLATION (HCC): ICD-10-CM

## 2019-06-25 PROCEDURE — 93000 ELECTROCARDIOGRAM COMPLETE: CPT | Performed by: INTERNAL MEDICINE

## 2019-06-25 PROCEDURE — 99214 OFFICE O/P EST MOD 30 MIN: CPT | Performed by: INTERNAL MEDICINE

## 2019-06-25 RX ORDER — METOPROLOL SUCCINATE 25 MG/1
12.5 TABLET, EXTENDED RELEASE ORAL DAILY
Qty: 90 TABLET | Refills: 0
Start: 2019-06-25 | End: 2019-08-26 | Stop reason: SDUPTHER

## 2019-06-25 NOTE — PROGRESS NOTES
Subjective:     Encounter Date:06/25/2019      Patient ID: Ama Billy is a 85 y.o. female.    Chief Complaint:  History of Present Illness    This is an 85-year-old with a history of rheumatic fever, hypertension, hyperlipidemia, paroxysmal atrial fibrillation, recent GI bleed due to esophagitis, who presents for follow-up.     I saw the patient initially when she was hospitalized 11/2017 for a GI bleed.  The patient presented on 11/14/2017 with complaints of nausea, vomiting, and diarrhea associated with generalized weakness and fatigue.  She was noted to be severely anemic on admission requiring a transfusion.  She was then seen by gastroenterology and underwent an EGD that showed evidence of acute erosive esophagitis.  The following day she went into atrial fibrillation with rapid ventricular rate that lasted approximately 3 hours and converted back to sinus rhythm prior to even starting a diltiazem drip.  She denies any symptoms with atrial fibrillation.  She had no recurrent atrial fibrillation during that admission.  Due to her GI bleed and only a single episode of atrial fibrillation I did not recommend starting anticoagulation.  I did start metoprolol succinate 25 mg a day during that admission.  As part of her workup she did undergo an echocardiogram that showed normal left ventricle systolic function wall motion with an estimated ejection fraction of 60%, normal diastolic function, no significant valvular disease.     When I saw her back in follow up in 6/2018 she was having issues with elevated blood pressures and blood in her stool.  She admitted to dietary indiscretions with sodium.  At that office visit have recommended that she increase her lisinopril to 10 mg a day and work on limiting her sodium intake.  She was evaluated by Dr. Peralta and it was recommended that she proceed with a colonoscopy which she has finally agreed to have done.  She underwent colonoscopy that showed a polyp that it  ended up being a tubular adenoma and internal hemorrhoids which was thought to be the cause of her bleeding.       Since then we have been working on getting her blood pressures under control.    Over the course of her follow-up self titrated her lisinopril dosage up to 40 mg.  During her last visit in 3/2019 I added amlodipine 5 mg a day.    Today she presents for routine 3-month follow-up.  With the addition of the amlodipine her blood pressure control is improved.  She reports that she is been feeling more fatigued lately and has periods of time where she will have sudden onset weakness even at rest.  She does report some increase in dyspnea on exertion.  She is worried that she may be developing anemia again.  She denies any significant bloody stool or melena.  She reports that her heart rates have been mainly in the 50s when she checks it along with her blood pressures.  She denies any significant palpitations, PND or orthopnea, near-syncope or syncope.  She reports that her left lower extremity always appears to be a little more swollen than the right.    Review of Systems   Constitution: Positive for malaise/fatigue. Negative for weakness.   HENT: Negative for hearing loss, hoarse voice, nosebleeds and sore throat.    Eyes: Negative for pain.   Cardiovascular: Positive for dyspnea on exertion, leg swelling and palpitations. Negative for chest pain, claudication, cyanosis, irregular heartbeat, near-syncope, orthopnea, paroxysmal nocturnal dyspnea and syncope.   Respiratory: Negative for shortness of breath and snoring.    Endocrine: Negative for cold intolerance, heat intolerance, polydipsia, polyphagia and polyuria.   Skin: Negative for itching and rash.   Musculoskeletal: Negative for arthritis, falls, joint pain, joint swelling, muscle cramps, muscle weakness and myalgias.   Gastrointestinal: Negative for constipation, diarrhea, dysphagia, heartburn, hematemesis, hematochezia, melena, nausea and vomiting.    Genitourinary: Negative for frequency, hematuria and hesitancy.   Neurological: Negative for excessive daytime sleepiness, dizziness, headaches, light-headedness and numbness.   Psychiatric/Behavioral: Negative for depression. The patient is not nervous/anxious.           Current Outpatient Medications:   •  amLODIPine (NORVASC) 5 MG tablet, Take 1 tablet by mouth Daily., Disp: 90 tablet, Rfl: 1  •  aspirin 81 MG EC tablet, Take 81 mg by mouth Every Night., Disp: , Rfl:   •  Calcium Carbonate-Vitamin D (CALCIUM 500 + D) 500-125 MG-UNIT tablet, Take 1 tablet by mouth Daily., Disp: , Rfl:   •  Docusate Sodium 100 MG capsule, Take 100 mg by mouth Every Night., Disp: , Rfl:   •  lisinopril (PRINIVIL,ZESTRIL) 40 MG tablet, Take 1 tablet by mouth Daily., Disp: 90 tablet, Rfl: 1  •  lovastatin (MEVACOR) 20 MG tablet, TAKE 1 TABLET EVERY NIGHT, Disp: 90 tablet, Rfl: 1  •  metoprolol succinate XL (TOPROL-XL) 25 MG 24 hr tablet, TAKE 1 TABLET DAILY, Disp: 90 tablet, Rfl: 0  •  pantoprazole (PROTONIX) 40 MG EC tablet, TAKE 1 TABLET EVERY MORNING BEFORE BREAKFAST, Disp: 90 tablet, Rfl: 0    Past Medical History:   Diagnosis Date   • Anemia     Normocytic anemia of unclear ediology   • Arthritis     Osteoarthritis involving knees and left shoulder   • Chronic venous insufficiency    • Eosinophilia     Persistent mild eosinophilia of unknown etiology   • History of colonic polyps    • Hyperlipidemia    • Hypertension    • Paroxysmal atrial fibrillation (CMS/HCC)      Past Surgical History:   Procedure Laterality Date   • COLONOSCOPY      H/O colonic polyps with regular colonoscopies at 5 year intervals.    • COLONOSCOPY  2013    By Dr. Gudino, no polyps identified.   • COLONOSCOPY N/A 7/3/2018    IH, one TA w/low grade dysplasi   • ENDOSCOPY N/A 11/15/2017    Small HH, LA Grade B reflux, acute and erosive ulcerative esophagitis,    • ENDOSCOPY N/A 2/27/2018    LA Grade B esophagitis, HH, gastritis, Path: Canales's w/out  "dysplasia   • HEMORRHOIDECTOMY  1965   • HYSTERECTOMY  1971    Partial, secondary to endometriosis   • JOINT REPLACEMENT  2014    Left total hip arthroplasy   • KNEE SURGERY  1997    Arthroscopy of left knee 1997; right knee 2003     Family History   Problem Relation Age of Onset   • Coronary artery disease Father    • Heart disease Father    • Heart attack Father 72   • Coronary artery disease Brother    • Heart disease Brother         CABG   • Malig Hyperthermia Neg Hx      Social History     Tobacco Use   • Smoking status: Never Smoker   • Smokeless tobacco: Never Used   Substance Use Topics   • Alcohol use: No     Frequency: Never   • Drug use: No           ECG 12 Lead  Date/Time: 6/25/2019 1:12 PM  Performed by: Ibeth Tiwari MD  Authorized by: Ibeth Tiwari MD   Comparison: compared with previous ECG   Comparison to previous ECG: Rate is lower  Rhythm: sinus bradycardia               Objective:         Visit Vitals  /70   Pulse (!) 49   Ht 172.7 cm (68\")   Wt 85.7 kg (189 lb)   LMP  (LMP Unknown)   BMI 28.74 kg/m²          Physical Exam   Constitutional: She is oriented to person, place, and time. She appears well-developed and well-nourished.   HENT:   Head: Normocephalic and atraumatic.   Eyes: Conjunctivae, EOM and lids are normal. Pupils are equal, round, and reactive to light.   Neck: Normal range of motion and full passive range of motion without pain. Neck supple. No JVD present. Carotid bruit is not present.   Cardiovascular: Regular rhythm, S1 normal and S2 normal. Bradycardia present. Exam reveals no gallop.   No murmur heard.  Pulses:       Radial pulses are 2+ on the right side, and 2+ on the left side.   1+ left lower extremity edema   Pulmonary/Chest: Effort normal and breath sounds normal.   Abdominal: Soft. Normal appearance.   Lymphadenopathy:     She has no cervical adenopathy.   Neurological: She is alert and oriented to person, place, and time.   Skin: Skin is warm, dry and " intact.   Psychiatric: She has a normal mood and affect.       Lab Review:       Assessment:          Diagnosis Plan   1. Hypertension, unspecified type     2. Paroxysmal atrial fibrillation (CMS/HCC)     3. Chronic venous insufficiency     4. Other hyperlipidemia     5. Lightheadedness  CBC (No Diff)   6. Weakness  CBC (No Diff)   7. Dyspnea on exertion  CBC (No Diff)          Plan:       1.  Fatigue/lightheadedness.  I wonder if this is related to her bradycardia.  Advised her to decrease her metoprolol succinate to 12.5 mg a day.  Patient is also worried that she may be developing anemia again.  We will set her up with a CBC.  If her CBC is normal and her symptoms and heart rate do not improve with a decrease in the metoprolol succinate will consider stopping the metoprolol altogether to see if this helps.  2.  Hypertension.  Well controlled on her current regimen of medications.  We will see how she does with the lowering of the metoprolol succinate dosage.  3.  Paroxysmal atrial fibrillation.  Only an isolated episode.  No plans for intake regulation at this time due to her history of GI bleed and only one documented episode of atrial fibrillation so far.  4.  Chronic venous insufficiency.  Explained to the patient that this is responsible for her swelling.  She is also had knee surgery in the past of the left leg and suspect that this is contributing to the discrepant swelling.  5.  Hyperlipidemia.  On lovastatin which is managed by Dr. molina.    We will call and discuss results of her CBC.  We will plan on seeing the patient back again in 3 months.    Atrial Fibrillation and Atrial Flutter  Assessment  • The patient has paroxysmal atrial fibrillation  • This is non-valvular in etiology  • The patient's CHADS2-VASc score is 4  • A [id number] score of 2 or more is considered a high risk for a thromboembolic event  • Aspirin prescribed    Plan  • Attempt to maintain sinus rhythm  • Continue aspirin for  antithrombotic therapy, bleeding issues discussed  • Continue beta blocker for rhythm control  • Continue beta blocker for rate control

## 2019-07-02 ENCOUNTER — LAB (OUTPATIENT)
Dept: LAB | Facility: HOSPITAL | Age: 84
End: 2019-07-02

## 2019-07-02 DIAGNOSIS — R53.1 WEAKNESS: ICD-10-CM

## 2019-07-02 DIAGNOSIS — R06.09 DYSPNEA ON EXERTION: ICD-10-CM

## 2019-07-02 DIAGNOSIS — R42 LIGHTHEADEDNESS: ICD-10-CM

## 2019-07-02 LAB
DEPRECATED RDW RBC AUTO: 74.7 FL (ref 37–54)
ERYTHROCYTE [DISTWIDTH] IN BLOOD BY AUTOMATED COUNT: 22.8 % (ref 12.3–15.4)
HCT VFR BLD AUTO: 36.3 % (ref 34–46.6)
HGB BLD-MCNC: 10.9 G/DL (ref 12–15.9)
MCH RBC QN AUTO: 26.8 PG (ref 26.6–33)
MCHC RBC AUTO-ENTMCNC: 30 G/DL (ref 31.5–35.7)
MCV RBC AUTO: 89.4 FL (ref 79–97)
PLATELET # BLD AUTO: 351 10*3/MM3 (ref 140–450)
PMV BLD AUTO: 10.6 FL (ref 6–12)
RBC # BLD AUTO: 4.06 10*6/MM3 (ref 3.77–5.28)
WBC NRBC COR # BLD: 10.72 10*3/MM3 (ref 3.4–10.8)

## 2019-07-02 PROCEDURE — 85027 COMPLETE CBC AUTOMATED: CPT

## 2019-07-02 PROCEDURE — 36415 COLL VENOUS BLD VENIPUNCTURE: CPT

## 2019-07-15 RX ORDER — LOVASTATIN 20 MG/1
TABLET ORAL
Qty: 90 TABLET | Refills: 1 | Status: SHIPPED | OUTPATIENT
Start: 2019-07-15 | End: 2020-01-13

## 2019-08-07 RX ORDER — PANTOPRAZOLE SODIUM 40 MG/1
TABLET, DELAYED RELEASE ORAL
Qty: 90 TABLET | Refills: 0 | OUTPATIENT
Start: 2019-08-07

## 2019-08-07 RX ORDER — PANTOPRAZOLE SODIUM 20 MG/1
20 TABLET, DELAYED RELEASE ORAL DAILY
Qty: 90 TABLET | Refills: 1 | Status: SHIPPED | OUTPATIENT
Start: 2019-08-07 | End: 2020-01-16

## 2019-08-07 NOTE — TELEPHONE ENCOUNTER
"[facility] request received for pantoprazole 40 mg 1 tab po daily, #90, R0.    See o/v note of 3/28/19 - \"will decrease pantoprazole to 20 mg every morning at next rx.  Return in 1 yr\".      Call to pt to advise of above.  Above refill denied.  New  escribe completed for pantoprazole 20 mg 1 tab po daily, #90, R1.     Update to Dr Peralta.  "

## 2019-08-14 RX ORDER — LISINOPRIL 40 MG/1
TABLET ORAL
Qty: 90 TABLET | Refills: 1 | Status: SHIPPED | OUTPATIENT
Start: 2019-08-14 | End: 2020-02-10

## 2019-08-26 DIAGNOSIS — I48.0 PAROXYSMAL ATRIAL FIBRILLATION (HCC): ICD-10-CM

## 2019-08-26 RX ORDER — METOPROLOL SUCCINATE 25 MG/1
12.5 TABLET, EXTENDED RELEASE ORAL DAILY
Qty: 45 TABLET | Refills: 2 | Status: SHIPPED | OUTPATIENT
Start: 2019-08-26 | End: 2020-05-26

## 2019-08-28 DIAGNOSIS — I10 HYPERTENSION, UNSPECIFIED TYPE: ICD-10-CM

## 2019-08-28 RX ORDER — AMLODIPINE BESYLATE 5 MG/1
TABLET ORAL
Qty: 90 TABLET | Refills: 2 | Status: SHIPPED | OUTPATIENT
Start: 2019-08-28 | End: 2020-05-26

## 2019-09-26 ENCOUNTER — OFFICE VISIT (OUTPATIENT)
Dept: CARDIOLOGY | Facility: CLINIC | Age: 84
End: 2019-09-26

## 2019-09-26 VITALS
BODY MASS INDEX: 28.95 KG/M2 | HEIGHT: 68 IN | WEIGHT: 191 LBS | SYSTOLIC BLOOD PRESSURE: 142 MMHG | HEART RATE: 63 BPM | DIASTOLIC BLOOD PRESSURE: 82 MMHG

## 2019-09-26 DIAGNOSIS — R06.09 DYSPNEA ON EXERTION: ICD-10-CM

## 2019-09-26 DIAGNOSIS — I10 HYPERTENSION, UNSPECIFIED TYPE: ICD-10-CM

## 2019-09-26 DIAGNOSIS — I48.0 PAROXYSMAL ATRIAL FIBRILLATION (HCC): Primary | ICD-10-CM

## 2019-09-26 DIAGNOSIS — E78.49 OTHER HYPERLIPIDEMIA: ICD-10-CM

## 2019-09-26 PROCEDURE — 93000 ELECTROCARDIOGRAM COMPLETE: CPT | Performed by: INTERNAL MEDICINE

## 2019-09-26 PROCEDURE — 99214 OFFICE O/P EST MOD 30 MIN: CPT | Performed by: INTERNAL MEDICINE

## 2019-09-26 RX ORDER — SODIUM PHOSPHATE,MONO-DIBASIC 19G-7G/118
1 ENEMA (ML) RECTAL
COMMUNITY
End: 2021-08-24

## 2019-09-26 NOTE — PROGRESS NOTES
Subjective:     Encounter Date:09/26/2019      Patient ID: Ama Billy is a 85 y.o. female.    Chief Complaint:  History of Present Illness    This is an 85-year-old with a history of rheumatic fever, hypertension, hyperlipidemia, paroxysmal atrial fibrillation, recent GI bleed due to esophagitis, who presents for follow-up.     I saw the patient initially when she was hospitalized 11/2017 for a GI bleed.  The patient presented on 11/14/2017 with complaints of nausea, vomiting, and diarrhea associated with generalized weakness and fatigue.  She was noted to be severely anemic on admission requiring a transfusion.  She was then seen by gastroenterology and underwent an EGD that showed evidence of acute erosive esophagitis.  The following day she went into atrial fibrillation with rapid ventricular rate that lasted approximately 3 hours and converted back to sinus rhythm prior to even starting a diltiazem drip.  She denies any symptoms with atrial fibrillation.  She had no recurrent atrial fibrillation during that admission.  Due to her GI bleed and only a single episode of atrial fibrillation I did not recommend starting anticoagulation.  I did start metoprolol succinate 25 mg a day during that admission.  As part of her workup she did undergo an echocardiogram that showed normal left ventricle systolic function wall motion with an estimated ejection fraction of 60%, normal diastolic function, no significant valvular disease.     When I saw her back in follow up in 6/2018 she was having issues with elevated blood pressures and blood in her stool.  She admitted to dietary indiscretions with sodium.  At that office visit have recommended that she increase her lisinopril to 10 mg a day and work on limiting her sodium intake.  She was evaluated by Dr. Peralta and it was recommended that she proceed with a colonoscopy which she has finally agreed to have done.  She underwent colonoscopy that showed a polyp that it  ended up being a tubular adenoma and internal hemorrhoids which was thought to be the cause of her bleeding.       Since then we have been working on getting her blood pressures under control.   This was accomplished with uptitrating her lisinopril dose and adding amlodipine 5 mg a day.  At her last office visit in 6/2019 she complained about fatigue and we decided to decrease her metoprolol succinate to 12.5 mg      Today she presents for routine 3-month follow-up.  She reports improvement in her fatigue since decreasing the dose of metoprolol.  She continues to have dyspnea on exertion which is largely chronic and has been progressing some but denies any dramatic change.  She does admit that she is quite sedentary and does not do much outside of her routine housework.  She denies any chest pain, palpitations, PND orthopnea, near-syncope or syncope, or lower extremity edema.    Review of Systems   Constitution: Negative for weakness and malaise/fatigue.   HENT: Negative for hearing loss, hoarse voice, nosebleeds and sore throat.    Eyes: Negative for pain.   Cardiovascular: Positive for dyspnea on exertion and leg swelling. Negative for chest pain, claudication, cyanosis, irregular heartbeat, near-syncope, orthopnea, palpitations, paroxysmal nocturnal dyspnea and syncope.   Respiratory: Negative for shortness of breath and snoring.    Endocrine: Negative for cold intolerance, heat intolerance, polydipsia, polyphagia and polyuria.   Skin: Negative for itching and rash.   Musculoskeletal: Negative for arthritis, falls, joint pain, joint swelling, muscle cramps, muscle weakness and myalgias.   Gastrointestinal: Negative for constipation, diarrhea, dysphagia, heartburn, hematemesis, hematochezia, melena, nausea and vomiting.   Genitourinary: Negative for frequency, hematuria and hesitancy.   Neurological: Negative for excessive daytime sleepiness, dizziness, headaches, light-headedness and numbness.    Psychiatric/Behavioral: Negative for depression. The patient is not nervous/anxious.           Current Outpatient Medications:   •  amLODIPine (NORVASC) 5 MG tablet, TAKE 1 TABLET DAILY, Disp: 90 tablet, Rfl: 2  •  aspirin 81 MG EC tablet, Take 81 mg by mouth Every Night., Disp: , Rfl:   •  Calcium Carbonate-Vitamin D (CALCIUM 500 + D) 500-125 MG-UNIT tablet, Take 1 tablet by mouth Daily., Disp: , Rfl:   •  Docusate Sodium 100 MG capsule, Take 100 mg by mouth Every Night., Disp: , Rfl:   •  glucosamine-chondroitin 500-400 MG capsule capsule, Take 1 capsule by mouth 3 (Three) Times a Day With Meals., Disp: , Rfl:   •  lisinopril (PRINIVIL,ZESTRIL) 40 MG tablet, TAKE 1 TABLET DAILY (DOSE ADJUSTMENT), Disp: 90 tablet, Rfl: 1  •  lovastatin (MEVACOR) 20 MG tablet, TAKE 1 TABLET EVERY NIGHT, Disp: 90 tablet, Rfl: 1  •  metoprolol succinate XL (TOPROL-XL) 25 MG 24 hr tablet, Take 0.5 tablets by mouth Daily., Disp: 45 tablet, Rfl: 2  •  pantoprazole (PROTONIX) 20 MG EC tablet, Take 1 tablet by mouth Daily., Disp: 90 tablet, Rfl: 1    Past Medical History:   Diagnosis Date   • Anemia     Normocytic anemia of unclear ediology   • Arthritis     Osteoarthritis involving knees and left shoulder   • Chronic venous insufficiency    • Eosinophilia     Persistent mild eosinophilia of unknown etiology   • History of colonic polyps    • Hyperlipidemia    • Hypertension    • Paroxysmal atrial fibrillation (CMS/HCC)      Past Surgical History:   Procedure Laterality Date   • COLONOSCOPY      H/O colonic polyps with regular colonoscopies at 5 year intervals.    • COLONOSCOPY  2013    By Dr. Gudino, no polyps identified.   • COLONOSCOPY N/A 7/3/2018    IH, one TA w/low grade dysplasi   • ENDOSCOPY N/A 11/15/2017    Small HH, LA Grade B reflux, acute and erosive ulcerative esophagitis,    • ENDOSCOPY N/A 2/27/2018    LA Grade B esophagitis, HH, gastritis, Path: Canales's w/out dysplasia   • HEMORRHOIDECTOMY  1965   • HYSTERECTOMY  1971  "   Partial, secondary to endometriosis   • JOINT REPLACEMENT  2014    Left total hip arthroplasy   • KNEE SURGERY  1997    Arthroscopy of left knee 1997; right knee 2003     Family History   Problem Relation Age of Onset   • Coronary artery disease Father    • Heart disease Father    • Heart attack Father 72   • Coronary artery disease Brother    • Heart disease Brother         CABG   • Malig Hyperthermia Neg Hx      Social History     Tobacco Use   • Smoking status: Never Smoker   • Smokeless tobacco: Never Used   Substance Use Topics   • Alcohol use: No     Frequency: Never   • Drug use: No           ECG 12 Lead  Date/Time: 9/26/2019 12:34 PM  Performed by: Ibeth Tiwari MD  Authorized by: Ibeth Tiwari MD   Comparison: compared with previous ECG   Similar to previous ECG  Rhythm: sinus rhythm               Objective:         Visit Vitals  /82   Pulse 63   Ht 172.7 cm (68\")   Wt 86.6 kg (191 lb)   LMP  (LMP Unknown)   BMI 29.04 kg/m²          Physical Exam   Constitutional: She is oriented to person, place, and time. She appears well-developed and well-nourished.   HENT:   Head: Normocephalic and atraumatic.   Eyes: Conjunctivae, EOM and lids are normal. Pupils are equal, round, and reactive to light.   Neck: Normal range of motion and full passive range of motion without pain. Neck supple. No JVD present. Carotid bruit is not present.   Cardiovascular: Normal rate, regular rhythm, S1 normal and S2 normal. Exam reveals no gallop.   No murmur heard.  Pulses:       Radial pulses are 2+ on the right side, and 2+ on the left side.   No bilateral lower extremity   Pulmonary/Chest: Effort normal and breath sounds normal.   Abdominal: Soft. Normal appearance.   Lymphadenopathy:     She has no cervical adenopathy.   Neurological: She is alert and oriented to person, place, and time.   Skin: Skin is warm, dry and intact.   Psychiatric: She has a normal mood and affect.       Lab Review:       Assessment:     "      Diagnosis Plan   1. Paroxysmal atrial fibrillation (CMS/HCC)     2. Hypertension, unspecified type     3. Other hyperlipidemia     4. Dyspnea on exertion            Plan:       1.  Dyspnea on exertion.  This appears to be slowly progressing.  I suspect this is more due to deconditioning.  I did instruct her to let me know if her symptoms rapidly progress at which point we need to consider a cardiac work-up.  2.  Paroxysmal atrial fibrillation.  Remains in sinus rhythm today.  I have held off on anticoagulation since she is only had one documented episode of atrial fibrillation and due to her history of a GI bleed.  If she has any further episodes of atrial fibrillation would have a low threshold to place her on anticoagulation at that time.  In the meanwhile we will continue on a low-dose of metoprolol succinate.  3.  Hypertension.  Blood pressures are just mildly elevated in the office today but she reports are better controlled at home despite the decrease in the metoprolol dosage.  We will continue to monitor and her current management.  4.  Chronic venous insufficiency.  5.  Hyperlipidemia.  On lovastatin which is managed by Dr. Canas.    Atrial Fibrillation and Atrial Flutter  Assessment  • The patient has paroxysmal atrial fibrillation  • This is non-valvular in etiology  • The patient's CHADS2-VASc score is 4  • A [id number] score of 2 or more is considered a high risk for a thromboembolic event  • Aspirin prescribed    Plan  • Attempt to maintain sinus rhythm  • Continue aspirin for antithrombotic therapy, bleeding issues discussed  • Continue beta blocker for rhythm control  • Continue beta blocker for rate control

## 2019-10-29 ENCOUNTER — OFFICE VISIT (OUTPATIENT)
Dept: INTERNAL MEDICINE | Facility: CLINIC | Age: 84
End: 2019-10-29

## 2019-10-29 VITALS
SYSTOLIC BLOOD PRESSURE: 126 MMHG | TEMPERATURE: 97.5 F | BODY MASS INDEX: 29.35 KG/M2 | WEIGHT: 193 LBS | OXYGEN SATURATION: 97 % | HEART RATE: 83 BPM | DIASTOLIC BLOOD PRESSURE: 62 MMHG

## 2019-10-29 DIAGNOSIS — K20.90 ESOPHAGITIS: ICD-10-CM

## 2019-10-29 DIAGNOSIS — I48.0 PAROXYSMAL ATRIAL FIBRILLATION (HCC): ICD-10-CM

## 2019-10-29 DIAGNOSIS — Z00.00 MEDICARE ANNUAL WELLNESS VISIT, SUBSEQUENT: ICD-10-CM

## 2019-10-29 DIAGNOSIS — R23.8 IRRITATION SYMPTOM OF SKIN: Primary | ICD-10-CM

## 2019-10-29 DIAGNOSIS — R73.09 ELEVATED GLUCOSE: ICD-10-CM

## 2019-10-29 DIAGNOSIS — D50.0 IRON DEFICIENCY ANEMIA DUE TO CHRONIC BLOOD LOSS: ICD-10-CM

## 2019-10-29 DIAGNOSIS — M25.561 CHRONIC PAIN OF RIGHT KNEE: ICD-10-CM

## 2019-10-29 DIAGNOSIS — G89.29 CHRONIC PAIN OF RIGHT KNEE: ICD-10-CM

## 2019-10-29 DIAGNOSIS — K22.70 BARRETT'S ESOPHAGUS WITHOUT DYSPLASIA: ICD-10-CM

## 2019-10-29 DIAGNOSIS — E78.49 OTHER HYPERLIPIDEMIA: ICD-10-CM

## 2019-10-29 DIAGNOSIS — I10 HYPERTENSION, UNSPECIFIED TYPE: ICD-10-CM

## 2019-10-29 PROCEDURE — 99214 OFFICE O/P EST MOD 30 MIN: CPT | Performed by: FAMILY MEDICINE

## 2019-10-29 PROCEDURE — G0439 PPPS, SUBSEQ VISIT: HCPCS | Performed by: FAMILY MEDICINE

## 2019-10-29 NOTE — PATIENT INSTRUCTIONS
Medicare Wellness  Personal Prevention Plan of Service     Date of Office Visit:  10/29/2019  Encounter Provider:  Con Canas Jr., MD  Place of Service:  [facility] INTER[facility] MEDIC[facility]  Patient Name: Ama Billy  :  1933    As part of the Medicare Wellness portion of your visit today, we are providing you with this personalized preventive plan of services (PPPS). This plan is based upon recommendations of the [place] Preventive Services Task Force (USPSTF) and the Advisory Committee on Immunization Practices (ACIP).    This lists the preventive care services that should be considered, and provides dates of when you are due. Items listed as completed are up-to-date and do not require any further intervention.    Health Maintenance   Topic Date Due   • TDAP/TD VACCINES (1 - Tdap) 1952   • ZOSTER VACCINE (1 of 2) 1983   • MAMMOGRAM  2016   • MEDICARE ANNUAL WELLNESS  2019   • PNEUMOCOCCAL VACCINES (65+ LOW/MEDIUM RISK) (2 of 2 - PPSV23) 2020 (Originally 2019)   • LIPID PANEL  2020   • INFLUENZA VACCINE  Completed       No orders of the defined types were placed in this encounter.      No Follow-up on file.

## 2019-10-29 NOTE — PROGRESS NOTES
The ABCs of the Annual Wellness Visit  Subsequent Medicare Wellness Visit    No chief complaint on file.      Subjective   History of Present Illness:  Ama Billy is a 85 y.o. female who presents for a Subsequent Medicare Wellness Visit.    HEALTH RISK ASSESSMENT    Recent Hospitalizations:  No hospitalization(s) within the last year.    Current Medical Providers:  Patient Care Team:  Con Canas Jr., MD as PCP - General (Family Medicine)    Smoking Status:  Social History     Tobacco Use   Smoking Status Never Smoker   Smokeless Tobacco Never Used       Alcohol Consumption:  Social History     Substance and Sexual Activity   Alcohol Use No   • Frequency: Never       Depression Screen:   PHQ-2/PHQ-9 Depression Screening 10/29/2019   Little interest or pleasure in doing things 0   Feeling down, depressed, or hopeless 0   Trouble falling or staying asleep, or sleeping too much -   Feeling tired or having little energy -   Poor appetite or overeating -   Feeling bad about yourself - or that you are a failure or have let yourself or your family down -   Trouble concentrating on things, such as reading the newspaper or watching television -   Moving or speaking so slowly that other people could have noticed. Or the opposite - being so fidgety or restless that you have been moving around a lot more than usual -   Thoughts that you would be better off dead, or of hurting yourself in some way -   Total Score 0   If you checked off any problems, how difficult have these problems made it for you to do your work, take care of things at home, or get along with other people? -       Fall Risk Screen:  STEADI Fall Risk Assessment was completed, and patient is at LOW risk for falls.Assessment completed on:10/29/2019    Health Habits and Functional and Cognitive Screening:  Functional & Cognitive Status 10/29/2019   Do you have difficulty preparing food and eating? No   Do you have difficulty bathing yourself, getting dressed  or grooming yourself? No   Do you have difficulty using the toilet? No   Do you have difficulty moving around from place to place? No   Do you have trouble with steps or getting out of a bed or a chair? No   Current Diet Well Balanced Diet   Dental Exam Up to date   Eye Exam Up to date   Exercise (times per week) 0 times per week   Current Exercise Activities Include None   Do you need help using the phone?  No   Are you deaf or do you have serious difficulty hearing?  No   Do you need help with transportation? No   Do you need help shopping? No   Do you need help preparing meals?  No   Do you need help with housework?  No   Do you need help with laundry? No   Do you need help taking your medications? No   Do you need help managing money? No   Do you ever drive or ride in a car without wearing a seat belt? No   Have you felt unusual stress, anger or loneliness in the last month? No   Who do you live with? Spouse   If you need help, do you have trouble finding someone available to you? No   Have you been bothered in the last four weeks by sexual problems? No   Do you have difficulty concentrating, remembering or making decisions? No         Does the patient have evidence of cognitive impairment? No    Asprin use counseling:Taking ASA appropriately as indicated    Age-appropriate Screening Schedule:  Refer to the list below for future screening recommendations based on patient's age, sex and/or medical conditions. Orders for these recommended tests are listed in the plan section. The patient has been provided with a written plan.    Health Maintenance   Topic Date Due   • TDAP/TD VACCINES (1 - Tdap) 11/12/1952   • ZOSTER VACCINE (1 of 2) 11/12/1983   • MAMMOGRAM  11/21/2016   • PNEUMOCOCCAL VACCINES (65+ LOW/MEDIUM RISK) (2 of 2 - PPSV23) 01/08/2020 (Originally 1/16/2019)   • LIPID PANEL  01/28/2020   • INFLUENZA VACCINE  Completed          The following portions of the patient's history were reviewed and updated as  appropriate: allergies, current medications, past family history, past medical history, past social history, past surgical history and problem list.    Outpatient Medications Prior to Visit   Medication Sig Dispense Refill   • amLODIPine (NORVASC) 5 MG tablet TAKE 1 TABLET DAILY 90 tablet 2   • aspirin 81 MG EC tablet Take 81 mg by mouth Every Night.     • Calcium Carbonate-Vitamin D (CALCIUM 500 + D) 500-125 MG-UNIT tablet Take 1 tablet by mouth Daily.     • Docusate Sodium 100 MG capsule Take 100 mg by mouth Every Night.     • glucosamine-chondroitin 500-400 MG capsule capsule Take 1 capsule by mouth 3 (Three) Times a Day With Meals.     • lisinopril (PRINIVIL,ZESTRIL) 40 MG tablet TAKE 1 TABLET DAILY (DOSE ADJUSTMENT) 90 tablet 1   • lovastatin (MEVACOR) 20 MG tablet TAKE 1 TABLET EVERY NIGHT 90 tablet 1   • metoprolol succinate XL (TOPROL-XL) 25 MG 24 hr tablet Take 0.5 tablets by mouth Daily. 45 tablet 2   • pantoprazole (PROTONIX) 20 MG EC tablet Take 1 tablet by mouth Daily. 90 tablet 1     No facility-administered medications prior to visit.        Patient Active Problem List   Diagnosis   • Normocytic anemia   • Abdominal cramping   • LLQ pain   • Colon polyps   • Bursitis of hip   • Diarrhea   • Fatigue   • Generalized osteoarthritis   • Hyperlipidemia   • Hypertension   • Irritable bowel syndrome   • Muscle strain of lower extremity   • Tendinitis   • Chronic venous insufficiency   • Status post total left knee replacement   • Hip joint replacement status   • Left retinal detachment   • Hypovitaminosis D   • Weakness of extremity   • Closed wedge compression fracture of first lumbar vertebra (CMS/HCC)   • Fall   • Constipation   • Nausea & vomiting   • Leukocytosis   • Anemia   • Symptomatic anemia   • Esophagitis   • GI bleed   • Acute blood loss anemia   • Iron deficiency anemia   • Paroxysmal atrial fibrillation (CMS/HCC)   • Rectal bleeding   • Iron deficiency anemia due to chronic blood loss   •  Swelling of lower extremity   • Canales's esophagus without dysplasia   • Dyspnea on exertion       Advanced Care Planning:  Patient has an advance directive - a copy has been provided and is visible in patient header    Review of Systems    Compared to one year ago, the patient feels her physical health is the same.  Compared to one year ago, the patient feels her mental health is the same.    Reviewed chart for potential of high risk medication in the elderly: not applicable  Reviewed chart for potential of harmful drug interactions in the elderly:not applicable    Objective         Vitals:    10/29/19 1401   BP: 126/62   BP Location: Left arm   Patient Position: Sitting   Cuff Size: Large Adult   Pulse: 83   Temp: 97.5 °F (36.4 °C)   TempSrc: Tympanic   SpO2: 97%   Weight: 87.5 kg (193 lb)   PainSc: 0-No pain       Body mass index is 29.35 kg/m².  Discussed the patient's BMI with her. The BMI is above average; BMI management plan is completed.    Physical Exam          Assessment/Plan   Medicare Risks and Personalized Health Plan  CMS Preventative Services Quick Reference  Cardiovascular risk  Fall Risk    The above risks/problems have been discussed with the patient.  Pertinent information has been shared with the patient in the After Visit Summary.  Follow up plans and orders are seen below in the Assessment/Plan Section.    There are no diagnoses linked to this encounter.  Follow Up:  No Follow-up on file.     An After Visit Summary and PPPS were given to the patient.

## 2019-10-29 NOTE — PROGRESS NOTES
Subjective   Ama Billy is a 85 y.o. female.     Chief Complaint   Patient presents with   • Atrial Fibrillation   • Hyperlipidemia   • Hypertension   • GI Problem   • Rash         History of Present Illness   Patient with a visit for Medicare wellness plus other issues.    For 2 weeks she has had a scabbed irritated area at the right lower border of the lower lip it is an irritation that is spread over approximately 0.5 cm of the right lower lip.    History of paroxysmal atrial fibrillation not on anticoagulant secondary to infrequent A. fib plus history of GI bleed.    Continue PPI with history of Canales's esophagus plus irritative esophagitis and GI bleed.    Treatment of hyperlipidemia reviewed.  Also treatment of hypertension.    She is to see orthopedics tomorrow with a history of right knee replacement Dr. Linda several years ago but she fell 2 years ago on the right knee and is now had increasing swelling and pain.      The following portions of the patient's history were reviewed and updated as appropriate: allergies, current medications, past social history and problem list.    Review of Systems   Constitutional: Negative.    HENT: Negative.    Eyes: Negative.    Respiratory: Negative.    Cardiovascular: Negative.    Gastrointestinal: Negative.    Endocrine: Negative.    Genitourinary: Negative.    Musculoskeletal: Positive for arthralgias and gait problem.   Skin: Positive for color change.   Allergic/Immunologic: Negative.    Hematological: Negative.    Psychiatric/Behavioral: Negative.        Objective   Vitals:    10/29/19 1401   BP: 126/62   Pulse: 83   Temp: 97.5 °F (36.4 °C)   SpO2: 97%     Physical Exam   Constitutional: She is oriented to person, place, and time. She appears well-developed and well-nourished.   HENT:   Head: Normocephalic and atraumatic.       Right Ear: Tympanic membrane and external ear normal.   Left Ear: Tympanic membrane and external ear normal.   Nose: Nose normal.    Mouth/Throat: Oropharynx is clear and moist.   Eyes: Conjunctivae and EOM are normal. Pupils are equal, round, and reactive to light.   Neck: Normal range of motion. Neck supple. No JVD present. No thyromegaly present.   Cardiovascular: Normal rate, regular rhythm and intact distal pulses.   Murmur heard.   Systolic murmur is present with a grade of 2/6.  Pulmonary/Chest: Effort normal and breath sounds normal.   Abdominal: Soft. Bowel sounds are normal.   Musculoskeletal:        Right knee: She exhibits decreased range of motion and swelling. Tenderness found. Lateral joint line tenderness noted.   Lymphadenopathy:     She has no cervical adenopathy.   Neurological: She is alert and oriented to person, place, and time. No cranial nerve deficit. Coordination normal.   Skin: Skin is warm and dry. No rash noted.   Psychiatric: She has a normal mood and affect. Her behavior is normal. Judgment and thought content normal.   Vitals reviewed.      Assessment/Plan   Problem List Items Addressed This Visit        Cardiovascular and Mediastinum    Paroxysmal atrial fibrillation (CMS/HCC)    Relevant Orders    CBC & Differential    Comprehensive Metabolic Panel    Hemoglobin A1c    Lipid Panel With / Chol / HDL Ratio    Urinalysis With Microscopic If Indicated (No Culture) - Urine, Clean Catch    TSH    T4, Free    T3, Free    Iron and TIBC    Ferritin    Hyperlipidemia    Relevant Orders    CBC & Differential    Comprehensive Metabolic Panel    Hemoglobin A1c    Lipid Panel With / Chol / HDL Ratio    Urinalysis With Microscopic If Indicated (No Culture) - Urine, Clean Catch    TSH    T4, Free    T3, Free    Iron and TIBC    Ferritin    Hypertension    Relevant Orders    CBC & Differential    Comprehensive Metabolic Panel    Hemoglobin A1c    Lipid Panel With / Chol / HDL Ratio    Urinalysis With Microscopic If Indicated (No Culture) - Urine, Clean Catch    TSH    T4, Free    T3, Free    Iron and TIBC    Ferritin        Digestive    Canales's esophagus without dysplasia    Relevant Orders    CBC & Differential    Comprehensive Metabolic Panel    Hemoglobin A1c    Lipid Panel With / Chol / HDL Ratio    Urinalysis With Microscopic If Indicated (No Culture) - Urine, Clean Catch    TSH    T4, Free    T3, Free    Iron and TIBC    Ferritin    Esophagitis       Hematopoietic and Hemostatic    Iron deficiency anemia due to chronic blood loss    Relevant Orders    CBC & Differential    Comprehensive Metabolic Panel    Hemoglobin A1c    Lipid Panel With / Chol / HDL Ratio    Urinalysis With Microscopic If Indicated (No Culture) - Urine, Clean Catch    TSH    T4, Free    T3, Free    Iron and TIBC    Ferritin      Other Visit Diagnoses     Irritation symptom of skin    -  Primary    Chronic pain of right knee        Medicare annual wellness visit, subsequent        Elevated glucose         Relevant Orders    Hemoglobin A1c      Plan: Labs today.    Follow-up in 6 months for recheck.    Follow-up with orthopedics for right knee arthritis and swelling.    Continue other medications as noted.    Mupirocin ointment 3 times daily to the right lower lip.  Referral to dermatology if no better.

## 2019-10-30 LAB
ALBUMIN SERPL-MCNC: 4.9 G/DL (ref 3.5–5.2)
ALBUMIN/GLOB SERPL: 1.8 G/DL
ALP SERPL-CCNC: 104 U/L (ref 39–117)
ALT SERPL-CCNC: 13 U/L (ref 1–33)
APPEARANCE UR: CLEAR
AST SERPL-CCNC: 21 U/L (ref 1–32)
BACTERIA #/AREA URNS HPF: ABNORMAL /HPF
BASOPHILS # BLD AUTO: 0.09 10*3/MM3 (ref 0–0.2)
BASOPHILS NFR BLD AUTO: 0.8 % (ref 0–1.5)
BILIRUB SERPL-MCNC: 0.6 MG/DL (ref 0.2–1.2)
BILIRUB UR QL STRIP: NEGATIVE
BUN SERPL-MCNC: 21 MG/DL (ref 8–23)
BUN/CREAT SERPL: 17.5 (ref 7–25)
CALCIUM SERPL-MCNC: 9.9 MG/DL (ref 8.6–10.5)
CASTS URNS MICRO: ABNORMAL
CHLORIDE SERPL-SCNC: 101 MMOL/L (ref 98–107)
CHOLEST SERPL-MCNC: 174 MG/DL (ref 0–200)
CHOLEST/HDLC SERPL: 2.23 {RATIO}
CO2 SERPL-SCNC: 23.8 MMOL/L (ref 22–29)
COLOR UR: YELLOW
CREAT SERPL-MCNC: 1.2 MG/DL (ref 0.57–1)
EOSINOPHIL # BLD AUTO: 0.93 10*3/MM3 (ref 0–0.4)
EOSINOPHIL NFR BLD AUTO: 8.8 % (ref 0.3–6.2)
EPI CELLS #/AREA URNS HPF: ABNORMAL /HPF
ERYTHROCYTE [DISTWIDTH] IN BLOOD BY AUTOMATED COUNT: 21.1 % (ref 12.3–15.4)
FERRITIN SERPL-MCNC: 447 NG/ML (ref 13–150)
GLOBULIN SER CALC-MCNC: 2.8 GM/DL
GLUCOSE SERPL-MCNC: 100 MG/DL (ref 65–99)
GLUCOSE UR QL: NEGATIVE
HBA1C MFR BLD: 5.3 % (ref 4.8–5.6)
HCT VFR BLD AUTO: 33.9 % (ref 34–46.6)
HDLC SERPL-MCNC: 78 MG/DL (ref 40–60)
HGB BLD-MCNC: 10.9 G/DL (ref 12–15.9)
HGB UR QL STRIP: NEGATIVE
IMM GRANULOCYTES # BLD AUTO: 0.05 10*3/MM3 (ref 0–0.05)
IMM GRANULOCYTES NFR BLD AUTO: 0.5 % (ref 0–0.5)
IRON SATN MFR SERPL: 25 % (ref 20–50)
IRON SERPL-MCNC: 97 MCG/DL (ref 37–145)
KETONES UR QL STRIP: NEGATIVE
LDLC SERPL CALC-MCNC: 75 MG/DL (ref 0–100)
LEUKOCYTE ESTERASE UR QL STRIP: ABNORMAL
LYMPHOCYTES # BLD AUTO: 2.56 10*3/MM3 (ref 0.7–3.1)
LYMPHOCYTES NFR BLD AUTO: 24.2 % (ref 19.6–45.3)
MCH RBC QN AUTO: 27.6 PG (ref 26.6–33)
MCHC RBC AUTO-ENTMCNC: 32.2 G/DL (ref 31.5–35.7)
MCV RBC AUTO: 85.8 FL (ref 79–97)
MONOCYTES # BLD AUTO: 1.1 10*3/MM3 (ref 0.1–0.9)
MONOCYTES NFR BLD AUTO: 10.4 % (ref 5–12)
NEUTROPHILS # BLD AUTO: 5.86 10*3/MM3 (ref 1.7–7)
NEUTROPHILS NFR BLD AUTO: 55.3 % (ref 42.7–76)
NITRITE UR QL STRIP: NEGATIVE
NRBC BLD AUTO-RTO: 0.8 /100 WBC (ref 0–0.2)
PH UR STRIP: 5.5 [PH] (ref 5–8)
PLATELET # BLD AUTO: 401 10*3/MM3 (ref 140–450)
POTASSIUM SERPL-SCNC: 4.9 MMOL/L (ref 3.5–5.2)
PROT SERPL-MCNC: 7.7 G/DL (ref 6–8.5)
PROT UR QL STRIP: NEGATIVE
RBC # BLD AUTO: 3.95 10*6/MM3 (ref 3.77–5.28)
RBC #/AREA URNS HPF: ABNORMAL /HPF
SODIUM SERPL-SCNC: 141 MMOL/L (ref 136–145)
SP GR UR: 1.02 (ref 1–1.03)
T3FREE SERPL-MCNC: 2.9 PG/ML (ref 2–4.4)
T4 FREE SERPL-MCNC: 1.2 NG/DL (ref 0.93–1.7)
TIBC SERPL-MCNC: 388 MCG/DL
TRIGL SERPL-MCNC: 103 MG/DL (ref 0–150)
TSH SERPL DL<=0.005 MIU/L-ACNC: 1.55 UIU/ML (ref 0.27–4.2)
UIBC SERPL-MCNC: 291 MCG/DL (ref 112–346)
UROBILINOGEN UR STRIP-MCNC: ABNORMAL MG/DL
VLDLC SERPL CALC-MCNC: 20.6 MG/DL
WBC # BLD AUTO: 10.59 10*3/MM3 (ref 3.4–10.8)
WBC #/AREA URNS HPF: ABNORMAL /HPF

## 2020-01-13 RX ORDER — LOVASTATIN 20 MG/1
TABLET ORAL
Qty: 90 TABLET | Refills: 1 | Status: SHIPPED | OUTPATIENT
Start: 2020-01-13 | End: 2020-07-13

## 2020-01-16 RX ORDER — PANTOPRAZOLE SODIUM 20 MG/1
TABLET, DELAYED RELEASE ORAL
Qty: 90 TABLET | Refills: 0 | Status: SHIPPED | OUTPATIENT
Start: 2020-01-16 | End: 2020-08-13 | Stop reason: SDUPTHER

## 2020-02-10 RX ORDER — LISINOPRIL 40 MG/1
TABLET ORAL
Qty: 90 TABLET | Refills: 0 | Status: SHIPPED | OUTPATIENT
Start: 2020-02-10 | End: 2020-05-11

## 2020-03-18 ENCOUNTER — TELEPHONE (OUTPATIENT)
Dept: INTERNAL MEDICINE | Facility: CLINIC | Age: 85
End: 2020-03-18

## 2020-03-18 RX ORDER — AZITHROMYCIN 250 MG/1
TABLET, FILM COATED ORAL
Qty: 6 TABLET | Refills: 0 | Status: SHIPPED | OUTPATIENT
Start: 2020-03-18 | End: 2020-03-26 | Stop reason: SDUPTHER

## 2020-03-18 NOTE — TELEPHONE ENCOUNTER
The patient has had shortness of breath for a week on and off with no cough fever otherwise.  I reviewed her prior.  We will send an empiric Z-Roshan which I sent electronically to her pharmacy.  If she is no better by tomorrow she will need to be seen either here or emergency room or urgent care.  She will call us tomorrow and let us know how she is doing

## 2020-03-18 NOTE — TELEPHONE ENCOUNTER
Patient called in and stated she has a chest cold, she doesn't have any other symptoms than the breathing.    She would like to know if she can have meds sent to the pharmacy?    [facility] at [address] [facility], confirmed    Patient call back number [phone]

## 2020-03-26 ENCOUNTER — TELEPHONE (OUTPATIENT)
Dept: INTERNAL MEDICINE | Facility: CLINIC | Age: 85
End: 2020-03-26

## 2020-03-26 RX ORDER — AZITHROMYCIN 250 MG/1
TABLET, FILM COATED ORAL
Qty: 6 TABLET | Refills: 0 | Status: SHIPPED | OUTPATIENT
Start: 2020-03-26 | End: 2020-03-27

## 2020-03-26 NOTE — TELEPHONE ENCOUNTER
PATIENT STATES THAT SHE HAS FINISHED HER ANTIBIOTIC OF THE Z-CIARA FOR HER COUGH & SOB AND WAS FEELING BETTER BUT HER SYMPTOMS HAVE CAME BACK. SHE IS REQUESTING ANOTHER ROUND OF THAT MEDICATION BE SENT TO [facility]. SHE CAN BE REACHED [phone].

## 2020-03-27 RX ORDER — AZITHROMYCIN 250 MG/1
TABLET, FILM COATED ORAL
Qty: 6 TABLET | Refills: 0 | Status: SHIPPED | OUTPATIENT
Start: 2020-03-27 | End: 2020-04-30

## 2020-04-16 RX ORDER — PANTOPRAZOLE SODIUM 20 MG/1
TABLET, DELAYED RELEASE ORAL
Qty: 90 TABLET | Refills: 3 | OUTPATIENT
Start: 2020-04-16

## 2020-04-30 ENCOUNTER — OFFICE VISIT (OUTPATIENT)
Dept: INTERNAL MEDICINE | Facility: CLINIC | Age: 85
End: 2020-04-30

## 2020-04-30 DIAGNOSIS — I48.0 PAROXYSMAL ATRIAL FIBRILLATION (HCC): ICD-10-CM

## 2020-04-30 DIAGNOSIS — E78.49 OTHER HYPERLIPIDEMIA: ICD-10-CM

## 2020-04-30 DIAGNOSIS — J20.9 ACUTE BRONCHITIS, UNSPECIFIED ORGANISM: ICD-10-CM

## 2020-04-30 DIAGNOSIS — I10 HYPERTENSION, UNSPECIFIED TYPE: Primary | ICD-10-CM

## 2020-04-30 PROCEDURE — 99442 PR PHYS/QHP TELEPHONE EVALUATION 11-20 MIN: CPT | Performed by: FAMILY MEDICINE

## 2020-05-11 RX ORDER — LISINOPRIL 40 MG/1
TABLET ORAL
Qty: 90 TABLET | Refills: 0 | Status: SHIPPED | OUTPATIENT
Start: 2020-05-11 | End: 2020-07-31 | Stop reason: SDUPTHER

## 2020-05-24 DIAGNOSIS — I48.0 PAROXYSMAL ATRIAL FIBRILLATION (HCC): ICD-10-CM

## 2020-05-24 DIAGNOSIS — I10 HYPERTENSION, UNSPECIFIED TYPE: ICD-10-CM

## 2020-05-26 RX ORDER — METOPROLOL SUCCINATE 25 MG/1
TABLET, EXTENDED RELEASE ORAL
Qty: 45 TABLET | Refills: 0 | Status: SHIPPED | OUTPATIENT
Start: 2020-05-26 | End: 2020-07-31 | Stop reason: SDUPTHER

## 2020-05-26 RX ORDER — AMLODIPINE BESYLATE 5 MG/1
TABLET ORAL
Qty: 90 TABLET | Refills: 0 | Status: SHIPPED | OUTPATIENT
Start: 2020-05-26 | End: 2020-07-31 | Stop reason: SDUPTHER

## 2020-07-13 RX ORDER — LOVASTATIN 20 MG/1
TABLET ORAL
Qty: 90 TABLET | Refills: 3 | Status: SHIPPED | OUTPATIENT
Start: 2020-07-13 | End: 2021-06-03

## 2020-07-31 ENCOUNTER — OFFICE VISIT (OUTPATIENT)
Dept: INTERNAL MEDICINE | Facility: CLINIC | Age: 85
End: 2020-07-31

## 2020-07-31 ENCOUNTER — TELEPHONE (OUTPATIENT)
Dept: GASTROENTEROLOGY | Facility: CLINIC | Age: 85
End: 2020-07-31

## 2020-07-31 ENCOUNTER — APPOINTMENT (OUTPATIENT)
Dept: WOMENS IMAGING | Facility: HOSPITAL | Age: 85
End: 2020-07-31

## 2020-07-31 VITALS
DIASTOLIC BLOOD PRESSURE: 80 MMHG | HEART RATE: 60 BPM | HEIGHT: 68 IN | OXYGEN SATURATION: 96 % | WEIGHT: 188 LBS | SYSTOLIC BLOOD PRESSURE: 134 MMHG | BODY MASS INDEX: 28.49 KG/M2

## 2020-07-31 DIAGNOSIS — E78.2 MIXED HYPERLIPIDEMIA: ICD-10-CM

## 2020-07-31 DIAGNOSIS — I48.0 PAROXYSMAL ATRIAL FIBRILLATION (HCC): ICD-10-CM

## 2020-07-31 DIAGNOSIS — D50.8 OTHER IRON DEFICIENCY ANEMIA: ICD-10-CM

## 2020-07-31 DIAGNOSIS — I10 HYPERTENSION, UNSPECIFIED TYPE: ICD-10-CM

## 2020-07-31 DIAGNOSIS — R05.9 COUGH: Primary | ICD-10-CM

## 2020-07-31 DIAGNOSIS — R06.02 SHORTNESS OF BREATH: ICD-10-CM

## 2020-07-31 LAB
ALBUMIN SERPL-MCNC: 4.7 G/DL (ref 3.5–5.2)
ALBUMIN/GLOB SERPL: 2 G/DL
ALP SERPL-CCNC: 83 U/L (ref 39–117)
ALT SERPL-CCNC: 10 U/L (ref 1–33)
APPEARANCE UR: CLEAR
AST SERPL-CCNC: 17 U/L (ref 1–32)
BACTERIA #/AREA URNS HPF: ABNORMAL /HPF
BASOPHILS # BLD AUTO: 0.1 10*3/MM3 (ref 0–0.2)
BASOPHILS NFR BLD AUTO: 0.9 % (ref 0–1.5)
BILIRUB SERPL-MCNC: 0.8 MG/DL (ref 0–1.2)
BILIRUB UR QL STRIP: NEGATIVE
BUN SERPL-MCNC: 15 MG/DL (ref 8–23)
BUN/CREAT SERPL: 12.7 (ref 7–25)
CALCIUM SERPL-MCNC: 9.4 MG/DL (ref 8.6–10.5)
CASTS URNS MICRO: ABNORMAL
CHLORIDE SERPL-SCNC: 106 MMOL/L (ref 98–107)
CHOLEST SERPL-MCNC: 156 MG/DL (ref 0–200)
CHOLEST/HDLC SERPL: 2.14 {RATIO}
CO2 SERPL-SCNC: 24.9 MMOL/L (ref 22–29)
COLOR UR: YELLOW
CREAT SERPL-MCNC: 1.18 MG/DL (ref 0.57–1)
EOSINOPHIL # BLD AUTO: 0.5 10*3/MM3 (ref 0–0.4)
EOSINOPHIL NFR BLD AUTO: 4.7 % (ref 0.3–6.2)
EPI CELLS #/AREA URNS HPF: ABNORMAL /HPF
ERYTHROCYTE [DISTWIDTH] IN BLOOD BY AUTOMATED COUNT: 19.8 % (ref 12.3–15.4)
FERRITIN SERPL-MCNC: 456 NG/ML (ref 13–150)
GLOBULIN SER CALC-MCNC: 2.3 GM/DL
GLUCOSE SERPL-MCNC: 89 MG/DL (ref 65–99)
GLUCOSE UR QL: NEGATIVE
HCT VFR BLD AUTO: 34.7 % (ref 34–46.6)
HDLC SERPL-MCNC: 73 MG/DL (ref 40–60)
HGB BLD-MCNC: 10.7 G/DL (ref 12–15.9)
HGB UR QL STRIP: NEGATIVE
IMM GRANULOCYTES # BLD AUTO: 0.04 10*3/MM3 (ref 0–0.05)
IMM GRANULOCYTES NFR BLD AUTO: 0.4 % (ref 0–0.5)
IRON SATN MFR SERPL: 32 % (ref 20–50)
IRON SERPL-MCNC: 111 MCG/DL (ref 37–145)
KETONES UR QL STRIP: NEGATIVE
LDLC SERPL CALC-MCNC: 70 MG/DL (ref 0–100)
LEUKOCYTE ESTERASE UR QL STRIP: ABNORMAL
LYMPHOCYTES # BLD AUTO: 1.88 10*3/MM3 (ref 0.7–3.1)
LYMPHOCYTES NFR BLD AUTO: 17.8 % (ref 19.6–45.3)
MCH RBC QN AUTO: 27.1 PG (ref 26.6–33)
MCHC RBC AUTO-ENTMCNC: 30.8 G/DL (ref 31.5–35.7)
MCV RBC AUTO: 87.8 FL (ref 79–97)
MONOCYTES # BLD AUTO: 1.04 10*3/MM3 (ref 0.1–0.9)
MONOCYTES NFR BLD AUTO: 9.8 % (ref 5–12)
NEUTROPHILS # BLD AUTO: 7.03 10*3/MM3 (ref 1.7–7)
NEUTROPHILS NFR BLD AUTO: 66.4 % (ref 42.7–76)
NITRITE UR QL STRIP: NEGATIVE
NRBC BLD AUTO-RTO: 0.6 /100 WBC (ref 0–0.2)
PH UR STRIP: 5.5 [PH] (ref 5–8)
PLATELET # BLD AUTO: 349 10*3/MM3 (ref 140–450)
POTASSIUM SERPL-SCNC: 5.2 MMOL/L (ref 3.5–5.2)
PROT SERPL-MCNC: 7 G/DL (ref 6–8.5)
PROT UR QL STRIP: NEGATIVE
RBC # BLD AUTO: 3.95 10*6/MM3 (ref 3.77–5.28)
RBC #/AREA URNS HPF: ABNORMAL /HPF
SODIUM SERPL-SCNC: 141 MMOL/L (ref 136–145)
SP GR UR: 1.02 (ref 1–1.03)
TIBC SERPL-MCNC: 346 MCG/DL
TRIGL SERPL-MCNC: 65 MG/DL (ref 0–150)
TSH SERPL DL<=0.005 MIU/L-ACNC: 1.31 UIU/ML (ref 0.27–4.2)
UIBC SERPL-MCNC: 235 MCG/DL (ref 112–346)
UROBILINOGEN UR STRIP-MCNC: ABNORMAL MG/DL
VIT B12 SERPL-MCNC: 320 PG/ML (ref 211–946)
VLDLC SERPL CALC-MCNC: 13 MG/DL
WBC # BLD AUTO: 10.59 10*3/MM3 (ref 3.4–10.8)
WBC #/AREA URNS HPF: ABNORMAL /HPF

## 2020-07-31 PROCEDURE — 71046 X-RAY EXAM CHEST 2 VIEWS: CPT | Performed by: RADIOLOGY

## 2020-07-31 PROCEDURE — 99214 OFFICE O/P EST MOD 30 MIN: CPT | Performed by: FAMILY MEDICINE

## 2020-07-31 PROCEDURE — 71046 X-RAY EXAM CHEST 2 VIEWS: CPT | Performed by: FAMILY MEDICINE

## 2020-07-31 RX ORDER — METOPROLOL SUCCINATE 25 MG/1
12.5 TABLET, EXTENDED RELEASE ORAL DAILY
Qty: 45 TABLET | Refills: 1 | Status: SHIPPED | OUTPATIENT
Start: 2020-07-31 | End: 2021-01-27

## 2020-07-31 RX ORDER — AMLODIPINE BESYLATE 5 MG/1
5 TABLET ORAL DAILY
Qty: 90 TABLET | Refills: 1 | Status: SHIPPED | OUTPATIENT
Start: 2020-07-31 | End: 2021-01-27

## 2020-07-31 RX ORDER — LISINOPRIL 40 MG/1
40 TABLET ORAL DAILY
Qty: 90 TABLET | Refills: 1 | Status: SHIPPED | OUTPATIENT
Start: 2020-07-31 | End: 2021-01-27

## 2020-07-31 NOTE — TELEPHONE ENCOUNTER
Received fax request from [facility] for refill on pantoprazole.  Denial faxed to them at [phone] and fax confirmation received.  Pt last seen 03/28/2019.

## 2020-07-31 NOTE — PROGRESS NOTES
Subjective   Ama Billy is a 86 y.o. female.     Chief Complaint   Patient presents with   • Hyperlipidemia   • Hypertension         History of Present Illness   Very pleasant lady who is been self quarantine with cold or virus going on.    She has a history of one episode of paroxysmal atrial fibrillation with now normal sinus rhythm.  She is been off anticoagulation secondary to history of GI bleed.  Last hemoglobin is less than 11 last fall.  Recheck labs today.    She continues low-dose aspirin 81 mg nightly.    She is treated for hypertension hyperlipidemia and also history of gastroesophageal reflux disease with Canales's esophagus without dysplasia.    She has had 2 self-limited periods of shortness of air in the morning with cough for the past 4 months to 6 months.  No recent episodes are noted.  Prior evaluation by cardiology last fall shortness of air was thought to be secondary to deconditioning with negative cardiac general: No distress alert oriented ×3 pleasant   Pupils equal round react to light  Head atraumatic  HEENT TMs normal bilaterally  Posterior oropharynx nonerythematous  Neck supple no adenopathy or thyromegaly.  Chest atraumatic  Heart regular without murmur or click  Lungs clear to auscultation  Abdomen soft with normal bowel sounds and no obvious organomegaly   rectal deferred  Extremities no edema  Neurologic no focal abnormalities  Psychiatric normal judgment and affect.      The following portions of the patient's history were reviewed and updated as appropriate: allergies, current medications, past social history and problem list.    Review of Systems   Constitutional: Negative.    HENT: Negative.    Eyes: Negative.    Respiratory: Positive for cough and shortness of breath.    Cardiovascular: Negative.    Gastrointestinal: Negative.    Endocrine: Negative.    Genitourinary: Negative.    Musculoskeletal: Negative.    Skin: Negative.    Allergic/Immunologic: Negative.     Neurological: Negative.    Hematological: Negative.    Psychiatric/Behavioral: Negative.        Objective   Vitals:    07/31/20 0803   BP: 134/80   Pulse: 60   SpO2: 96%     Physical Exam   Constitutional: She is oriented to person, place, and time. She appears well-developed and well-nourished.   HENT:   Head: Normocephalic and atraumatic.   Right Ear: Tympanic membrane and external ear normal.   Left Ear: Tympanic membrane and external ear normal.   Nose: Nose normal.   Mouth/Throat: Oropharynx is clear and moist.   Eyes: Pupils are equal, round, and reactive to light. Conjunctivae and EOM are normal.   Neck: Normal range of motion. Neck supple. No JVD present. No thyromegaly present.   Cardiovascular: Normal rate, regular rhythm, normal heart sounds and intact distal pulses.   Pulmonary/Chest: Effort normal and breath sounds normal.   Abdominal: Soft. Bowel sounds are normal.   Musculoskeletal: Normal range of motion.   Lymphadenopathy:     She has no cervical adenopathy.   Neurological: She is alert and oriented to person, place, and time. No cranial nerve deficit. Coordination normal.   Skin: Skin is warm and dry. No rash noted.   Psychiatric: She has a normal mood and affect. Her behavior is normal. Judgment and thought content normal.   Vitals reviewed.      Assessment/Plan   Problem List Items Addressed This Visit        Cardiovascular and Mediastinum    Paroxysmal atrial fibrillation (CMS/HCC)    Relevant Medications    amLODIPine (NORVASC) 5 MG tablet    metoprolol succinate XL (TOPROL-XL) 25 MG 24 hr tablet    Other Relevant Orders    Iron and TIBC    Ferritin    CBC & Differential    Comprehensive Metabolic Panel    Lipid Panel With / Chol / HDL Ratio    Urinalysis With Microscopic If Indicated (No Culture) - Urine, Clean Catch    TSH    Vitamin B12    Hyperlipidemia    Hypertension    Relevant Medications    amLODIPine (NORVASC) 5 MG tablet    lisinopril (PRINIVIL,ZESTRIL) 40 MG tablet    metoprolol  succinate XL (TOPROL-XL) 25 MG 24 hr tablet    Other Relevant Orders    Iron and TIBC    Ferritin    CBC & Differential    Comprehensive Metabolic Panel    Lipid Panel With / Chol / HDL Ratio    Urinalysis With Microscopic If Indicated (No Culture) - Urine, Clean Catch    TSH    Vitamin B12       Hematopoietic and Hemostatic    Iron deficiency anemia    Relevant Orders    Iron and TIBC    Ferritin    CBC & Differential    Comprehensive Metabolic Panel    Lipid Panel With / Chol / HDL Ratio    Urinalysis With Microscopic If Indicated (No Culture) - Urine, Clean Catch    TSH    Vitamin B12      Other Visit Diagnoses     Cough    -  Primary    Relevant Orders    Iron and TIBC    Ferritin    CBC & Differential    Comprehensive Metabolic Panel    Lipid Panel With / Chol / HDL Ratio    Urinalysis With Microscopic If Indicated (No Culture) - Urine, Clean Catch    TSH    Vitamin B12    Shortness of breath          Plan: Chest x-ray PA lateral.    Recheck 6 months Medicare wellness.    Medications refill.    Labs pending.    Recheck iron studies plus CBC.

## 2020-08-13 ENCOUNTER — TELEPHONE (OUTPATIENT)
Dept: GASTROENTEROLOGY | Facility: CLINIC | Age: 85
End: 2020-08-13

## 2020-08-13 RX ORDER — PANTOPRAZOLE SODIUM 20 MG/1
20 TABLET, DELAYED RELEASE ORAL DAILY
Qty: 90 TABLET | Refills: 0 | Status: SHIPPED | OUTPATIENT
Start: 2020-08-13 | End: 2020-09-17 | Stop reason: SDUPTHER

## 2020-08-13 NOTE — TELEPHONE ENCOUNTER
Called pt and advised pt that she is due for her yearly f/u ( pt last seen 03/28/2019).  Pt verb understanding and made appt for 09/14 at 930am with Brenda KILGORE. Advised pt we will escribe her pantoprazole 20mg daily to [facility]. Pt verb understanding.

## 2020-08-13 NOTE — TELEPHONE ENCOUNTER
----- Message from Vani Ervin sent at 8/12/2020  4:14 PM EDT -----  Contact: [phone]  Patient is needing a refill on pantroprazole 20mg, 1 capsule daily.

## 2020-09-17 ENCOUNTER — OFFICE VISIT (OUTPATIENT)
Dept: GASTROENTEROLOGY | Facility: CLINIC | Age: 85
End: 2020-09-17

## 2020-09-17 VITALS — HEIGHT: 68 IN | BODY MASS INDEX: 28.64 KG/M2 | WEIGHT: 189 LBS | TEMPERATURE: 97 F

## 2020-09-17 DIAGNOSIS — K58.2 IRRITABLE BOWEL SYNDROME WITH BOTH CONSTIPATION AND DIARRHEA: Primary | ICD-10-CM

## 2020-09-17 DIAGNOSIS — K21.9 GASTROESOPHAGEAL REFLUX DISEASE, ESOPHAGITIS PRESENCE NOT SPECIFIED: ICD-10-CM

## 2020-09-17 DIAGNOSIS — K22.70 BARRETT'S ESOPHAGUS WITHOUT DYSPLASIA: ICD-10-CM

## 2020-09-17 DIAGNOSIS — D12.6 ADENOMATOUS POLYP OF COLON, UNSPECIFIED PART OF COLON: ICD-10-CM

## 2020-09-17 PROCEDURE — 99214 OFFICE O/P EST MOD 30 MIN: CPT | Performed by: NURSE PRACTITIONER

## 2020-09-17 RX ORDER — PANTOPRAZOLE SODIUM 20 MG/1
20 TABLET, DELAYED RELEASE ORAL DAILY
Qty: 90 TABLET | Refills: 3 | Status: SHIPPED | OUTPATIENT
Start: 2020-09-17 | End: 2022-04-29 | Stop reason: SDUPTHER

## 2020-09-17 NOTE — PROGRESS NOTES
Chief Complaint   Patient presents with   • Irritable Bowel Syndrome       Ama Billy is a  86 y.o. female here for a follow up visit for IBS.    HPI  86-year-old female presents today for follow-up visit for IBS-mixed and GERD.  She is a patient of Dr. Peralta.  She was last seen in the office on 3/28/2019.  She has a history of GERD/Canales's esophagus without dysplasia and admits she does well on Protonix 20 mg every day.  She denies any breakthrough reflux at this time.  She also has a history of IBS-mixed and admits as long she takes her daily MiraLAX and fiber she does really well.  Her bowels are moving good.  She denies any dysphagia, reflux, abdominal, nausea vomiting, diarrhea, constipation, rectal bleeding or melena.  She admits appetite is good and her weight is stable.  Her last EGD and colonoscopy was in 2018.  She does have a history of adenomatous colon polyps.  Past Medical History:   Diagnosis Date   • Anemia     Normocytic anemia of unclear ediology   • Arthritis     Osteoarthritis involving knees and left shoulder   • Chronic venous insufficiency    • Eosinophilia     Persistent mild eosinophilia of unknown etiology   • History of colonic polyps    • Hyperlipidemia    • Hypertension    • Paroxysmal atrial fibrillation (CMS/HCC)        Past Surgical History:   Procedure Laterality Date   • COLONOSCOPY      H/O colonic polyps with regular colonoscopies at 5 year intervals.    • COLONOSCOPY  2013    By Dr. Gudino, no polyps identified.   • COLONOSCOPY N/A 7/3/2018    IH, one TA w/low grade dysplasi   • ENDOSCOPY N/A 11/15/2017    Small HH, LA Grade B reflux, acute and erosive ulcerative esophagitis,    • ENDOSCOPY N/A 2/27/2018    LA Grade B esophagitis, HH, gastritis, Path: Canales's w/out dysplasia   • HEMORRHOIDECTOMY  1965   • HYSTERECTOMY  1971    Partial, secondary to endometriosis   • JOINT REPLACEMENT  2014    Left total hip arthroplasy   • KNEE SURGERY  1997    Arthroscopy of left knee  1997; right knee 2003       Scheduled Meds:    Continuous Infusions:No current facility-administered medications for this visit.       PRN Meds:.    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     Employer: RETIRED   Tobacco Use   • Smoking status: Never Smoker   • Smokeless tobacco: Never Used   Substance and Sexual Activity   • Alcohol use: No     Frequency: Never   • Drug use: No   • Sexual activity: Defer       Family History   Problem Relation Age of Onset   • Coronary artery disease Father    • Heart disease Father    • Heart attack Father 72   • Coronary artery disease Brother    • Heart disease Brother         CABG   • Malig Hyperthermia Neg Hx        Review of Systems   Constitutional: Negative for appetite change, chills, diaphoresis, fatigue, fever and unexpected weight change.   HENT: Negative for nosebleeds, postnasal drip, sore throat, trouble swallowing and voice change.    Respiratory: Negative for cough, choking, chest tightness, shortness of breath and wheezing.    Cardiovascular: Negative for chest pain, palpitations and leg swelling.   Gastrointestinal: Negative for abdominal distention, abdominal pain, anal bleeding, blood in stool, constipation, diarrhea, nausea, rectal pain and vomiting.   Endocrine: Negative for polydipsia, polyphagia and polyuria.   Musculoskeletal: Negative for gait problem.   Skin: Negative for rash and wound.   Allergic/Immunologic: Negative for food allergies.   Neurological: Negative for dizziness, speech difficulty and light-headedness.   Psychiatric/Behavioral: Negative for  confusion, self-injury, sleep disturbance and suicidal ideas.       Vitals:    09/17/20 1118   Temp: 97 °F (36.1 °C)       Physical Exam  Constitutional:       General: She is not in acute distress.     Appearance: She is well-developed. She is not ill-appearing.   HENT:      Head: Normocephalic.   Eyes:      Pupils: Pupils are equal, round, and reactive to light.   Cardiovascular:      Rate and Rhythm: Normal rate and regular rhythm.      Heart sounds: Normal heart sounds.   Pulmonary:      Effort: Pulmonary effort is normal.      Breath sounds: Normal breath sounds.   Abdominal:      General: Bowel sounds are normal. There is no distension.      Palpations: Abdomen is soft. There is no mass.      Tenderness: There is no abdominal tenderness. There is no guarding or rebound.      Hernia: No hernia is present.   Musculoskeletal: Normal range of motion.   Skin:     General: Skin is warm and dry.   Neurological:      Mental Status: She is alert and oriented to person, place, and time.   Psychiatric:         Speech: Speech normal.         Behavior: Behavior normal.         Judgment: Judgment normal.         No radiology results for the last 7 days     Assessment and plan     1. Irritable bowel syndrome with both constipation and diarrhea    2. Canales's esophagus without dysplasia    3. Gastroesophageal reflux disease, esophagitis presence not specified    4. Adenomatous polyp of colon, unspecified part of colon    GERD seems well controlled on Protonix 20 mg once daily.  Continue GERD precautions.  IBS-mixed is well controlled on MiraLAX and daily fiber.  Bowels are moving well.  Good appetite and good weight.  Patient to call the office with any issues.  Patient to follow-up with me in 1 year.

## 2021-01-27 DIAGNOSIS — I10 HYPERTENSION, UNSPECIFIED TYPE: ICD-10-CM

## 2021-01-27 DIAGNOSIS — I48.0 PAROXYSMAL ATRIAL FIBRILLATION (HCC): ICD-10-CM

## 2021-01-27 RX ORDER — LISINOPRIL 40 MG/1
TABLET ORAL
Qty: 90 TABLET | Refills: 3 | Status: SHIPPED | OUTPATIENT
Start: 2021-01-27 | End: 2022-01-24

## 2021-01-27 RX ORDER — AMLODIPINE BESYLATE 5 MG/1
TABLET ORAL
Qty: 90 TABLET | Refills: 3 | Status: SHIPPED | OUTPATIENT
Start: 2021-01-27 | End: 2022-01-24

## 2021-01-27 RX ORDER — METOPROLOL SUCCINATE 25 MG/1
TABLET, EXTENDED RELEASE ORAL
Qty: 45 TABLET | Refills: 3 | Status: SHIPPED | OUTPATIENT
Start: 2021-01-27 | End: 2022-01-24

## 2021-02-09 ENCOUNTER — OFFICE VISIT (OUTPATIENT)
Dept: INTERNAL MEDICINE | Facility: CLINIC | Age: 86
End: 2021-02-09

## 2021-02-09 VITALS
HEART RATE: 65 BPM | SYSTOLIC BLOOD PRESSURE: 130 MMHG | WEIGHT: 189 LBS | TEMPERATURE: 97.8 F | HEIGHT: 68 IN | OXYGEN SATURATION: 96 % | BODY MASS INDEX: 28.64 KG/M2 | DIASTOLIC BLOOD PRESSURE: 80 MMHG

## 2021-02-09 DIAGNOSIS — D64.9 ANEMIA, UNSPECIFIED TYPE: ICD-10-CM

## 2021-02-09 DIAGNOSIS — E55.9 HYPOVITAMINOSIS D: ICD-10-CM

## 2021-02-09 DIAGNOSIS — E78.2 MIXED HYPERLIPIDEMIA: ICD-10-CM

## 2021-02-09 DIAGNOSIS — K22.70 BARRETT'S ESOPHAGUS WITHOUT DYSPLASIA: ICD-10-CM

## 2021-02-09 DIAGNOSIS — I10 HYPERTENSION, UNSPECIFIED TYPE: Primary | ICD-10-CM

## 2021-02-09 DIAGNOSIS — Z00.00 MEDICARE ANNUAL WELLNESS VISIT, SUBSEQUENT: ICD-10-CM

## 2021-02-09 LAB
ALBUMIN SERPL-MCNC: 4.5 G/DL (ref 3.5–5.2)
ALBUMIN/GLOB SERPL: 1.7 G/DL
ALP SERPL-CCNC: 87 U/L (ref 39–117)
ALT SERPL-CCNC: 10 U/L (ref 1–33)
APPEARANCE UR: CLEAR
AST SERPL-CCNC: 19 U/L (ref 1–32)
BACTERIA #/AREA URNS HPF: ABNORMAL /HPF
BASOPHILS # BLD AUTO: 0.08 10*3/MM3 (ref 0–0.2)
BASOPHILS NFR BLD AUTO: 0.8 % (ref 0–1.5)
BILIRUB SERPL-MCNC: 0.8 MG/DL (ref 0–1.2)
BILIRUB UR QL STRIP: NEGATIVE
BUN SERPL-MCNC: 20 MG/DL (ref 8–23)
BUN/CREAT SERPL: 14.2 (ref 7–25)
CALCIUM SERPL-MCNC: 9.8 MG/DL (ref 8.6–10.5)
CASTS URNS MICRO: ABNORMAL
CHLORIDE SERPL-SCNC: 103 MMOL/L (ref 98–107)
CHOLEST SERPL-MCNC: 157 MG/DL (ref 0–200)
CHOLEST/HDLC SERPL: 2.12 {RATIO}
CO2 SERPL-SCNC: 26.8 MMOL/L (ref 22–29)
COLOR UR: YELLOW
CREAT SERPL-MCNC: 1.41 MG/DL (ref 0.57–1)
EOSINOPHIL # BLD AUTO: 0.55 10*3/MM3 (ref 0–0.4)
EOSINOPHIL NFR BLD AUTO: 5.8 % (ref 0.3–6.2)
EPI CELLS #/AREA URNS HPF: ABNORMAL /HPF
ERYTHROCYTE [DISTWIDTH] IN BLOOD BY AUTOMATED COUNT: 20.6 % (ref 12.3–15.4)
FERRITIN SERPL-MCNC: 485 NG/ML (ref 13–150)
GLOBULIN SER CALC-MCNC: 2.7 GM/DL
GLUCOSE SERPL-MCNC: 88 MG/DL (ref 65–99)
GLUCOSE UR QL: NEGATIVE
HCT VFR BLD AUTO: 32.8 % (ref 34–46.6)
HDLC SERPL-MCNC: 74 MG/DL (ref 40–60)
HGB BLD-MCNC: 10.4 G/DL (ref 12–15.9)
HGB UR QL STRIP: NEGATIVE
IMM GRANULOCYTES # BLD AUTO: 0.05 10*3/MM3 (ref 0–0.05)
IMM GRANULOCYTES NFR BLD AUTO: 0.5 % (ref 0–0.5)
IRON SATN MFR SERPL: 31 % (ref 20–50)
IRON SERPL-MCNC: 114 MCG/DL (ref 37–145)
KETONES UR QL STRIP: NEGATIVE
LDLC SERPL CALC-MCNC: 71 MG/DL (ref 0–100)
LEUKOCYTE ESTERASE UR QL STRIP: ABNORMAL
LYMPHOCYTES # BLD AUTO: 1.69 10*3/MM3 (ref 0.7–3.1)
LYMPHOCYTES NFR BLD AUTO: 17.8 % (ref 19.6–45.3)
MCH RBC QN AUTO: 27.5 PG (ref 26.6–33)
MCHC RBC AUTO-ENTMCNC: 31.7 G/DL (ref 31.5–35.7)
MCV RBC AUTO: 86.8 FL (ref 79–97)
MONOCYTES # BLD AUTO: 0.97 10*3/MM3 (ref 0.1–0.9)
MONOCYTES NFR BLD AUTO: 10.2 % (ref 5–12)
NEUTROPHILS # BLD AUTO: 6.15 10*3/MM3 (ref 1.7–7)
NEUTROPHILS NFR BLD AUTO: 64.9 % (ref 42.7–76)
NITRITE UR QL STRIP: NEGATIVE
NRBC BLD AUTO-RTO: 0.4 /100 WBC (ref 0–0.2)
PH UR STRIP: 5.5 [PH] (ref 5–8)
PLATELET # BLD AUTO: 362 10*3/MM3 (ref 140–450)
POTASSIUM SERPL-SCNC: 4.9 MMOL/L (ref 3.5–5.2)
PROT SERPL-MCNC: 7.2 G/DL (ref 6–8.5)
PROT UR QL STRIP: NEGATIVE
RBC # BLD AUTO: 3.78 10*6/MM3 (ref 3.77–5.28)
RBC #/AREA URNS HPF: ABNORMAL /HPF
SODIUM SERPL-SCNC: 140 MMOL/L (ref 136–145)
SP GR UR: 1.02 (ref 1–1.03)
T4 FREE SERPL-MCNC: 1.21 NG/DL (ref 0.93–1.7)
TIBC SERPL-MCNC: 365 MCG/DL
TRIGL SERPL-MCNC: 57 MG/DL (ref 0–150)
TSH SERPL DL<=0.005 MIU/L-ACNC: 1.65 UIU/ML (ref 0.27–4.2)
UIBC SERPL-MCNC: 251 MCG/DL (ref 112–346)
UROBILINOGEN UR STRIP-MCNC: ABNORMAL MG/DL
VIT B12 SERPL-MCNC: 312 PG/ML (ref 211–946)
VLDLC SERPL CALC-MCNC: 12 MG/DL (ref 5–40)
WBC # BLD AUTO: 9.49 10*3/MM3 (ref 3.4–10.8)
WBC #/AREA URNS HPF: ABNORMAL /HPF

## 2021-02-09 PROCEDURE — 99214 OFFICE O/P EST MOD 30 MIN: CPT | Performed by: FAMILY MEDICINE

## 2021-02-09 PROCEDURE — G0439 PPPS, SUBSEQ VISIT: HCPCS | Performed by: FAMILY MEDICINE

## 2021-02-09 PROCEDURE — 1159F MED LIST DOCD IN RCRD: CPT | Performed by: FAMILY MEDICINE

## 2021-02-09 PROCEDURE — 1170F FXNL STATUS ASSESSED: CPT | Performed by: FAMILY MEDICINE

## 2021-02-09 NOTE — PROGRESS NOTES
"Chief Complaint  Medicare Wellness-subsequent, Hypertension, and Hyperlipidemia    Subjective          Ama Billy presents to [facility]  Delightful lady with history of hyperlipidemia hypertension gastroesophageal reflux disease with Canales's esophagus and some degree of anemia over the past couple of years.  She has been seen by gastroenterology as well.  She does not have an acute bleed.  Recheck labs today in reference to iron studies B12 and CBC is also hyperlipidemia.  Symptomatically she is doing well and she is on the list to get COVID-19 vaccination.  Otherwise she is independent and fall risk seems manageable.         Objective   Vital Signs:   /80   Pulse 65   Temp 97.8 °F (36.6 °C)   Ht 172.7 cm (67.99\")   Wt 85.7 kg (189 lb)   SpO2 96%   BMI 28.74 kg/m²     Physical Exam  Vitals signs reviewed.   Constitutional:       Appearance: She is well-developed.   HENT:      Head: Normocephalic and atraumatic.      Right Ear: Tympanic membrane and external ear normal.      Left Ear: Tympanic membrane and external ear normal.   Eyes:      Conjunctiva/sclera: Conjunctivae normal.      Pupils: Pupils are equal, round, and reactive to light.   Neck:      Musculoskeletal: Normal range of motion and neck supple.      Thyroid: No thyromegaly.      Vascular: No JVD.   Cardiovascular:      Rate and Rhythm: Normal rate and regular rhythm.      Heart sounds: Murmur present. Systolic murmur present with a grade of 2/6.      Comments: Faint murmur perhaps mitral regurgitation.  Echocardiogram reviewed from 3 years ago.  Pulmonary:      Effort: Pulmonary effort is normal.      Breath sounds: Normal breath sounds.   Abdominal:      General: Bowel sounds are normal.      Palpations: Abdomen is soft.   Musculoskeletal: Normal range of motion.   Lymphadenopathy:      Cervical: No cervical adenopathy.   Skin:     General: Skin is warm and dry.      Findings: No rash. "   Neurological:      Mental Status: She is alert and oriented to person, place, and time.      Cranial Nerves: No cranial nerve deficit.      Coordination: Coordination normal.   Psychiatric:         Behavior: Behavior normal.         Thought Content: Thought content normal.         Judgment: Judgment normal.        Result Review :   The following data was reviewed by: Con Canas Jr., MD on 02/09/2021:  Common labs    Common Labsle 7/31/20 7/31/20 7/31/20    0940 0940 0940   Glucose  89    BUN  15    Creatinine  1.18 (A)    eGFR Non  Am  43 (A)    eGFR  Am  53 (A)    Sodium  141    Potassium  5.2    Chloride  106    Calcium  9.4    Total Protein  7.0    Albumin  4.70    Total Bilirubin  0.8    Alkaline Phosphatase  83    AST (SGOT)  17    ALT (SGPT)  10    WBC 10.59     Hemoglobin 10.7 (A)     Hematocrit 34.7     Platelets 349     Total Cholesterol   156   Triglycerides   65   HDL Cholesterol   73 (A)   LDL Cholesterol    70   (A) Abnormal value       Comments are available for some flowsheets but are not being displayed.                     Assessment and Plan    Diagnoses and all orders for this visit:    1. Hypertension, unspecified type (Primary)  -     CBC & Differential  -     Comprehensive Metabolic Panel  -     Lipid Panel With / Chol / HDL Ratio  -     Vitamin B12  -     TSH  -     T4, Free  -     Urinalysis With Microscopic If Indicated (No Culture) - Urine, Clean Catch  -     Iron and TIBC  -     Ferritin    2. Mixed hyperlipidemia  -     CBC & Differential  -     Comprehensive Metabolic Panel  -     Lipid Panel With / Chol / HDL Ratio  -     Vitamin B12  -     TSH  -     T4, Free  -     Urinalysis With Microscopic If Indicated (No Culture) - Urine, Clean Catch  -     Iron and TIBC  -     Ferritin    3. Hypovitaminosis D  -     CBC & Differential  -     Comprehensive Metabolic Panel  -     Lipid Panel With / Chol / HDL Ratio  -     Vitamin B12  -     TSH  -     T4, Free  -     Urinalysis  With Microscopic If Indicated (No Culture) - Urine, Clean Catch  -     Iron and TIBC  -     Ferritin    4. Canales's esophagus without dysplasia  -     CBC & Differential  -     Comprehensive Metabolic Panel  -     Lipid Panel With / Chol / HDL Ratio  -     Vitamin B12  -     TSH  -     T4, Free  -     Urinalysis With Microscopic If Indicated (No Culture) - Urine, Clean Catch  -     Iron and TIBC  -     Ferritin    5. Anemia, unspecified type  -     CBC & Differential  -     Comprehensive Metabolic Panel  -     Lipid Panel With / Chol / HDL Ratio  -     Vitamin B12  -     TSH  -     T4, Free  -     Urinalysis With Microscopic If Indicated (No Culture) - Urine, Clean Catch  -     Iron and TIBC  -     Ferritin    6. Medicare annual wellness visit, subsequent      Plan: Continue current medications labs pending as well as iron studies.  No change in medications this time recheck in 6 months.  Reviewed gastroenterology report as well as prior echocardiogram.      Follow Up   No follow-ups on file.  Patient was given instructions and counseling regarding her condition or for health maintenance advice. Please see specific information pulled into the AVS if appropriate.

## 2021-02-09 NOTE — PROGRESS NOTES
The ABCs of the Annual Wellness Visit  Subsequent Medicare Wellness Visit    Chief Complaint   Patient presents with   • Medicare Wellness-subsequent   • Hypertension   • Hyperlipidemia       Subjective   History of Present Illness:  Ama Billy is a 87 y.o. female who presents for a Subsequent Medicare Wellness Visit.    HEALTH RISK ASSESSMENT    Recent Hospitalizations:  No hospitalization(s) within the last year.    Current Medical Providers:  Patient Care Team:  Con Canas Jr., MD as PCP - General (Family Medicine)    Smoking Status:  Social History     Tobacco Use   Smoking Status Never Smoker   Smokeless Tobacco Never Used       Alcohol Consumption:  Social History     Substance and Sexual Activity   Alcohol Use No   • Frequency: Never       Depression Screen:   PHQ-2/PHQ-9 Depression Screening 2/9/2021   Little interest or pleasure in doing things 0   Feeling down, depressed, or hopeless 0   Trouble falling or staying asleep, or sleeping too much 0   Feeling tired or having little energy 0   Poor appetite or overeating 0   Feeling bad about yourself - or that you are a failure or have let yourself or your family down 0   Trouble concentrating on things, such as reading the newspaper or watching television 0   Moving or speaking so slowly that other people could have noticed. Or the opposite - being so fidgety or restless that you have been moving around a lot more than usual 0   Thoughts that you would be better off dead, or of hurting yourself in some way 0   Total Score 0   If you checked off any problems, how difficult have these problems made it for you to do your work, take care of things at home, or get along with other people? -       Fall Risk Screen:  STEADI Fall Risk Assessment was completed, and patient is at LOW risk for falls.Assessment completed on:2/9/2021    Health Habits and Functional and Cognitive Screening:  Functional & Cognitive Status 2/9/2021   Do you have difficulty preparing  food and eating? No   Do you have difficulty bathing yourself, getting dressed or grooming yourself? No   Do you have difficulty using the toilet? No   Do you have difficulty moving around from place to place? No   Do you have trouble with steps or getting out of a bed or a chair? No   Current Diet Well Balanced Diet   Dental Exam Not up to date   Eye Exam Not up to date   Exercise (times per week) 0 times per week   Current Exercises Include No Regular Exercise   Current Exercise Activities Include -   Do you need help using the phone?  No   Are you deaf or do you have serious difficulty hearing?  Yes   Do you need help with transportation? No   Do you need help shopping? No   Do you need help preparing meals?  No   Do you need help with housework?  No   Do you need help with laundry? No   Do you need help taking your medications? No   Do you need help managing money? No   Do you ever drive or ride in a car without wearing a seat belt? No   Have you felt unusual stress, anger or loneliness in the last month? No   Who do you live with? Spouse   If you need help, do you have trouble finding someone available to you? No   Have you been bothered in the last four weeks by sexual problems? No   Do you have difficulty concentrating, remembering or making decisions? Yes         Does the patient have evidence of cognitive impairment? No    Asprin use counseling:Taking ASA appropriately as indicated    Age-appropriate Screening Schedule:  Refer to the list below for future screening recommendations based on patient's age, sex and/or medical conditions. Orders for these recommended tests are listed in the plan section. The patient has been provided with a written plan.    Health Maintenance   Topic Date Due   • DXA SCAN  11/12/1933   • TDAP/TD VACCINES (1 - Tdap) 11/12/1952   • ZOSTER VACCINE (1 of 2) 11/12/1983   • MAMMOGRAM  11/21/2016   • LIPID PANEL  07/31/2021   • INFLUENZA VACCINE  Completed          The following  portions of the patient's history were reviewed and updated as appropriate: allergies, current medications, past family history, past medical history, past social history, past surgical history and problem list.    Outpatient Medications Prior to Visit   Medication Sig Dispense Refill   • amLODIPine (NORVASC) 5 MG tablet TAKE 1 TABLET DAILY ( MAKE APPOINTMENT FOR FURTHER REFILLS ) 90 tablet 3   • aspirin 81 MG EC tablet Take 81 mg by mouth Every Night.     • Calcium Carbonate-Vitamin D (CALCIUM 500 + D) 500-125 MG-UNIT tablet Take 1 tablet by mouth Daily.     • Docusate Sodium 100 MG capsule Take 100 mg by mouth Every Night.     • glucosamine-chondroitin 500-400 MG capsule capsule Take 1 capsule by mouth 3 (Three) Times a Day With Meals.     • lisinopril (PRINIVIL,ZESTRIL) 40 MG tablet TAKE 1 TABLET DAILY ( MAKE APPOINTMENT FOR FURTHER REFILLS ) 90 tablet 3   • lovastatin (MEVACOR) 20 MG tablet TAKE 1 TABLET EVERY NIGHT 90 tablet 3   • metoprolol succinate XL (TOPROL-XL) 25 MG 24 hr tablet TAKE ONE-HALF (1/2) TABLET DAILY. ( MAKE APPOINTMENT FOR FURTHER REFILLS ) 45 tablet 3   • pantoprazole (PROTONIX) 20 MG EC tablet Take 1 tablet by mouth Daily. 90 tablet 3     No facility-administered medications prior to visit.        Patient Active Problem List   Diagnosis   • Normocytic anemia   • Abdominal cramping   • LLQ pain   • Colon polyps   • Bursitis of hip   • Diarrhea   • Fatigue   • Generalized osteoarthritis   • Hyperlipidemia   • Hypertension   • Irritable bowel syndrome   • Muscle strain of lower extremity   • Tendinitis   • Chronic venous insufficiency   • Status post total left knee replacement   • Hip joint replacement status   • Left retinal detachment   • Hypovitaminosis D   • Weakness of extremity   • Closed wedge compression fracture of first lumbar vertebra (CMS/HCC)   • Fall   • Constipation   • Nausea & vomiting   • Leukocytosis   • Anemia   • Symptomatic anemia   • Esophagitis   • GI bleed   • Acute  "blood loss anemia   • Iron deficiency anemia   • Paroxysmal atrial fibrillation (CMS/HCC)   • Rectal bleeding   • Iron deficiency anemia due to chronic blood loss   • Swelling of lower extremity   • Canales's esophagus without dysplasia   • Dyspnea on exertion       Advanced Care Planning:  ACP discussion was declined by the patient. Patient does not have an advance directive, information provided.    Review of Systems    Compared to one year ago, the patient feels her physical health is the same.  Compared to one year ago, the patient feels her mental health is the same.    Reviewed chart for potential of high risk medication in the elderly: not applicable  Reviewed chart for potential of harmful drug interactions in the elderly:not applicable    Objective         Vitals:    02/09/21 0803   BP: 130/80   Pulse: 65   Temp: 97.8 °F (36.6 °C)   SpO2: 96%   Weight: 85.7 kg (189 lb)   Height: 172.7 cm (67.99\")   PainSc: 0-No pain       Body mass index is 28.74 kg/m².  Discussed the patient's BMI with her. The BMI is above average; no BMI management plan is appropriate..    Physical Exam          Assessment/Plan   Medicare Risks and Personalized Health Plan  CMS Preventative Services Quick Reference  Cardiovascular risk  Fall Risk    The above risks/problems have been discussed with the patient.  Pertinent information has been shared with the patient in the After Visit Summary.  Follow up plans and orders are seen below in the Assessment/Plan Section.    There are no diagnoses linked to this encounter.  Follow Up:  No follow-ups on file.     An After Visit Summary and PPPS were given to the patient.             "

## 2021-02-27 ENCOUNTER — IMMUNIZATION (OUTPATIENT)
Dept: VACCINE CLINIC | Facility: HOSPITAL | Age: 86
End: 2021-02-27

## 2021-02-27 PROCEDURE — 0001A: CPT | Performed by: INTERNAL MEDICINE

## 2021-02-27 PROCEDURE — 91300 HC SARSCOV02 VAC 30MCG/0.3ML IM: CPT | Performed by: INTERNAL MEDICINE

## 2021-02-28 ENCOUNTER — APPOINTMENT (OUTPATIENT)
Dept: VACCINE CLINIC | Facility: HOSPITAL | Age: 86
End: 2021-02-28

## 2021-03-20 ENCOUNTER — IMMUNIZATION (OUTPATIENT)
Dept: VACCINE CLINIC | Facility: HOSPITAL | Age: 86
End: 2021-03-20

## 2021-03-20 PROCEDURE — 0002A: CPT | Performed by: INTERNAL MEDICINE

## 2021-03-20 PROCEDURE — 91300 HC SARSCOV02 VAC 30MCG/0.3ML IM: CPT | Performed by: INTERNAL MEDICINE

## 2021-04-15 ENCOUNTER — OFFICE VISIT (OUTPATIENT)
Dept: INTERNAL MEDICINE | Facility: CLINIC | Age: 86
End: 2021-04-15

## 2021-04-15 VITALS
SYSTOLIC BLOOD PRESSURE: 120 MMHG | BODY MASS INDEX: 28.34 KG/M2 | DIASTOLIC BLOOD PRESSURE: 80 MMHG | HEIGHT: 68 IN | OXYGEN SATURATION: 99 % | WEIGHT: 187 LBS | TEMPERATURE: 98.2 F | HEART RATE: 73 BPM

## 2021-04-15 DIAGNOSIS — I10 ESSENTIAL HYPERTENSION: Primary | ICD-10-CM

## 2021-04-15 DIAGNOSIS — K58.2 IRRITABLE BOWEL SYNDROME WITH BOTH CONSTIPATION AND DIARRHEA: ICD-10-CM

## 2021-04-15 PROCEDURE — 99213 OFFICE O/P EST LOW 20 MIN: CPT | Performed by: NURSE PRACTITIONER

## 2021-04-15 NOTE — PROGRESS NOTES
"Chief Complaint  Headache, Diarrhea, and Hypertension    Subjective          Ama Billy presents to [facility] due to IBS and HTN.    She has been home more over the past year due to the pandemic, is starting to get out a little more since she has received her COVID-19 vaccine.   Her BP has been well-controlled on Metoprolol and Lisinopril which she is tolerating well.  Her  has scheduled this appointment due to concerns over her inactivity which she attributes to the pandemic. She reports feeling well without chest pain or shortness of breath. She c/o intermittent dull headaches which resolve without medication. She does have a hx of IBS and alternates between constipation and diarrhea, denies rectal bleeding.      Objective   Vital Signs:   /80 (BP Location: Left arm, Patient Position: Sitting, Cuff Size: Adult)   Pulse 73   Temp 98.2 °F (36.8 °C) (Temporal)   Ht 172.7 cm (68\")   Wt 84.8 kg (187 lb)   SpO2 99%   BMI 28.43 kg/m²     Physical Exam  Constitutional:       Appearance: She is well-developed. She is not ill-appearing.   HENT:      Head: Normocephalic.      Right Ear: Hearing, tympanic membrane and external ear normal.      Left Ear: Hearing, tympanic membrane and external ear normal.      Nose: Nose normal. No nasal deformity, mucosal edema or rhinorrhea.      Right Sinus: No maxillary sinus tenderness or frontal sinus tenderness.      Left Sinus: No maxillary sinus tenderness or frontal sinus tenderness.      Mouth/Throat:      Dentition: Normal dentition.   Eyes:      General: Lids are normal.         Right eye: No discharge.         Left eye: No discharge.      Conjunctiva/sclera: Conjunctivae normal.      Right eye: No exudate.     Left eye: No exudate.  Neck:      Thyroid: No thyroid mass or thyromegaly.      Vascular: No carotid bruit.      Trachea: Trachea normal.   Cardiovascular:      Rate and Rhythm: Regular rhythm.      Pulses: Normal " pulses.      Heart sounds: Normal heart sounds. No murmur heard.     Pulmonary:      Effort: No respiratory distress.      Breath sounds: Normal breath sounds. No decreased breath sounds, wheezing, rhonchi or rales.   Abdominal:      General: Bowel sounds are normal.      Palpations: Abdomen is soft.      Tenderness: There is no abdominal tenderness.   Musculoskeletal:      Cervical back: Normal range of motion. No edema.   Lymphadenopathy:      Head:      Right side of head: No submental, submandibular, tonsillar, preauricular, posterior auricular or occipital adenopathy.      Left side of head: No submental, submandibular, tonsillar, preauricular, posterior auricular or occipital adenopathy.   Skin:     General: Skin is warm and dry.      Nails: There is no clubbing.   Neurological:      Mental Status: She is alert.   Psychiatric:         Behavior: Behavior is cooperative.        Result Review :   The following data was reviewed by: LEONELA Herrera on 04/15/2021:  Common labs    Common Labsle 7/31/20 7/31/20 7/31/20 2/9/21 2/9/21 2/9/21    0940 0940 0940 0902 0902 0902   Glucose  89   88    BUN  15   20    Creatinine  1.18 (A)   1.41 (A)    eGFR Non  Am  43 (A)   35 (A)    eGFR  Am  53 (A)   43 (A)    Sodium  141   140    Potassium  5.2   4.9    Chloride  106   103    Calcium  9.4   9.8    Total Protein  7.0   7.2    Albumin  4.70   4.50    Total Bilirubin  0.8   0.8    Alkaline Phosphatase  83   87    AST (SGOT)  17   19    ALT (SGPT)  10   10    WBC 10.59   9.49     Hemoglobin 10.7 (A)   10.4 (A)     Hematocrit 34.7   32.8 (A)     Platelets 349   362     Total Cholesterol   156   157   Triglycerides   65   57   HDL Cholesterol   73 (A)   74 (A)   LDL Cholesterol    70   71   (A) Abnormal value       Comments are available for some flowsheets but are not being displayed.                     Assessment and Plan    Diagnoses and all orders for this visit:    1. Essential hypertension  (Primary)    2. Irritable bowel syndrome with both constipation and diarrhea    1. HTN-BP is stable on current medications which she is advised to continue.  2. IBS-alternating between constipation and diarrhea, she will continue a high fiber diet with close monitoring of her symptoms.    Reviewed labs and office notes from 2/9/21 which do not show any acute abnormalities. She will continue current medication and f/u with PCP as scheduled in August.      Follow Up   Return if symptoms worsen or fail to improve, for Next scheduled follow up.  Patient was given instructions and counseling regarding her condition or for health maintenance advice. Please see specific information pulled into the AVS if appropriate.

## 2021-06-03 DIAGNOSIS — E78.2 MIXED HYPERLIPIDEMIA: Primary | ICD-10-CM

## 2021-06-03 RX ORDER — LOVASTATIN 20 MG/1
TABLET ORAL
Qty: 90 TABLET | Refills: 3 | Status: SHIPPED | OUTPATIENT
Start: 2021-06-03 | End: 2022-02-22 | Stop reason: HOSPADM

## 2021-08-24 ENCOUNTER — OFFICE VISIT (OUTPATIENT)
Dept: INTERNAL MEDICINE | Facility: CLINIC | Age: 86
End: 2021-08-24

## 2021-08-24 VITALS
DIASTOLIC BLOOD PRESSURE: 80 MMHG | OXYGEN SATURATION: 99 % | HEART RATE: 67 BPM | WEIGHT: 184 LBS | BODY MASS INDEX: 27.89 KG/M2 | SYSTOLIC BLOOD PRESSURE: 142 MMHG | TEMPERATURE: 97.9 F | HEIGHT: 68 IN

## 2021-08-24 DIAGNOSIS — I10 ESSENTIAL HYPERTENSION: ICD-10-CM

## 2021-08-24 DIAGNOSIS — E78.2 MIXED HYPERLIPIDEMIA: Primary | ICD-10-CM

## 2021-08-24 DIAGNOSIS — E55.9 HYPOVITAMINOSIS D: ICD-10-CM

## 2021-08-24 DIAGNOSIS — D63.8 ANEMIA OF CHRONIC DISEASE: ICD-10-CM

## 2021-08-24 DIAGNOSIS — K21.9 GASTROESOPHAGEAL REFLUX DISEASE, UNSPECIFIED WHETHER ESOPHAGITIS PRESENT: ICD-10-CM

## 2021-08-24 PROCEDURE — 99213 OFFICE O/P EST LOW 20 MIN: CPT | Performed by: FAMILY MEDICINE

## 2021-08-24 NOTE — PROGRESS NOTES
"Chief Complaint  Hyperlipidemia and Hypertension    Subjective          Ama Billy presents to [facility]  Very pleasant lady with active management of hyperlipidemia hypertension gastroesophageal reflux disease.  She had evidence of chronic anemia with elevated ferritin or holding iron supplementation pending further labs on next visit.  Symptomatically she is doing very well.      Objective   Vital Signs:   /80   Pulse 67   Temp 97.9 °F (36.6 °C)   Ht 172.7 cm (68\")   Wt 83.5 kg (184 lb)   SpO2 99%   BMI 27.98 kg/m²     Physical Exam  Vitals reviewed.   Constitutional:       Appearance: She is well-developed.   HENT:      Head: Normocephalic and atraumatic.      Right Ear: Tympanic membrane and external ear normal.      Left Ear: Tympanic membrane and external ear normal.   Eyes:      Conjunctiva/sclera: Conjunctivae normal.      Pupils: Pupils are equal, round, and reactive to light.   Neck:      Thyroid: No thyromegaly.      Vascular: No JVD.   Cardiovascular:      Rate and Rhythm: Normal rate and regular rhythm.      Heart sounds: Normal heart sounds.   Pulmonary:      Effort: Pulmonary effort is normal.      Breath sounds: Normal breath sounds.   Abdominal:      General: Bowel sounds are normal.      Palpations: Abdomen is soft.   Musculoskeletal:         General: Normal range of motion.      Cervical back: Normal range of motion and neck supple.   Lymphadenopathy:      Cervical: No cervical adenopathy.   Skin:     General: Skin is warm and dry.      Findings: No rash.   Neurological:      Mental Status: She is alert and oriented to person, place, and time.      Cranial Nerves: No cranial nerve deficit.      Coordination: Coordination normal.   Psychiatric:         Behavior: Behavior normal.         Thought Content: Thought content normal.         Judgment: Judgment normal.        Result Review :                 Assessment and Plan    Diagnoses and all orders for " this visit:    1. Mixed hyperlipidemia (Primary)  Comments:  Lovastatin 20 mg daily    2. Essential hypertension  Comments:  Lisinopril 40 mg daily plus amlodipine 5 mg daily plus metoprolol XL 25 mg daily.    3. Hypovitaminosis D    4. Gastroesophageal reflux disease, unspecified whether esophagitis present  Comments:  Pantoprazole 20 mg daily    5. Anemia of chronic disease  Comments:  Hold on iron supplementation recheck labs next visit.        Follow Up   Return in about 6 months (around 2/24/2022) for Medicare Wellness, Recheck.  Patient was given instructions and counseling regarding her condition or for health maintenance advice. Please see specific information pulled into the AVS if appropriate.

## 2021-09-01 RX ORDER — PANTOPRAZOLE SODIUM 20 MG/1
TABLET, DELAYED RELEASE ORAL
Qty: 90 TABLET | Refills: 3 | OUTPATIENT
Start: 2021-09-01

## 2021-11-07 ENCOUNTER — APPOINTMENT (OUTPATIENT)
Dept: GENERAL RADIOLOGY | Facility: HOSPITAL | Age: 86
End: 2021-11-07

## 2021-11-07 ENCOUNTER — HOSPITAL ENCOUNTER (EMERGENCY)
Facility: HOSPITAL | Age: 86
Discharge: HOME | End: 2021-11-07
Attending: EMERGENCY MEDICINE | Admitting: EMERGENCY MEDICINE

## 2021-11-07 VITALS
RESPIRATION RATE: 18 BRPM | OXYGEN SATURATION: 99 % | HEART RATE: 68 BPM | TEMPERATURE: 98.7 F | SYSTOLIC BLOOD PRESSURE: 164 MMHG | DIASTOLIC BLOOD PRESSURE: 70 MMHG

## 2021-11-07 DIAGNOSIS — R07.9 CHEST PAIN, UNSPECIFIED TYPE: Primary | ICD-10-CM

## 2021-11-07 LAB
ALBUMIN SERPL-MCNC: 4 G/DL (ref 3.5–5.2)
ALBUMIN/GLOB SERPL: 1.5 G/DL
ALP SERPL-CCNC: 74 U/L (ref 39–117)
ALT SERPL W P-5'-P-CCNC: 9 U/L (ref 1–33)
ANION GAP SERPL CALCULATED.3IONS-SCNC: 9.5 MMOL/L (ref 5–15)
AST SERPL-CCNC: 14 U/L (ref 1–32)
BACTERIA UR QL AUTO: ABNORMAL /HPF
BASOPHILS # BLD AUTO: 0.07 10*3/MM3 (ref 0–0.2)
BASOPHILS NFR BLD AUTO: 0.8 % (ref 0–1.5)
BILIRUB SERPL-MCNC: 0.8 MG/DL (ref 0–1.2)
BILIRUB UR QL STRIP: NEGATIVE
BUN SERPL-MCNC: 16 MG/DL (ref 8–23)
BUN/CREAT SERPL: 13.2 (ref 7–25)
CALCIUM SPEC-SCNC: 8.3 MG/DL (ref 8.6–10.5)
CHLORIDE SERPL-SCNC: 110 MMOL/L (ref 98–107)
CLARITY UR: CLEAR
CO2 SERPL-SCNC: 21.5 MMOL/L (ref 22–29)
COLOR UR: YELLOW
CREAT SERPL-MCNC: 1.21 MG/DL (ref 0.57–1)
DEPRECATED RDW RBC AUTO: 63.1 FL (ref 37–54)
EOSINOPHIL # BLD AUTO: 0.33 10*3/MM3 (ref 0–0.4)
EOSINOPHIL NFR BLD AUTO: 3.6 % (ref 0.3–6.2)
ERYTHROCYTE [DISTWIDTH] IN BLOOD BY AUTOMATED COUNT: 21.2 % (ref 12.3–15.4)
GFR SERPL CREATININE-BSD FRML MDRD: 42 ML/MIN/1.73
GLOBULIN UR ELPH-MCNC: 2.6 GM/DL
GLUCOSE SERPL-MCNC: 109 MG/DL (ref 65–99)
GLUCOSE UR STRIP-MCNC: NEGATIVE MG/DL
HCT VFR BLD AUTO: 30.9 % (ref 34–46.6)
HGB BLD-MCNC: 10.2 G/DL (ref 12–15.9)
HGB UR QL STRIP.AUTO: NEGATIVE
HYALINE CASTS UR QL AUTO: ABNORMAL /LPF
IMM GRANULOCYTES # BLD AUTO: 0.04 10*3/MM3 (ref 0–0.05)
IMM GRANULOCYTES NFR BLD AUTO: 0.4 % (ref 0–0.5)
KETONES UR QL STRIP: ABNORMAL
LEUKOCYTE ESTERASE UR QL STRIP.AUTO: ABNORMAL
LYMPHOCYTES # BLD AUTO: 2.12 10*3/MM3 (ref 0.7–3.1)
LYMPHOCYTES NFR BLD AUTO: 23.2 % (ref 19.6–45.3)
MCH RBC QN AUTO: 27.8 PG (ref 26.6–33)
MCHC RBC AUTO-ENTMCNC: 33 G/DL (ref 31.5–35.7)
MCV RBC AUTO: 84.2 FL (ref 79–97)
MONOCYTES # BLD AUTO: 0.94 10*3/MM3 (ref 0.1–0.9)
MONOCYTES NFR BLD AUTO: 10.3 % (ref 5–12)
NEUTROPHILS NFR BLD AUTO: 5.62 10*3/MM3 (ref 1.7–7)
NEUTROPHILS NFR BLD AUTO: 61.7 % (ref 42.7–76)
NITRITE UR QL STRIP: NEGATIVE
NRBC BLD AUTO-RTO: 0.5 /100 WBC (ref 0–0.2)
PH UR STRIP.AUTO: <=5 [PH] (ref 5–8)
PLATELET # BLD AUTO: 332 10*3/MM3 (ref 140–450)
PMV BLD AUTO: 10.9 FL (ref 6–12)
POTASSIUM SERPL-SCNC: 3.8 MMOL/L (ref 3.5–5.2)
PROT SERPL-MCNC: 6.6 G/DL (ref 6–8.5)
PROT UR QL STRIP: NEGATIVE
RBC # BLD AUTO: 3.67 10*6/MM3 (ref 3.77–5.28)
RBC # UR: ABNORMAL /HPF
REF LAB TEST METHOD: ABNORMAL
SARS-COV-2 RNA PNL SPEC NAA+PROBE: NOT DETECTED
SODIUM SERPL-SCNC: 141 MMOL/L (ref 136–145)
SP GR UR STRIP: 1.02 (ref 1–1.03)
SQUAMOUS #/AREA URNS HPF: ABNORMAL /HPF
T4 FREE SERPL-MCNC: 1.44 NG/DL (ref 0.93–1.7)
TROPONIN T SERPL-MCNC: <0.01 NG/ML (ref 0–0.03)
TSH SERPL DL<=0.05 MIU/L-ACNC: 1.21 UIU/ML (ref 0.27–4.2)
UROBILINOGEN UR QL STRIP: ABNORMAL
WBC # BLD AUTO: 9.12 10*3/MM3 (ref 3.4–10.8)
WBC UR QL AUTO: ABNORMAL /HPF

## 2021-11-07 PROCEDURE — P9612 CATHETERIZE FOR URINE SPEC: HCPCS

## 2021-11-07 PROCEDURE — 85025 COMPLETE CBC W/AUTO DIFF WBC: CPT | Performed by: EMERGENCY MEDICINE

## 2021-11-07 PROCEDURE — 87635 SARS-COV-2 COVID-19 AMP PRB: CPT | Performed by: PHYSICIAN ASSISTANT

## 2021-11-07 PROCEDURE — 84439 ASSAY OF FREE THYROXINE: CPT | Performed by: EMERGENCY MEDICINE

## 2021-11-07 PROCEDURE — 84484 ASSAY OF TROPONIN QUANT: CPT | Performed by: EMERGENCY MEDICINE

## 2021-11-07 PROCEDURE — 93005 ELECTROCARDIOGRAM TRACING: CPT | Performed by: EMERGENCY MEDICINE

## 2021-11-07 PROCEDURE — 36415 COLL VENOUS BLD VENIPUNCTURE: CPT

## 2021-11-07 PROCEDURE — 81001 URINALYSIS AUTO W/SCOPE: CPT | Performed by: EMERGENCY MEDICINE

## 2021-11-07 PROCEDURE — 93010 ELECTROCARDIOGRAM REPORT: CPT | Performed by: INTERNAL MEDICINE

## 2021-11-07 PROCEDURE — 99283 EMERGENCY DEPT VISIT LOW MDM: CPT

## 2021-11-07 PROCEDURE — 84443 ASSAY THYROID STIM HORMONE: CPT | Performed by: EMERGENCY MEDICINE

## 2021-11-07 PROCEDURE — 80053 COMPREHEN METABOLIC PANEL: CPT | Performed by: EMERGENCY MEDICINE

## 2021-11-07 PROCEDURE — 71045 X-RAY EXAM CHEST 1 VIEW: CPT

## 2021-11-07 NOTE — ED NOTES
"Pt from home. C/o generalized weakness and nausea for \"couple days\". Pt denies fevers, pain, SOA,     Patient was placed in face mask during triage process. Patient was wearing facemask when I entered the room and throughout our encounter. I wore full protective equipment throughout this patient encounter including a face mask, eye protection, and gloves. Hand hygiene was performed before donning protective equipment and again following doffing of PPE after leaving the room.       Meseret Lopez, RN  11/07/21 6104    "

## 2021-11-08 LAB — QT INTERVAL: 417 MS

## 2021-11-08 NOTE — ED PROVIDER NOTES
MD ATTESTATION NOTE  Patient was placed in face mask in first look and the following protective measures were taken unless documented below in the ED course. Patient was wearing facemask when I entered the room and throughout our encounter. I wore full protective equipment throughout this patient encounter including a N95 face mask, googles, gown and gloves. Hand hygiene was performed before donning protective equipment and after removal when leaving the room.    The MAGDA and I have discussed this patient's history, physical exam, and treatment plan. I have reviewed the documentation and personally had a face to face interaction with the patient. I affirm the MAGDA documentation and agree with their diagnostics, findings, treatment, plan, and disposition.  The attached note describes my personal findings.    Ama Billy is a 87 y.o. female who presents to the ED c/o chest pain.  History supplied by patient patient's spouse.  Patient had chest pressure that began at approximately 5 PM, diffuse, lasted for minutes and then resolved.  Patient denies any inciting event, denies any radiation of the pain, no associated shortness of breath, nausea vomiting, diaphoresis.  Patient reports pain resolved and she has been asymptomatic since.  Patient reports that she was at baseline prior to onset of pain.  No history of similar pain in the past.  Patient denies any cardiac history, no prior cardiac evaluations, not followed by cardiologist.  Patient does have a history of hypertension hyperlipidemia, patient reports family history of MI in both brother as well as her father and her father  of MI.    On exam:  General: NAD.  Head: NCAT.  ENT: EOMI, PERRLA, MMM.  Neck: Supple, trachea midline.  Cardiac: regular rate and rhythm.  Lungs: CTAB.  Abdomen: Soft, NTTP, no rebound tenderness/guarding/rigidity.   : Deferred to MAGDA.  Extremities: Moves all extremities well, no peripheral edema  Skin: Warm, dry.    Medical Decision  Making:  After the initial H&P, I discussed pertinent information from history and physical exam with patient/family.  Discussed differential diagnosis.  Discussed plan for ED evaluation/work-up/treatment.  All questions answered.  Patient/family is agreeable with plan.    ED Course as of 11/08/21 0217   Sun Nov 07, 2021 2137 WBC: 9.12 [RC]   2138 RBC(!): 3.67 [RC]   2138 Hemoglobin(!): 10.2 [RC]   2138 Hematocrit(!): 30.9 [RC]   2138 Platelets: 332 [RC]   2138 Troponin T: <0.010 [RC]   2138 Glucose(!): 109 [RC]   2138 Creatinine(!): 1.21 [RC]   2138 Sodium: 141 [RC]   2138 Potassium: 3.8 [RC]   2138 CO2(!): 21.5 [RC]   2138 Anion Gap: 9.5 [RC]   2138 TSH Baseline: 1.210 [RC]   2138 Free T4: 1.44 [RC]   2139 EKG independently viewed and contemporaneously interpreted by ED physician. Time: 2122.  Rate 65.  Interpretation: Normal sinus rhythm, normal axis, inferior Q waves, otherwise normal QRS, no acute ST changes [JG]   2247 Discussed the patient's case with the observation unit.  It is full.  Patient's heart score is a 4.  Given her family history, story, and the fact that she has never seen a cardiologist, I feel she be better served by admission to the hospital for further rule out.  We will place a call to the hospitalist [RC]   2252 Discussed the patient's case with the hospitalist who will accept the patient for further rule out [RC]   2302 Patient is refusing admission and wants to go home and follow-up as an outpatient.  She has remained pain-free during her emergency department stay.  We will abide by the patient's wishes and discharge at this time and have her return to the emergency department should she have any further chest pain. [RC]      ED Course User Index  [JG] Gerardo Wolff MD  [RC] Pavan Esteban III, PA       Diagnosis  Final diagnoses:   Chest pain, unspecified type        Gerardo Wolff MD  11/08/21 0217

## 2021-11-08 NOTE — ED PROVIDER NOTES
EMERGENCY DEPARTMENT ENCOUNTER    Room Number:  01/01  Date of encounter:  11/7/2021  PCP: Con Canas Jr., MD  Historian: Patient      HPI:  Chief Complaint: Generalized weakness and chest discomfort  A complete HPI/ROS/PMH/PSH/SH/FH are unobtainable due to: Nothing    Context: Ama Billy is a 87 y.o. female who presents to the ED c/o generalized weakness and chest discomfort.  Patient states the generalized weakness has been ongoing for at least a week.  This chest discomfort has been intermittent for a few weeks but was worse today than it had been in the past.  Patient states she was the passenger in a car with her .  She developed some chest pressure that led to have lasted about a minute.  There was pain in bilateral arms.  The pain came on at rest.  She denies nausea, vomiting, diaphoresis.  She informs me she has never seen a cardiologist before but states she does have hypertension, hyperlipidemia, and strong family history of coronary artery disease to include a father and brother.  She and her  became concerned and they present to the emergency department for further evaluation.  Patient is pain-free at this time.      PAST MEDICAL HISTORY  Active Ambulatory Problems     Diagnosis Date Noted   • Normocytic anemia 04/15/2016   • Abdominal cramping 06/24/2013   • LLQ pain 06/24/2013   • Colon polyps 02/21/2017   • Bursitis of hip 02/21/2017   • Diarrhea 06/24/2013   • Fatigue 02/21/2017   • Generalized osteoarthritis 02/21/2017   • Hyperlipidemia 02/21/2017   • Hypertension 02/21/2017   • Irritable bowel syndrome 06/24/2013   • Muscle strain of lower extremity 02/21/2017   • Tendinitis 02/21/2017   • Chronic venous insufficiency 02/21/2017   • Status post total left knee replacement 02/21/2017   • 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   • Left retinal detachment 02/21/2017   • Hypovitaminosis D 02/21/2017   • Weakness of extremity 02/21/2017   • Closed wedge compression fracture of  first lumbar vertebra (HCC) 10/13/2017   • Fall 10/13/2017   • Constipation 10/13/2017   • Nausea & vomiting 10/13/2017   • Leukocytosis 10/13/2017   • Anemia 10/14/2017   • Symptomatic anemia 11/14/2017   • Esophagitis 11/15/2017   • GI bleed 11/15/2017   • Acute blood loss anemia 11/15/2017   • Iron deficiency anemia 11/30/2017   • Paroxysmal atrial fibrillation (HCC) 11/30/2017   • Rectal bleeding 06/12/2018   • Iron deficiency anemia due to chronic blood loss 06/12/2018   • Swelling of lower extremity 06/20/2018   • Canales's esophagus without dysplasia 03/28/2019   • Dyspnea on exertion 09/26/2019     Resolved Ambulatory Problems     Diagnosis Date Noted   • Acute upper respiratory infection 02/21/2017   • Globus sensation 06/24/2013   • Atypical pneumonia 10/12/2017   • Mild dehydration 11/14/2017   • Nausea vomiting and diarrhea 11/14/2017   • Coffee ground emesis 11/14/2017     Past Medical History:   Diagnosis Date   • Arthritis    • Eosinophilia    • History of colonic polyps          PAST SURGICAL HISTORY  Past Surgical History:   Procedure Laterality Date   • COLONOSCOPY      H/O colonic polyps with regular colonoscopies at 5 year intervals.    • COLONOSCOPY  2013    By Dr. Gudino, no polyps identified.   • COLONOSCOPY N/A 7/3/2018    IH, one TA w/low grade dysplasi   • ENDOSCOPY N/A 11/15/2017    Small HH, LA Grade B reflux, acute and erosive ulcerative esophagitis,    • ENDOSCOPY N/A 2/27/2018    LA Grade B esophagitis, HH, gastritis, Path: Canales's w/out dysplasia   • HEMORRHOIDECTOMY  1965   • HYSTERECTOMY  1971    Partial, secondary to endometriosis   • JOINT REPLACEMENT  2014    Left total hip arthroplasy   • KNEE SURGERY  1997    Arthroscopy of left knee 1997; right knee 2003         FAMILY HISTORY  Family History   Problem Relation Age of Onset   • Coronary artery disease Father    • Heart disease Father    • Heart attack Father 72   • Coronary artery disease Brother    • Heart disease Brother          CABG   • Malig Hyperthermia Neg Hx          SOCIAL HISTORY  Social History     Socioeconomic History   • Marital status:      Spouse name: Trino   • Number of children: 2   • Years of education: College +   Tobacco Use   • Smoking status: Never Smoker   • Smokeless tobacco: Never Used   Vaping Use   • Vaping Use: Never used   Substance and Sexual Activity   • Alcohol use: No   • Drug use: No   • Sexual activity: Defer         ALLERGIES  Diclofenac sodium        REVIEW OF SYSTEMS  Review of Systems   Constitutional: Negative for chills and fever.   HENT: Negative.    Respiratory: Negative for cough and shortness of breath.    Cardiovascular: Positive for chest pain. Negative for palpitations and leg swelling.   Gastrointestinal: Negative for abdominal pain, constipation, nausea and vomiting.   Genitourinary: Negative.    Musculoskeletal: Negative.    Skin: Negative.    Neurological: Negative.    Psychiatric/Behavioral: Negative.         All systems reviewed and negative except for those discussed in HPI.       PHYSICAL EXAM    I have reviewed the triage vital signs and nursing notes.    ED Triage Vitals [11/07/21 1844]   Temp Heart Rate Resp BP SpO2   98.7 °F (37.1 °C) 65 19 124/79 98 %      Temp src Heart Rate Source Patient Position BP Location FiO2 (%)   -- -- -- -- --       Physical Exam  GENERAL: Well-nourished, nontoxic, no acute distress  HENT: nares patent  EYES: no scleral icterus  CV: regular rhythm, regular rate no rubs murmurs gallops no unilateral leg swelling or JVD noted  RESPIRATORY: normal effort, lungs CTA B  ABDOMEN: soft, nontender  MUSCULOSKELETAL: no deformity  NEURO: alert and oriented x4, moves all extremities, follows commands  SKIN: warm, dry        LAB RESULTS  Recent Results (from the past 24 hour(s))   Comprehensive Metabolic Panel    Collection Time: 11/07/21  8:42 PM    Specimen: Blood   Result Value Ref Range    Glucose 109 (H) 65 - 99 mg/dL    BUN 16 8 - 23 mg/dL     Creatinine 1.21 (H) 0.57 - 1.00 mg/dL    Sodium 141 136 - 145 mmol/L    Potassium 3.8 3.5 - 5.2 mmol/L    Chloride 110 (H) 98 - 107 mmol/L    CO2 21.5 (L) 22.0 - 29.0 mmol/L    Calcium 8.3 (L) 8.6 - 10.5 mg/dL    Total Protein 6.6 6.0 - 8.5 g/dL    Albumin 4.00 3.50 - 5.20 g/dL    ALT (SGPT) 9 1 - 33 U/L    AST (SGOT) 14 1 - 32 U/L    Alkaline Phosphatase 74 39 - 117 U/L    Total Bilirubin 0.8 0.0 - 1.2 mg/dL    eGFR Non African Amer 42 (L) >60 mL/min/1.73    Globulin 2.6 gm/dL    A/G Ratio 1.5 g/dL    BUN/Creatinine Ratio 13.2 7.0 - 25.0    Anion Gap 9.5 5.0 - 15.0 mmol/L   Troponin    Collection Time: 11/07/21  8:42 PM    Specimen: Blood   Result Value Ref Range    Troponin T <0.010 0.000 - 0.030 ng/mL   TSH    Collection Time: 11/07/21  8:42 PM    Specimen: Blood   Result Value Ref Range    TSH 1.210 0.270 - 4.200 uIU/mL   T4, Free    Collection Time: 11/07/21  8:42 PM    Specimen: Blood   Result Value Ref Range    Free T4 1.44 0.93 - 1.70 ng/dL   CBC Auto Differential    Collection Time: 11/07/21  8:42 PM    Specimen: Blood   Result Value Ref Range    WBC 9.12 3.40 - 10.80 10*3/mm3    RBC 3.67 (L) 3.77 - 5.28 10*6/mm3    Hemoglobin 10.2 (L) 12.0 - 15.9 g/dL    Hematocrit 30.9 (L) 34.0 - 46.6 %    MCV 84.2 79.0 - 97.0 fL    MCH 27.8 26.6 - 33.0 pg    MCHC 33.0 31.5 - 35.7 g/dL    RDW 21.2 (H) 12.3 - 15.4 %    RDW-SD 63.1 (H) 37.0 - 54.0 fl    MPV 10.9 6.0 - 12.0 fL    Platelets 332 140 - 450 10*3/mm3    Neutrophil % 61.7 42.7 - 76.0 %    Lymphocyte % 23.2 19.6 - 45.3 %    Monocyte % 10.3 5.0 - 12.0 %    Eosinophil % 3.6 0.3 - 6.2 %    Basophil % 0.8 0.0 - 1.5 %    Immature Grans % 0.4 0.0 - 0.5 %    Neutrophils, Absolute 5.62 1.70 - 7.00 10*3/mm3    Lymphocytes, Absolute 2.12 0.70 - 3.10 10*3/mm3    Monocytes, Absolute 0.94 (H) 0.10 - 0.90 10*3/mm3    Eosinophils, Absolute 0.33 0.00 - 0.40 10*3/mm3    Basophils, Absolute 0.07 0.00 - 0.20 10*3/mm3    Immature Grans, Absolute 0.04 0.00 - 0.05 10*3/mm3    nRBC 0.5  (H) 0.0 - 0.2 /100 WBC   Urinalysis With Microscopic If Indicated (No Culture) - Urine, Catheter    Collection Time: 11/07/21  9:06 PM    Specimen: Urine, Catheter   Result Value Ref Range    Color, UA Yellow Yellow, Straw    Appearance, UA Clear Clear    pH, UA <=5.0 5.0 - 8.0    Specific Gravity, UA 1.018 1.005 - 1.030    Glucose, UA Negative Negative    Ketones, UA Trace (A) Negative    Bilirubin, UA Negative Negative    Blood, UA Negative Negative    Protein, UA Negative Negative    Leuk Esterase, UA Moderate (2+) (A) Negative    Nitrite, UA Negative Negative    Urobilinogen, UA 0.2 E.U./dL 0.2 - 1.0 E.U./dL   Urinalysis, Microscopic Only - Urine, Catheter    Collection Time: 11/07/21  9:06 PM    Specimen: Urine, Catheter   Result Value Ref Range    RBC, UA 0-2 None Seen, 0-2 /HPF    WBC, UA 21-30 (A) None Seen, 0-2 /HPF    Bacteria, UA None Seen None Seen /HPF    Squamous Epithelial Cells, UA 0-2 None Seen, 0-2 /HPF    Hyaline Casts, UA 3-6 None Seen /LPF    Methodology Automated Microscopy    ECG 12 Lead    Collection Time: 11/07/21  9:22 PM   Result Value Ref Range    QT Interval 417 ms   COVID-19,[facility] IN-HOUSE CEPHEID/TOMASA NP SWAB IN TRANSPORT MEDIA 8-12 HR TAT - Swab, Nasopharynx    Collection Time: 11/07/21  9:50 PM    Specimen: Nasopharynx; Swab   Result Value Ref Range    COVID19 Not Detected Not Detected - Ref. Range       Ordered the above labs and independently reviewed the results.        RADIOLOGY  XR Chest 1 View    Result Date: 11/7/2021  Patient: LISA PANTOJA  Time Out: 20:04 Exam(s): FILM CXR 1 VIEW EXAM:   XR Chest, 1 View CLINICAL HISTORY:    Reason for exam: gen weakness. TECHNIQUE:   Frontal view of the chest. COMPARISON:   No previous studies. FINDINGS:   Lungs:  The lungs are well aerated.  A 0.7 cm calcified granuloma is noted at the left upper lobe of the periphery.   Pleural space:  Unremarkable.  No pneumothorax.   Heart:  Cardiomediastinal silhouette unremarkable.   Mediastinum:   See above.   Bones joints:  Osteopenia.   Soft tissues:  Soft tissues are unremarkable. IMPRESSION:     1.  Calcified granuloma left upper lobe. 2.  No active disease. 3.  Osteopenia.     Electronically signed by Tyler Reynaga MD on 11-07-21 at 2004      I ordered the above noted radiological studies. Reviewed by me and discussed with radiologist.  See dictation for official radiology interpretation.      PROCEDURES    Procedures      MEDICATIONS GIVEN IN ER    Medications - No data to display      PROGRESS, DATA ANALYSIS, CONSULTS, AND MEDICAL DECISION MAKING    All labs have been independently reviewed by me.  All radiology studies have been reviewed by me and discussed with radiologist dictating the report.   EKG's independently viewed and interpreted by me.  Discussion below represents my analysis of pertinent findings related to patient's condition, differential diagnosis, treatment plan and final disposition.    DX includes but is not limited to: ACS, PTX, PE, PNA, pneumomediastinum, GI mediated chest pain, chest wall pain.  Further evaluate the patient I will order a CBC, CMP, troponin, portable chest x-ray.  Please see below for MDM and timeline of events.    ED Course as of 11/07/21 2304   Sun Nov 07, 2021 2137 WBC: 9.12 [RC]   2138 RBC(!): 3.67 [RC]   2138 Hemoglobin(!): 10.2 [RC]   2138 Hematocrit(!): 30.9 [RC]   2138 Platelets: 332 [RC]   2138 Troponin T: <0.010 [RC]   2138 Glucose(!): 109 [RC]   2138 Creatinine(!): 1.21 [RC]   2138 Sodium: 141 [RC]   2138 Potassium: 3.8 [RC]   2138 CO2(!): 21.5 [RC]   2138 Anion Gap: 9.5 [RC]   2138 TSH Baseline: 1.210 [RC]   2138 Free T4: 1.44 [RC]   2139 EKG independently viewed and contemporaneously interpreted by ED physician. Time: 2122.  Rate 65.  Interpretation: Normal sinus rhythm, normal axis, inferior Q waves, otherwise normal QRS, no acute ST changes [JG]   2244 Discussed the patient's case with the observation unit.  It is full.  Patient's heart score  is a 4.  Given her family history, story, and the fact that she has never seen a cardiologist, I feel she be better served by admission to the hospital for further rule out.  We will place a call to the hospitalist [RC]   4739 Discussed the patient's case with the hospitalist who will accept the patient for further rule out [RC]   2631 Patient is refusing admission and wants to go home and follow-up as an outpatient.  She has remained pain-free during her emergency department stay.  We will abide by the patient's wishes and discharge at this time and have her return to the emergency department should she have any further chest pain. [RC]      ED Course User Index  [JG] Gerardo Wolff MD  [RC] Pavan Esteban III, PA           PPE: The patient wore a surgical mask throughout the entire patient encounter. I wore an N95.    AS OF 23:04 EST VITALS:    BP - 164/70  HR - 68  TEMP - 98.7 °F (37.1 °C)  O2 SATS - 99%        DIAGNOSIS  Final diagnoses:   Chest pain, unspecified type         DISPOSITION  DISCHARGE    Patient discharged in stable condition.    Reviewed implications of results, diagnosis, meds, responsibility to follow up, warning signs and symptoms of possible worsening, potential complications and reasons to return to ER.    Patient/Family voiced understanding of above instructions.    Discussed plan for discharge, as there is no emergent indication for admission. Patient referred to primary care provider for BP management due to today's BP. Pt/family is agreeable and understands need for follow up and repeat testing.  Pt is aware that discharge does not mean that nothing is wrong but it indicates no emergency is present that requires admission and they must continue care with follow-up as given below or physician of their choice.     FOLLOW-UP  Primo Joseph MD  [address] [id number] [phone]    Schedule an appointment as soon as possible for a visit   For  further evaluation and treatment         Medication List      No changes were made to your prescriptions during this visit.                Pavan Esteban III, PA  11/07/21 4841

## 2021-11-08 NOTE — DISCHARGE INSTRUCTIONS
Return to the ER with any further concerns, you have any further chest pain or develop fever.  Commend calling the cardiologist at the number above and scheduling follow-up as soon as possible.

## 2021-11-23 ENCOUNTER — IMMUNIZATION (OUTPATIENT)
Dept: VACCINE CLINIC | Facility: HOSPITAL | Age: 86
End: 2021-11-23

## 2021-11-23 PROCEDURE — 91300 HC SARSCOV02 VAC 30MCG/0.3ML IM: CPT | Performed by: INTERNAL MEDICINE

## 2021-11-23 PROCEDURE — 0004A ADM SARSCOV2 30MCG/0.3ML BOOSTER: CPT | Performed by: INTERNAL MEDICINE

## 2022-01-24 DIAGNOSIS — I10 HYPERTENSION, UNSPECIFIED TYPE: ICD-10-CM

## 2022-01-24 DIAGNOSIS — I48.0 PAROXYSMAL ATRIAL FIBRILLATION: ICD-10-CM

## 2022-01-24 RX ORDER — AMLODIPINE BESYLATE 5 MG/1
TABLET ORAL
Qty: 90 TABLET | Refills: 3 | Status: SHIPPED | OUTPATIENT
Start: 2022-01-24 | End: 2022-08-25

## 2022-01-24 RX ORDER — METOPROLOL SUCCINATE 25 MG/1
TABLET, EXTENDED RELEASE ORAL
Qty: 45 TABLET | Refills: 3 | Status: SHIPPED | OUTPATIENT
Start: 2022-01-24 | End: 2022-02-22 | Stop reason: HOSPADM

## 2022-01-24 RX ORDER — LISINOPRIL 40 MG/1
TABLET ORAL
Qty: 90 TABLET | Refills: 3 | Status: SHIPPED | OUTPATIENT
Start: 2022-01-24 | End: 2022-02-22 | Stop reason: HOSPADM

## 2022-02-14 ENCOUNTER — APPOINTMENT (OUTPATIENT)
Dept: GENERAL RADIOLOGY | Facility: HOSPITAL | Age: 87
End: 2022-02-14

## 2022-02-14 ENCOUNTER — HOSPITAL ENCOUNTER (INPATIENT)
Facility: HOSPITAL | Age: 87
LOS: 8 days | Discharge: NURSING, REHAB OR LONG-TERM CARE | End: 2022-02-22
Attending: EMERGENCY MEDICINE | Admitting: INTERNAL MEDICINE

## 2022-02-14 DIAGNOSIS — D72.829 LEUKOCYTOSIS, UNSPECIFIED TYPE: ICD-10-CM
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
## 2022-02-14 DIAGNOSIS — S72.001A CLOSED FRACTURE OF RIGHT HIP, INITIAL ENCOUNTER: Primary | ICD-10-CM

## 2022-02-14 DIAGNOSIS — S42.211A UNSPECIFIED DISPLACED FRACTURE OF SURGICAL NECK OF RIGHT HUMERUS, INITIAL ENCOUNTER FOR CLOSED FRACTURE: ICD-10-CM

## 2022-02-14 DIAGNOSIS — S42.211A FX HUMERAL NECK, RIGHT, CLOSED, INITIAL ENCOUNTER: ICD-10-CM

## 2022-02-14 LAB
ABO GROUP BLD: NORMAL
ALBUMIN SERPL-MCNC: 4.1 G/DL (ref 3.5–5.2)
ALBUMIN/GLOB SERPL: 1.5 G/DL
ALP SERPL-CCNC: 93 U/L (ref 39–117)
ALT SERPL W P-5'-P-CCNC: 18 U/L (ref 1–33)
ANION GAP SERPL CALCULATED.3IONS-SCNC: 12 MMOL/L (ref 5–15)
AST SERPL-CCNC: 22 U/L (ref 1–32)
BILIRUB SERPL-MCNC: 0.9 MG/DL (ref 0–1.2)
BILIRUB UR QL STRIP: NEGATIVE
BLD GP AB SCN SERPL QL: NEGATIVE
BUN SERPL-MCNC: 17 MG/DL (ref 8–23)
BUN/CREAT SERPL: 13.5 (ref 7–25)
CALCIUM SPEC-SCNC: 9.1 MG/DL (ref 8.6–10.5)
CHLORIDE SERPL-SCNC: 106 MMOL/L (ref 98–107)
CLARITY UR: CLEAR
CO2 SERPL-SCNC: 22 MMOL/L (ref 22–29)
COLOR UR: YELLOW
CREAT SERPL-MCNC: 1.26 MG/DL (ref 0.57–1)
D-LACTATE SERPL-SCNC: 1.5 MMOL/L (ref 0.5–2)
DEPRECATED RDW RBC AUTO: 61.8 FL (ref 37–54)
ERYTHROCYTE [DISTWIDTH] IN BLOOD BY AUTOMATED COUNT: 20.8 % (ref 12.3–15.4)
GFR SERPL CREATININE-BSD FRML MDRD: 40 ML/MIN/1.73
GLOBULIN UR ELPH-MCNC: 2.7 GM/DL
GLUCOSE BLDC GLUCOMTR-MCNC: 136 MG/DL (ref 70–130)
GLUCOSE SERPL-MCNC: 128 MG/DL (ref 65–99)
GLUCOSE UR STRIP-MCNC: NEGATIVE MG/DL
HCT VFR BLD AUTO: 31.2 % (ref 34–46.6)
HGB BLD-MCNC: 10.1 G/DL (ref 12–15.9)
HGB UR QL STRIP.AUTO: NEGATIVE
KETONES UR QL STRIP: ABNORMAL
LEUKOCYTE ESTERASE UR QL STRIP.AUTO: NEGATIVE
LYMPHOCYTES # BLD MANUAL: 0.64 10*3/MM3 (ref 0.7–3.1)
LYMPHOCYTES NFR BLD MANUAL: 7.2 % (ref 5–12)
MCH RBC QN AUTO: 27.4 PG (ref 26.6–33)
MCHC RBC AUTO-ENTMCNC: 32.4 G/DL (ref 31.5–35.7)
MCV RBC AUTO: 84.6 FL (ref 79–97)
MONOCYTES # BLD: 2.18 10*3/MM3 (ref 0.1–0.9)
NEUTROPHILS # BLD AUTO: 27.43 10*3/MM3 (ref 1.7–7)
NEUTROPHILS NFR BLD MANUAL: 90.7 % (ref 42.7–76)
NITRITE UR QL STRIP: NEGATIVE
PH UR STRIP.AUTO: <=5 [PH] (ref 5–8)
PLAT MORPH BLD: NORMAL
PLATELET # BLD AUTO: 310 10*3/MM3 (ref 140–450)
PMV BLD AUTO: 10.5 FL (ref 6–12)
POTASSIUM SERPL-SCNC: 4.1 MMOL/L (ref 3.5–5.2)
PROCALCITONIN SERPL-MCNC: 0.05 NG/ML (ref 0–0.25)
PROT SERPL-MCNC: 6.8 G/DL (ref 6–8.5)
PROT UR QL STRIP: ABNORMAL
RBC # BLD AUTO: 3.69 10*6/MM3 (ref 3.77–5.28)
RBC MORPH BLD: NORMAL
RH BLD: NEGATIVE
SARS-COV-2 RNA RESP QL NAA+PROBE: NOT DETECTED
SODIUM SERPL-SCNC: 140 MMOL/L (ref 136–145)
SP GR UR STRIP: 1.02 (ref 1–1.03)
T&S EXPIRATION DATE: NORMAL
UROBILINOGEN UR QL STRIP: ABNORMAL
VARIANT LYMPHS NFR BLD MANUAL: 2.1 % (ref 19.6–45.3)
WBC MORPH BLD: NORMAL
WBC NRBC COR # BLD: 30.24 10*3/MM3 (ref 3.4–10.8)

## 2022-02-14 PROCEDURE — 86901 BLOOD TYPING SEROLOGIC RH(D): CPT | Performed by: EMERGENCY MEDICINE

## 2022-02-14 PROCEDURE — 81003 URINALYSIS AUTO W/O SCOPE: CPT | Performed by: PHYSICIAN ASSISTANT

## 2022-02-14 PROCEDURE — 99284 EMERGENCY DEPT VISIT MOD MDM: CPT

## 2022-02-14 PROCEDURE — 93010 ELECTROCARDIOGRAM REPORT: CPT | Performed by: INTERNAL MEDICINE

## 2022-02-14 PROCEDURE — 80053 COMPREHEN METABOLIC PANEL: CPT | Performed by: EMERGENCY MEDICINE

## 2022-02-14 PROCEDURE — 36415 COLL VENOUS BLD VENIPUNCTURE: CPT | Performed by: PHYSICIAN ASSISTANT

## 2022-02-14 PROCEDURE — 85007 BL SMEAR W/DIFF WBC COUNT: CPT | Performed by: EMERGENCY MEDICINE

## 2022-02-14 PROCEDURE — 93005 ELECTROCARDIOGRAM TRACING: CPT | Performed by: EMERGENCY MEDICINE

## 2022-02-14 PROCEDURE — 86850 RBC ANTIBODY SCREEN: CPT | Performed by: EMERGENCY MEDICINE

## 2022-02-14 PROCEDURE — 86900 BLOOD TYPING SEROLOGIC ABO: CPT | Performed by: EMERGENCY MEDICINE

## 2022-02-14 PROCEDURE — 87040 BLOOD CULTURE FOR BACTERIA: CPT | Performed by: PHYSICIAN ASSISTANT

## 2022-02-14 PROCEDURE — U0003 INFECTIOUS AGENT DETECTION BY NUCLEIC ACID (DNA OR RNA); SEVERE ACUTE RESPIRATORY SYNDROME CORONAVIRUS 2 (SARS-COV-2) (CORONAVIRUS DISEASE [COVID-19]), AMPLIFIED PROBE TECHNIQUE, MAKING USE OF HIGH THROUGHPUT TECHNOLOGIES AS DESCRIBED BY CMS-2020-01-R: HCPCS | Performed by: PHYSICIAN ASSISTANT

## 2022-02-14 PROCEDURE — 73502 X-RAY EXAM HIP UNI 2-3 VIEWS: CPT

## 2022-02-14 PROCEDURE — 83605 ASSAY OF LACTIC ACID: CPT | Performed by: PHYSICIAN ASSISTANT

## 2022-02-14 PROCEDURE — 71045 X-RAY EXAM CHEST 1 VIEW: CPT

## 2022-02-14 PROCEDURE — 84145 PROCALCITONIN (PCT): CPT | Performed by: PHYSICIAN ASSISTANT

## 2022-02-14 PROCEDURE — 25010000002 ONDANSETRON PER 1 MG: Performed by: EMERGENCY MEDICINE

## 2022-02-14 PROCEDURE — 82962 GLUCOSE BLOOD TEST: CPT

## 2022-02-14 PROCEDURE — 85025 COMPLETE CBC W/AUTO DIFF WBC: CPT | Performed by: EMERGENCY MEDICINE

## 2022-02-14 PROCEDURE — P9612 CATHETERIZE FOR URINE SPEC: HCPCS

## 2022-02-14 PROCEDURE — 25010000002 MORPHINE PER 10 MG: Performed by: EMERGENCY MEDICINE

## 2022-02-14 PROCEDURE — 73030 X-RAY EXAM OF SHOULDER: CPT

## 2022-02-14 RX ORDER — HYDROMORPHONE HYDROCHLORIDE 1 MG/ML
0.5 INJECTION, SOLUTION INTRAMUSCULAR; INTRAVENOUS; SUBCUTANEOUS
Status: DISPENSED | OUTPATIENT
Start: 2022-02-14 | End: 2022-02-21

## 2022-02-14 RX ORDER — INSULIN LISPRO 100 [IU]/ML
0-9 INJECTION, SOLUTION INTRAVENOUS; SUBCUTANEOUS
Status: DISCONTINUED | OUTPATIENT
Start: 2022-02-15 | End: 2022-02-22 | Stop reason: HOSPADM

## 2022-02-14 RX ORDER — ASPIRIN 81 MG/1
81 TABLET ORAL NIGHTLY
Status: DISCONTINUED | OUTPATIENT
Start: 2022-02-14 | End: 2022-02-15

## 2022-02-14 RX ORDER — AMLODIPINE BESYLATE 5 MG/1
5 TABLET ORAL
Status: DISCONTINUED | OUTPATIENT
Start: 2022-02-15 | End: 2022-02-22 | Stop reason: HOSPADM

## 2022-02-14 RX ORDER — DEXTROSE MONOHYDRATE 25 G/50ML
25 INJECTION, SOLUTION INTRAVENOUS
Status: DISCONTINUED | OUTPATIENT
Start: 2022-02-14 | End: 2022-02-22 | Stop reason: HOSPADM

## 2022-02-14 RX ORDER — SODIUM CHLORIDE 0.9 % (FLUSH) 0.9 %
10 SYRINGE (ML) INJECTION AS NEEDED
Status: DISCONTINUED | OUTPATIENT
Start: 2022-02-14 | End: 2022-02-22 | Stop reason: HOSPADM

## 2022-02-14 RX ORDER — ONDANSETRON 2 MG/ML
4 INJECTION INTRAMUSCULAR; INTRAVENOUS ONCE
Status: COMPLETED | OUTPATIENT
Start: 2022-02-14 | End: 2022-02-14

## 2022-02-14 RX ORDER — NALOXONE HCL 0.4 MG/ML
0.4 VIAL (ML) INJECTION
Status: DISCONTINUED | OUTPATIENT
Start: 2022-02-14 | End: 2022-02-22 | Stop reason: HOSPADM

## 2022-02-14 RX ORDER — METOPROLOL SUCCINATE 25 MG/1
37.5 TABLET, EXTENDED RELEASE ORAL
Status: DISCONTINUED | OUTPATIENT
Start: 2022-02-15 | End: 2022-02-16

## 2022-02-14 RX ORDER — ATORVASTATIN CALCIUM 20 MG/1
10 TABLET, FILM COATED ORAL DAILY
Status: DISCONTINUED | OUTPATIENT
Start: 2022-02-15 | End: 2022-02-22 | Stop reason: HOSPADM

## 2022-02-14 RX ORDER — UREA 10 %
3 LOTION (ML) TOPICAL NIGHTLY PRN
Status: DISCONTINUED | OUTPATIENT
Start: 2022-02-14 | End: 2022-02-22 | Stop reason: HOSPADM

## 2022-02-14 RX ORDER — MORPHINE SULFATE 2 MG/ML
4 INJECTION, SOLUTION INTRAMUSCULAR; INTRAVENOUS ONCE
Status: COMPLETED | OUTPATIENT
Start: 2022-02-14 | End: 2022-02-14

## 2022-02-14 RX ORDER — ACETAMINOPHEN 325 MG/1
650 TABLET ORAL EVERY 4 HOURS PRN
Status: DISCONTINUED | OUTPATIENT
Start: 2022-02-14 | End: 2022-02-22 | Stop reason: HOSPADM

## 2022-02-14 RX ORDER — SODIUM CHLORIDE 9 MG/ML
75 INJECTION, SOLUTION INTRAVENOUS CONTINUOUS
Status: DISCONTINUED | OUTPATIENT
Start: 2022-02-14 | End: 2022-02-17

## 2022-02-14 RX ORDER — ONDANSETRON 2 MG/ML
4 INJECTION INTRAMUSCULAR; INTRAVENOUS EVERY 6 HOURS PRN
Status: DISCONTINUED | OUTPATIENT
Start: 2022-02-14 | End: 2022-02-22 | Stop reason: HOSPADM

## 2022-02-14 RX ORDER — ONDANSETRON 4 MG/1
4 TABLET, FILM COATED ORAL EVERY 6 HOURS PRN
Status: DISCONTINUED | OUTPATIENT
Start: 2022-02-14 | End: 2022-02-22 | Stop reason: HOSPADM

## 2022-02-14 RX ORDER — NICOTINE POLACRILEX 4 MG
15 LOZENGE BUCCAL
Status: DISCONTINUED | OUTPATIENT
Start: 2022-02-14 | End: 2022-02-22 | Stop reason: HOSPADM

## 2022-02-14 RX ORDER — HYDROCODONE BITARTRATE AND ACETAMINOPHEN 5; 325 MG/1; MG/1
1 TABLET ORAL EVERY 4 HOURS PRN
Status: DISCONTINUED | OUTPATIENT
Start: 2022-02-14 | End: 2022-02-22 | Stop reason: HOSPADM

## 2022-02-14 RX ORDER — LISINOPRIL 40 MG/1
40 TABLET ORAL
Status: DISCONTINUED | OUTPATIENT
Start: 2022-02-15 | End: 2022-02-21

## 2022-02-14 RX ADMIN — ONDANSETRON 4 MG: 2 INJECTION INTRAMUSCULAR; INTRAVENOUS at 15:48

## 2022-02-14 RX ADMIN — MORPHINE SULFATE 4 MG: 2 INJECTION, SOLUTION INTRAMUSCULAR; INTRAVENOUS at 15:48

## 2022-02-14 RX ADMIN — ASPIRIN 81 MG: 81 TABLET, COATED ORAL at 21:45

## 2022-02-14 RX ADMIN — SODIUM CHLORIDE 75 ML/HR: 9 INJECTION, SOLUTION INTRAVENOUS at 21:46

## 2022-02-15 PROBLEM — S42.211A FX HUMERAL NECK, RIGHT, CLOSED, INITIAL ENCOUNTER: Status: ACTIVE | Noted: 2022-02-15

## 2022-02-15 LAB
ANION GAP SERPL CALCULATED.3IONS-SCNC: 11 MMOL/L (ref 5–15)
BASOPHILS # BLD AUTO: 0.06 10*3/MM3 (ref 0–0.2)
BASOPHILS NFR BLD AUTO: 0.3 % (ref 0–1.5)
BILIRUB UR QL STRIP: NEGATIVE
BUN SERPL-MCNC: 18 MG/DL (ref 8–23)
BUN/CREAT SERPL: 14.8 (ref 7–25)
CALCIUM SPEC-SCNC: 8.9 MG/DL (ref 8.6–10.5)
CHLORIDE SERPL-SCNC: 107 MMOL/L (ref 98–107)
CLARITY UR: CLEAR
CO2 SERPL-SCNC: 21 MMOL/L (ref 22–29)
COLOR UR: YELLOW
CREAT SERPL-MCNC: 1.22 MG/DL (ref 0.57–1)
DEPRECATED RDW RBC AUTO: 63.3 FL (ref 37–54)
EOSINOPHIL # BLD AUTO: 0.66 10*3/MM3 (ref 0–0.4)
EOSINOPHIL NFR BLD AUTO: 3.6 % (ref 0.3–6.2)
ERYTHROCYTE [DISTWIDTH] IN BLOOD BY AUTOMATED COUNT: 20.6 % (ref 12.3–15.4)
FERRITIN SERPL-MCNC: 1113 NG/ML (ref 13–150)
FOLATE SERPL-MCNC: 3.77 NG/ML (ref 4.78–24.2)
GFR SERPL CREATININE-BSD FRML MDRD: 42 ML/MIN/1.73
GLUCOSE BLDC GLUCOMTR-MCNC: 114 MG/DL (ref 70–130)
GLUCOSE BLDC GLUCOMTR-MCNC: 91 MG/DL (ref 70–130)
GLUCOSE BLDC GLUCOMTR-MCNC: 98 MG/DL (ref 70–130)
GLUCOSE BLDC GLUCOMTR-MCNC: 99 MG/DL (ref 70–130)
GLUCOSE SERPL-MCNC: 86 MG/DL (ref 65–99)
GLUCOSE UR STRIP-MCNC: NEGATIVE MG/DL
HBA1C MFR BLD: 4.9 % (ref 4.8–5.6)
HCT VFR BLD AUTO: 29.7 % (ref 34–46.6)
HGB BLD-MCNC: 9.4 G/DL (ref 12–15.9)
HGB RETIC QN AUTO: 27.4 PG (ref 29.8–36.1)
HGB UR QL STRIP.AUTO: NEGATIVE
IMM GRANULOCYTES # BLD AUTO: 0.1 10*3/MM3 (ref 0–0.05)
IMM GRANULOCYTES NFR BLD AUTO: 0.5 % (ref 0–0.5)
IMM RETICS NFR: 23.3 % (ref 3–15.8)
IRON 24H UR-MRATE: 35 MCG/DL (ref 37–145)
IRON SATN MFR SERPL: 11 % (ref 20–50)
KETONES UR QL STRIP: NEGATIVE
LEUKOCYTE ESTERASE UR QL STRIP.AUTO: NEGATIVE
LYMPHOCYTES # BLD AUTO: 1.37 10*3/MM3 (ref 0.7–3.1)
LYMPHOCYTES NFR BLD AUTO: 7.5 % (ref 19.6–45.3)
MCH RBC QN AUTO: 27.2 PG (ref 26.6–33)
MCHC RBC AUTO-ENTMCNC: 31.6 G/DL (ref 31.5–35.7)
MCV RBC AUTO: 85.8 FL (ref 79–97)
MONOCYTES # BLD AUTO: 0.99 10*3/MM3 (ref 0.1–0.9)
MONOCYTES NFR BLD AUTO: 5.4 % (ref 5–12)
NEUTROPHILS NFR BLD AUTO: 15.11 10*3/MM3 (ref 1.7–7)
NEUTROPHILS NFR BLD AUTO: 82.7 % (ref 42.7–76)
NITRITE UR QL STRIP: NEGATIVE
NRBC BLD AUTO-RTO: 0.3 /100 WBC (ref 0–0.2)
PH UR STRIP.AUTO: <=5 [PH] (ref 5–8)
PLATELET # BLD AUTO: 294 10*3/MM3 (ref 140–450)
PMV BLD AUTO: 11 FL (ref 6–12)
POTASSIUM SERPL-SCNC: 4.1 MMOL/L (ref 3.5–5.2)
PROT UR QL STRIP: NEGATIVE
QT INTERVAL: 402 MS
RBC # BLD AUTO: 3.46 10*6/MM3 (ref 3.77–5.28)
RETICS # AUTO: 0.05 10*6/MM3 (ref 0.02–0.13)
RETICS/RBC NFR AUTO: 1.35 % (ref 0.7–1.9)
SODIUM SERPL-SCNC: 139 MMOL/L (ref 136–145)
SP GR UR STRIP: 1.02 (ref 1–1.03)
TIBC SERPL-MCNC: 307 MCG/DL (ref 298–536)
TRANSFERRIN SERPL-MCNC: 206 MG/DL (ref 200–360)
UROBILINOGEN UR QL STRIP: NORMAL
VIT B12 BLD-MCNC: 276 PG/ML (ref 211–946)
WBC NRBC COR # BLD: 18.29 10*3/MM3 (ref 3.4–10.8)

## 2022-02-15 PROCEDURE — 80048 BASIC METABOLIC PNL TOTAL CA: CPT | Performed by: INTERNAL MEDICINE

## 2022-02-15 PROCEDURE — 82746 ASSAY OF FOLIC ACID SERUM: CPT | Performed by: INTERNAL MEDICINE

## 2022-02-15 PROCEDURE — 83036 HEMOGLOBIN GLYCOSYLATED A1C: CPT | Performed by: INTERNAL MEDICINE

## 2022-02-15 PROCEDURE — 85046 RETICYTE/HGB CONCENTRATE: CPT | Performed by: INTERNAL MEDICINE

## 2022-02-15 PROCEDURE — 81003 URINALYSIS AUTO W/O SCOPE: CPT | Performed by: INTERNAL MEDICINE

## 2022-02-15 PROCEDURE — 85025 COMPLETE CBC W/AUTO DIFF WBC: CPT | Performed by: INTERNAL MEDICINE

## 2022-02-15 PROCEDURE — 82962 GLUCOSE BLOOD TEST: CPT

## 2022-02-15 PROCEDURE — 84466 ASSAY OF TRANSFERRIN: CPT | Performed by: INTERNAL MEDICINE

## 2022-02-15 PROCEDURE — 82607 VITAMIN B-12: CPT | Performed by: INTERNAL MEDICINE

## 2022-02-15 PROCEDURE — 99223 1ST HOSP IP/OBS HIGH 75: CPT | Performed by: INTERNAL MEDICINE

## 2022-02-15 PROCEDURE — 82728 ASSAY OF FERRITIN: CPT | Performed by: INTERNAL MEDICINE

## 2022-02-15 PROCEDURE — 83540 ASSAY OF IRON: CPT | Performed by: INTERNAL MEDICINE

## 2022-02-15 RX ORDER — PANTOPRAZOLE SODIUM 40 MG/1
40 TABLET, DELAYED RELEASE ORAL
Status: DISCONTINUED | OUTPATIENT
Start: 2022-02-15 | End: 2022-02-22 | Stop reason: HOSPADM

## 2022-02-15 RX ADMIN — METOPROLOL SUCCINATE 37.5 MG: 25 TABLET, EXTENDED RELEASE ORAL at 08:50

## 2022-02-15 RX ADMIN — ATORVASTATIN CALCIUM 10 MG: 20 TABLET, FILM COATED ORAL at 08:50

## 2022-02-15 RX ADMIN — LISINOPRIL 40 MG: 40 TABLET ORAL at 08:50

## 2022-02-15 RX ADMIN — HYDROCODONE BITARTRATE AND ACETAMINOPHEN 1 TABLET: 5; 325 TABLET ORAL at 08:51

## 2022-02-15 RX ADMIN — SODIUM CHLORIDE 75 ML/HR: 9 INJECTION, SOLUTION INTRAVENOUS at 06:26

## 2022-02-15 RX ADMIN — AMLODIPINE BESYLATE 5 MG: 5 TABLET ORAL at 08:50

## 2022-02-15 RX ADMIN — HYDROCODONE BITARTRATE AND ACETAMINOPHEN 1 TABLET: 5; 325 TABLET ORAL at 22:01

## 2022-02-16 ENCOUNTER — APPOINTMENT (OUTPATIENT)
Dept: GENERAL RADIOLOGY | Facility: HOSPITAL | Age: 87
End: 2022-02-16

## 2022-02-16 ENCOUNTER — ANESTHESIA (OUTPATIENT)
Dept: PERIOP | Facility: HOSPITAL | Age: 87
End: 2022-02-16

## 2022-02-16 ENCOUNTER — ANESTHESIA EVENT (OUTPATIENT)
Dept: PERIOP | Facility: HOSPITAL | Age: 87
End: 2022-02-16

## 2022-02-16 PROBLEM — R33.8 ACUTE URINARY RETENTION: Status: ACTIVE | Noted: 2022-02-16

## 2022-02-16 LAB
ALBUMIN SERPL-MCNC: 3.4 G/DL (ref 3.5–5.2)
ALBUMIN/GLOB SERPL: 1.4 G/DL
ALP SERPL-CCNC: 80 U/L (ref 39–117)
ALT SERPL W P-5'-P-CCNC: 12 U/L (ref 1–33)
ANION GAP SERPL CALCULATED.3IONS-SCNC: 8.8 MMOL/L (ref 5–15)
AST SERPL-CCNC: 14 U/L (ref 1–32)
BASOPHILS # BLD AUTO: 0.06 10*3/MM3 (ref 0–0.2)
BASOPHILS NFR BLD AUTO: 0.3 % (ref 0–1.5)
BILIRUB SERPL-MCNC: 1.2 MG/DL (ref 0–1.2)
BUN SERPL-MCNC: 16 MG/DL (ref 8–23)
BUN/CREAT SERPL: 15.1 (ref 7–25)
CALCIUM SPEC-SCNC: 8.9 MG/DL (ref 8.6–10.5)
CHLORIDE SERPL-SCNC: 108 MMOL/L (ref 98–107)
CO2 SERPL-SCNC: 21.2 MMOL/L (ref 22–29)
CREAT SERPL-MCNC: 1.06 MG/DL (ref 0.57–1)
DEPRECATED RDW RBC AUTO: 61.3 FL (ref 37–54)
EOSINOPHIL # BLD AUTO: 1.41 10*3/MM3 (ref 0–0.4)
EOSINOPHIL NFR BLD AUTO: 8.2 % (ref 0.3–6.2)
ERYTHROCYTE [DISTWIDTH] IN BLOOD BY AUTOMATED COUNT: 21 % (ref 12.3–15.4)
GFR SERPL CREATININE-BSD FRML MDRD: 49 ML/MIN/1.73
GLOBULIN UR ELPH-MCNC: 2.5 GM/DL
GLUCOSE BLDC GLUCOMTR-MCNC: 105 MG/DL (ref 70–130)
GLUCOSE BLDC GLUCOMTR-MCNC: 135 MG/DL (ref 70–130)
GLUCOSE BLDC GLUCOMTR-MCNC: 88 MG/DL (ref 70–130)
GLUCOSE SERPL-MCNC: 99 MG/DL (ref 65–99)
HCT VFR BLD AUTO: 26.1 % (ref 34–46.6)
HGB BLD-MCNC: 8.4 G/DL (ref 12–15.9)
IMM GRANULOCYTES # BLD AUTO: 0.09 10*3/MM3 (ref 0–0.05)
IMM GRANULOCYTES NFR BLD AUTO: 0.5 % (ref 0–0.5)
LYMPHOCYTES # BLD AUTO: 1.02 10*3/MM3 (ref 0.7–3.1)
LYMPHOCYTES NFR BLD AUTO: 5.9 % (ref 19.6–45.3)
MCH RBC QN AUTO: 26.9 PG (ref 26.6–33)
MCHC RBC AUTO-ENTMCNC: 32.2 G/DL (ref 31.5–35.7)
MCV RBC AUTO: 83.7 FL (ref 79–97)
MONOCYTES # BLD AUTO: 1.21 10*3/MM3 (ref 0.1–0.9)
MONOCYTES NFR BLD AUTO: 7 % (ref 5–12)
NEUTROPHILS NFR BLD AUTO: 13.45 10*3/MM3 (ref 1.7–7)
NEUTROPHILS NFR BLD AUTO: 78.1 % (ref 42.7–76)
NRBC BLD AUTO-RTO: 0.3 /100 WBC (ref 0–0.2)
PLATELET # BLD AUTO: 237 10*3/MM3 (ref 140–450)
PMV BLD AUTO: 10.6 FL (ref 6–12)
POTASSIUM SERPL-SCNC: 4 MMOL/L (ref 3.5–5.2)
PROT SERPL-MCNC: 5.9 G/DL (ref 6–8.5)
RBC # BLD AUTO: 3.12 10*6/MM3 (ref 3.77–5.28)
SODIUM SERPL-SCNC: 138 MMOL/L (ref 136–145)
WBC NRBC COR # BLD: 17.24 10*3/MM3 (ref 3.4–10.8)

## 2022-02-16 PROCEDURE — C1713 ANCHOR/SCREW BN/BN,TIS/BN: HCPCS | Performed by: ORTHOPAEDIC SURGERY

## 2022-02-16 PROCEDURE — 25010000002 MAGNESIUM SULFATE PER 500 MG OF MAGNESIUM: Performed by: ANESTHESIOLOGY

## 2022-02-16 PROCEDURE — 25010000002 FENTANYL CITRATE (PF) 50 MCG/ML SOLUTION: Performed by: ANESTHESIOLOGY

## 2022-02-16 PROCEDURE — C1889 IMPLANT/INSERT DEVICE, NOC: HCPCS | Performed by: ORTHOPAEDIC SURGERY

## 2022-02-16 PROCEDURE — 25010000002 HYDROMORPHONE PER 4 MG: Performed by: ANESTHESIOLOGY

## 2022-02-16 PROCEDURE — 82962 GLUCOSE BLOOD TEST: CPT

## 2022-02-16 PROCEDURE — 25010000002 PROPOFOL 10 MG/ML EMULSION: Performed by: ANESTHESIOLOGY

## 2022-02-16 PROCEDURE — 99233 SBSQ HOSP IP/OBS HIGH 50: CPT | Performed by: INTERNAL MEDICINE

## 2022-02-16 PROCEDURE — 25010000002 NEOSTIGMINE 10 MG/10ML SOLUTION: Performed by: NURSE ANESTHETIST, CERTIFIED REGISTERED

## 2022-02-16 PROCEDURE — 25010000002 VANCOMYCIN 1 G RECONSTITUTED SOLUTION: Performed by: ORTHOPAEDIC SURGERY

## 2022-02-16 PROCEDURE — 72170 X-RAY EXAM OF PELVIS: CPT

## 2022-02-16 PROCEDURE — C1776 JOINT DEVICE (IMPLANTABLE): HCPCS | Performed by: ORTHOPAEDIC SURGERY

## 2022-02-16 PROCEDURE — 0SRR0J9 REPLACEMENT OF RIGHT HIP JOINT, FEMORAL SURFACE WITH SYNTHETIC SUBSTITUTE, CEMENTED, OPEN APPROACH: ICD-10-PCS | Performed by: ORTHOPAEDIC SURGERY

## 2022-02-16 PROCEDURE — 85025 COMPLETE CBC W/AUTO DIFF WBC: CPT | Performed by: INTERNAL MEDICINE

## 2022-02-16 PROCEDURE — 0 CEFAZOLIN IN DEXTROSE 2-4 GM/100ML-% SOLUTION: Performed by: ORTHOPAEDIC SURGERY

## 2022-02-16 PROCEDURE — 25010000002 DEXAMETHASONE PER 1 MG: Performed by: ANESTHESIOLOGY

## 2022-02-16 PROCEDURE — 25010000002 ONDANSETRON PER 1 MG: Performed by: NURSE ANESTHETIST, CERTIFIED REGISTERED

## 2022-02-16 PROCEDURE — L1830 KO IMMOB CANVAS LONG PRE OTS: HCPCS | Performed by: ORTHOPAEDIC SURGERY

## 2022-02-16 PROCEDURE — 80053 COMPREHEN METABOLIC PANEL: CPT | Performed by: INTERNAL MEDICINE

## 2022-02-16 DEVICE — IMPLANTABLE DEVICE: Type: IMPLANTABLE DEVICE | Site: HIP | Status: FUNCTIONAL

## 2022-02-16 DEVICE — AVENIR® STANDARD STEM CEMENTED 2
Type: IMPLANTABLE DEVICE | Site: HIP | Status: FUNCTIONAL
Brand: AVENIR®

## 2022-02-16 DEVICE — CAP PRT HIP BIPOL: Type: IMPLANTABLE DEVICE | Site: HIP | Status: FUNCTIONAL

## 2022-02-16 DEVICE — VIOLET ANTIBACTERIAL POLYDIOXANONE, KNOTLESS TISSUE CONTROL DEVICE
Type: IMPLANTABLE DEVICE | Site: HIP | Status: FUNCTIONAL
Brand: STRATAFIX

## 2022-02-16 DEVICE — KNOTLESS TISSUE CONTROL DEVICE, UNDYED UNIDIRECTIONAL (ANTIBACTERIAL) SYNTHETIC ABSORBABLE DEVICE
Type: IMPLANTABLE DEVICE | Site: HIP | Status: FUNCTIONAL
Brand: STRATAFIX

## 2022-02-16 RX ORDER — SODIUM CHLORIDE 0.9 % (FLUSH) 0.9 %
3-10 SYRINGE (ML) INJECTION AS NEEDED
Status: DISCONTINUED | OUTPATIENT
Start: 2022-02-16 | End: 2022-02-16 | Stop reason: HOSPADM

## 2022-02-16 RX ORDER — ROCURONIUM BROMIDE 10 MG/ML
INJECTION, SOLUTION INTRAVENOUS AS NEEDED
Status: DISCONTINUED | OUTPATIENT
Start: 2022-02-16 | End: 2022-02-16 | Stop reason: SURG

## 2022-02-16 RX ORDER — HYDROMORPHONE HYDROCHLORIDE 1 MG/ML
0.5 INJECTION, SOLUTION INTRAMUSCULAR; INTRAVENOUS; SUBCUTANEOUS
Status: DISCONTINUED | OUTPATIENT
Start: 2022-02-16 | End: 2022-02-16 | Stop reason: HOSPADM

## 2022-02-16 RX ORDER — HYDRALAZINE HYDROCHLORIDE 20 MG/ML
5 INJECTION INTRAMUSCULAR; INTRAVENOUS
Status: DISCONTINUED | OUTPATIENT
Start: 2022-02-16 | End: 2022-02-16 | Stop reason: HOSPADM

## 2022-02-16 RX ORDER — FENTANYL CITRATE 50 UG/ML
50 INJECTION, SOLUTION INTRAMUSCULAR; INTRAVENOUS
Status: DISCONTINUED | OUTPATIENT
Start: 2022-02-16 | End: 2022-02-16 | Stop reason: HOSPADM

## 2022-02-16 RX ORDER — FLUMAZENIL 0.1 MG/ML
0.2 INJECTION INTRAVENOUS AS NEEDED
Status: DISCONTINUED | OUTPATIENT
Start: 2022-02-16 | End: 2022-02-16 | Stop reason: HOSPADM

## 2022-02-16 RX ORDER — SODIUM CHLORIDE, SODIUM LACTATE, POTASSIUM CHLORIDE, CALCIUM CHLORIDE 600; 310; 30; 20 MG/100ML; MG/100ML; MG/100ML; MG/100ML
9 INJECTION, SOLUTION INTRAVENOUS CONTINUOUS
Status: DISCONTINUED | OUTPATIENT
Start: 2022-02-16 | End: 2022-02-22 | Stop reason: HOSPADM

## 2022-02-16 RX ORDER — ONDANSETRON 2 MG/ML
INJECTION INTRAMUSCULAR; INTRAVENOUS AS NEEDED
Status: DISCONTINUED | OUTPATIENT
Start: 2022-02-16 | End: 2022-02-16 | Stop reason: SURG

## 2022-02-16 RX ORDER — PROPOFOL 10 MG/ML
VIAL (ML) INTRAVENOUS AS NEEDED
Status: DISCONTINUED | OUTPATIENT
Start: 2022-02-16 | End: 2022-02-16 | Stop reason: SURG

## 2022-02-16 RX ORDER — HYDROMORPHONE HCL 110MG/55ML
PATIENT CONTROLLED ANALGESIA SYRINGE INTRAVENOUS AS NEEDED
Status: DISCONTINUED | OUTPATIENT
Start: 2022-02-16 | End: 2022-02-16 | Stop reason: SURG

## 2022-02-16 RX ORDER — MAGNESIUM SULFATE HEPTAHYDRATE 500 MG/ML
INJECTION, SOLUTION INTRAMUSCULAR; INTRAVENOUS AS NEEDED
Status: DISCONTINUED | OUTPATIENT
Start: 2022-02-16 | End: 2022-02-16 | Stop reason: SURG

## 2022-02-16 RX ORDER — MAGNESIUM HYDROXIDE 1200 MG/15ML
LIQUID ORAL AS NEEDED
Status: DISCONTINUED | OUTPATIENT
Start: 2022-02-16 | End: 2022-02-16 | Stop reason: HOSPADM

## 2022-02-16 RX ORDER — ONDANSETRON 2 MG/ML
4 INJECTION INTRAMUSCULAR; INTRAVENOUS ONCE AS NEEDED
Status: DISCONTINUED | OUTPATIENT
Start: 2022-02-16 | End: 2022-02-16 | Stop reason: HOSPADM

## 2022-02-16 RX ORDER — LIDOCAINE HYDROCHLORIDE 20 MG/ML
INJECTION, SOLUTION INFILTRATION; PERINEURAL AS NEEDED
Status: DISCONTINUED | OUTPATIENT
Start: 2022-02-16 | End: 2022-02-16 | Stop reason: SURG

## 2022-02-16 RX ORDER — MIDAZOLAM HYDROCHLORIDE 1 MG/ML
0.5 INJECTION INTRAMUSCULAR; INTRAVENOUS
Status: DISCONTINUED | OUTPATIENT
Start: 2022-02-16 | End: 2022-02-16 | Stop reason: HOSPADM

## 2022-02-16 RX ORDER — EPHEDRINE SULFATE 50 MG/ML
5 INJECTION, SOLUTION INTRAVENOUS ONCE AS NEEDED
Status: DISCONTINUED | OUTPATIENT
Start: 2022-02-16 | End: 2022-02-16 | Stop reason: HOSPADM

## 2022-02-16 RX ORDER — NEOSTIGMINE METHYLSULFATE 1 MG/ML
INJECTION, SOLUTION INTRAVENOUS AS NEEDED
Status: DISCONTINUED | OUTPATIENT
Start: 2022-02-16 | End: 2022-02-16 | Stop reason: SURG

## 2022-02-16 RX ORDER — DIPHENHYDRAMINE HCL 25 MG
25 CAPSULE ORAL
Status: DISCONTINUED | OUTPATIENT
Start: 2022-02-16 | End: 2022-02-16 | Stop reason: HOSPADM

## 2022-02-16 RX ORDER — CEFAZOLIN SODIUM 2 G/100ML
2 INJECTION, SOLUTION INTRAVENOUS ONCE
Status: COMPLETED | OUTPATIENT
Start: 2022-02-16 | End: 2022-02-16

## 2022-02-16 RX ORDER — OXYCODONE AND ACETAMINOPHEN 7.5; 325 MG/1; MG/1
1 TABLET ORAL EVERY 4 HOURS PRN
Status: DISCONTINUED | OUTPATIENT
Start: 2022-02-16 | End: 2022-02-16 | Stop reason: HOSPADM

## 2022-02-16 RX ORDER — DIPHENHYDRAMINE HYDROCHLORIDE 50 MG/ML
12.5 INJECTION INTRAMUSCULAR; INTRAVENOUS
Status: DISCONTINUED | OUTPATIENT
Start: 2022-02-16 | End: 2022-02-16 | Stop reason: HOSPADM

## 2022-02-16 RX ORDER — FOLIC ACID 1 MG/1
1 TABLET ORAL DAILY
Status: DISCONTINUED | OUTPATIENT
Start: 2022-02-16 | End: 2022-02-22 | Stop reason: HOSPADM

## 2022-02-16 RX ORDER — METOPROLOL SUCCINATE 25 MG/1
12.5 TABLET, EXTENDED RELEASE ORAL
Status: DISCONTINUED | OUTPATIENT
Start: 2022-02-17 | End: 2022-02-16

## 2022-02-16 RX ORDER — DEXAMETHASONE SODIUM PHOSPHATE 10 MG/ML
INJECTION INTRAMUSCULAR; INTRAVENOUS AS NEEDED
Status: DISCONTINUED | OUTPATIENT
Start: 2022-02-16 | End: 2022-02-16 | Stop reason: SURG

## 2022-02-16 RX ORDER — TRANEXAMIC ACID 100 MG/ML
INJECTION, SOLUTION INTRAVENOUS AS NEEDED
Status: DISCONTINUED | OUTPATIENT
Start: 2022-02-16 | End: 2022-02-16 | Stop reason: SURG

## 2022-02-16 RX ORDER — HYDROCODONE BITARTRATE AND ACETAMINOPHEN 7.5; 325 MG/1; MG/1
1 TABLET ORAL ONCE AS NEEDED
Status: DISCONTINUED | OUTPATIENT
Start: 2022-02-16 | End: 2022-02-16 | Stop reason: HOSPADM

## 2022-02-16 RX ORDER — CEFAZOLIN SODIUM 1 G/50ML
1 INJECTION, SOLUTION INTRAVENOUS EVERY 8 HOURS
Status: COMPLETED | OUTPATIENT
Start: 2022-02-17 | End: 2022-02-17

## 2022-02-16 RX ORDER — GLYCOPYRROLATE 0.2 MG/ML
INJECTION INTRAMUSCULAR; INTRAVENOUS AS NEEDED
Status: DISCONTINUED | OUTPATIENT
Start: 2022-02-16 | End: 2022-02-16 | Stop reason: SURG

## 2022-02-16 RX ORDER — METOPROLOL SUCCINATE 25 MG/1
12.5 TABLET, EXTENDED RELEASE ORAL
Status: DISCONTINUED | OUTPATIENT
Start: 2022-02-16 | End: 2022-02-21

## 2022-02-16 RX ORDER — PROMETHAZINE HYDROCHLORIDE 25 MG/1
25 SUPPOSITORY RECTAL ONCE AS NEEDED
Status: DISCONTINUED | OUTPATIENT
Start: 2022-02-16 | End: 2022-02-16 | Stop reason: HOSPADM

## 2022-02-16 RX ORDER — VANCOMYCIN HYDROCHLORIDE 1 G/20ML
INJECTION, POWDER, LYOPHILIZED, FOR SOLUTION INTRAVENOUS AS NEEDED
Status: DISCONTINUED | OUTPATIENT
Start: 2022-02-16 | End: 2022-02-16 | Stop reason: HOSPADM

## 2022-02-16 RX ORDER — LABETALOL HYDROCHLORIDE 5 MG/ML
5 INJECTION, SOLUTION INTRAVENOUS
Status: DISCONTINUED | OUTPATIENT
Start: 2022-02-16 | End: 2022-02-16 | Stop reason: HOSPADM

## 2022-02-16 RX ORDER — PROMETHAZINE HYDROCHLORIDE 25 MG/1
25 TABLET ORAL ONCE AS NEEDED
Status: DISCONTINUED | OUTPATIENT
Start: 2022-02-16 | End: 2022-02-16 | Stop reason: HOSPADM

## 2022-02-16 RX ORDER — CHOLECALCIFEROL (VITAMIN D3) 125 MCG
1000 CAPSULE ORAL DAILY
Status: DISCONTINUED | OUTPATIENT
Start: 2022-02-16 | End: 2022-02-22 | Stop reason: HOSPADM

## 2022-02-16 RX ORDER — LIDOCAINE HYDROCHLORIDE 10 MG/ML
0.5 INJECTION, SOLUTION EPIDURAL; INFILTRATION; INTRACAUDAL; PERINEURAL ONCE AS NEEDED
Status: DISCONTINUED | OUTPATIENT
Start: 2022-02-16 | End: 2022-02-16 | Stop reason: HOSPADM

## 2022-02-16 RX ORDER — FERROUS SULFATE 325(65) MG
325 TABLET ORAL
Status: DISCONTINUED | OUTPATIENT
Start: 2022-02-16 | End: 2022-02-17

## 2022-02-16 RX ORDER — NALOXONE HCL 0.4 MG/ML
0.2 VIAL (ML) INJECTION AS NEEDED
Status: DISCONTINUED | OUTPATIENT
Start: 2022-02-16 | End: 2022-02-16 | Stop reason: HOSPADM

## 2022-02-16 RX ORDER — SODIUM CHLORIDE 9 MG/ML
INJECTION, SOLUTION INTRAVENOUS AS NEEDED
Status: DISCONTINUED | OUTPATIENT
Start: 2022-02-16 | End: 2022-02-16 | Stop reason: HOSPADM

## 2022-02-16 RX ORDER — IBUPROFEN 600 MG/1
600 TABLET ORAL ONCE AS NEEDED
Status: DISCONTINUED | OUTPATIENT
Start: 2022-02-16 | End: 2022-02-16

## 2022-02-16 RX ORDER — SODIUM CHLORIDE 0.9 % (FLUSH) 0.9 %
3 SYRINGE (ML) INJECTION EVERY 12 HOURS SCHEDULED
Status: DISCONTINUED | OUTPATIENT
Start: 2022-02-16 | End: 2022-02-16 | Stop reason: HOSPADM

## 2022-02-16 RX ADMIN — HYDROMORPHONE HYDROCHLORIDE 0.5 MG: 2 INJECTION, SOLUTION INTRAMUSCULAR; INTRAVENOUS; SUBCUTANEOUS at 15:43

## 2022-02-16 RX ADMIN — ONDANSETRON 4 MG: 2 INJECTION INTRAMUSCULAR; INTRAVENOUS at 17:24

## 2022-02-16 RX ADMIN — PROPOFOL 50 MG: 10 INJECTION, EMULSION INTRAVENOUS at 15:28

## 2022-02-16 RX ADMIN — SODIUM CHLORIDE, POTASSIUM CHLORIDE, SODIUM LACTATE AND CALCIUM CHLORIDE: 600; 310; 30; 20 INJECTION, SOLUTION INTRAVENOUS at 17:31

## 2022-02-16 RX ADMIN — DEXAMETHASONE SODIUM PHOSPHATE 8 MG: 10 INJECTION INTRAMUSCULAR; INTRAVENOUS at 15:32

## 2022-02-16 RX ADMIN — CEFAZOLIN SODIUM 2 G: 2 INJECTION, SOLUTION INTRAVENOUS at 15:15

## 2022-02-16 RX ADMIN — NEOSTIGMINE METHYLSULFATE 2 MG: 1 INJECTION INTRAVENOUS at 17:24

## 2022-02-16 RX ADMIN — PROPOFOL 50 MG: 10 INJECTION, EMULSION INTRAVENOUS at 15:23

## 2022-02-16 RX ADMIN — SODIUM CHLORIDE 75 ML/HR: 9 INJECTION, SOLUTION INTRAVENOUS at 22:27

## 2022-02-16 RX ADMIN — METOPROLOL SUCCINATE 12.5 MG: 25 TABLET, EXTENDED RELEASE ORAL at 10:27

## 2022-02-16 RX ADMIN — HYDROCODONE BITARTRATE AND ACETAMINOPHEN 1 TABLET: 5; 325 TABLET ORAL at 10:30

## 2022-02-16 RX ADMIN — SODIUM CHLORIDE, POTASSIUM CHLORIDE, SODIUM LACTATE AND CALCIUM CHLORIDE 9 ML/HR: 600; 310; 30; 20 INJECTION, SOLUTION INTRAVENOUS at 14:51

## 2022-02-16 RX ADMIN — ROCURONIUM BROMIDE 10 MG: 50 INJECTION INTRAVENOUS at 16:42

## 2022-02-16 RX ADMIN — LIDOCAINE HYDROCHLORIDE 100 MG: 20 INJECTION, SOLUTION INFILTRATION; PERINEURAL at 15:23

## 2022-02-16 RX ADMIN — EPHEDRINE SULFATE 10 MG: 50 INJECTION INTRAMUSCULAR; INTRAVENOUS; SUBCUTANEOUS at 16:36

## 2022-02-16 RX ADMIN — MAGNESIUM SULFATE HEPTAHYDRATE 1 G: 500 INJECTION, SOLUTION INTRAMUSCULAR; INTRAVENOUS at 15:43

## 2022-02-16 RX ADMIN — TRANEXAMIC ACID 1000 MG: 1 INJECTION, SOLUTION INTRAVENOUS at 15:46

## 2022-02-16 RX ADMIN — PROPOFOL 50 MG: 10 INJECTION, EMULSION INTRAVENOUS at 15:26

## 2022-02-16 RX ADMIN — FENTANYL CITRATE 50 MCG: 50 INJECTION INTRAMUSCULAR; INTRAVENOUS at 18:22

## 2022-02-16 RX ADMIN — ROCURONIUM BROMIDE 10 MG: 50 INJECTION INTRAVENOUS at 16:02

## 2022-02-16 RX ADMIN — HYDROCODONE BITARTRATE AND ACETAMINOPHEN 1 TABLET: 5; 325 TABLET ORAL at 04:17

## 2022-02-16 RX ADMIN — ROCURONIUM BROMIDE 30 MG: 50 INJECTION INTRAVENOUS at 15:24

## 2022-02-16 RX ADMIN — GLYCOPYRROLATE 0.4 MG: 0.2 INJECTION INTRAMUSCULAR; INTRAVENOUS at 17:24

## 2022-02-16 NOTE — ANESTHESIA POSTPROCEDURE EVALUATION
"Patient: Ama Billy    Procedure Summary     Date: 02/16/22 Room / Location: [facility] OR  / [facility] MAIN OR    Anesthesia Start: 1521 Anesthesia Stop: 1748    Procedure: RIGHT HIP BIPOLAR HEMIARTHROPLASTY (Right Hip) Diagnosis:     Surgeons: Miguel Ya MD Provider: Manuel Mendenhall MD    Anesthesia Type: general ASA Status: 3          Anesthesia Type: general    Vitals  Vitals Value Taken Time   /52 02/16/22 1846   Temp 36.4 °C (97.5 °F) 02/16/22 1747   Pulse 55 02/16/22 1849   Resp 16 02/16/22 1845   SpO2 96 % 02/16/22 1849   Vitals shown include unvalidated device data.        Post Anesthesia Care and Evaluation    Patient location during evaluation: bedside  Patient participation: complete - patient participated  Level of consciousness: awake and alert  Pain management: adequate  Airway patency: patent  Anesthetic complications: No anesthetic complications    Cardiovascular status: acceptable  Respiratory status: acceptable  Hydration status: acceptable    Comments: /52 (BP Location: Left arm, Patient Position: Lying)   Pulse 56   Temp 36.4 °C (97.5 °F) (Oral)   Resp 16   Ht 172.7 cm (67.99\")   Wt 80.5 kg (177 lb 8 oz)   LMP  (LMP Unknown)   SpO2 96%   BMI 27.00 kg/m²     Patient is stable postoperatively and has adequately recovered from anesthesia as described above unless otherwise noted      "

## 2022-02-16 NOTE — ANESTHESIA PREPROCEDURE EVALUATION
Anesthesia Evaluation     Patient summary reviewed and Nursing notes reviewed                Airway   Mallampati: II  TM distance: >3 FB  Neck ROM: full  Dental    (+) upper dentures, lower dentures and partials    Pulmonary    (+) shortness of breath,   Cardiovascular     ECG reviewed  PT is on anticoagulation therapy  Patient on routine beta blocker and Beta blocker given within 24 hours of surgery  Rhythm: regular  Rate: normal    (+) hypertension, dysrhythmias Paroxysmal Atrial Fib, hyperlipidemia,       Neuro/Psych- negative ROS  GI/Hepatic/Renal/Endo    (+)  GI bleeding ,     Musculoskeletal     Abdominal    Substance History - negative use     OB/GYN negative ob/gyn ROS         Other   arthritis,                    Anesthesia Plan    ASA 3     general   (PAF currently in NSR    Both her right hip and arm were fractured when she fell PTA    NPO    partial upper and lower dentures have been removed    I have reviewed the patient's history with the patient and the chart, including all pertinent laboratory results and imaging. I have explained the risks of anesthesia including but not limited to dental damage, corneal abrasion, nerve injury, MI, stroke, and death. Questions asked and answered. Anesthetic plan discussed with patient and team as indicated. Patient expressed understanding of the above.  )  intravenous induction     Anesthetic plan, all risks, benefits, and alternatives have been provided, discussed and informed consent has been obtained with: patient.        CODE STATUS:    Code Status (Patient has no pulse and is not breathing): CPR (Attempt to Resuscitate)  Medical Interventions (Patient has pulse or is breathing): Full Support

## 2022-02-16 NOTE — ANESTHESIA PROCEDURE NOTES
Airway  Date/Time: 2/16/2022 3:29 PM  Airway not difficult    General Information and Staff    Anesthesiologist: Julio Figueroa MD    Indications and Patient Condition    Preoxygenated: yes  Mask difficulty assessment: 1 - vent by mask    Final Airway Details  Final airway type: endotracheal airway      Successful airway: ETT  Cuffed: yes   Successful intubation technique: direct laryngoscopy  Endotracheal tube insertion site: oral  Blade: Foster  Blade size: 2  ETT size (mm): 7.0  Cormack-Lehane Classification: grade I - full view of glottis  Placement verified by: chest auscultation and capnometry   Measured from: teeth  ETT/EBT  to teeth (cm): 22  Assessment: lips, teeth, and gum same as pre-op and atraumatic intubation    Additional Comments  Cracked #8 & #9 noted pre induction

## 2022-02-17 LAB
ANION GAP SERPL CALCULATED.3IONS-SCNC: 8.6 MMOL/L (ref 5–15)
BASOPHILS # BLD AUTO: 0.02 10*3/MM3 (ref 0–0.2)
BASOPHILS NFR BLD AUTO: 0.1 % (ref 0–1.5)
BUN SERPL-MCNC: 18 MG/DL (ref 8–23)
BUN/CREAT SERPL: 17 (ref 7–25)
CALCIUM SPEC-SCNC: 8.8 MG/DL (ref 8.6–10.5)
CHLORIDE SERPL-SCNC: 108 MMOL/L (ref 98–107)
CO2 SERPL-SCNC: 20.4 MMOL/L (ref 22–29)
CREAT SERPL-MCNC: 1.06 MG/DL (ref 0.57–1)
DEPRECATED RDW RBC AUTO: 61.4 FL (ref 37–54)
EOSINOPHIL # BLD AUTO: 0 10*3/MM3 (ref 0–0.4)
EOSINOPHIL NFR BLD AUTO: 0 % (ref 0.3–6.2)
ERYTHROCYTE [DISTWIDTH] IN BLOOD BY AUTOMATED COUNT: 20.6 % (ref 12.3–15.4)
GFR SERPL CREATININE-BSD FRML MDRD: 49 ML/MIN/1.73
GLUCOSE BLDC GLUCOMTR-MCNC: 126 MG/DL (ref 70–130)
GLUCOSE BLDC GLUCOMTR-MCNC: 131 MG/DL (ref 70–130)
GLUCOSE BLDC GLUCOMTR-MCNC: 138 MG/DL (ref 70–130)
GLUCOSE BLDC GLUCOMTR-MCNC: 236 MG/DL (ref 70–130)
GLUCOSE SERPL-MCNC: 134 MG/DL (ref 65–99)
HCT VFR BLD AUTO: 26.5 % (ref 34–46.6)
HGB BLD-MCNC: 8.7 G/DL (ref 12–15.9)
IMM GRANULOCYTES # BLD AUTO: 0.08 10*3/MM3 (ref 0–0.05)
IMM GRANULOCYTES NFR BLD AUTO: 0.6 % (ref 0–0.5)
LYMPHOCYTES # BLD AUTO: 0.75 10*3/MM3 (ref 0.7–3.1)
LYMPHOCYTES NFR BLD AUTO: 5.6 % (ref 19.6–45.3)
MCH RBC QN AUTO: 27.4 PG (ref 26.6–33)
MCHC RBC AUTO-ENTMCNC: 32.8 G/DL (ref 31.5–35.7)
MCV RBC AUTO: 83.6 FL (ref 79–97)
MONOCYTES # BLD AUTO: 0.81 10*3/MM3 (ref 0.1–0.9)
MONOCYTES NFR BLD AUTO: 6 % (ref 5–12)
NEUTROPHILS NFR BLD AUTO: 11.82 10*3/MM3 (ref 1.7–7)
NEUTROPHILS NFR BLD AUTO: 87.7 % (ref 42.7–76)
NRBC BLD AUTO-RTO: 0.6 /100 WBC (ref 0–0.2)
PLATELET # BLD AUTO: 262 10*3/MM3 (ref 140–450)
PMV BLD AUTO: 11.5 FL (ref 6–12)
POTASSIUM SERPL-SCNC: 4.4 MMOL/L (ref 3.5–5.2)
RBC # BLD AUTO: 3.17 10*6/MM3 (ref 3.77–5.28)
SODIUM SERPL-SCNC: 137 MMOL/L (ref 136–145)
WBC NRBC COR # BLD: 13.48 10*3/MM3 (ref 3.4–10.8)

## 2022-02-17 PROCEDURE — 82962 GLUCOSE BLOOD TEST: CPT

## 2022-02-17 PROCEDURE — 63710000001 INSULIN LISPRO (HUMAN) PER 5 UNITS: Performed by: ORTHOPAEDIC SURGERY

## 2022-02-17 PROCEDURE — 25010000002 ENOXAPARIN PER 10 MG: Performed by: ORTHOPAEDIC SURGERY

## 2022-02-17 PROCEDURE — 80048 BASIC METABOLIC PNL TOTAL CA: CPT | Performed by: ORTHOPAEDIC SURGERY

## 2022-02-17 PROCEDURE — 97110 THERAPEUTIC EXERCISES: CPT

## 2022-02-17 PROCEDURE — 99233 SBSQ HOSP IP/OBS HIGH 50: CPT | Performed by: INTERNAL MEDICINE

## 2022-02-17 PROCEDURE — 25010000002 CEFAZOLIN 1-4 GM/50ML-% SOLUTION: Performed by: ORTHOPAEDIC SURGERY

## 2022-02-17 PROCEDURE — 97162 PT EVAL MOD COMPLEX 30 MIN: CPT

## 2022-02-17 PROCEDURE — 85025 COMPLETE CBC W/AUTO DIFF WBC: CPT | Performed by: ORTHOPAEDIC SURGERY

## 2022-02-17 RX ORDER — FERROUS SULFATE 325(65) MG
325 TABLET ORAL 2 TIMES DAILY WITH MEALS
Status: DISCONTINUED | OUTPATIENT
Start: 2022-02-17 | End: 2022-02-22 | Stop reason: HOSPADM

## 2022-02-17 RX ADMIN — ENOXAPARIN SODIUM 40 MG: 100 INJECTION SUBCUTANEOUS at 08:56

## 2022-02-17 RX ADMIN — FERROUS SULFATE TAB 325 MG (65 MG ELEMENTAL FE) 325 MG: 325 (65 FE) TAB at 08:56

## 2022-02-17 RX ADMIN — INSULIN LISPRO 4 UNITS: 100 INJECTION, SOLUTION INTRAVENOUS; SUBCUTANEOUS at 11:48

## 2022-02-17 RX ADMIN — Medication 1000 MCG: at 08:56

## 2022-02-17 RX ADMIN — FOLIC ACID 1 MG: 1 TABLET ORAL at 08:56

## 2022-02-17 RX ADMIN — HYDROCODONE BITARTRATE AND ACETAMINOPHEN 1 TABLET: 5; 325 TABLET ORAL at 23:09

## 2022-02-17 RX ADMIN — PANTOPRAZOLE SODIUM 40 MG: 40 TABLET, DELAYED RELEASE ORAL at 05:36

## 2022-02-17 RX ADMIN — ATORVASTATIN CALCIUM 10 MG: 20 TABLET, FILM COATED ORAL at 08:56

## 2022-02-17 RX ADMIN — FERROUS SULFATE TAB 325 MG (65 MG ELEMENTAL FE) 325 MG: 325 (65 FE) TAB at 17:32

## 2022-02-17 RX ADMIN — CEFAZOLIN SODIUM 1 G: 1 INJECTION, SOLUTION INTRAVENOUS at 01:33

## 2022-02-17 RX ADMIN — CEFAZOLIN SODIUM 1 G: 1 INJECTION, SOLUTION INTRAVENOUS at 08:56

## 2022-02-17 RX ADMIN — LISINOPRIL 40 MG: 40 TABLET ORAL at 08:56

## 2022-02-17 RX ADMIN — AMLODIPINE BESYLATE 5 MG: 5 TABLET ORAL at 08:56

## 2022-02-17 RX ADMIN — METOPROLOL SUCCINATE 12.5 MG: 25 TABLET, EXTENDED RELEASE ORAL at 08:56

## 2022-02-17 RX ADMIN — HYDROCODONE BITARTRATE AND ACETAMINOPHEN 1 TABLET: 5; 325 TABLET ORAL at 05:36

## 2022-02-18 DIAGNOSIS — C92.10 CML (CHRONIC MYELOCYTIC LEUKEMIA): ICD-10-CM

## 2022-02-18 DIAGNOSIS — C90.00 MULTIPLE MYELOMA, REMISSION STATUS UNSPECIFIED: Primary | ICD-10-CM

## 2022-02-18 LAB
BASOPHILS # BLD AUTO: 0.07 10*3/MM3 (ref 0–0.2)
BASOPHILS NFR BLD AUTO: 0.4 % (ref 0–1.5)
DEPRECATED RDW RBC AUTO: 64.1 FL (ref 37–54)
EOSINOPHIL # BLD AUTO: 0.38 10*3/MM3 (ref 0–0.4)
EOSINOPHIL NFR BLD AUTO: 2.3 % (ref 0.3–6.2)
ERYTHROCYTE [DISTWIDTH] IN BLOOD BY AUTOMATED COUNT: 20.8 % (ref 12.3–15.4)
GLUCOSE BLDC GLUCOMTR-MCNC: 112 MG/DL (ref 70–130)
GLUCOSE BLDC GLUCOMTR-MCNC: 116 MG/DL (ref 70–130)
GLUCOSE BLDC GLUCOMTR-MCNC: 98 MG/DL (ref 70–130)
GLUCOSE BLDC GLUCOMTR-MCNC: 99 MG/DL (ref 70–130)
HCT VFR BLD AUTO: 23.7 % (ref 34–46.6)
HGB BLD-MCNC: 7.6 G/DL (ref 12–15.9)
IMM GRANULOCYTES # BLD AUTO: 0.15 10*3/MM3 (ref 0–0.05)
IMM GRANULOCYTES NFR BLD AUTO: 0.9 % (ref 0–0.5)
LYMPHOCYTES # BLD AUTO: 1.54 10*3/MM3 (ref 0.7–3.1)
LYMPHOCYTES NFR BLD AUTO: 9.2 % (ref 19.6–45.3)
MCH RBC QN AUTO: 27.5 PG (ref 26.6–33)
MCHC RBC AUTO-ENTMCNC: 32.1 G/DL (ref 31.5–35.7)
MCV RBC AUTO: 85.9 FL (ref 79–97)
MONOCYTES # BLD AUTO: 1.39 10*3/MM3 (ref 0.1–0.9)
MONOCYTES NFR BLD AUTO: 8.3 % (ref 5–12)
NEUTROPHILS NFR BLD AUTO: 13.26 10*3/MM3 (ref 1.7–7)
NEUTROPHILS NFR BLD AUTO: 78.9 % (ref 42.7–76)
NRBC BLD AUTO-RTO: 1.1 /100 WBC (ref 0–0.2)
PLATELET # BLD AUTO: 286 10*3/MM3 (ref 140–450)
PMV BLD AUTO: 11.5 FL (ref 6–12)
RBC # BLD AUTO: 2.76 10*6/MM3 (ref 3.77–5.28)
WBC NRBC COR # BLD: 16.79 10*3/MM3 (ref 3.4–10.8)

## 2022-02-18 PROCEDURE — 85025 COMPLETE CBC W/AUTO DIFF WBC: CPT | Performed by: ORTHOPAEDIC SURGERY

## 2022-02-18 PROCEDURE — 82962 GLUCOSE BLOOD TEST: CPT

## 2022-02-18 PROCEDURE — 25010000002 ENOXAPARIN PER 10 MG: Performed by: ORTHOPAEDIC SURGERY

## 2022-02-18 PROCEDURE — 97110 THERAPEUTIC EXERCISES: CPT

## 2022-02-18 PROCEDURE — 99233 SBSQ HOSP IP/OBS HIGH 50: CPT | Performed by: INTERNAL MEDICINE

## 2022-02-18 RX ORDER — HYDROCODONE BITARTRATE AND ACETAMINOPHEN 5; 325 MG/1; MG/1
1 TABLET ORAL EVERY 4 HOURS PRN
Qty: 12 TABLET | Refills: 0 | Status: SHIPPED | OUTPATIENT
Start: 2022-02-18 | End: 2022-02-22

## 2022-02-18 RX ADMIN — FERROUS SULFATE TAB 325 MG (65 MG ELEMENTAL FE) 325 MG: 325 (65 FE) TAB at 09:15

## 2022-02-18 RX ADMIN — FOLIC ACID 1 MG: 1 TABLET ORAL at 09:15

## 2022-02-18 RX ADMIN — Medication 1000 MCG: at 09:15

## 2022-02-18 RX ADMIN — ENOXAPARIN SODIUM 40 MG: 100 INJECTION SUBCUTANEOUS at 09:14

## 2022-02-18 RX ADMIN — FERROUS SULFATE TAB 325 MG (65 MG ELEMENTAL FE) 325 MG: 325 (65 FE) TAB at 17:41

## 2022-02-18 RX ADMIN — PANTOPRAZOLE SODIUM 40 MG: 40 TABLET, DELAYED RELEASE ORAL at 09:15

## 2022-02-18 RX ADMIN — AMLODIPINE BESYLATE 5 MG: 5 TABLET ORAL at 09:15

## 2022-02-18 RX ADMIN — LISINOPRIL 40 MG: 40 TABLET ORAL at 09:16

## 2022-02-18 RX ADMIN — ATORVASTATIN CALCIUM 10 MG: 20 TABLET, FILM COATED ORAL at 09:15

## 2022-02-18 RX ADMIN — METOPROLOL SUCCINATE 12.5 MG: 25 TABLET, EXTENDED RELEASE ORAL at 09:15

## 2022-02-18 RX ADMIN — Medication 3 MG: at 03:31

## 2022-02-19 LAB
ABO GROUP BLD: NORMAL
BACTERIA SPEC AEROBE CULT: NORMAL
BACTERIA SPEC AEROBE CULT: NORMAL
BASOPHILS # BLD AUTO: 0.07 10*3/MM3 (ref 0–0.2)
BASOPHILS NFR BLD AUTO: 0.5 % (ref 0–1.5)
BLD GP AB SCN SERPL QL: NEGATIVE
DEPRECATED RDW RBC AUTO: 62.3 FL (ref 37–54)
EOSINOPHIL # BLD AUTO: 0.73 10*3/MM3 (ref 0–0.4)
EOSINOPHIL NFR BLD AUTO: 5.4 % (ref 0.3–6.2)
ERYTHROCYTE [DISTWIDTH] IN BLOOD BY AUTOMATED COUNT: 21 % (ref 12.3–15.4)
GLUCOSE BLDC GLUCOMTR-MCNC: 103 MG/DL (ref 70–130)
GLUCOSE BLDC GLUCOMTR-MCNC: 107 MG/DL (ref 70–130)
GLUCOSE BLDC GLUCOMTR-MCNC: 128 MG/DL (ref 70–130)
HCT VFR BLD AUTO: 21.5 % (ref 34–46.6)
HGB BLD-MCNC: 7 G/DL (ref 12–15.9)
IMM GRANULOCYTES # BLD AUTO: 0.21 10*3/MM3 (ref 0–0.05)
IMM GRANULOCYTES NFR BLD AUTO: 1.6 % (ref 0–0.5)
LYMPHOCYTES # BLD AUTO: 1.53 10*3/MM3 (ref 0.7–3.1)
LYMPHOCYTES NFR BLD AUTO: 11.4 % (ref 19.6–45.3)
MCH RBC QN AUTO: 27.6 PG (ref 26.6–33)
MCHC RBC AUTO-ENTMCNC: 32.6 G/DL (ref 31.5–35.7)
MCV RBC AUTO: 84.6 FL (ref 79–97)
MONOCYTES # BLD AUTO: 1.56 10*3/MM3 (ref 0.1–0.9)
MONOCYTES NFR BLD AUTO: 11.6 % (ref 5–12)
NEUTROPHILS NFR BLD AUTO: 69.5 % (ref 42.7–76)
NEUTROPHILS NFR BLD AUTO: 9.33 10*3/MM3 (ref 1.7–7)
NRBC BLD AUTO-RTO: 1.2 /100 WBC (ref 0–0.2)
PLATELET # BLD AUTO: 287 10*3/MM3 (ref 140–450)
PMV BLD AUTO: 11.2 FL (ref 6–12)
RBC # BLD AUTO: 2.54 10*6/MM3 (ref 3.77–5.28)
RH BLD: NEGATIVE
T&S EXPIRATION DATE: NORMAL
WBC NRBC COR # BLD: 13.43 10*3/MM3 (ref 3.4–10.8)

## 2022-02-19 PROCEDURE — 82962 GLUCOSE BLOOD TEST: CPT

## 2022-02-19 PROCEDURE — 25010000002 ENOXAPARIN PER 10 MG: Performed by: ORTHOPAEDIC SURGERY

## 2022-02-19 PROCEDURE — 86900 BLOOD TYPING SEROLOGIC ABO: CPT

## 2022-02-19 PROCEDURE — 85025 COMPLETE CBC W/AUTO DIFF WBC: CPT | Performed by: ORTHOPAEDIC SURGERY

## 2022-02-19 PROCEDURE — 86900 BLOOD TYPING SEROLOGIC ABO: CPT | Performed by: INTERNAL MEDICINE

## 2022-02-19 PROCEDURE — 86901 BLOOD TYPING SEROLOGIC RH(D): CPT | Performed by: INTERNAL MEDICINE

## 2022-02-19 PROCEDURE — 36430 TRANSFUSION BLD/BLD COMPNT: CPT

## 2022-02-19 PROCEDURE — P9016 RBC LEUKOCYTES REDUCED: HCPCS

## 2022-02-19 PROCEDURE — 99232 SBSQ HOSP IP/OBS MODERATE 35: CPT | Performed by: INTERNAL MEDICINE

## 2022-02-19 PROCEDURE — 86850 RBC ANTIBODY SCREEN: CPT | Performed by: INTERNAL MEDICINE

## 2022-02-19 PROCEDURE — 86923 COMPATIBILITY TEST ELECTRIC: CPT

## 2022-02-19 PROCEDURE — 97110 THERAPEUTIC EXERCISES: CPT

## 2022-02-19 PROCEDURE — 97530 THERAPEUTIC ACTIVITIES: CPT

## 2022-02-19 RX ADMIN — ACETAMINOPHEN 650 MG: 325 TABLET, FILM COATED ORAL at 06:48

## 2022-02-19 RX ADMIN — ATORVASTATIN CALCIUM 10 MG: 20 TABLET, FILM COATED ORAL at 09:40

## 2022-02-19 RX ADMIN — AMLODIPINE BESYLATE 5 MG: 5 TABLET ORAL at 09:40

## 2022-02-19 RX ADMIN — PANTOPRAZOLE SODIUM 40 MG: 40 TABLET, DELAYED RELEASE ORAL at 06:48

## 2022-02-19 RX ADMIN — FOLIC ACID 1 MG: 1 TABLET ORAL at 09:39

## 2022-02-19 RX ADMIN — Medication 1000 MCG: at 09:39

## 2022-02-19 RX ADMIN — FERROUS SULFATE TAB 325 MG (65 MG ELEMENTAL FE) 325 MG: 325 (65 FE) TAB at 18:31

## 2022-02-19 RX ADMIN — LISINOPRIL 40 MG: 40 TABLET ORAL at 09:39

## 2022-02-19 RX ADMIN — FERROUS SULFATE TAB 325 MG (65 MG ELEMENTAL FE) 325 MG: 325 (65 FE) TAB at 09:40

## 2022-02-19 RX ADMIN — METOPROLOL SUCCINATE 12.5 MG: 25 TABLET, EXTENDED RELEASE ORAL at 09:39

## 2022-02-19 RX ADMIN — ENOXAPARIN SODIUM 40 MG: 100 INJECTION SUBCUTANEOUS at 09:39

## 2022-02-20 LAB
BASOPHILS # BLD AUTO: 0.08 10*3/MM3 (ref 0–0.2)
BASOPHILS NFR BLD AUTO: 0.6 % (ref 0–1.5)
BH BB BLOOD EXPIRATION DATE: NORMAL
BH BB BLOOD TYPE BARCODE: 600
BH BB DISPENSE STATUS: NORMAL
BH BB PRODUCT CODE: NORMAL
BH BB UNIT NUMBER: NORMAL
CROSSMATCH INTERPRETATION: NORMAL
DEPRECATED RDW RBC AUTO: 57 FL (ref 37–54)
EOSINOPHIL # BLD AUTO: 0.78 10*3/MM3 (ref 0–0.4)
EOSINOPHIL NFR BLD AUTO: 6.1 % (ref 0.3–6.2)
ERYTHROCYTE [DISTWIDTH] IN BLOOD BY AUTOMATED COUNT: 19.2 % (ref 12.3–15.4)
GLUCOSE BLDC GLUCOMTR-MCNC: 100 MG/DL (ref 70–130)
GLUCOSE BLDC GLUCOMTR-MCNC: 118 MG/DL (ref 70–130)
GLUCOSE BLDC GLUCOMTR-MCNC: 121 MG/DL (ref 70–130)
GLUCOSE BLDC GLUCOMTR-MCNC: 139 MG/DL (ref 70–130)
HCT VFR BLD AUTO: 25.9 % (ref 34–46.6)
HGB BLD-MCNC: 8.4 G/DL (ref 12–15.9)
IMM GRANULOCYTES # BLD AUTO: 0.52 10*3/MM3 (ref 0–0.05)
IMM GRANULOCYTES NFR BLD AUTO: 4.1 % (ref 0–0.5)
LYMPHOCYTES # BLD AUTO: 1.52 10*3/MM3 (ref 0.7–3.1)
LYMPHOCYTES NFR BLD AUTO: 11.9 % (ref 19.6–45.3)
MCH RBC QN AUTO: 27.3 PG (ref 26.6–33)
MCHC RBC AUTO-ENTMCNC: 32.4 G/DL (ref 31.5–35.7)
MCV RBC AUTO: 84.1 FL (ref 79–97)
MONOCYTES # BLD AUTO: 1.55 10*3/MM3 (ref 0.1–0.9)
MONOCYTES NFR BLD AUTO: 12.1 % (ref 5–12)
NEUTROPHILS NFR BLD AUTO: 65.2 % (ref 42.7–76)
NEUTROPHILS NFR BLD AUTO: 8.35 10*3/MM3 (ref 1.7–7)
NRBC BLD AUTO-RTO: 1.4 /100 WBC (ref 0–0.2)
PLATELET # BLD AUTO: 358 10*3/MM3 (ref 140–450)
PMV BLD AUTO: 11.5 FL (ref 6–12)
RBC # BLD AUTO: 3.08 10*6/MM3 (ref 3.77–5.28)
UNIT  ABO: NORMAL
UNIT  RH: NORMAL
WBC NRBC COR # BLD: 12.8 10*3/MM3 (ref 3.4–10.8)

## 2022-02-20 PROCEDURE — 99232 SBSQ HOSP IP/OBS MODERATE 35: CPT | Performed by: INTERNAL MEDICINE

## 2022-02-20 PROCEDURE — 97110 THERAPEUTIC EXERCISES: CPT

## 2022-02-20 PROCEDURE — 25010000002 ENOXAPARIN PER 10 MG: Performed by: ORTHOPAEDIC SURGERY

## 2022-02-20 PROCEDURE — 97530 THERAPEUTIC ACTIVITIES: CPT

## 2022-02-20 PROCEDURE — 85025 COMPLETE CBC W/AUTO DIFF WBC: CPT | Performed by: ORTHOPAEDIC SURGERY

## 2022-02-20 PROCEDURE — 82962 GLUCOSE BLOOD TEST: CPT

## 2022-02-20 RX ORDER — BISACODYL 10 MG
10 SUPPOSITORY, RECTAL RECTAL DAILY
Status: DISCONTINUED | OUTPATIENT
Start: 2022-02-20 | End: 2022-02-22 | Stop reason: HOSPADM

## 2022-02-20 RX ORDER — DOCUSATE SODIUM 100 MG/1
100 CAPSULE, LIQUID FILLED ORAL 2 TIMES DAILY
Status: DISCONTINUED | OUTPATIENT
Start: 2022-02-20 | End: 2022-02-22 | Stop reason: HOSPADM

## 2022-02-20 RX ADMIN — DOCUSATE SODIUM 100 MG: 100 CAPSULE, LIQUID FILLED ORAL at 19:47

## 2022-02-20 RX ADMIN — FOLIC ACID 1 MG: 1 TABLET ORAL at 08:55

## 2022-02-20 RX ADMIN — Medication 1000 MCG: at 08:55

## 2022-02-20 RX ADMIN — PANTOPRAZOLE SODIUM 40 MG: 40 TABLET, DELAYED RELEASE ORAL at 06:34

## 2022-02-20 RX ADMIN — ENOXAPARIN SODIUM 40 MG: 100 INJECTION SUBCUTANEOUS at 08:55

## 2022-02-20 RX ADMIN — BISACODYL 10 MG: 10 SUPPOSITORY RECTAL at 19:47

## 2022-02-20 RX ADMIN — METOPROLOL SUCCINATE 12.5 MG: 25 TABLET, EXTENDED RELEASE ORAL at 08:55

## 2022-02-20 RX ADMIN — AMLODIPINE BESYLATE 5 MG: 5 TABLET ORAL at 08:54

## 2022-02-20 RX ADMIN — ATORVASTATIN CALCIUM 10 MG: 20 TABLET, FILM COATED ORAL at 08:54

## 2022-02-20 RX ADMIN — FERROUS SULFATE TAB 325 MG (65 MG ELEMENTAL FE) 325 MG: 325 (65 FE) TAB at 08:55

## 2022-02-20 RX ADMIN — FERROUS SULFATE TAB 325 MG (65 MG ELEMENTAL FE) 325 MG: 325 (65 FE) TAB at 18:17

## 2022-02-20 RX ADMIN — LISINOPRIL 40 MG: 40 TABLET ORAL at 08:55

## 2022-02-21 LAB
BASOPHILS # BLD AUTO: 0.11 10*3/MM3 (ref 0–0.2)
BASOPHILS NFR BLD AUTO: 0.8 % (ref 0–1.5)
DEPRECATED RDW RBC AUTO: 60.8 FL (ref 37–54)
EOSINOPHIL # BLD AUTO: 0.92 10*3/MM3 (ref 0–0.4)
EOSINOPHIL NFR BLD AUTO: 6.4 % (ref 0.3–6.2)
ERYTHROCYTE [DISTWIDTH] IN BLOOD BY AUTOMATED COUNT: 19.5 % (ref 12.3–15.4)
GLUCOSE BLDC GLUCOMTR-MCNC: 105 MG/DL (ref 70–130)
GLUCOSE BLDC GLUCOMTR-MCNC: 114 MG/DL (ref 70–130)
GLUCOSE BLDC GLUCOMTR-MCNC: 93 MG/DL (ref 70–130)
GLUCOSE BLDC GLUCOMTR-MCNC: 99 MG/DL (ref 70–130)
HCT VFR BLD AUTO: 26 % (ref 34–46.6)
HGB BLD-MCNC: 8.4 G/DL (ref 12–15.9)
IMM GRANULOCYTES # BLD AUTO: 0.68 10*3/MM3 (ref 0–0.05)
IMM GRANULOCYTES NFR BLD AUTO: 4.7 % (ref 0–0.5)
LYMPHOCYTES # BLD AUTO: 1.6 10*3/MM3 (ref 0.7–3.1)
LYMPHOCYTES NFR BLD AUTO: 11.1 % (ref 19.6–45.3)
MCH RBC QN AUTO: 28 PG (ref 26.6–33)
MCHC RBC AUTO-ENTMCNC: 32.3 G/DL (ref 31.5–35.7)
MCV RBC AUTO: 86.7 FL (ref 79–97)
MONOCYTES # BLD AUTO: 1.83 10*3/MM3 (ref 0.1–0.9)
MONOCYTES NFR BLD AUTO: 12.8 % (ref 5–12)
NEUTROPHILS NFR BLD AUTO: 64.2 % (ref 42.7–76)
NEUTROPHILS NFR BLD AUTO: 9.21 10*3/MM3 (ref 1.7–7)
NRBC BLD AUTO-RTO: 1 /100 WBC (ref 0–0.2)
PLATELET # BLD AUTO: 359 10*3/MM3 (ref 140–450)
PMV BLD AUTO: 10.9 FL (ref 6–12)
QT INTERVAL: 287 MS
RBC # BLD AUTO: 3 10*6/MM3 (ref 3.77–5.28)
WBC NRBC COR # BLD: 14.35 10*3/MM3 (ref 3.4–10.8)

## 2022-02-21 PROCEDURE — 85025 COMPLETE CBC W/AUTO DIFF WBC: CPT | Performed by: ORTHOPAEDIC SURGERY

## 2022-02-21 PROCEDURE — 82962 GLUCOSE BLOOD TEST: CPT

## 2022-02-21 PROCEDURE — 93010 ELECTROCARDIOGRAM REPORT: CPT | Performed by: INTERNAL MEDICINE

## 2022-02-21 PROCEDURE — 99231 SBSQ HOSP IP/OBS SF/LOW 25: CPT | Performed by: INTERNAL MEDICINE

## 2022-02-21 PROCEDURE — 97110 THERAPEUTIC EXERCISES: CPT

## 2022-02-21 PROCEDURE — 25010000002 ENOXAPARIN PER 10 MG: Performed by: ORTHOPAEDIC SURGERY

## 2022-02-21 PROCEDURE — 93005 ELECTROCARDIOGRAM TRACING: CPT | Performed by: HOSPITALIST

## 2022-02-21 RX ORDER — METOPROLOL SUCCINATE 25 MG/1
25 TABLET, EXTENDED RELEASE ORAL
Status: DISCONTINUED | OUTPATIENT
Start: 2022-02-22 | End: 2022-02-22 | Stop reason: HOSPADM

## 2022-02-21 RX ORDER — NITROGLYCERIN 0.4 MG/1
0.4 TABLET SUBLINGUAL
Status: DISCONTINUED | OUTPATIENT
Start: 2022-02-21 | End: 2022-02-22 | Stop reason: HOSPADM

## 2022-02-21 RX ORDER — LISINOPRIL 20 MG/1
20 TABLET ORAL
Status: DISCONTINUED | OUTPATIENT
Start: 2022-02-22 | End: 2022-02-22 | Stop reason: HOSPADM

## 2022-02-21 RX ADMIN — LISINOPRIL 40 MG: 40 TABLET ORAL at 09:28

## 2022-02-21 RX ADMIN — Medication 1000 MCG: at 09:28

## 2022-02-21 RX ADMIN — ACETAMINOPHEN 650 MG: 325 TABLET, FILM COATED ORAL at 09:27

## 2022-02-21 RX ADMIN — AMLODIPINE BESYLATE 5 MG: 5 TABLET ORAL at 09:28

## 2022-02-21 RX ADMIN — DOCUSATE SODIUM 100 MG: 100 CAPSULE, LIQUID FILLED ORAL at 20:55

## 2022-02-21 RX ADMIN — PANTOPRAZOLE SODIUM 40 MG: 40 TABLET, DELAYED RELEASE ORAL at 09:28

## 2022-02-21 RX ADMIN — FOLIC ACID 1 MG: 1 TABLET ORAL at 09:29

## 2022-02-21 RX ADMIN — METOPROLOL SUCCINATE 12.5 MG: 25 TABLET, EXTENDED RELEASE ORAL at 09:28

## 2022-02-21 RX ADMIN — FERROUS SULFATE TAB 325 MG (65 MG ELEMENTAL FE) 325 MG: 325 (65 FE) TAB at 07:53

## 2022-02-21 RX ADMIN — METOPROLOL TARTRATE 12.5 MG: 25 TABLET, FILM COATED ORAL at 10:24

## 2022-02-21 RX ADMIN — ENOXAPARIN SODIUM 40 MG: 100 INJECTION SUBCUTANEOUS at 09:29

## 2022-02-21 RX ADMIN — FERROUS SULFATE TAB 325 MG (65 MG ELEMENTAL FE) 325 MG: 325 (65 FE) TAB at 18:09

## 2022-02-21 RX ADMIN — ATORVASTATIN CALCIUM 10 MG: 20 TABLET, FILM COATED ORAL at 09:28

## 2022-02-21 RX ADMIN — DOCUSATE SODIUM 100 MG: 100 CAPSULE, LIQUID FILLED ORAL at 09:27

## 2022-02-22 VITALS
OXYGEN SATURATION: 98 % | SYSTOLIC BLOOD PRESSURE: 115 MMHG | TEMPERATURE: 97.5 F | HEART RATE: 73 BPM | RESPIRATION RATE: 18 BRPM | DIASTOLIC BLOOD PRESSURE: 53 MMHG | HEIGHT: 68 IN | WEIGHT: 177.5 LBS | BODY MASS INDEX: 26.9 KG/M2

## 2022-02-22 LAB
ANISOCYTOSIS BLD QL: ABNORMAL
DEPRECATED RDW RBC AUTO: 60.5 FL (ref 37–54)
EOSINOPHIL # BLD MANUAL: 1.05 10*3/MM3 (ref 0–0.4)
EOSINOPHIL NFR BLD MANUAL: 8 % (ref 0.3–6.2)
ERYTHROCYTE [DISTWIDTH] IN BLOOD BY AUTOMATED COUNT: 19.6 % (ref 12.3–15.4)
GIANT PLATELETS: ABNORMAL
GLUCOSE BLDC GLUCOMTR-MCNC: 112 MG/DL (ref 70–130)
GLUCOSE BLDC GLUCOMTR-MCNC: 94 MG/DL (ref 70–130)
HCT VFR BLD AUTO: 27.2 % (ref 34–46.6)
HGB BLD-MCNC: 8.4 G/DL (ref 12–15.9)
LYMPHOCYTES # BLD MANUAL: 1.57 10*3/MM3 (ref 0.7–3.1)
LYMPHOCYTES NFR BLD MANUAL: 9 % (ref 5–12)
MCH RBC QN AUTO: 27.1 PG (ref 26.6–33)
MCHC RBC AUTO-ENTMCNC: 30.9 G/DL (ref 31.5–35.7)
MCV RBC AUTO: 87.7 FL (ref 79–97)
MONOCYTES # BLD: 1.18 10*3/MM3 (ref 0.1–0.9)
NEUTROPHILS # BLD AUTO: 9.29 10*3/MM3 (ref 1.7–7)
NEUTROPHILS NFR BLD MANUAL: 71 % (ref 42.7–76)
NRBC BLD AUTO-RTO: 0.9 /100 WBC (ref 0–0.2)
PLATELET # BLD AUTO: 429 10*3/MM3 (ref 140–450)
PMV BLD AUTO: 10.6 FL (ref 6–12)
POIKILOCYTOSIS BLD QL SMEAR: ABNORMAL
POLYCHROMASIA BLD QL SMEAR: ABNORMAL
RBC # BLD AUTO: 3.1 10*6/MM3 (ref 3.77–5.28)
VARIANT LYMPHS NFR BLD MANUAL: 12 % (ref 19.6–45.3)
WBC MORPH BLD: NORMAL
WBC NRBC COR # BLD: 13.09 10*3/MM3 (ref 3.4–10.8)

## 2022-02-22 PROCEDURE — 82962 GLUCOSE BLOOD TEST: CPT

## 2022-02-22 PROCEDURE — 85007 BL SMEAR W/DIFF WBC COUNT: CPT | Performed by: HOSPITALIST

## 2022-02-22 PROCEDURE — 25010000002 ENOXAPARIN PER 10 MG: Performed by: HOSPITALIST

## 2022-02-22 PROCEDURE — 85025 COMPLETE CBC W/AUTO DIFF WBC: CPT | Performed by: HOSPITALIST

## 2022-02-22 RX ORDER — METOPROLOL SUCCINATE 25 MG/1
25 TABLET, EXTENDED RELEASE ORAL
Start: 2022-02-22 | End: 2022-04-29 | Stop reason: SDUPTHER

## 2022-02-22 RX ORDER — FERROUS SULFATE 325(65) MG
325 TABLET ORAL 2 TIMES DAILY WITH MEALS
Start: 2022-02-22 | End: 2022-04-29

## 2022-02-22 RX ORDER — HYDROCODONE BITARTRATE AND ACETAMINOPHEN 5; 325 MG/1; MG/1
1 TABLET ORAL EVERY 4 HOURS PRN
Qty: 12 TABLET | Refills: 0 | Status: SHIPPED | OUTPATIENT
Start: 2022-02-22 | End: 2022-02-28

## 2022-02-22 RX ORDER — PSEUDOEPHEDRINE HCL 30 MG
100 TABLET ORAL 2 TIMES DAILY PRN
Start: 2022-02-22 | End: 2023-03-02

## 2022-02-22 RX ORDER — BISACODYL 10 MG
10 SUPPOSITORY, RECTAL RECTAL DAILY PRN
Start: 2022-02-22

## 2022-02-22 RX ORDER — LISINOPRIL 20 MG/1
20 TABLET ORAL
Start: 2022-02-22 | End: 2022-03-22 | Stop reason: HOSPADM

## 2022-02-22 RX ORDER — FOLIC ACID 1 MG/1
1 TABLET ORAL DAILY
Start: 2022-02-22 | End: 2022-04-29 | Stop reason: SDUPTHER

## 2022-02-22 RX ORDER — ATORVASTATIN CALCIUM 10 MG/1
10 TABLET, FILM COATED ORAL DAILY
Start: 2022-02-22 | End: 2022-04-29 | Stop reason: SDUPTHER

## 2022-02-22 RX ADMIN — METOPROLOL SUCCINATE 25 MG: 25 TABLET, EXTENDED RELEASE ORAL at 09:02

## 2022-02-22 RX ADMIN — ATORVASTATIN CALCIUM 10 MG: 20 TABLET, FILM COATED ORAL at 09:03

## 2022-02-22 RX ADMIN — Medication 1000 MCG: at 09:03

## 2022-02-22 RX ADMIN — LISINOPRIL 20 MG: 20 TABLET ORAL at 09:03

## 2022-02-22 RX ADMIN — AMLODIPINE BESYLATE 5 MG: 5 TABLET ORAL at 09:02

## 2022-02-22 RX ADMIN — DOCUSATE SODIUM 100 MG: 100 CAPSULE, LIQUID FILLED ORAL at 09:03

## 2022-02-22 RX ADMIN — PANTOPRAZOLE SODIUM 40 MG: 40 TABLET, DELAYED RELEASE ORAL at 09:14

## 2022-02-22 RX ADMIN — FERROUS SULFATE TAB 325 MG (65 MG ELEMENTAL FE) 325 MG: 325 (65 FE) TAB at 09:01

## 2022-02-22 RX ADMIN — ENOXAPARIN SODIUM 40 MG: 100 INJECTION SUBCUTANEOUS at 09:04

## 2022-02-22 RX ADMIN — HYDROCODONE BITARTRATE AND ACETAMINOPHEN 1 TABLET: 5; 325 TABLET ORAL at 02:05

## 2022-02-22 RX ADMIN — FOLIC ACID 1 MG: 1 TABLET ORAL at 09:02

## 2022-02-22 RX ADMIN — BISACODYL 10 MG: 10 SUPPOSITORY RECTAL at 09:04

## 2022-03-14 ENCOUNTER — TRANSITIONAL CARE MANAGEMENT TELEPHONE ENCOUNTER (OUTPATIENT)
Dept: CALL CENTER | Facility: HOSPITAL | Age: 87
End: 2022-03-14

## 2022-03-14 ENCOUNTER — READMISSION MANAGEMENT (OUTPATIENT)
Dept: CALL CENTER | Facility: HOSPITAL | Age: 87
End: 2022-03-14

## 2022-03-14 NOTE — OUTREACH NOTE
Prep Survey    Flowsheet Row Responses   [facility] patient discharged from? Non-BH   Is LACE score < 7 ? Non-BH Discharge   Emergency Room discharge w/ pulse ox? No   Eligibility [facility]   Date of Discharge 03/13/22   Discharge diagnosis Unavailable    Does the patient have one of the following disease processes/diagnoses(primary or secondary)? Non-BH Discharge   Prep survey completed? Yes          JOSE GUADALUPE LA - Registered Nurse

## 2022-03-14 NOTE — OUTREACH NOTE
Call Center TCM Note    Flowsheet Row Responses   [facility] patient discharged from? Non-BH   Does the patient have one of the following disease processes/diagnoses(primary or secondary)? Non-BH Discharge   TCM attempt successful? No   Unsuccessful attempts Attempt 1          Manda Cohen RN    3/14/2022, 10:33 EDT

## 2022-03-14 NOTE — OUTREACH NOTE
Call Center TCM Note    Flowsheet Row Responses   [facility] patient discharged from? Non-   Does the patient have one of the following disease processes/diagnoses(primary or secondary)? Non- Discharge   TCM attempt successful? Yes   Call start time 1205   Call end time 1214   Discharge diagnosis Unavailable    Person spoke with today (if not patient) and relationship Pts son and DIL   Meds reviewed with patient/caregiver? Yes   Is the patient having any side effects they believe may be caused by any medication additions or changes? No   Does the patient have all medications ordered at discharge? Yes   Is the patient taking all medications as directed (includes completed medication regime)? Yes   Does the patient have a primary care provider?  Yes   Does the patient have an appointment with their PCP within 7 days of discharge? No   Comments regarding PCP No hosp dc fu noted that meets TCm guidelines. Will route to PCP office.    What is preventing the patient from scheduling follow up appointments within 7 days of discharge? Earlier appointment not available   Has the patient kept scheduled appointments due by today? N/A   Has home health visited the patient within 72 hours of discharge? N/A   Psychosocial issues? No   Did the patient receive a copy of their discharge instructions? Yes   What is the patient's perception of their health status since discharge? Improving  [Howver family and  feels she still needs nore in Pt rehab as they can not care for her. ]   Is the patient/caregiver able to teach back signs and symptoms related to disease process for when to call PCP? Yes   Is the patient/caregiver able to teach back signs and symptoms related to disease process for when to call 911? Yes   Is the patient/caregiver able to teach back the hierarchy of who to call/visit for symptoms/problems? PCP, Specialist, Home health nurse, Urgent Care, ED, 911 Yes   TCM call completed? Yes   Wrap up additional  comments  family and  feels she still needs more in Pt rehab as they can not care for her at home.  has reached out to rehab to readmit her. Will route to PCP office to inform.            Manda Cohen RN    3/14/2022, 12:16 EDT

## 2022-03-16 ENCOUNTER — HOSPITAL ENCOUNTER (INPATIENT)
Facility: HOSPITAL | Age: 87
LOS: 6 days | Discharge: NURSING, REHAB OR LONG-TERM CARE | End: 2022-03-22
Attending: EMERGENCY MEDICINE | Admitting: STUDENT IN AN ORGANIZED HEALTH CARE EDUCATION/TRAINING PROGRAM

## 2022-03-16 ENCOUNTER — APPOINTMENT (OUTPATIENT)
Dept: CT IMAGING | Facility: HOSPITAL | Age: 87
End: 2022-03-16

## 2022-03-16 ENCOUNTER — APPOINTMENT (OUTPATIENT)
Dept: GENERAL RADIOLOGY | Facility: HOSPITAL | Age: 87
End: 2022-03-16

## 2022-03-16 DIAGNOSIS — R65.20 SEPSIS WITH ACUTE RENAL FAILURE WITHOUT SEPTIC SHOCK, DUE TO UNSPECIFIED ORGANISM, UNSPECIFIED ACUTE RENAL FAILURE TYPE: Primary | ICD-10-CM

## 2022-03-16 DIAGNOSIS — E78.5 HYPERLIPIDEMIA, UNSPECIFIED HYPERLIPIDEMIA TYPE: ICD-10-CM

## 2022-03-16 DIAGNOSIS — R77.8 ELEVATED TROPONIN: ICD-10-CM

## 2022-03-16 DIAGNOSIS — N39.0 ACUTE URINARY TRACT INFECTION: ICD-10-CM

## 2022-03-16 DIAGNOSIS — G93.41 METABOLIC ENCEPHALOPATHY: ICD-10-CM

## 2022-03-16 DIAGNOSIS — N17.9 SEPSIS WITH ACUTE RENAL FAILURE WITHOUT SEPTIC SHOCK, DUE TO UNSPECIFIED ORGANISM, UNSPECIFIED ACUTE RENAL FAILURE TYPE: Primary | ICD-10-CM

## 2022-03-16 DIAGNOSIS — I10 PRIMARY HYPERTENSION: ICD-10-CM

## 2022-03-16 DIAGNOSIS — A41.9 SEPSIS WITH ACUTE RENAL FAILURE WITHOUT SEPTIC SHOCK, DUE TO UNSPECIFIED ORGANISM, UNSPECIFIED ACUTE RENAL FAILURE TYPE: Primary | ICD-10-CM

## 2022-03-16 DIAGNOSIS — N17.9 AKI (ACUTE KIDNEY INJURY): ICD-10-CM

## 2022-03-16 PROBLEM — R41.82 AMS (ALTERED MENTAL STATUS): Status: ACTIVE | Noted: 2022-03-16

## 2022-03-16 LAB
ACANTHOCYTES BLD QL SMEAR: NORMAL
ALBUMIN SERPL-MCNC: 3.8 G/DL (ref 3.5–5.2)
ALBUMIN/GLOB SERPL: 1.4 G/DL
ALP SERPL-CCNC: 162 U/L (ref 39–117)
ALT SERPL W P-5'-P-CCNC: 12 U/L (ref 1–33)
ANION GAP SERPL CALCULATED.3IONS-SCNC: 12 MMOL/L (ref 5–15)
ANION GAP SERPL CALCULATED.3IONS-SCNC: 15.2 MMOL/L (ref 5–15)
AST SERPL-CCNC: 24 U/L (ref 1–32)
BACTERIA BLD CULT: ABNORMAL
BACTERIA UR QL AUTO: ABNORMAL /HPF
BASOPHILS # BLD AUTO: 0.03 10*3/MM3 (ref 0–0.2)
BASOPHILS # BLD AUTO: 0.06 10*3/MM3 (ref 0–0.2)
BASOPHILS NFR BLD AUTO: 0.2 % (ref 0–1.5)
BASOPHILS NFR BLD AUTO: 0.2 % (ref 0–1.5)
BILIRUB SERPL-MCNC: 1.1 MG/DL (ref 0–1.2)
BILIRUB UR QL STRIP: NEGATIVE
BOTTLE TYPE: ABNORMAL
BUN SERPL-MCNC: 27 MG/DL (ref 8–23)
BUN SERPL-MCNC: 28 MG/DL (ref 8–23)
BUN/CREAT SERPL: 14.3 (ref 7–25)
BUN/CREAT SERPL: 16.5 (ref 7–25)
CALCIUM SPEC-SCNC: 8.2 MG/DL (ref 8.6–10.5)
CALCIUM SPEC-SCNC: 9.1 MG/DL (ref 8.6–10.5)
CHLORIDE SERPL-SCNC: 103 MMOL/L (ref 98–107)
CHLORIDE SERPL-SCNC: 99 MMOL/L (ref 98–107)
CLARITY UR: ABNORMAL
CO2 SERPL-SCNC: 21 MMOL/L (ref 22–29)
CO2 SERPL-SCNC: 21.8 MMOL/L (ref 22–29)
COLOR UR: YELLOW
CREAT SERPL-MCNC: 1.7 MG/DL (ref 0.57–1)
CREAT SERPL-MCNC: 1.89 MG/DL (ref 0.57–1)
D-LACTATE SERPL-SCNC: 1.4 MMOL/L (ref 0.5–2)
D-LACTATE SERPL-SCNC: 2.6 MMOL/L (ref 0.5–2)
DEPRECATED RDW RBC AUTO: 61 FL (ref 37–54)
DEPRECATED RDW RBC AUTO: 64.4 FL (ref 37–54)
EGFRCR SERPLBLD CKD-EPI 2021: 25.3 ML/MIN/1.73
EGFRCR SERPLBLD CKD-EPI 2021: 28.7 ML/MIN/1.73
ELLIPTOCYTES BLD QL SMEAR: NORMAL
EOSINOPHIL # BLD AUTO: 0.03 10*3/MM3 (ref 0–0.4)
EOSINOPHIL # BLD AUTO: 0.08 10*3/MM3 (ref 0–0.4)
EOSINOPHIL NFR BLD AUTO: 0.1 % (ref 0.3–6.2)
EOSINOPHIL NFR BLD AUTO: 0.4 % (ref 0.3–6.2)
ERYTHROCYTE [DISTWIDTH] IN BLOOD BY AUTOMATED COUNT: 19.5 % (ref 12.3–15.4)
ERYTHROCYTE [DISTWIDTH] IN BLOOD BY AUTOMATED COUNT: 20 % (ref 12.3–15.4)
GLOBULIN UR ELPH-MCNC: 2.7 GM/DL
GLUCOSE SERPL-MCNC: 101 MG/DL (ref 65–99)
GLUCOSE SERPL-MCNC: 115 MG/DL (ref 65–99)
GLUCOSE UR STRIP-MCNC: NEGATIVE MG/DL
HCT VFR BLD AUTO: 24.5 % (ref 34–46.6)
HCT VFR BLD AUTO: 29.1 % (ref 34–46.6)
HGB BLD-MCNC: 7.8 G/DL (ref 12–15.9)
HGB BLD-MCNC: 9.1 G/DL (ref 12–15.9)
HGB UR QL STRIP.AUTO: ABNORMAL
HYALINE CASTS UR QL AUTO: ABNORMAL /LPF
IMM GRANULOCYTES # BLD AUTO: 0.3 10*3/MM3 (ref 0–0.05)
IMM GRANULOCYTES NFR BLD AUTO: 1.2 % (ref 0–0.5)
INR PPP: 1.19 (ref 0.9–1.1)
KETONES UR QL STRIP: NEGATIVE
LEUKOCYTE ESTERASE UR QL STRIP.AUTO: ABNORMAL
LIPASE SERPL-CCNC: 40 U/L (ref 13–60)
LYMPHOCYTES # BLD AUTO: 0.3 10*3/MM3 (ref 0.7–3.1)
LYMPHOCYTES # BLD AUTO: 0.61 10*3/MM3 (ref 0.7–3.1)
LYMPHOCYTES NFR BLD AUTO: 1.7 % (ref 19.6–45.3)
LYMPHOCYTES NFR BLD AUTO: 2.5 % (ref 19.6–45.3)
MAGNESIUM SERPL-MCNC: 1.5 MG/DL (ref 1.6–2.4)
MCH RBC QN AUTO: 27.5 PG (ref 26.6–33)
MCH RBC QN AUTO: 27.8 PG (ref 26.6–33)
MCHC RBC AUTO-ENTMCNC: 31.3 G/DL (ref 31.5–35.7)
MCHC RBC AUTO-ENTMCNC: 31.8 G/DL (ref 31.5–35.7)
MCV RBC AUTO: 87.2 FL (ref 79–97)
MCV RBC AUTO: 87.9 FL (ref 79–97)
MONOCYTES # BLD AUTO: 0.19 10*3/MM3 (ref 0.1–0.9)
MONOCYTES # BLD AUTO: 1.2 10*3/MM3 (ref 0.1–0.9)
MONOCYTES NFR BLD AUTO: 1.1 % (ref 5–12)
MONOCYTES NFR BLD AUTO: 5 % (ref 5–12)
NEUTROPHILS NFR BLD AUTO: 17.13 10*3/MM3 (ref 1.7–7)
NEUTROPHILS NFR BLD AUTO: 21.95 10*3/MM3 (ref 1.7–7)
NEUTROPHILS NFR BLD AUTO: 91 % (ref 42.7–76)
NEUTROPHILS NFR BLD AUTO: 95.9 % (ref 42.7–76)
NITRITE UR QL STRIP: NEGATIVE
NRBC BLD AUTO-RTO: 0.2 /100 WBC (ref 0–0.2)
PH UR STRIP.AUTO: 5.5 [PH] (ref 5–8)
PLATELET # BLD AUTO: 238 10*3/MM3 (ref 140–450)
PLATELET # BLD AUTO: 292 10*3/MM3 (ref 140–450)
PMV BLD AUTO: 10.9 FL (ref 6–12)
PMV BLD AUTO: 11 FL (ref 6–12)
POTASSIUM SERPL-SCNC: 4.2 MMOL/L (ref 3.5–5.2)
POTASSIUM SERPL-SCNC: 4.7 MMOL/L (ref 3.5–5.2)
PROCALCITONIN SERPL-MCNC: 21.8 NG/ML (ref 0–0.25)
PROT SERPL-MCNC: 6.5 G/DL (ref 6–8.5)
PROT UR QL STRIP: ABNORMAL
PROTHROMBIN TIME: 15 SECONDS (ref 11.7–14.2)
QT INTERVAL: 324 MS
RBC # BLD AUTO: 2.81 10*6/MM3 (ref 3.77–5.28)
RBC # BLD AUTO: 3.31 10*6/MM3 (ref 3.77–5.28)
RBC # UR STRIP: ABNORMAL /HPF
REF LAB TEST METHOD: ABNORMAL
SARS-COV-2 RNA RESP QL NAA+PROBE: NOT DETECTED
SMALL PLATELETS BLD QL SMEAR: ADEQUATE
SODIUM SERPL-SCNC: 136 MMOL/L (ref 136–145)
SODIUM SERPL-SCNC: 136 MMOL/L (ref 136–145)
SP GR UR STRIP: 1.01 (ref 1–1.03)
SQUAMOUS #/AREA URNS HPF: ABNORMAL /HPF
TARGETS BLD QL SMEAR: NORMAL
TROPONIN T SERPL-MCNC: 0.04 NG/ML (ref 0–0.03)
UROBILINOGEN UR QL STRIP: ABNORMAL
WBC # UR STRIP: ABNORMAL /HPF
WBC MORPH BLD: NORMAL
WBC NRBC COR # BLD: 17.86 10*3/MM3 (ref 3.4–10.8)
WBC NRBC COR # BLD: 24.15 10*3/MM3 (ref 3.4–10.8)

## 2022-03-16 PROCEDURE — 25010000002 PIPERACILLIN SOD-TAZOBACTAM PER 1 G: Performed by: INTERNAL MEDICINE

## 2022-03-16 PROCEDURE — 25010000002 ENOXAPARIN PER 10 MG: Performed by: HOSPITALIST

## 2022-03-16 PROCEDURE — 99285 EMERGENCY DEPT VISIT HI MDM: CPT

## 2022-03-16 PROCEDURE — 84145 PROCALCITONIN (PCT): CPT | Performed by: PHYSICIAN ASSISTANT

## 2022-03-16 PROCEDURE — 85025 COMPLETE CBC W/AUTO DIFF WBC: CPT | Performed by: NURSE PRACTITIONER

## 2022-03-16 PROCEDURE — 87150 DNA/RNA AMPLIFIED PROBE: CPT | Performed by: PHYSICIAN ASSISTANT

## 2022-03-16 PROCEDURE — 87186 SC STD MICRODIL/AGAR DIL: CPT | Performed by: PHYSICIAN ASSISTANT

## 2022-03-16 PROCEDURE — 83735 ASSAY OF MAGNESIUM: CPT | Performed by: PHYSICIAN ASSISTANT

## 2022-03-16 PROCEDURE — 83690 ASSAY OF LIPASE: CPT | Performed by: PHYSICIAN ASSISTANT

## 2022-03-16 PROCEDURE — 85007 BL SMEAR W/DIFF WBC COUNT: CPT | Performed by: NURSE PRACTITIONER

## 2022-03-16 PROCEDURE — 99223 1ST HOSP IP/OBS HIGH 75: CPT | Performed by: INTERNAL MEDICINE

## 2022-03-16 PROCEDURE — 74176 CT ABD & PELVIS W/O CONTRAST: CPT

## 2022-03-16 PROCEDURE — 84484 ASSAY OF TROPONIN QUANT: CPT | Performed by: PHYSICIAN ASSISTANT

## 2022-03-16 PROCEDURE — 93005 ELECTROCARDIOGRAM TRACING: CPT | Performed by: PHYSICIAN ASSISTANT

## 2022-03-16 PROCEDURE — 87088 URINE BACTERIA CULTURE: CPT | Performed by: PHYSICIAN ASSISTANT

## 2022-03-16 PROCEDURE — U0003 INFECTIOUS AGENT DETECTION BY NUCLEIC ACID (DNA OR RNA); SEVERE ACUTE RESPIRATORY SYNDROME CORONAVIRUS 2 (SARS-COV-2) (CORONAVIRUS DISEASE [COVID-19]), AMPLIFIED PROBE TECHNIQUE, MAKING USE OF HIGH THROUGHPUT TECHNOLOGIES AS DESCRIBED BY CMS-2020-01-R: HCPCS | Performed by: PHYSICIAN ASSISTANT

## 2022-03-16 PROCEDURE — 93010 ELECTROCARDIOGRAM REPORT: CPT | Performed by: INTERNAL MEDICINE

## 2022-03-16 PROCEDURE — 81001 URINALYSIS AUTO W/SCOPE: CPT | Performed by: PHYSICIAN ASSISTANT

## 2022-03-16 PROCEDURE — P9612 CATHETERIZE FOR URINE SPEC: HCPCS

## 2022-03-16 PROCEDURE — 25010000002 ONDANSETRON PER 1 MG: Performed by: NURSE PRACTITIONER

## 2022-03-16 PROCEDURE — 83605 ASSAY OF LACTIC ACID: CPT | Performed by: PHYSICIAN ASSISTANT

## 2022-03-16 PROCEDURE — 87040 BLOOD CULTURE FOR BACTERIA: CPT | Performed by: PHYSICIAN ASSISTANT

## 2022-03-16 PROCEDURE — 85025 COMPLETE CBC W/AUTO DIFF WBC: CPT | Performed by: PHYSICIAN ASSISTANT

## 2022-03-16 PROCEDURE — 80053 COMPREHEN METABOLIC PANEL: CPT | Performed by: PHYSICIAN ASSISTANT

## 2022-03-16 PROCEDURE — 85610 PROTHROMBIN TIME: CPT | Performed by: NURSE PRACTITIONER

## 2022-03-16 PROCEDURE — 87086 URINE CULTURE/COLONY COUNT: CPT | Performed by: PHYSICIAN ASSISTANT

## 2022-03-16 PROCEDURE — 71045 X-RAY EXAM CHEST 1 VIEW: CPT

## 2022-03-16 PROCEDURE — 25010000002 CEFTRIAXONE PER 250 MG: Performed by: PHYSICIAN ASSISTANT

## 2022-03-16 RX ORDER — ACETAMINOPHEN 325 MG/1
650 TABLET ORAL EVERY 4 HOURS PRN
Status: DISCONTINUED | OUTPATIENT
Start: 2022-03-16 | End: 2022-03-22 | Stop reason: HOSPADM

## 2022-03-16 RX ORDER — ACETAMINOPHEN 650 MG/1
650 SUPPOSITORY RECTAL EVERY 4 HOURS PRN
Status: DISCONTINUED | OUTPATIENT
Start: 2022-03-16 | End: 2022-03-22 | Stop reason: HOSPADM

## 2022-03-16 RX ORDER — SODIUM CHLORIDE 0.9 % (FLUSH) 0.9 %
10 SYRINGE (ML) INJECTION AS NEEDED
Status: DISCONTINUED | OUTPATIENT
Start: 2022-03-16 | End: 2022-03-22 | Stop reason: HOSPADM

## 2022-03-16 RX ORDER — ATORVASTATIN CALCIUM 20 MG/1
10 TABLET, FILM COATED ORAL DAILY
Status: DISCONTINUED | OUTPATIENT
Start: 2022-03-16 | End: 2022-03-22 | Stop reason: HOSPADM

## 2022-03-16 RX ORDER — PANTOPRAZOLE SODIUM 40 MG/1
40 TABLET, DELAYED RELEASE ORAL DAILY
Status: DISCONTINUED | OUTPATIENT
Start: 2022-03-16 | End: 2022-03-22 | Stop reason: HOSPADM

## 2022-03-16 RX ORDER — SODIUM CHLORIDE 0.9 % (FLUSH) 0.9 %
10 SYRINGE (ML) INJECTION EVERY 12 HOURS SCHEDULED
Status: DISCONTINUED | OUTPATIENT
Start: 2022-03-16 | End: 2022-03-22 | Stop reason: HOSPADM

## 2022-03-16 RX ORDER — HYDROCODONE BITARTRATE AND ACETAMINOPHEN 5; 325 MG/1; MG/1
1 TABLET ORAL EVERY 8 HOURS PRN
Status: DISCONTINUED | OUTPATIENT
Start: 2022-03-16 | End: 2022-03-22 | Stop reason: HOSPADM

## 2022-03-16 RX ORDER — SODIUM CHLORIDE 9 MG/ML
100 INJECTION, SOLUTION INTRAVENOUS CONTINUOUS
Status: DISCONTINUED | OUTPATIENT
Start: 2022-03-16 | End: 2022-03-18

## 2022-03-16 RX ORDER — ACETAMINOPHEN 160 MG/5ML
650 SOLUTION ORAL EVERY 4 HOURS PRN
Status: DISCONTINUED | OUTPATIENT
Start: 2022-03-16 | End: 2022-03-22 | Stop reason: HOSPADM

## 2022-03-16 RX ORDER — ASCORBIC ACID 500 MG
500 TABLET ORAL 2 TIMES DAILY
COMMUNITY
End: 2022-04-29

## 2022-03-16 RX ORDER — NITROGLYCERIN 0.4 MG/1
0.4 TABLET SUBLINGUAL
Status: DISCONTINUED | OUTPATIENT
Start: 2022-03-16 | End: 2022-03-22 | Stop reason: HOSPADM

## 2022-03-16 RX ORDER — HYDROCODONE BITARTRATE AND ACETAMINOPHEN 5; 325 MG/1; MG/1
1 TABLET ORAL EVERY 4 HOURS PRN
COMMUNITY
End: 2022-03-22 | Stop reason: HOSPADM

## 2022-03-16 RX ORDER — METOPROLOL SUCCINATE 50 MG/1
25 TABLET, EXTENDED RELEASE ORAL
Status: DISCONTINUED | OUTPATIENT
Start: 2022-03-16 | End: 2022-03-22 | Stop reason: HOSPADM

## 2022-03-16 RX ORDER — ONDANSETRON 2 MG/ML
4 INJECTION INTRAMUSCULAR; INTRAVENOUS EVERY 6 HOURS PRN
Status: DISCONTINUED | OUTPATIENT
Start: 2022-03-16 | End: 2022-03-22 | Stop reason: HOSPADM

## 2022-03-16 RX ADMIN — SODIUM CHLORIDE 100 ML/HR: 9 INJECTION, SOLUTION INTRAVENOUS at 04:45

## 2022-03-16 RX ADMIN — ATORVASTATIN CALCIUM 10 MG: 20 TABLET, FILM COATED ORAL at 14:24

## 2022-03-16 RX ADMIN — TAZOBACTAM SODIUM AND PIPERACILLIN SODIUM 3.38 G: 375; 3 INJECTION, SOLUTION INTRAVENOUS at 16:26

## 2022-03-16 RX ADMIN — Medication 10 ML: at 13:46

## 2022-03-16 RX ADMIN — METOPROLOL SUCCINATE 25 MG: 25 TABLET, EXTENDED RELEASE ORAL at 14:24

## 2022-03-16 RX ADMIN — ACETAMINOPHEN 650 MG: 325 TABLET ORAL at 13:43

## 2022-03-16 RX ADMIN — PANTOPRAZOLE SODIUM 40 MG: 40 TABLET, DELAYED RELEASE ORAL at 14:24

## 2022-03-16 RX ADMIN — TAZOBACTAM SODIUM AND PIPERACILLIN SODIUM 3.38 G: 375; 3 INJECTION, SOLUTION INTRAVENOUS at 22:02

## 2022-03-16 RX ADMIN — ONDANSETRON 4 MG: 2 INJECTION INTRAMUSCULAR; INTRAVENOUS at 13:46

## 2022-03-16 RX ADMIN — HYDROCODONE BITARTRATE AND ACETAMINOPHEN 1 TABLET: 5; 325 TABLET ORAL at 21:02

## 2022-03-16 RX ADMIN — ENOXAPARIN SODIUM 30 MG: 30 INJECTION SUBCUTANEOUS at 20:17

## 2022-03-16 RX ADMIN — CEFTRIAXONE 2 G: 2 INJECTION, POWDER, FOR SOLUTION INTRAMUSCULAR; INTRAVENOUS at 04:49

## 2022-03-16 RX ADMIN — SODIUM CHLORIDE 100 ML/HR: 9 INJECTION, SOLUTION INTRAVENOUS at 17:46

## 2022-03-16 RX ADMIN — SODIUM CHLORIDE 500 ML: 9 INJECTION, SOLUTION INTRAVENOUS at 03:08

## 2022-03-16 NOTE — PROGRESS NOTES
[facility]: Piperacillin-Tazobactam Consult    Pt Name: Ama Billy   : 1933    Indication: UTI    Relevant clinical data and objective history reviewed:    Past Medical History:   Diagnosis Date    Anemia     Normocytic anemia of unclear ediology    Arthritis     Osteoarthritis involving knees and left shoulder    Chronic venous insufficiency     Eosinophilia     Persistent mild eosinophilia of unknown etiology    History of colonic polyps     Hyperlipidemia     Hypertension     Paroxysmal atrial fibrillation (HCC)      Creatinine   Date Value Ref Range Status   2022 1.70 (H) 0.57 - 1.00 mg/dL Final   2022 1.89 (H) 0.57 - 1.00 mg/dL Final   2022 1.06 (H) 0.57 - 1.00 mg/dL Final     BUN   Date Value Ref Range Status   2022 28 (H) 8 - 23 mg/dL Final     Estimated Creatinine Clearance: 24 mL/min (A) (by C-G formula based on SCr of 1.7 mg/dL (H)).    Lab Results   Component Value Date    WBC 24.15 (H) 2022     Temp Readings from Last 3 Encounters:   22 (!) 103 °F (39.4 °C) (Tympanic)   22 97.5 °F (36.4 °C) (Oral)   21 98.7 °F (37.1 °C)      Assessment/Plan  Estimated CrCl >20 mL/min at this time; BMI 27 kg/m2  Will start piperacillin-tazobactam 3.375 g IV every 8 hours     Pharmacy will continue to follow daily while on piperacillin-tazobactam and adjust as needed. Thank you for this consult.    Ric Garcia PharmD  Clinical Pharmacist

## 2022-03-16 NOTE — ED PROVIDER NOTES
MD ATTESTATION NOTE    The MAGDA and I have discussed this patient's history, physical exam, and treatment plan.    I provided a substantive portion of the care of this patient. I personally performed the physical exam, in its entirety. The attached note describes my personal findings.      Ama Billy is a 88 y.o. female who presents to the ED c/o fever and altered mental status.  Patient sent in from rehab facility for nausea vomiting and confusion.  Was febrile on arrival.  Patient reports some urinary frequency.  No chest pain cough or shortness of breath.      On exam:  GENERAL: Chronically ill-appearing, not distressed  HENT: nares patent  EYES: no scleral icterus  CV: regular rhythm, regular rate  RESPIRATORY: normal effort  ABDOMEN: soft, nontender nondistended  MUSCULOSKELETAL: no deformity  NEURO: alert, moves all extremities, follows commands  SKIN: warm, dry    Labs  Recent Results (from the past 24 hour(s))   Comprehensive Metabolic Panel    Collection Time: 03/16/22  2:56 AM    Specimen: Blood   Result Value Ref Range    Glucose 115 (H) 65 - 99 mg/dL    BUN 27 (H) 8 - 23 mg/dL    Creatinine 1.89 (H) 0.57 - 1.00 mg/dL    Sodium 136 136 - 145 mmol/L    Potassium 4.7 3.5 - 5.2 mmol/L    Chloride 99 98 - 107 mmol/L    CO2 21.8 (L) 22.0 - 29.0 mmol/L    Calcium 9.1 8.6 - 10.5 mg/dL    Total Protein 6.5 6.0 - 8.5 g/dL    Albumin 3.80 3.50 - 5.20 g/dL    ALT (SGPT) 12 1 - 33 U/L    AST (SGOT) 24 1 - 32 U/L    Alkaline Phosphatase 162 (H) 39 - 117 U/L    Total Bilirubin 1.1 0.0 - 1.2 mg/dL    Globulin 2.7 gm/dL    A/G Ratio 1.4 g/dL    BUN/Creatinine Ratio 14.3 7.0 - 25.0    Anion Gap 15.2 (H) 5.0 - 15.0 mmol/L    eGFR 25.3 (L) >60.0 mL/min/1.73   Troponin    Collection Time: 03/16/22  2:56 AM    Specimen: Blood   Result Value Ref Range    Troponin T 0.041 (C) 0.000 - 0.030 ng/mL   Lactic Acid, Plasma    Collection Time: 03/16/22  2:56 AM    Specimen: Blood   Result Value Ref Range    Lactate 2.6 (C) 0.5 -  2.0 mmol/L   Procalcitonin    Collection Time: 03/16/22  2:56 AM    Specimen: Blood   Result Value Ref Range    Procalcitonin 21.80 (H) 0.00 - 0.25 ng/mL   Magnesium    Collection Time: 03/16/22  2:56 AM    Specimen: Blood   Result Value Ref Range    Magnesium 1.5 (L) 1.6 - 2.4 mg/dL   Lipase    Collection Time: 03/16/22  2:56 AM    Specimen: Blood   Result Value Ref Range    Lipase 40 13 - 60 U/L   COVID-19, JUSTINO IN-HOUSE CEPHEID/TOMASA NP SWAB IN TRANSPORT MEDIA 8-12 HR TAT - Swab, Nasopharynx    Collection Time: 03/16/22  2:56 AM    Specimen: Nasopharynx; Swab   Result Value Ref Range    COVID19 Not Detected Not Detected - Ref. Range   CBC Auto Differential    Collection Time: 03/16/22  2:56 AM    Specimen: Blood   Result Value Ref Range    WBC 17.86 (H) 3.40 - 10.80 10*3/mm3    RBC 3.31 (L) 3.77 - 5.28 10*6/mm3    Hemoglobin 9.1 (L) 12.0 - 15.9 g/dL    Hematocrit 29.1 (L) 34.0 - 46.6 %    MCV 87.9 79.0 - 97.0 fL    MCH 27.5 26.6 - 33.0 pg    MCHC 31.3 (L) 31.5 - 35.7 g/dL    RDW 20.0 (H) 12.3 - 15.4 %    RDW-SD 64.4 (H) 37.0 - 54.0 fl    MPV 10.9 6.0 - 12.0 fL    Platelets 292 140 - 450 10*3/mm3    Neutrophil % 95.9 (H) 42.7 - 76.0 %    Lymphocyte % 1.7 (L) 19.6 - 45.3 %    Monocyte % 1.1 (L) 5.0 - 12.0 %    Eosinophil % 0.4 0.3 - 6.2 %    Basophil % 0.2 0.0 - 1.5 %    Neutrophils, Absolute 17.13 (H) 1.70 - 7.00 10*3/mm3    Lymphocytes, Absolute 0.30 (L) 0.70 - 3.10 10*3/mm3    Monocytes, Absolute 0.19 0.10 - 0.90 10*3/mm3    Eosinophils, Absolute 0.08 0.00 - 0.40 10*3/mm3    Basophils, Absolute 0.03 0.00 - 0.20 10*3/mm3   ECG 12 Lead    Collection Time: 03/16/22  2:57 AM   Result Value Ref Range    QT Interval 324 ms   Urinalysis With Microscopic If Indicated (No Culture) - Urine, Catheter    Collection Time: 03/16/22  3:07 AM    Specimen: Urine, Catheter   Result Value Ref Range    Color, UA Yellow Yellow, Straw    Appearance, UA Cloudy (A) Clear    pH, UA 5.5 5.0 - 8.0    Specific Gravity, UA 1.015 1.005 -  1.030    Glucose, UA Negative Negative    Ketones, UA Negative Negative    Bilirubin, UA Negative Negative    Blood, UA Small (1+) (A) Negative    Protein,  mg/dL (2+) (A) Negative    Leuk Esterase, UA Large (3+) (A) Negative    Nitrite, UA Negative Negative    Urobilinogen, UA 0.2 E.U./dL 0.2 - 1.0 E.U./dL   Urinalysis, Microscopic Only - Urine, Catheter    Collection Time: 03/16/22  3:07 AM    Specimen: Urine, Catheter   Result Value Ref Range    RBC, UA 6-12 (A) None Seen, 0-2 /HPF    WBC, UA Too Numerous to Count (A) None Seen, 0-2 /HPF    Bacteria, UA 4+ (A) None Seen /HPF    Squamous Epithelial Cells, UA 0-2 None Seen, 0-2 /HPF    Hyaline Casts, UA 0-2 None Seen /LPF    Methodology Automated Microscopy    CBC Auto Differential    Collection Time: 03/16/22  6:06 AM    Specimen: Blood   Result Value Ref Range    WBC 24.15 (H) 3.40 - 10.80 10*3/mm3    RBC 2.81 (L) 3.77 - 5.28 10*6/mm3    Hemoglobin 7.8 (L) 12.0 - 15.9 g/dL    Hematocrit 24.5 (L) 34.0 - 46.6 %    MCV 87.2 79.0 - 97.0 fL    MCH 27.8 26.6 - 33.0 pg    MCHC 31.8 31.5 - 35.7 g/dL    RDW 19.5 (H) 12.3 - 15.4 %    RDW-SD 61.0 (H) 37.0 - 54.0 fl    MPV 11.0 6.0 - 12.0 fL    Platelets 238 140 - 450 10*3/mm3    Neutrophil % 91.0 (H) 42.7 - 76.0 %    Lymphocyte % 2.5 (L) 19.6 - 45.3 %    Monocyte % 5.0 5.0 - 12.0 %    Eosinophil % 0.1 (L) 0.3 - 6.2 %    Basophil % 0.2 0.0 - 1.5 %    Immature Grans % 1.2 (H) 0.0 - 0.5 %    Neutrophils, Absolute 21.95 (H) 1.70 - 7.00 10*3/mm3    Lymphocytes, Absolute 0.61 (L) 0.70 - 3.10 10*3/mm3    Monocytes, Absolute 1.20 (H) 0.10 - 0.90 10*3/mm3    Eosinophils, Absolute 0.03 0.00 - 0.40 10*3/mm3    Basophils, Absolute 0.06 0.00 - 0.20 10*3/mm3    Immature Grans, Absolute 0.30 (H) 0.00 - 0.05 10*3/mm3    nRBC 0.2 0.0 - 0.2 /100 WBC   Protime-INR    Collection Time: 03/16/22  6:06 AM    Specimen: Blood   Result Value Ref Range    Protime 15.0 (H) 11.7 - 14.2 Seconds    INR 1.19 (H) 0.90 - 1.10   Scan Slide     Collection Time: 03/16/22  6:06 AM    Specimen: Blood   Result Value Ref Range    Acanthocytes Slight/1+ None Seen    Elliptocytes Slight/1+ None Seen    Target Cells Slight/1+ None Seen    WBC Morphology Normal Normal    Platelet Estimate Adequate Normal       Radiology  XR Chest 1 View    Result Date: 3/16/2022  Patient: LISA PANTOJA  Time Out: 04:49 Exam(s): FILM CXR 1 VIEW EXAM:   XR Chest, 1 View CLINICAL HISTORY:    Reason for exam: Fever. TECHNIQUE:   Frontal view of the chest. COMPARISON:   2 14 2022 FINDINGS:   Lungs:  No consolidation or mass.  Unchanged 6 mm left upper lobe nodule.   Pleural space:  No acute findings   Heart:  No cardiomegaly.   Bones joints:  No acute findings. IMPRESSION:       No acute cardiopulmonary process. Unchanged 6 mm left upper lobe nodule.     Electronically signed by Gilma Kumar MD on 03-16-22 at 0449     Medical Decision Making:  ED Course as of 03/16/22 0740   Wed Mar 16, 2022   0300 EKG          EKG time: 0257  Rhythm/Rate: Sinus rhythm rate 93  P waves and GA: Normal  QRS, axis: Normal  ST and T waves: Nonspecific    Interpreted Contemporaneously by me, independently viewed [TJ]   0403 WBC(!): 17.86 [AILYN]   0406 Hemoglobin(!): 9.1 [AILYN]   0407 Hematocrit(!): 29.1 [AILYN]   0407 Platelets: 292 [AILYN]   0407 Lactate(!!): 2.6 [AILYN]   0407 Procalcitonin(!): 21.80 [AILYN]   0407 Glucose(!): 115 [AILYN]   0407 BUN(!): 27 [AILYN]   0407 Creatinine(!): 1.89 [AILYN]   0407 Sodium: 136 [AILYN]   0407 Potassium: 4.7 [AILYN]   0407 Chloride: 99 [AILYN]   0407 CO2(!): 21.8 [AILYN]   0407 Magnesium(!): 1.5 [AILYN]   0407 Lipase: 40 [AILYN]   0407 Troponin T(!!): 0.041 [AILYN]   0407 COVID19: Not Detected [AILYN]   0407 Leukocytes, UA(!): Large (3+) [AILYN]   0407 WBC, UA(!): Too Numerous to Count [AILYN]   0407 Bacteria, UA(!): 4+ [AILYN]   0459 Patient history, presentation, lab results and diagnosis discussed with Valentina GIPSON, will admit per Dr. Tafoya. [AILYN]      ED Course User Index  [AILYN] Manuel Mason PA  [TJ] Azeb  Julio HUANG MD           PPE: Both the patient and I wore a surgical mask throughout the entire patient encounter. I wore protective goggles.     Diagnosis  Final diagnoses:   Sepsis with acute renal failure without septic shock, due to unspecified organism, unspecified acute renal failure type (HCC)   Acute urinary tract infection   Metabolic encephalopathy   BENNIE (acute kidney injury) (HCC)   Elevated troponin   Primary hypertension   Hyperlipidemia, unspecified hyperlipidemia type        Julio Bowie MD  03/16/22 0421       Julio Bowie MD  03/16/22 0740

## 2022-03-16 NOTE — CONSULTS
Referring Provider: Dr Joe    Subjective   History of present illness:  87 yo we are asked to see for sepsis.  She has no understanding why she is in the hospital but says she has been feeling sick.  Can't charcterize how long but denies and focal sx.  Per admitting notes, brought to ED from rehab (after recent stay for R hip fracture s/p R hemiarthroplasty) for acute onset of fever to 103, confusion and n/v.  UA with pyuria but CXR clear.  WBC now 24,000 and in BENNIE.      Past Medical History:   Diagnosis Date   • Anemia     Normocytic anemia of unclear ediology   • Arthritis     Osteoarthritis involving knees and left shoulder   • Chronic venous insufficiency    • Eosinophilia     Persistent mild eosinophilia of unknown etiology   • History of colonic polyps    • Hyperlipidemia    • Hypertension    • Paroxysmal atrial fibrillation (HCC)        Past Surgical History:   Procedure Laterality Date   • COLONOSCOPY      H/O colonic polyps with regular colonoscopies at 5 year intervals.    • COLONOSCOPY  2013    By Dr. Gudino, no polyps identified.   • COLONOSCOPY N/A 7/3/2018    IH, one TA w/low grade dysplasi   • ENDOSCOPY N/A 11/15/2017    Small HH, LA Grade B reflux, acute and erosive ulcerative esophagitis,    • ENDOSCOPY N/A 2/27/2018    LA Grade B esophagitis, HH, gastritis, Path: Canales's w/out dysplasia   • HEMORRHOIDECTOMY  1965   • HIP ENDOPROSTHESIS Right 2/16/2022    Procedure: RIGHT HIP BIPOLAR HEMIARTHROPLASTY;  Surgeon: Miguel Ya MD;  Location: [facility];  Service: Orthopedics;  Laterality: Right;   • HYSTERECTOMY  1971    Partial, secondary to endometriosis   • JOINT REPLACEMENT  2014    Left total hip arthroplasy   • KNEE SURGERY  1997    Arthroscopy of left knee 1997; right knee 2003       No fmh infection  nkda  Sh: lives in [place] but originally from [facility]. . nonsmoker    Allergies   Allergen Reactions   • Diclofenac Sodium Unknown (See Comments)     Is unaware          Review of Systems  Pertinent items are noted in HPI, all other systems reviewed and negative albeit responses may not be accurate    Objective     Physical Exam:   Vital Signs   Temp:  [98.1 °F (36.7 °C)-103.6 °F (39.8 °C)] 103 °F (39.4 °C)  Heart Rate:  [] 90  Resp:  [16-24] 18  BP: (105-170)/() 141/58    GENERAL: Awake and alert, in no acute distress.   HEENT: Oropharynx is clear. Hearing is grossly normal.   EYES: PERRL. No conjunctival injection. No lid lag.   LYMPHATICS: No lymphadenopathy of the neck or inguinal regions.   HEART: Regular rate and regular rhythm. No peripheral edema.   LUNGS: Clear to auscultation anteriorly with normal respiratory effort.   GI: Soft, R CVA ttp, nondistended. No appreciable organomegaly.   SKIN: Warm and dry without cutaneous eruptions   PSYCHIATRIC: Appropriate mood, affect. impaired insight, and judgment.     Results Review:  WBC 24.15    HGb 7.8  Cr 1.7   LFTs nl  PCT 22    Microbiology: Blood and urine cx P  Radiology:  CXR, independently interpreted: no pna      Assessment/Plan   1. Sepsis, suspect R pyelo, r/o obstructing stone  2. BENNIE, baseline cr around 1-1.12  3. Mild septic encephalopathy    Cont abx, now on zosyn which is reasonable given recent admission. May be bacteremic with procal that elevated and will follow cx closely.  Check ct a/p to eval for pyelo, stones  Check CBC and procal in am  D/w Dr Joe this afternoon    Thank you for this consult.  We will continue to follow along and tailor antibiotics as the patient's clinical course evolves.      Reginaldo Hyatt MD  03/16/22  14:56 EDT

## 2022-03-16 NOTE — ED NOTES
Pt to ER via EMS from [facility]. Pt in mask, staff in PPE. Pt at NH since last week for hip fx and dislocated shoulder. Pt presents to ER with confusion, fever, tachypnea,abd pain and N/V. Pt is normally alert and oriented. Was just discharged from here 2/22 for hip fx and shoulder dislocation. Surgical site look good per EMS.

## 2022-03-16 NOTE — ED NOTES
Nursing report ED to floor  Ama Billy  88 y.o.  female    HPI :   Chief Complaint   Patient presents with   • Fever   • Altered Mental Status       Admitting doctor:   Demarco Joe MD    Admitting diagnosis:   The primary encounter diagnosis was Sepsis with acute renal failure without septic shock, due to unspecified organism, unspecified acute renal failure type (HCC). Diagnoses of Acute urinary tract infection, Metabolic encephalopathy, BENNIE (acute kidney injury) (HCC), Elevated troponin, Primary hypertension, and Hyperlipidemia, unspecified hyperlipidemia type were also pertinent to this visit.    Code status:   Current Code Status     Date Active Code Status Order ID Comments User Context       3/16/2022 0501 CPR (Attempt to Resuscitate) [id number]  Valentina Pierre, [facility]     Advance Care Planning Activity      Questions for Current Code Status     Question Answer    Code Status (Patient has no pulse and is not breathing) CPR (Attempt to Resuscitate)    Medical Interventions (Patient has pulse or is breathing) Full Support          Allergies:   Diclofenac sodium    Intake and Output    Intake/Output Summary (Last 24 hours) at 3/16/2022 0958  Last data filed at 3/16/2022 0607  Gross per 24 hour   Intake 600 ml   Output --   Net 600 ml       Weight:   There were no vitals filed for this visit.    Most recent vitals:   Vitals:    03/16/22 0541 03/16/22 0545 03/16/22 0841 03/16/22 0853   BP: 126/43  105/44    BP Location:       Patient Position:       Pulse:  90  81   Resp:       Temp:       TempSrc:       SpO2:  96%  96%       Active LDAs/IV Access:   Lines, Drains & Airways     Active LDAs     Name Placement date Placement time Site Days    Peripheral IV 03/16/22 0257 Left Wrist 03/16/22  0257  Wrist  less than 1    External Urinary Catheter 02/17/22  --  --  27                Labs (abnormal labs have a star):   Labs Reviewed   COMPREHENSIVE METABOLIC PANEL - Abnormal; Notable for the following  components:       Result Value    Glucose 115 (*)     BUN 27 (*)     Creatinine 1.89 (*)     CO2 21.8 (*)     Alkaline Phosphatase 162 (*)     Anion Gap 15.2 (*)     eGFR 25.3 (*)     All other components within normal limits    Narrative:     GFR Normal >60  Chronic Kidney Disease <60  Kidney Failure <15     URINALYSIS W/ MICROSCOPIC IF INDICATED (NO CULTURE) - Abnormal; Notable for the following components:    Appearance, UA Cloudy (*)     Blood, UA Small (1+) (*)     Protein,  mg/dL (2+) (*)     Leuk Esterase, UA Large (3+) (*)     All other components within normal limits   TROPONIN (IN-HOUSE) - Abnormal; Notable for the following components:    Troponin T 0.041 (*)     All other components within normal limits    Narrative:     Troponin T Reference Range:  <= 0.03 ng/mL-   Negative for AMI  >0.03 ng/mL-     Abnormal for myocardial necrosis.  Clinicians would have to utilize clinical acumen, EKG, Troponin and serial changes to determine if it is an Acute Myocardial Infarction or myocardial injury due to an underlying chronic condition.       Results may be falsely decreased if patient taking Biotin.     LACTIC ACID, PLASMA - Abnormal; Notable for the following components:    Lactate 2.6 (*)     All other components within normal limits   PROCALCITONIN - Abnormal; Notable for the following components:    Procalcitonin 21.80 (*)     All other components within normal limits    Narrative:     As a Marker for Sepsis (Non-Neonates):    1. <0.5 ng/mL represents a low risk of severe sepsis and/or septic shock.  2. >2 ng/mL represents a high risk of severe sepsis and/or septic shock.    As a Marker for Lower Respiratory Tract Infections that require antibiotic therapy:    PCT on Admission    Antibiotic Therapy       6-12 Hrs later    >0.5                Strongly Recommended  >0.25 - <0.5        Recommended   0.1 - 0.25          Discouraged              Remeasure/reassess PCT  <0.1                Strongly  "Discouraged     Remeasure/reassess PCT    As 28 day mortality risk marker: \"Change in Procalcitonin Result\" (>80% or <=80%) if Day 0 (or Day 1) and Day 4 values are available. Refer to http://www.[facility]-pct-calculator.com    Change in PCT <=80%  A decrease of PCT levels below or equal to 80% defines a positive change in PCT test result representing a higher risk for 28-day all-cause mortality of patients diagnosed with severe sepsis for septic shock.    Change in PCT >80%  A decrease of PCT levels of more than 80% defines a negative change in PCT result representing a lower risk for 28-day all-cause mortality of patients diagnosed with severe sepsis or septic shock.      MAGNESIUM - Abnormal; Notable for the following components:    Magnesium 1.5 (*)     All other components within normal limits   CBC WITH AUTO DIFFERENTIAL - Abnormal; Notable for the following components:    WBC 17.86 (*)     RBC 3.31 (*)     Hemoglobin 9.1 (*)     Hematocrit 29.1 (*)     MCHC 31.3 (*)     RDW 20.0 (*)     RDW-SD 64.4 (*)     Neutrophil % 95.9 (*)     Lymphocyte % 1.7 (*)     Monocyte % 1.1 (*)     Neutrophils, Absolute 17.13 (*)     Lymphocytes, Absolute 0.30 (*)     All other components within normal limits   URINALYSIS, MICROSCOPIC ONLY - Abnormal; Notable for the following components:    RBC, UA 6-12 (*)     WBC, UA Too Numerous to Count (*)     Bacteria, UA 4+ (*)     All other components within normal limits   BASIC METABOLIC PANEL - Abnormal; Notable for the following components:    Glucose 101 (*)     BUN 28 (*)     Creatinine 1.70 (*)     CO2 21.0 (*)     Calcium 8.2 (*)     eGFR 28.7 (*)     All other components within normal limits    Narrative:     GFR Normal >60  Chronic Kidney Disease <60  Kidney Failure <15     CBC WITH AUTO DIFFERENTIAL - Abnormal; Notable for the following components:    WBC 24.15 (*)     RBC 2.81 (*)     Hemoglobin 7.8 (*)     Hematocrit 24.5 (*)     RDW 19.5 (*)     RDW-SD 61.0 (*)     Neutrophil " % 91.0 (*)     Lymphocyte % 2.5 (*)     Eosinophil % 0.1 (*)     Immature Grans % 1.2 (*)     Neutrophils, Absolute 21.95 (*)     Lymphocytes, Absolute 0.61 (*)     Monocytes, Absolute 1.20 (*)     Immature Grans, Absolute 0.30 (*)     All other components within normal limits   PROTIME-INR - Abnormal; Notable for the following components:    Protime 15.0 (*)     INR 1.19 (*)     All other components within normal limits   COVID-19, JUSTINO IN-HOUSE CEPHEID/TOMASA, NP SWAB IN TRANSPORT MEDIA 8-12 HR TAT - Normal    Narrative:     Fact sheet for providers: https://www.fda.gov/media/390872/download     Fact sheet for patients: https://www.fda.gov/media/105615/download   LIPASE - Normal   LACTIC ACID, REFLEX - Normal   COVID PRE-OP / PRE-PROCEDURE SCREENING ORDER (NO ISOLATION)    Narrative:     The following orders were created for panel order COVID PRE-OP / PRE-PROCEDURE SCREENING ORDER (NO ISOLATION) - Swab, Nasopharynx.  Procedure                               Abnormality         Status                     ---------                               -----------         ------                     COVID-19, JUSTINO IN-HOUSE...[[id number]]  Normal              Final result                 Please view results for these tests on the individual orders.   BLOOD CULTURE   BLOOD CULTURE   URINE CULTURE   SCAN SLIDE   CBC AND DIFFERENTIAL    Narrative:     The following orders were created for panel order CBC & Differential.  Procedure                               Abnormality         Status                     ---------                               -----------         ------                     CBC Auto Differential[[id number]]        Abnormal            Final result                 Please view results for these tests on the individual orders.       EKG:   ECG 12 Lead   Preliminary Result   HEART RATE= 93  bpm   RR Interval= 644  ms   FL Interval= 170  ms   P Horizontal Axis= 18  deg   P Front Axis= 55  deg   QRSD Interval= 80  ms    QT Interval= 324  ms   QRS Axis= -19  deg   T Wave Axis= 53  deg   - OTHERWISE NORMAL ECG -   Sinus rhythm   Borderline left axis deviation   Low voltage, precordial leads   Electronically Signed By:    Date and Time of Study: 2022-03-16 02:57:12          Meds given in ED:   Medications   sodium chloride 0.9 % flush 10 mL (has no administration in time range)   sodium chloride 0.9 % infusion (100 mL/hr Intravenous New Bag 3/16/22 0445)   sodium chloride 0.9 % flush 10 mL (has no administration in time range)   sodium chloride 0.9 % flush 10 mL (has no administration in time range)   nitroglycerin (NITROSTAT) SL tablet 0.4 mg (has no administration in time range)   acetaminophen (TYLENOL) tablet 650 mg (has no administration in time range)     Or   acetaminophen (TYLENOL) 160 MG/5ML solution 650 mg (has no administration in time range)     Or   acetaminophen (TYLENOL) suppository 650 mg (has no administration in time range)   ondansetron (ZOFRAN) injection 4 mg (has no administration in time range)   cefTRIAXone (ROCEPHIN) 2 g in sodium chloride 0.9 % 100 mL IVPB-VTB (has no administration in time range)   sodium chloride 0.9 % bolus 500 mL (0 mL Intravenous Stopped 3/16/22 0354)   cefTRIAXone (ROCEPHIN) 2 g in sodium chloride 0.9 % 100 mL IVPB-VTB (0 g Intravenous Stopped 3/16/22 0607)       Imaging results:  XR Chest 1 View    Result Date: 3/16/2022  Electronically signed by Gilma Kumar MD on 03-16-22 at 0449      Ambulatory status:   - bed    Social issues:   Social History     Socioeconomic History   • Marital status:      Spouse name: Trino   • Number of children: 2   • Years of education: College +   Tobacco Use   • Smoking status: Never Smoker   • Smokeless tobacco: Never Used   Vaping Use   • Vaping Use: Never used   Substance and Sexual Activity   • Alcohol use: No   • Drug use: No   • Sexual activity: Defer       NIH Stroke Scale:        Nursing report ED to floor:

## 2022-03-16 NOTE — ED NOTES
Patient wearing mask, nurse wearing mask, n95, protective eyewear for care and assessment.  Hand hygiene performed prior to and post care.

## 2022-03-16 NOTE — PLAN OF CARE
Goal Outcome Evaluation:  Plan of Care Reviewed With: patient, spouse        Progress: no change  Outcome Evaluation: patient being admitted to obs holding from ER due to fever, sepsis and UTI following a recent admit/discharge for right hip fx and repair.  Patient who is typically aox4 per her family is currently aoxself and the month.  Patient has had a max temp today of 103.6F.  Patient is on iv antbx per ID.  PAtient had CT abd today results are still pending.  Patient has been at the [facility] for rehab.  Patient will need PT during her stay. SR, RA q2 turn with purwick and breif. CTM closely

## 2022-03-16 NOTE — ED NOTES
"Pt called out stating she feels like she is dying, when entered room pt I warm to touch, shaking all over, states having nausea. Pts temp checked, medicated, MD notified.     Pt also states \" I just spoke to a man who said my  was here at the hospital\" pt informed he was here to visit her earlier per bridgette NASH, not at bedside currently. Pt repositioned, given water and zofran. Assured she is not dying right now. Pt given emesis bag   "

## 2022-03-16 NOTE — ED PROVIDER NOTES
" EMERGENCY DEPARTMENT ENCOUNTER    Room Number:  01/01  Date of encounter:  3/16/2022  PCP: Con Canas MD  Historian: Patient and spouse      HPI:  Chief Complaint: Possible sepsis       Context: Ama Billy is a 88 y.o. female with past medical history of HTN, HLD, and GERD who presents to the ED c/o fever and confusion.  Patient was admitted here from 2/14/2022 through 2/22/2022 for evaluation treatment of a right hip and right proximal humerus fracture.  Patient has been at rehab since her discharge here.  This evening patient was sent to the ER for further evaluation of a fever, confusion, nausea and vomiting.  When asked what is the matter or why she came to the ER tonight the patient states that she is \"feeling lousy\".  Patient admits to nausea and urinary frequency, but denies any constipation, vomiting, fever, chest pain, shortness of breath, cough, or dysuria.      PAST MEDICAL HISTORY  Active Ambulatory Problems     Diagnosis Date Noted   • Normocytic anemia 04/15/2016   • Abdominal cramping 06/24/2013   • LLQ pain 06/24/2013   • Colon polyps 02/21/2017   • Bursitis of hip 02/21/2017   • Diarrhea 06/24/2013   • Fatigue 02/21/2017   • Generalized osteoarthritis 02/21/2017   • Hyperlipidemia 02/21/2017   • Hypertension 02/21/2017   • Irritable bowel syndrome 06/24/2013   • Muscle strain of lower extremity 02/21/2017   • Tendinitis 02/21/2017   • Chronic venous insufficiency 02/21/2017   • Status post total left knee replacement 02/21/2017   • 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   • Left retinal detachment 02/21/2017   • Hypovitaminosis D 02/21/2017   • Weakness of extremity 02/21/2017   • Closed wedge compression fracture of first lumbar vertebra (HCC) 10/13/2017   • Fall 10/13/2017   • Constipation 10/13/2017   • Nausea & vomiting 10/13/2017   • Leukocytosis 10/13/2017   • Anemia 10/14/2017   • Symptomatic anemia 11/14/2017   • Esophagitis 11/15/2017   • GI bleed 11/15/2017   • Acute blood loss " anemia 11/15/2017   • Iron deficiency anemia 11/30/2017   • Paroxysmal atrial fibrillation (HCC) 11/30/2017   • Rectal bleeding 06/12/2018   • Iron deficiency anemia due to chronic blood loss 06/12/2018   • Swelling of lower extremity 06/20/2018   • Canales's esophagus without dysplasia 03/28/2019   • Dyspnea on exertion 09/26/2019   • Closed fracture of right hip (HCC) 02/14/2022   • Fx humeral neck, right, closed, initial encounter 02/15/2022   • Acute urinary retention 02/16/2022     Resolved Ambulatory Problems     Diagnosis Date Noted   • Acute upper respiratory infection 02/21/2017   • Globus sensation 06/24/2013   • Atypical pneumonia 10/12/2017   • Mild dehydration 11/14/2017   • Nausea vomiting and diarrhea 11/14/2017   • Coffee ground emesis 11/14/2017     Past Medical History:   Diagnosis Date   • Arthritis    • Eosinophilia    • History of colonic polyps          PAST SURGICAL HISTORY  Past Surgical History:   Procedure Laterality Date   • COLONOSCOPY      H/O colonic polyps with regular colonoscopies at 5 year intervals.    • COLONOSCOPY  2013    By Dr. Gudino, no polyps identified.   • COLONOSCOPY N/A 7/3/2018    IH, one TA w/low grade dysplasi   • ENDOSCOPY N/A 11/15/2017    Small HH, LA Grade B reflux, acute and erosive ulcerative esophagitis,    • ENDOSCOPY N/A 2/27/2018    LA Grade B esophagitis, HH, gastritis, Path: Canales's w/out dysplasia   • HEMORRHOIDECTOMY  1965   • HIP ENDOPROSTHESIS Right 2/16/2022    Procedure: RIGHT HIP BIPOLAR HEMIARTHROPLASTY;  Surgeon: Miguel Ya MD;  Location: [facility];  Service: Orthopedics;  Laterality: Right;   • HYSTERECTOMY  1971    Partial, secondary to endometriosis   • JOINT REPLACEMENT  2014    Left total hip arthroplasy   • KNEE SURGERY  1997    Arthroscopy of left knee 1997; right knee 2003         FAMILY HISTORY  Family History   Problem Relation Age of Onset   • Coronary artery disease Father    • Heart disease Father    • Heart attack Father  72   • Coronary artery disease Brother    • Heart disease Brother         CABG   • Malig Hyperthermia Neg Hx          SOCIAL HISTORY  Social History     Socioeconomic History   • Marital status:      Spouse name: Trino   • Number of children: 2   • Years of education: College +   Tobacco Use   • Smoking status: Never Smoker   • Smokeless tobacco: Never Used   Vaping Use   • Vaping Use: Never used   Substance and Sexual Activity   • Alcohol use: No   • Drug use: No   • Sexual activity: Defer         ALLERGIES  Diclofenac sodium        REVIEW OF SYSTEMS  Review of Systems     All systems reviewed and negative except for those discussed in HPI.       PHYSICAL EXAM    I have reviewed the triage vital signs and nursing notes.    ED Triage Vitals   Temp Heart Rate Resp BP SpO2   03/16/22 0226 03/16/22 0226 03/16/22 0228 03/16/22 0226 03/16/22 0226   (!) 101.4 °F (38.6 °C) 115 24 150/100 97 %      Temp src Heart Rate Source Patient Position BP Location FiO2 (%)   03/16/22 0226 03/16/22 0226 03/16/22 0226 03/16/22 0226 --   Oral Monitor Sitting Right arm        Physical Exam  GENERAL: Alert, not distressed  HENT: dry mucous membranes  EYES: no scleral icterus, PERRL, EOMI  CV: regular rhythm, regular rate, tachycardic  RESPIRATORY: normal effort, clear to auscultate bilaterally  ABDOMEN: soft/nontender, no rebound or guarding  MUSCULOSKELETAL: no deformity, 1+ pitting edema BLE  NEURO: alert, moves all extremities, follows commands  SKIN: warm, dry, no rashes        LAB RESULTS  Recent Results (from the past 24 hour(s))   Comprehensive Metabolic Panel    Collection Time: 03/16/22  2:56 AM    Specimen: Blood   Result Value Ref Range    Glucose 115 (H) 65 - 99 mg/dL    BUN 27 (H) 8 - 23 mg/dL    Creatinine 1.89 (H) 0.57 - 1.00 mg/dL    Sodium 136 136 - 145 mmol/L    Potassium 4.7 3.5 - 5.2 mmol/L    Chloride 99 98 - 107 mmol/L    CO2 21.8 (L) 22.0 - 29.0 mmol/L    Calcium 9.1 8.6 - 10.5 mg/dL    Total Protein 6.5 6.0  - 8.5 g/dL    Albumin 3.80 3.50 - 5.20 g/dL    ALT (SGPT) 12 1 - 33 U/L    AST (SGOT) 24 1 - 32 U/L    Alkaline Phosphatase 162 (H) 39 - 117 U/L    Total Bilirubin 1.1 0.0 - 1.2 mg/dL    Globulin 2.7 gm/dL    A/G Ratio 1.4 g/dL    BUN/Creatinine Ratio 14.3 7.0 - 25.0    Anion Gap 15.2 (H) 5.0 - 15.0 mmol/L    eGFR 25.3 (L) >60.0 mL/min/1.73   Troponin    Collection Time: 03/16/22  2:56 AM    Specimen: Blood   Result Value Ref Range    Troponin T 0.041 (C) 0.000 - 0.030 ng/mL   Lactic Acid, Plasma    Collection Time: 03/16/22  2:56 AM    Specimen: Blood   Result Value Ref Range    Lactate 2.6 (C) 0.5 - 2.0 mmol/L   Procalcitonin    Collection Time: 03/16/22  2:56 AM    Specimen: Blood   Result Value Ref Range    Procalcitonin 21.80 (H) 0.00 - 0.25 ng/mL   Magnesium    Collection Time: 03/16/22  2:56 AM    Specimen: Blood   Result Value Ref Range    Magnesium 1.5 (L) 1.6 - 2.4 mg/dL   Lipase    Collection Time: 03/16/22  2:56 AM    Specimen: Blood   Result Value Ref Range    Lipase 40 13 - 60 U/L   COVID-19, JUSTINO IN-HOUSE CEPHEID/TOMASA NP SWAB IN TRANSPORT MEDIA 8-12 HR TAT - Swab, Nasopharynx    Collection Time: 03/16/22  2:56 AM    Specimen: Nasopharynx; Swab   Result Value Ref Range    COVID19 Not Detected Not Detected - Ref. Range   CBC Auto Differential    Collection Time: 03/16/22  2:56 AM    Specimen: Blood   Result Value Ref Range    WBC 17.86 (H) 3.40 - 10.80 10*3/mm3    RBC 3.31 (L) 3.77 - 5.28 10*6/mm3    Hemoglobin 9.1 (L) 12.0 - 15.9 g/dL    Hematocrit 29.1 (L) 34.0 - 46.6 %    MCV 87.9 79.0 - 97.0 fL    MCH 27.5 26.6 - 33.0 pg    MCHC 31.3 (L) 31.5 - 35.7 g/dL    RDW 20.0 (H) 12.3 - 15.4 %    RDW-SD 64.4 (H) 37.0 - 54.0 fl    MPV 10.9 6.0 - 12.0 fL    Platelets 292 140 - 450 10*3/mm3    Neutrophil % 95.9 (H) 42.7 - 76.0 %    Lymphocyte % 1.7 (L) 19.6 - 45.3 %    Monocyte % 1.1 (L) 5.0 - 12.0 %    Eosinophil % 0.4 0.3 - 6.2 %    Basophil % 0.2 0.0 - 1.5 %    Neutrophils, Absolute 17.13 (H) 1.70 - 7.00  10*3/mm3    Lymphocytes, Absolute 0.30 (L) 0.70 - 3.10 10*3/mm3    Monocytes, Absolute 0.19 0.10 - 0.90 10*3/mm3    Eosinophils, Absolute 0.08 0.00 - 0.40 10*3/mm3    Basophils, Absolute 0.03 0.00 - 0.20 10*3/mm3   ECG 12 Lead    Collection Time: 03/16/22  2:57 AM   Result Value Ref Range    QT Interval 324 ms   Urinalysis With Microscopic If Indicated (No Culture) - Urine, Catheter    Collection Time: 03/16/22  3:07 AM    Specimen: Urine, Catheter   Result Value Ref Range    Color, UA Yellow Yellow, Straw    Appearance, UA Cloudy (A) Clear    pH, UA 5.5 5.0 - 8.0    Specific Gravity, UA 1.015 1.005 - 1.030    Glucose, UA Negative Negative    Ketones, UA Negative Negative    Bilirubin, UA Negative Negative    Blood, UA Small (1+) (A) Negative    Protein,  mg/dL (2+) (A) Negative    Leuk Esterase, UA Large (3+) (A) Negative    Nitrite, UA Negative Negative    Urobilinogen, UA 0.2 E.U./dL 0.2 - 1.0 E.U./dL   Urinalysis, Microscopic Only - Urine, Catheter    Collection Time: 03/16/22  3:07 AM    Specimen: Urine, Catheter   Result Value Ref Range    RBC, UA 6-12 (A) None Seen, 0-2 /HPF    WBC, UA Too Numerous to Count (A) None Seen, 0-2 /HPF    Bacteria, UA 4+ (A) None Seen /HPF    Squamous Epithelial Cells, UA 0-2 None Seen, 0-2 /HPF    Hyaline Casts, UA 0-2 None Seen /LPF    Methodology Automated Microscopy        Ordered the above labs and independently reviewed the results.        RADIOLOGY  XR Chest 1 View    Result Date: 3/16/2022  Patient: LISA PANTOJA  Time Out: 04:49 Exam(s): FILM CXR 1 VIEW EXAM:   XR Chest, 1 View CLINICAL HISTORY:    Reason for exam: Fever. TECHNIQUE:   Frontal view of the chest. COMPARISON:   2 14 2022 FINDINGS:   Lungs:  No consolidation or mass.  Unchanged 6 mm left upper lobe nodule.   Pleural space:  No acute findings   Heart:  No cardiomegaly.   Bones joints:  No acute findings. IMPRESSION:       No acute cardiopulmonary process. Unchanged 6 mm left upper lobe nodule.      Electronically signed by Gilma Kumar MD on 03-16-22 at 0449      I ordered the above noted radiological studies. Reviewed by me and discussed with radiologist.  See dictation for official radiology interpretation.      PROCEDURES    Procedures      MEDICATIONS GIVEN IN ER    Medications   sodium chloride 0.9 % flush 10 mL (has no administration in time range)   cefTRIAXone (ROCEPHIN) 2 g in sodium chloride 0.9 % 100 mL IVPB-VTB (2 g Intravenous New Bag 3/16/22 0449)   sodium chloride 0.9 % infusion (100 mL/hr Intravenous New Bag 3/16/22 0445)   sodium chloride 0.9 % flush 10 mL (has no administration in time range)   sodium chloride 0.9 % flush 10 mL (has no administration in time range)   nitroglycerin (NITROSTAT) SL tablet 0.4 mg (has no administration in time range)   acetaminophen (TYLENOL) tablet 650 mg (has no administration in time range)     Or   acetaminophen (TYLENOL) 160 MG/5ML solution 650 mg (has no administration in time range)     Or   acetaminophen (TYLENOL) suppository 650 mg (has no administration in time range)   ondansetron (ZOFRAN) injection 4 mg (has no administration in time range)   cefTRIAXone (ROCEPHIN) 1 g in sodium chloride 0.9 % 100 mL IVPB-VTB (has no administration in time range)   sodium chloride 0.9 % bolus 500 mL (0 mL Intravenous Stopped 3/16/22 0354)         PROGRESS, DATA ANALYSIS, CONSULTS, AND MEDICAL DECISION MAKING    All labs have been independently reviewed by me.  All radiology studies have been reviewed by me and discussed with radiologist dictating the report.   EKG's independently viewed and interpreted by me.  Discussion below represents my analysis of pertinent findings related to patient's condition, differential diagnosis, treatment plan and final disposition.    DDx: Includes but not limited to sepsis, UTI, pneumonia, postop infection    ED Course as of 03/16/22 0513   Wed Mar 16, 2022   0300 EKG          EKG time: 0257  Rhythm/Rate: Sinus rhythm rate 93  P  waves and OK: Normal  QRS, axis: Normal  ST and T waves: Nonspecific    Interpreted Contemporaneously by me, independently viewed [TJ]   0403 WBC(!): 17.86 [AILYN]   0406 Hemoglobin(!): 9.1 [AILYN]   0407 Hematocrit(!): 29.1 [AILYN]   0407 Platelets: 292 [AILYN]   0407 Lactate(!!): 2.6 [AILYN]   0407 Procalcitonin(!): 21.80 [AILYN]   0407 Glucose(!): 115 [AILYN]   0407 BUN(!): 27 [AILYN]   0407 Creatinine(!): 1.89 [AILYN]   0407 Sodium: 136 [AILYN]   0407 Potassium: 4.7 [AILYN]   0407 Chloride: 99 [AILYN]   0407 CO2(!): 21.8 [AILYN]   0407 Magnesium(!): 1.5 [AILYN]   0407 Lipase: 40 [AILYN]   0407 Troponin T(!!): 0.041 [AILYN]   0407 COVID19: Not Detected [AILYN]   0407 Leukocytes, UA(!): Large (3+) [AILYN]   0407 WBC, UA(!): Too Numerous to Count [AILYN]   0407 Bacteria, UA(!): 4+ [AILYN]   0459 Patient history, presentation, lab results and diagnosis discussed with Valentina GIPSON, will admit per Dr. Tafoya. [AILYN]      ED Course User Index  [AILYN] Manuel Mason PA  [TJ] Julio Bowie MD       MDM: Patient has elevated white count, lactic acid, procalcitonin, and kidney function.  She has been given IV fluids and IV antibiotics after obtaining blood and urine cultures.  Patient will be admitted for sepsis.    PPE: The patient wore a surgical mask throughout the entire patient encounter. I wore an N95.    AS OF 05:13 EDT VITALS:    BP - 120/42  HR - 93  TEMP - 99.5 °F (37.5 °C)  O2 SATS - 95%        DIAGNOSIS  Final diagnoses:   Sepsis with acute renal failure without septic shock, due to unspecified organism, unspecified acute renal failure type (HCC)   Acute urinary tract infection   Metabolic encephalopathy   BENNIE (acute kidney injury) (HCC)   Elevated troponin   Primary hypertension   Hyperlipidemia, unspecified hyperlipidemia type         DISPOSITION  ADMISSION    Discussed treatment plan and reason for admission with pt/family and admitting physician.  Pt/family voiced understanding of the plan for admission for further testing/treatment as needed.                   Manuel Mason, PA  03/16/22 0513

## 2022-03-16 NOTE — H&P
Patient Name:  Ama Billy  YOB: 1933  MRN:  [id number]  Admit Date:  3/16/2022  Patient Care Team:  Con Canas MD as PCP - General (Family Medicine)      Subjective   History Present Illness     Chief Complaint   Patient presents with   • Fever   • Altered Mental Status       Ms. Billy is a 88 y.o. never smoker with a history of hypertension, hyperlipidemia, paroxysmal atrial fibrillation, chronic venous insufficiency, anemia who presents to [facility] ER chief complaint of fever, altered mental status admitted for sepsis with acute renal failure without septic shock.    Recent hospital admission on 2/14-22 for hip fracture and humerus with discharge to rehab developed altered mental status and fever T-max 101.4 and sent to [facility] ER for further evaluation.    Lactic 2.6, procal 21, wbc 17.  Urinalysis appears with infection.  Patient received IV fluid 500 mL bolus and continuous IV fluids with empiric antibiotic therapy IV ceftriaxone 2 g.    Recommendation pending hospital course.  Details below in assessment/plan.    History of Present Illness  Review of Systems   Constitutional: Negative for chills and fever.   HENT: Negative for congestion and rhinorrhea.    Respiratory: Negative for cough and shortness of breath.    Cardiovascular: Negative for chest pain and leg swelling.   Gastrointestinal: Negative for abdominal pain, constipation, diarrhea, nausea and vomiting.   Endocrine: Negative for polydipsia, polyphagia and polyuria.   Genitourinary: Negative for difficulty urinating and dysuria.   Musculoskeletal: Positive for gait problem. Negative for myalgias.   Skin: Negative for rash and wound.   Neurological: Positive for weakness. Negative for light-headedness.   Psychiatric/Behavioral: Negative for confusion and hallucinations.        Personal History     Past Medical History:   Diagnosis Date   • Anemia     Normocytic anemia of unclear ediology   • Arthritis     Osteoarthritis  involving knees and left shoulder   • Chronic venous insufficiency    • Eosinophilia     Persistent mild eosinophilia of unknown etiology   • History of colonic polyps    • Hyperlipidemia    • Hypertension    • Paroxysmal atrial fibrillation (HCC)      Past Surgical History:   Procedure Laterality Date   • COLONOSCOPY      H/O colonic polyps with regular colonoscopies at 5 year intervals.    • COLONOSCOPY  2013    By Dr. Gudino, no polyps identified.   • COLONOSCOPY N/A 7/3/2018    IH, one TA w/low grade dysplasi   • ENDOSCOPY N/A 11/15/2017    Small HH, LA Grade B reflux, acute and erosive ulcerative esophagitis,    • ENDOSCOPY N/A 2/27/2018    LA Grade B esophagitis, HH, gastritis, Path: Canales's w/out dysplasia   • HEMORRHOIDECTOMY  1965   • HIP ENDOPROSTHESIS Right 2/16/2022    Procedure: RIGHT HIP BIPOLAR HEMIARTHROPLASTY;  Surgeon: Miguel Ya MD;  Location: [facility];  Service: Orthopedics;  Laterality: Right;   • HYSTERECTOMY  1971    Partial, secondary to endometriosis   • JOINT REPLACEMENT  2014    Left total hip arthroplasy   • KNEE SURGERY  1997    Arthroscopy of left knee 1997; right knee 2003     Family History   Problem Relation Age of Onset   • Coronary artery disease Father    • Heart disease Father    • Heart attack Father 72   • Coronary artery disease Brother    • Heart disease Brother         CABG   • Malig Hyperthermia Neg Hx      Social History     Tobacco Use   • Smoking status: Never Smoker   • Smokeless tobacco: Never Used   Vaping Use   • Vaping Use: Never used   Substance Use Topics   • Alcohol use: No   • Drug use: No     No current facility-administered medications on file prior to encounter.     Current Outpatient Medications on File Prior to Encounter   Medication Sig Dispense Refill   • amLODIPine (NORVASC) 5 MG tablet TAKE 1 TABLET DAILY ( MAKE APPOINTMENT FOR FURTHER REFILLS ) (Patient taking differently: Take 5 mg by mouth Daily.) 90 tablet 3   • ascorbic acid (VITAMIN C)  500 MG tablet Take 500 mg by mouth 2 (Two) Times a Day.     • aspirin 81 MG EC tablet Take 81 mg by mouth 2 (Two) Times a Day.     • atorvastatin (LIPITOR) 10 MG tablet Take 1 tablet by mouth Daily.     • bisacodyl (DULCOLAX) 10 MG suppository Insert 1 suppository into the rectum Daily As Needed for Constipation.     • docusate sodium 100 MG capsule Take 1 capsule by mouth 2 (Two) Times a Day As Needed for Constipation.     • ferrous sulfate 325 (65 FE) MG tablet Take 1 tablet by mouth 2 (Two) Times a Day With Meals.     • folic acid (FOLVITE) 1 MG tablet Take 1 tablet by mouth Daily.     • HYDROcodone-acetaminophen (NORCO) 5-325 MG per tablet Take 1 tablet by mouth Every 4 (Four) Hours As Needed.     • lisinopril (PRINIVIL,ZESTRIL) 20 MG tablet Take 1 tablet by mouth Daily.     • metoprolol succinate XL (TOPROL-XL) 25 MG 24 hr tablet Take 1 tablet by mouth Daily.     • pantoprazole (PROTONIX) 20 MG EC tablet Take 1 tablet by mouth Daily. 90 tablet 3   • vitamin B-12 (VITAMIN B-12) 1000 MCG tablet Take 1 tablet by mouth Daily.       Allergies   Allergen Reactions   • Diclofenac Sodium Unknown (See Comments)     Is unaware       Objective    Objective     Vital Signs  Temp:  [99.5 °F (37.5 °C)-101.4 °F (38.6 °C)] 99.5 °F (37.5 °C)  Heart Rate:  [] 81  Resp:  [24] 24  BP: (105-150)/() 105/44  SpO2:  [94 %-97 %] 96 %  on   ;   Device (Oxygen Therapy): room air  There is no height or weight on file to calculate BMI.    Physical Exam  Constitutional:       General: She is not in acute distress.     Appearance: She is obese. She is ill-appearing. She is not toxic-appearing.      Comments: Generally weak   Eyes:      Extraocular Movements: Extraocular movements intact.      Conjunctiva/sclera: Conjunctivae normal.   Cardiovascular:      Rate and Rhythm: Normal rate.      Heart sounds: Normal heart sounds.   Pulmonary:      Effort: Pulmonary effort is normal.      Breath sounds: Normal breath sounds.    Abdominal:      General: Bowel sounds are normal. There is no distension.      Palpations: Abdomen is soft.      Tenderness: There is no abdominal tenderness. There is no guarding.   Musculoskeletal:         General: No tenderness.      Cervical back: Normal range of motion and neck supple.      Right lower leg: Edema present.      Left lower leg: Edema present.   Skin:     General: Skin is warm and dry.      Coloration: Skin is pale.   Neurological:      Mental Status: She is alert and oriented to person, place, and time.      Cranial Nerves: No cranial nerve deficit.      Motor: Weakness (generalized) present.   Psychiatric:         Behavior: Behavior normal.         Thought Content: Thought content normal.         Results Review:  I reviewed the patient's new clinical results.  I reviewed the patient's new imaging results and agree with the interpretation.  I reviewed the patient's other test results and agree with the interpretation  I personally viewed and interpreted the patient's EKG/Telemetry data  Discussed with ED provider.    Lab Results (last 24 hours)     Procedure Component Value Units Date/Time    COVID PRE-OP / PRE-PROCEDURE SCREENING ORDER (NO ISOLATION) - Swab, Nasopharynx [[id number]]  (Normal) Collected: 03/16/22 0256    Specimen: Swab from Nasopharynx Updated: 03/16/22 0349    Narrative:      The following orders were created for panel order COVID PRE-OP / PRE-PROCEDURE SCREENING ORDER (NO ISOLATION) - Swab, Nasopharynx.  Procedure                               Abnormality         Status                     ---------                               -----------         ------                     COVID-19, [facility]...[[id number]]  Normal              Final result                 Please view results for these tests on the individual orders.    CBC & Differential [[id number]]  (Abnormal) Collected: 03/16/22 0256    Specimen: Blood Updated: 03/16/22 0721    Narrative:      The following orders  were created for panel order CBC & Differential.  Procedure                               Abnormality         Status                     ---------                               -----------         ------                     CBC Auto Differential[[id number]]        Abnormal            Final result                 Please view results for these tests on the individual orders.    Comprehensive Metabolic Panel [[id number]]  (Abnormal) Collected: 03/16/22 0256    Specimen: Blood Updated: 03/16/22 0334     Glucose 115 mg/dL      BUN 27 mg/dL      Creatinine 1.89 mg/dL      Sodium 136 mmol/L      Potassium 4.7 mmol/L      Comment: Slight hemolysis detected by analyzer. Results may be affected.        Chloride 99 mmol/L      CO2 21.8 mmol/L      Calcium 9.1 mg/dL      Total Protein 6.5 g/dL      Albumin 3.80 g/dL      ALT (SGPT) 12 U/L      AST (SGOT) 24 U/L      Alkaline Phosphatase 162 U/L      Total Bilirubin 1.1 mg/dL      Globulin 2.7 gm/dL      A/G Ratio 1.4 g/dL      BUN/Creatinine Ratio 14.3     Anion Gap 15.2 mmol/L      eGFR 25.3 mL/min/1.73      Comment: National Kidney Foundation and American Society of Nephrology (ASN) Task Force recommended calculation based on the Chronic Kidney Disease Epidemiology Collaboration (CKD-EPI) equation refit without adjustment for race.       Narrative:      GFR Normal >60  Chronic Kidney Disease <60  Kidney Failure <15      Troponin [[id number]]  (Abnormal) Collected: 03/16/22 0256    Specimen: Blood Updated: 03/16/22 0335     Troponin T 0.041 ng/mL     Narrative:      Troponin T Reference Range:  <= 0.03 ng/mL-   Negative for AMI  >0.03 ng/mL-     Abnormal for myocardial necrosis.  Clinicians would have to utilize clinical acumen, EKG, Troponin and serial changes to determine if it is an Acute Myocardial Infarction or myocardial injury due to an underlying chronic condition.       Results may be falsely decreased if patient taking Biotin.      Lactic Acid, Plasma [[id number]]   "(Abnormal) Collected: 03/16/22 0256    Specimen: Blood Updated: 03/16/22 0334     Lactate 2.6 mmol/L     Procalcitonin [[id number]]  (Abnormal) Collected: 03/16/22 0256    Specimen: Blood Updated: 03/16/22 0330     Procalcitonin 21.80 ng/mL     Narrative:      As a Marker for Sepsis (Non-Neonates):    1. <0.5 ng/mL represents a low risk of severe sepsis and/or septic shock.  2. >2 ng/mL represents a high risk of severe sepsis and/or septic shock.    As a Marker for Lower Respiratory Tract Infections that require antibiotic therapy:    PCT on Admission    Antibiotic Therapy       6-12 Hrs later    >0.5                Strongly Recommended  >0.25 - <0.5        Recommended   0.1 - 0.25          Discouraged              Remeasure/reassess PCT  <0.1                Strongly Discouraged     Remeasure/reassess PCT    As 28 day mortality risk marker: \"Change in Procalcitonin Result\" (>80% or <=80%) if Day 0 (or Day 1) and Day 4 values are available. Refer to http://www.[facility]-pct-calculator.com    Change in PCT <=80%  A decrease of PCT levels below or equal to 80% defines a positive change in PCT test result representing a higher risk for 28-day all-cause mortality of patients diagnosed with severe sepsis for septic shock.    Change in PCT >80%  A decrease of PCT levels of more than 80% defines a negative change in PCT result representing a lower risk for 28-day all-cause mortality of patients diagnosed with severe sepsis or septic shock.       Magnesium [[id number]]  (Abnormal) Collected: 03/16/22 0256    Specimen: Blood Updated: 03/16/22 0334     Magnesium 1.5 mg/dL     Lipase [[id number]]  (Normal) Collected: 03/16/22 0256    Specimen: Blood Updated: 03/16/22 0347     Lipase 40 U/L     COVID-19,[facility] CEPHEID/TOMASA NP SWAB IN TRANSPORT MEDIA 8-12 HR TAT - Swab, Nasopharynx [[id number]]  (Normal) Collected: 03/16/22 0256    Specimen: Swab from Nasopharynx Updated: 03/16/22 0349     COVID19 Not Detected    Narrative:   "    Fact sheet for providers: https://www.fda.gov/media/443414/download     Fact sheet for patients: https://www.fda.gov/media/443582/download    CBC Auto Differential [[id number]]  (Abnormal) Collected: 03/16/22 0256    Specimen: Blood Updated: 03/16/22 0352     WBC 17.86 10*3/mm3      RBC 3.31 10*6/mm3      Hemoglobin 9.1 g/dL      Hematocrit 29.1 %      MCV 87.9 fL      MCH 27.5 pg      MCHC 31.3 g/dL      RDW 20.0 %      RDW-SD 64.4 fl      MPV 10.9 fL      Platelets 292 10*3/mm3      Neutrophil % 95.9 %      Lymphocyte % 1.7 %      Monocyte % 1.1 %      Eosinophil % 0.4 %      Basophil % 0.2 %      Neutrophils, Absolute 17.13 10*3/mm3      Lymphocytes, Absolute 0.30 10*3/mm3      Monocytes, Absolute 0.19 10*3/mm3      Eosinophils, Absolute 0.08 10*3/mm3      Basophils, Absolute 0.03 10*3/mm3     Urinalysis With Microscopic If Indicated (No Culture) - Urine, Catheter [[id number]]  (Abnormal) Collected: 03/16/22 0307    Specimen: Urine, Catheter Updated: 03/16/22 0334     Color, UA Yellow     Appearance, UA Cloudy     pH, UA 5.5     Specific Gravity, UA 1.015     Glucose, UA Negative     Ketones, UA Negative     Bilirubin, UA Negative     Blood, UA Small (1+)     Protein,  mg/dL (2+)     Leuk Esterase, UA Large (3+)     Nitrite, UA Negative     Urobilinogen, UA 0.2 E.U./dL    Urinalysis, Microscopic Only - Urine, Catheter [[id number]]  (Abnormal) Collected: 03/16/22 0307    Specimen: Urine, Catheter Updated: 03/16/22 0334     RBC, UA 6-12 /HPF      WBC, UA Too Numerous to Count /HPF      Bacteria, UA 4+ /HPF      Squamous Epithelial Cells, UA 0-2 /HPF      Hyaline Casts, UA 0-2 /LPF      Methodology Automated Microscopy    Urine Culture - Urine, Urine, Catheter [[id number]] Collected: 03/16/22 0307    Specimen: Urine, Catheter Updated: 03/16/22 0503    Blood Culture - Blood, Arm, Left [[id number]] Collected: 03/16/22 0350    Specimen: Blood from Arm, Left Updated: 03/16/22 2293    Blood Culture - Blood, Hand,  Right [[id number]] Collected: 03/16/22 0428    Specimen: Blood from Hand, Right Updated: 03/16/22 0435    STAT Lactic Acid, Reflex [[id number]]  (Normal) Collected: 03/16/22 0606    Specimen: Blood Updated: 03/16/22 0818     Lactate 1.4 mmol/L     Basic Metabolic Panel [[id number]]  (Abnormal) Collected: 03/16/22 0606    Specimen: Blood Updated: 03/16/22 0745     Glucose 101 mg/dL      BUN 28 mg/dL      Creatinine 1.70 mg/dL      Sodium 136 mmol/L      Potassium 4.2 mmol/L      Chloride 103 mmol/L      CO2 21.0 mmol/L      Calcium 8.2 mg/dL      BUN/Creatinine Ratio 16.5     Anion Gap 12.0 mmol/L      eGFR 28.7 mL/min/1.73      Comment: National Kidney Foundation and American Society of Nephrology (ASN) Task Force recommended calculation based on the Chronic Kidney Disease Epidemiology Collaboration (CKD-EPI) equation refit without adjustment for race.       Narrative:      GFR Normal >60  Chronic Kidney Disease <60  Kidney Failure <15      CBC Auto Differential [[id number]]  (Abnormal) Collected: 03/16/22 0606    Specimen: Blood Updated: 03/16/22 0738     WBC 24.15 10*3/mm3      RBC 2.81 10*6/mm3      Hemoglobin 7.8 g/dL      Hematocrit 24.5 %      MCV 87.2 fL      MCH 27.8 pg      MCHC 31.8 g/dL      RDW 19.5 %      RDW-SD 61.0 fl      MPV 11.0 fL      Platelets 238 10*3/mm3      Neutrophil % 91.0 %      Lymphocyte % 2.5 %      Monocyte % 5.0 %      Eosinophil % 0.1 %      Basophil % 0.2 %      Immature Grans % 1.2 %      Neutrophils, Absolute 21.95 10*3/mm3      Lymphocytes, Absolute 0.61 10*3/mm3      Monocytes, Absolute 1.20 10*3/mm3      Eosinophils, Absolute 0.03 10*3/mm3      Basophils, Absolute 0.06 10*3/mm3      Immature Grans, Absolute 0.30 10*3/mm3      nRBC 0.2 /100 WBC     Protime-INR [[id number]]  (Abnormal) Collected: 03/16/22 0606    Specimen: Blood Updated: 03/16/22 0633     Protime 15.0 Seconds      INR 1.19    Scan Slide [[id number]] Collected: 03/16/22 0606    Specimen: Blood Updated: 03/16/22 0738      Acanthocytes Slight/1+     Elliptocytes Slight/1+     Target Cells Slight/1+     WBC Morphology Normal     Platelet Estimate Adequate          Imaging Results (Last 24 Hours)     Procedure Component Value Units Date/Time    XR Chest 1 View [[id number]] Collected: 03/16/22 0450     Updated: 03/16/22 0450    Narrative:        Patient: LISA PANTOJA  Time Out: 04:49  Exam(s): FILM CXR 1 VIEW     EXAM:    XR Chest, 1 View    CLINICAL HISTORY:     Reason for exam: Fever.    TECHNIQUE:    Frontal view of the chest.    COMPARISON:      2 14 2022    FINDINGS:    Lungs:  No consolidation or mass.  Unchanged 6 mm left upper lobe   nodule.    Pleural space:  No acute findings    Heart:  No cardiomegaly.    Bones joints:  No acute findings.    IMPRESSION:         No acute cardiopulmonary process. Unchanged 6 mm left upper lobe nodule.      Impression:          Electronically signed by Gilma Kumar MD on 03-16-22 at 0449          Results for orders placed during the hospital encounter of 11/14/17    Adult Transthoracic Echo Complete W/ Cont if Necessary Per Protocol    Interpretation Summary  · Left ventricular systolic function is normal. Calculated EF = 67.9%. Estimated EF was in agreement with the calculated EF. Normal left ventricular cavity size noted. All left ventricular wall segments contract normally. Left ventricular wall thickness is consistent with hypertrophy. Sigmoid-shaped ventricular septum is present. Left ventricular diastolic function is normal.      ECG 12 Lead   Preliminary Result   HEART RATE= 93  bpm   RR Interval= 644  ms   AZ Interval= 170  ms   P Horizontal Axis= 18  deg   P Front Axis= 55  deg   QRSD Interval= 80  ms   QT Interval= 324  ms   QRS Axis= -19  deg   T Wave Axis= 53  deg   - OTHERWISE NORMAL ECG -   Sinus rhythm   Borderline left axis deviation   Low voltage, precordial leads   Electronically Signed By:    Date and Time of Study: 2022-03-16 02:57:12           Assessment/Plan     Active  Hospital Problems    Diagnosis  POA   • **Sepsis with acute renal failure without septic shock (HCC) [A41.9, R65.20, N17.9]  Yes   • Acute UTI (urinary tract infection) [N39.0]  Yes   • AMS (altered mental status) [R41.82]  Yes   • Paroxysmal atrial fibrillation (HCC) [I48.0]  Yes   • Anemia [D64.9]  Yes   • Hypertension [I10]  Yes      Resolved Hospital Problems   No resolved problems to display.       Ms. Billy is a 88 y.o. never smoker with a history of hypertension, hyperlipidemia, paroxysmal atrial fibrillation, chronic venous insufficiency, anemia who presents to [facility] [place], operated by [facility] ER chief complaint of fever, altered mental status admitted for sepsis with acute renal failure without septic shock.      AMS (altered mental status)  Appears resolved--oriented x3  Most likely secondary to UTI  No evidence of lateralizing features on exam to suggest acute neurological event      Sepsis with acute renal failure without septic shock (HCC)  BP soft  Lactate normalized with IV fluids continued on admission  Continue empiric antibiotic therapy IV ceftriaxone pending blood cultures x2 and urine culture results      Acute UTI (urinary tract infection)  Note urinalysis, urine culture pending  See plan above      Anemia  Chronic deficiency of serum hemoglobin 7.8 (possible hemodilution following IV fluid bolus and continuous IV fluids)  Continue to monitor labs for now  Type and screen  Plan transfuse serum hemoglobin less than 7  Unremarkable surgical site following bipolar arthroplasty 2/2022      Hypertension /paroxysmal atrial fibrillation  BP soft   Continue beta-blocker with hold parameters    I discussed the patient's findings and my recommendations with Dr. Joe.     VTE Prophylaxis - SCD  Code Status - CPR       LEONELA Valentine  [facility]  03/16/22  09:32 EDT

## 2022-03-16 NOTE — PROGRESS NOTES
Clinical Pharmacy Services: Medication History    Ama Billy is a 88 y.o. female presenting to [facility] for   Chief Complaint   Patient presents with   • Fever   • Altered Mental Status       She  has a past medical history of Anemia, Arthritis, Chronic venous insufficiency, Eosinophilia, History of colonic polyps, Hyperlipidemia, Hypertension, and Paroxysmal atrial fibrillation (HCC).    Allergies as of 03/16/2022 - Reviewed 03/16/2022   Allergen Reaction Noted   • Diclofenac sodium Unknown (See Comments) 07/27/2015       Medication information was obtained from: Nursing Home   Pharmacy and Phone Number:     Prior to Admission Medications     Prescriptions Last Dose Informant Patient Reported? Taking?    amLODIPine (NORVASC) 5 MG tablet  Nursing Home No Yes    TAKE 1 TABLET DAILY ( MAKE APPOINTMENT FOR FURTHER REFILLS )    Patient taking differently:  Take 5 mg by mouth Daily.    ascorbic acid (VITAMIN C) 500 MG tablet 3/15/2022 Nursing Home Yes Yes    Take 500 mg by mouth 2 (Two) Times a Day.    aspirin 81 MG EC tablet 3/15/2022 Nursing Home Yes Yes    Take 81 mg by mouth 2 (Two) Times a Day.    atorvastatin (LIPITOR) 10 MG tablet 3/15/2022 Nursing Home No Yes    Take 1 tablet by mouth Daily.    bisacodyl (DULCOLAX) 10 MG suppository  Nursing Home No Yes    Insert 1 suppository into the rectum Daily As Needed for Constipation.    docusate sodium 100 MG capsule 3/15/2022 Nursing Home No Yes    Take 1 capsule by mouth 2 (Two) Times a Day As Needed for Constipation.    ferrous sulfate 325 (65 FE) MG tablet 3/15/2022 Nursing Home No Yes    Take 1 tablet by mouth 2 (Two) Times a Day With Meals.    folic acid (FOLVITE) 1 MG tablet 3/15/2022 Nursing Home No Yes    Take 1 tablet by mouth Daily.    HYDROcodone-acetaminophen (NORCO) 5-325 MG per tablet 3/15/2022 Nursing Home Yes Yes    Take 1 tablet by mouth Every 4 (Four) Hours As Needed.    lisinopril (PRINIVIL,ZESTRIL) 20 MG tablet  Nursing Home No  Yes    Take 1 tablet by mouth Daily.    metoprolol succinate XL (TOPROL-XL) 25 MG 24 hr tablet  Nursing Home No Yes    Take 1 tablet by mouth Daily.    pantoprazole (PROTONIX) 20 MG EC tablet 3/15/2022 Nursing Home No Yes    Take 1 tablet by mouth Daily.    vitamin B-12 (VITAMIN B-12) 1000 MCG tablet 3/15/2022 Nursing Home No Yes    Take 1 tablet by mouth Daily.            Medication notes: Nursing Home paper work does not have the last dose recorded for the patient's amlodipine, lisinopril, and metoprolol.     This medication list is complete to the best of my knowledge as of 3/16/2022    Please call if questions.    Cristi Queen  Medication History Technician  [phone]    3/16/2022 08:12 EDT

## 2022-03-17 PROBLEM — R78.81 E. COLI BACTEREMIA: Status: ACTIVE | Noted: 2022-03-17

## 2022-03-17 PROBLEM — N10 ACUTE PYELONEPHRITIS: Status: ACTIVE | Noted: 2022-03-17

## 2022-03-17 PROBLEM — B96.20 E. COLI BACTEREMIA: Status: ACTIVE | Noted: 2022-03-17

## 2022-03-17 LAB
ANION GAP SERPL CALCULATED.3IONS-SCNC: 10 MMOL/L (ref 5–15)
BACTERIA SPEC AEROBE CULT: ABNORMAL
BUN SERPL-MCNC: 22 MG/DL (ref 8–23)
BUN/CREAT SERPL: 15 (ref 7–25)
CALCIUM SPEC-SCNC: 8 MG/DL (ref 8.6–10.5)
CHLORIDE SERPL-SCNC: 102 MMOL/L (ref 98–107)
CO2 SERPL-SCNC: 22 MMOL/L (ref 22–29)
CREAT SERPL-MCNC: 1.47 MG/DL (ref 0.57–1)
DEPRECATED RDW RBC AUTO: 61.9 FL (ref 37–54)
EGFRCR SERPLBLD CKD-EPI 2021: 34.2 ML/MIN/1.73
ERYTHROCYTE [DISTWIDTH] IN BLOOD BY AUTOMATED COUNT: 19.8 % (ref 12.3–15.4)
GLUCOSE SERPL-MCNC: 83 MG/DL (ref 65–99)
HCT VFR BLD AUTO: 22.5 % (ref 34–46.6)
HGB BLD-MCNC: 7.1 G/DL (ref 12–15.9)
MCH RBC QN AUTO: 27 PG (ref 26.6–33)
MCHC RBC AUTO-ENTMCNC: 31.6 G/DL (ref 31.5–35.7)
MCV RBC AUTO: 85.6 FL (ref 79–97)
PLATELET # BLD AUTO: 212 10*3/MM3 (ref 140–450)
PMV BLD AUTO: 10.9 FL (ref 6–12)
POTASSIUM SERPL-SCNC: 4 MMOL/L (ref 3.5–5.2)
PROCALCITONIN SERPL-MCNC: 26.8 NG/ML (ref 0–0.25)
RBC # BLD AUTO: 2.63 10*6/MM3 (ref 3.77–5.28)
SODIUM SERPL-SCNC: 134 MMOL/L (ref 136–145)
WBC NRBC COR # BLD: 9.3 10*3/MM3 (ref 3.4–10.8)

## 2022-03-17 PROCEDURE — 25010000002 ENOXAPARIN PER 10 MG: Performed by: HOSPITALIST

## 2022-03-17 PROCEDURE — 99232 SBSQ HOSP IP/OBS MODERATE 35: CPT | Performed by: INTERNAL MEDICINE

## 2022-03-17 PROCEDURE — 36415 COLL VENOUS BLD VENIPUNCTURE: CPT | Performed by: NURSE PRACTITIONER

## 2022-03-17 PROCEDURE — 85027 COMPLETE CBC AUTOMATED: CPT | Performed by: NURSE PRACTITIONER

## 2022-03-17 PROCEDURE — 80048 BASIC METABOLIC PNL TOTAL CA: CPT | Performed by: NURSE PRACTITIONER

## 2022-03-17 PROCEDURE — 84145 PROCALCITONIN (PCT): CPT | Performed by: NURSE PRACTITIONER

## 2022-03-17 PROCEDURE — 25010000002 PIPERACILLIN SOD-TAZOBACTAM PER 1 G: Performed by: INTERNAL MEDICINE

## 2022-03-17 PROCEDURE — 87040 BLOOD CULTURE FOR BACTERIA: CPT | Performed by: HOSPITALIST

## 2022-03-17 RX ORDER — AMOXICILLIN 250 MG
2 CAPSULE ORAL NIGHTLY
Status: DISCONTINUED | OUTPATIENT
Start: 2022-03-17 | End: 2022-03-22 | Stop reason: HOSPADM

## 2022-03-17 RX ADMIN — PANTOPRAZOLE SODIUM 40 MG: 40 TABLET, DELAYED RELEASE ORAL at 09:53

## 2022-03-17 RX ADMIN — TAZOBACTAM SODIUM AND PIPERACILLIN SODIUM 3.38 G: 375; 3 INJECTION, SOLUTION INTRAVENOUS at 14:29

## 2022-03-17 RX ADMIN — SODIUM CHLORIDE 100 ML/HR: 9 INJECTION, SOLUTION INTRAVENOUS at 12:59

## 2022-03-17 RX ADMIN — ATORVASTATIN CALCIUM 10 MG: 20 TABLET, FILM COATED ORAL at 09:53

## 2022-03-17 RX ADMIN — ENOXAPARIN SODIUM 30 MG: 30 INJECTION SUBCUTANEOUS at 20:44

## 2022-03-17 RX ADMIN — Medication 10 ML: at 20:46

## 2022-03-17 RX ADMIN — METOPROLOL SUCCINATE 25 MG: 25 TABLET, EXTENDED RELEASE ORAL at 09:53

## 2022-03-17 RX ADMIN — Medication 10 ML: at 09:53

## 2022-03-17 RX ADMIN — DOCUSATE SODIUM 50MG AND SENNOSIDES 8.6MG 2 TABLET: 8.6; 5 TABLET, FILM COATED ORAL at 20:44

## 2022-03-17 RX ADMIN — TAZOBACTAM SODIUM AND PIPERACILLIN SODIUM 3.38 G: 375; 3 INJECTION, SOLUTION INTRAVENOUS at 23:29

## 2022-03-17 RX ADMIN — TAZOBACTAM SODIUM AND PIPERACILLIN SODIUM 3.38 G: 375; 3 INJECTION, SOLUTION INTRAVENOUS at 05:29

## 2022-03-17 NOTE — DISCHARGE PLACEMENT REQUEST
"Lisa Pantoja (88 y.o. Female)             Date of Birth   11/12/1933    Social Security Number       Address   [address] [place] [address]    Home Phone   [phone]    MRN   [id number]       Christianity   Unknown    Marital Status                               Admission Date   3/16/22    Admission Type   Emergency    Admitting Provider   Demarco Joe MD    Attending Provider   Demarco Joe MD    Department, Room/Bed   [facility] OBSERVATION, 105/1       Discharge Date       Discharge Disposition       Discharge Destination                               Attending Provider: Demarco Joe MD    Allergies: Diclofenac Sodium    Isolation: None   Infection: None   Code Status: CPR   Advance Care Planning Activity    Ht: 172.7 cm (68\")   Wt: 82.2 kg (181 lb 3.5 oz)    Admission Cmt: None   Principal Problem: Sepsis with acute renal failure without septic shock (HCC) [A41.9,R65.20,N17.9]                 Active Insurance as of 3/16/2022     Primary Coverage     Payor Plan Insurance Group Employer/Plan Group    [facility] MEDICARE REPLACEMENT [facility] MEDICARE REPLACEMENT [id number]     Payor Plan Address Payor Plan Phone Number Payor Plan Fax Number Effective Dates    [address] [address]   1/1/2016 - None Entered    [facility] [id number]       Subscriber Name Subscriber Birth Date Member ID       LISA PANTOJA 11/12/1933 [id number]                 Emergency Contacts      (Rel.) Home Phone Work Phone Mobile Phone    Trino Pantoja (Spouse) [phone] -- [phone]              "

## 2022-03-17 NOTE — PLAN OF CARE
Problem: Infection Progression (Sepsis/Septic Shock)  Goal: Absence of Infection Signs and Symptoms  Outcome: Ongoing, Progressing  Intervention: Initiate Sepsis Management  Recent Flowsheet Documentation  Taken 3/17/2022 0401 by Roselyn Ribeiro RN  Infection Prevention: rest/sleep promoted  Taken 3/17/2022 0213 by Roselyn Ribeiro RN  Infection Prevention: rest/sleep promoted  Taken 3/16/2022 2332 by Roselyn Ribeiro RN  Infection Prevention: rest/sleep promoted  Taken 3/16/2022 2208 by Roselyn Ribeiro RN  Infection Prevention: rest/sleep promoted  Taken 3/16/2022 2014 by Roselyn Ribeiro RN  Infection Prevention: rest/sleep promoted  Intervention: Promote Recovery  Recent Flowsheet Documentation  Taken 3/16/2022 2014 by Roselyn Ribeiro RN  Activity Management: activity adjusted per tolerance   Goal Outcome Evaluation:  Plan of Care Reviewed With: patient           Outcome Evaluation: VSS; Pt alert to self only; IV fluids and antibiotics continues; PRN pain med given for BLE pain;  SCD's applied; Will continue to monitor

## 2022-03-17 NOTE — CASE MANAGEMENT/SOCIAL WORK
Discharge Planning Assessment  [facility]     Patient Name: Ama Billy  MRN: [id number]  Today's Date: 3/17/2022    Admit Date: 3/16/2022     Discharge Needs Assessment     Row Name 03/17/22 1442       Living Environment    People in Home spouse    Name(s) of People in Home Trino Billy    Current Living Arrangements other (see comments)  currently staying at [facility]    Primary Care Provided by self    Provides Primary Care For no one, unable/limited ability to care for self    Family Caregiver if Needed spouse    Quality of Family Relationships helpful;involved;supportive    Able to Return to Prior Arrangements yes    Living Arrangement Comments prior arrangements at [facility]       Resource/Environmental Concerns    Resource/Environmental Concerns none       Transition Planning    Patient/Family Anticipates Transition to inpatient rehabilitation facility    Patient/Family Anticipated Services at Transition ;skilled nursing    Transportation Anticipated health plan transportation       Discharge Needs Assessment    Current Outpatient/Agency/Support Group skilled nursing facility    Concerns to be Addressed discharge planning    Equipment Needed After Discharge none    Outpatient/Agency/Support Group Needs skilled nursing facility    Discharge Facility/Level of Care Needs rehabilitation facility;nursing facility, skilled    Provided Post Acute Provider List? N/A    Patient's Choice of Community Agency(s) patient plans to return to [facility]    Current Discharge Risk cognitively impaired;physical impairment               Discharge Plan     Row Name 03/17/22 1432       Plan    Plan Comments Entered room, introduced self and explained role w/PPE in place on self and patient; verified information on facesheet- patient did not advise me that she is currently in rehab at the [facility]. Discussion with spouse over the phone noted this;  patient reports she fell down the steps at home and came to the hospital- spouse noted patient has been in the [facility] following a fall from home resulting in a hip fracture; Patient did present to the ED with infection/sepsis, some confusion; lives in a house w/spouse prior to fall; RX are filled at [facility] pharmacy; Plan is for patient to return to the [facility] at [place] upon d/c with ambulance transport; Facility can apply for a new precert since patient has had a new hospitalization. Regardless, patient has private pay bed at this time. Please discuss d/c plans with spouse as well;              Continued Care and Services - Admitted Since 3/16/2022     Destination     Service Provider Request Status Selected Services Address Phone Fax Patient Preferred    [facility] [place]  Pending - Request Sent N/A [address] [facility] [address] [phone] --       Internal Comment last updated by Aishwarya Ellis RN 3/17/2022 1429    Spoke w/Taylor w/[facility]- patient has a private pay bed at this time but may qualify for a new precert with new hospitalization- CCP to follow. Aishwarya Ellis RN                           Selected Continued Care - Prior Encounters Includes selections from prior encounters from 12/16/2021 to 3/17/2022    Discharged on 2/22/2022 Admission date: 2/14/2022 - Discharge disposition: Skilled Nursing Facility (DC - External)    Destination     Service Provider Selected Services Address Phone Fax Patient Preferred    [place] [facility] [place]  Skilled Nursing [address] [facility] [address] [phone] --                       Demographic Summary     Row Name 03/17/22 1438       General Information    Admission Type inpatient    Arrived From other (see comments)  SNF    Required Notices Provided Important Message from Medicare    Referral Source physician    Reason for Consult discharge planning;decision-making;facility placement    Preferred Language English        Contact Information    Permission Granted to Share Info With ;family/designee;facility   Spouse- Trino Billy               Functional Status     Row Name 03/17/22 0660       Functional Status    Usual Activity Tolerance moderate    Current Activity Tolerance fair       Functional Status, IADL    Medications assistive person    Meal Preparation assistive person    Housekeeping assistive person    Laundry assistive person    Shopping assistive person       Mental Status    General Appearance WDL WDL       Mental Status Summary    Recent Changes in Mental Status/Cognitive Functioning other (see comments)    Mental Status Comments confusion       Employment/    Employment Status retired               Psychosocial    No documentation.                Abuse/Neglect    No documentation.                Legal    No documentation.                Substance Abuse    No documentation.                Patient Forms    No documentation.                   Aishwarya Ellis RN

## 2022-03-17 NOTE — PROGRESS NOTES
ID note for pyelonephritis  Subjective: She denies any pain.  No fevers or chills or night sweats overnight.  CT abdomen pelvis obtained which showed left pyelonephritis without obstruction or abscess    Objective: Ill-appearing but nontoxic, responds appropriately to questions.  Currently afebrile with a T-max of 103.6 over the past 24 hours, heart rate 80, respirations 18, blood pressure 124/47 and breathing comfortably on ambient air, abdomen is soft and nontender    3/16 blood and urine cultures grew E. Coli    Assessment and plan  1.  E. coli septicemia from #2  2.  Left pyelonephritis  3.  Acute kidney injury    Blood cultures with E. coli CT confirms left pyelonephritis without obstruction or abscess.  Continue Zosyn.  Repeat blood cultures along with a.m. labs including CBC and BMP are pending today.  Fevers have improved and she looks little bit better to me today.

## 2022-03-17 NOTE — PLAN OF CARE
Goal Outcome Evaluation:              Outcome Evaluation: Pt A&O to person and place, VSS and no c/o pain. Urine cx came back positive for E. Coli. New blood cx's were drawn. Pt still receiving rocephin, zosyn and MIVF. Hgb=7.1 -- no blood given, will recheck in AM. Will CTM.

## 2022-03-17 NOTE — PROGRESS NOTES
[facility]    [phone]   LOS: 1 day     Name: Ama Billy  Age/Sex: 88 y.o. female  :  1933        PCP: Con Canas MD  Chief Complaint   Patient presents with   • Fever   • Altered Mental Status      Subjective   Still febrile yesterday afternoon but doesn't feel much different today no fever overnight.  Denies new complaints just feels week, confusion has resolved  General: No Fever or Chills, Cardiac: No Chest Pain or Palpitations, Resp: No Cough or SOA, GI: No Nausea, Vomiting, or Diarrhea and Other: No bleeding    atorvastatin, 10 mg, Oral, Daily  enoxaparin, 30 mg, Subcutaneous, Nightly  metoprolol succinate XL, 25 mg, Oral, Q24H  pantoprazole, 40 mg, Oral, Daily  piperacillin-tazobactam, 3.375 g, Intravenous, Q8H  senna-docusate sodium, 2 tablet, Oral, Nightly  sodium chloride, 10 mL, Intravenous, Q12H      Pharmacy to Dose Zosyn,   sodium chloride, 100 mL/hr, Last Rate: 100 mL/hr (22 1746)        Objective   Vital Signs  Temp:  [98.8 °F (37.1 °C)-103.6 °F (39.8 °C)] 99.1 °F (37.3 °C)  Heart Rate:  [] 80  Resp:  [18-20] 18  BP: (103-170)/(40-63) 124/47  Body mass index is 27.55 kg/m².    Intake/Output Summary (Last 24 hours) at 3/17/2022 1156  Last data filed at 3/17/2022 0320  Gross per 24 hour   Intake 50 ml   Output 700 ml   Net -650 ml       Physical Exam  Vitals and nursing note reviewed.   Constitutional:       Appearance: Normal appearance.   HENT:      Head: Normocephalic and atraumatic.   Pulmonary:      Effort: Pulmonary effort is normal.      Breath sounds: Normal breath sounds.   Abdominal:      General: Bowel sounds are normal. There is no distension.      Palpations: Abdomen is soft.   Neurological:      General: No focal deficit present.      Mental Status: She is alert and oriented to person, place, and time.   Psychiatric:         Mood and Affect: Mood normal.         Behavior: Behavior normal.           Results Review:       I reviewed the  patient's new clinical results.  Results from last 7 days   Lab Units 03/16/22  0606 03/16/22  0256   WBC 10*3/mm3 24.15* 17.86*   HEMOGLOBIN g/dL 7.8* 9.1*   PLATELETS 10*3/mm3 238 292     Results from last 7 days   Lab Units 03/16/22  0606 03/16/22  0256   SODIUM mmol/L 136 136   POTASSIUM mmol/L 4.2 4.7   CHLORIDE mmol/L 103 99   CO2 mmol/L 21.0* 21.8*   BUN mg/dL 28* 27*   CREATININE mg/dL 1.70* 1.89*   CALCIUM mg/dL 8.2* 9.1   MAGNESIUM mg/dL  --  1.5*   Estimated Creatinine Clearance: 25.7 mL/min (A) (by C-G formula based on SCr of 1.7 mg/dL (H)).      Assessment/Plan   Active Hospital Problems    Diagnosis  POA   • **Sepsis with acute renal failure without septic shock (HCC) [A41.9, R65.20, N17.9]  Yes   • Acute pyelonephritis [N10]  Unknown   • E. coli bacteremia [R78.81, B96.20]  Yes   • Acute UTI (urinary tract infection) [N39.0]  Yes   • AMS (altered mental status) [R41.82]  Yes   • Paroxysmal atrial fibrillation (HCC) [I48.0]  Yes   • Anemia [D64.9]  Yes   • Hypertension [I10]  Yes      Resolved Hospital Problems   No resolved problems to display.       PLAN  Ms. Billy is a 88 y.o. never smoker with a history of hypertension, hyperlipidemia, paroxysmal atrial fibrillation, chronic venous insufficiency, anemia who presents to [facility] ER chief complaint of fever, altered mental status admitted for sepsis with acute renal failure secondary to UTI and pyelonephritis with bacteremia     Met Encephalopathy  -resolving secondary to below issues  Sepsis  -resolved after appropriate IV abx and appropriate resuscitation    BENNIE  -Cr pending this AM but suspect continued improvement     UTI/Pyelo  -E.coli on cx  -continue IV Zosyn    Bactermia  -repeat cultures today    Anemia  -hgb down secondary to volume expansion awaiting labs today  -no symptoms of anemia    pAfib  -HR and VS stable presently      Disposition  Likely bact to SNF over the weekend      Demarco Joe MD  [facility]  03/17/22  11:56 EDT

## 2022-03-18 LAB
ABO GROUP BLD: NORMAL
ALBUMIN SERPL-MCNC: 2.8 G/DL (ref 3.5–5.2)
ANION GAP SERPL CALCULATED.3IONS-SCNC: 11 MMOL/L (ref 5–15)
BACTERIA SPEC AEROBE CULT: ABNORMAL
BACTERIA SPEC AEROBE CULT: ABNORMAL
BLD GP AB SCN SERPL QL: NEGATIVE
BUN SERPL-MCNC: 16 MG/DL (ref 8–23)
BUN/CREAT SERPL: 12.8 (ref 7–25)
CALCIUM SPEC-SCNC: 8 MG/DL (ref 8.6–10.5)
CHLORIDE SERPL-SCNC: 107 MMOL/L (ref 98–107)
CO2 SERPL-SCNC: 20 MMOL/L (ref 22–29)
CREAT SERPL-MCNC: 1.25 MG/DL (ref 0.57–1)
DEPRECATED RDW RBC AUTO: 62 FL (ref 37–54)
EGFRCR SERPLBLD CKD-EPI 2021: 41.5 ML/MIN/1.73
EOSINOPHIL # BLD MANUAL: 0.29 10*3/MM3 (ref 0–0.4)
EOSINOPHIL NFR BLD MANUAL: 4 % (ref 0.3–6.2)
ERYTHROCYTE [DISTWIDTH] IN BLOOD BY AUTOMATED COUNT: 19.7 % (ref 12.3–15.4)
FERRITIN SERPL-MCNC: 1022 NG/ML (ref 13–150)
GLUCOSE SERPL-MCNC: 105 MG/DL (ref 65–99)
GRAM STN SPEC: ABNORMAL
HAPTOGLOB SERPL-MCNC: 238 MG/DL (ref 30–200)
HCT VFR BLD AUTO: 23.6 % (ref 34–46.6)
HGB BLD-MCNC: 7.3 G/DL (ref 12–15.9)
HYPOCHROMIA BLD QL: ABNORMAL
IRON 24H UR-MRATE: 16 MCG/DL (ref 37–145)
IRON SATN MFR SERPL: 10 % (ref 20–50)
ISOLATED FROM: ABNORMAL
ISOLATED FROM: ABNORMAL
LYMPHOCYTES # BLD MANUAL: 0.58 10*3/MM3 (ref 0.7–3.1)
LYMPHOCYTES NFR BLD MANUAL: 5.1 % (ref 5–12)
MAGNESIUM SERPL-MCNC: 1.9 MG/DL (ref 1.6–2.4)
MCH RBC QN AUTO: 27.2 PG (ref 26.6–33)
MCHC RBC AUTO-ENTMCNC: 30.9 G/DL (ref 31.5–35.7)
MCV RBC AUTO: 88.1 FL (ref 79–97)
MONOCYTES # BLD: 0.36 10*3/MM3 (ref 0.1–0.9)
NEUTROPHILS # BLD AUTO: 5.92 10*3/MM3 (ref 1.7–7)
NEUTROPHILS NFR BLD MANUAL: 82.8 % (ref 42.7–76)
NRBC BLD AUTO-RTO: 0.1 /100 WBC (ref 0–0.2)
PHOSPHATE SERPL-MCNC: 2.6 MG/DL (ref 2.5–4.5)
PLAT MORPH BLD: NORMAL
PLATELET # BLD AUTO: 248 10*3/MM3 (ref 140–450)
PMV BLD AUTO: 12 FL (ref 6–12)
POLYCHROMASIA BLD QL SMEAR: ABNORMAL
POTASSIUM SERPL-SCNC: 3.6 MMOL/L (ref 3.5–5.2)
RBC # BLD AUTO: 2.68 10*6/MM3 (ref 3.77–5.28)
RETICS # AUTO: 0.03 10*6/MM3 (ref 0.02–0.13)
RETICS/RBC NFR AUTO: 0.99 % (ref 0.7–1.9)
RH BLD: NEGATIVE
SMUDGE CELLS BLD QL SMEAR: ABNORMAL
SODIUM SERPL-SCNC: 138 MMOL/L (ref 136–145)
T&S EXPIRATION DATE: NORMAL
TIBC SERPL-MCNC: 156 MCG/DL (ref 298–536)
TRANSFERRIN SERPL-MCNC: 105 MG/DL (ref 200–360)
VARIANT LYMPHS NFR BLD MANUAL: 8.1 % (ref 19.6–45.3)
VIT B12 BLD-MCNC: 687 PG/ML (ref 211–946)
WBC NRBC COR # BLD: 7.15 10*3/MM3 (ref 3.4–10.8)

## 2022-03-18 PROCEDURE — 86900 BLOOD TYPING SEROLOGIC ABO: CPT | Performed by: HOSPITALIST

## 2022-03-18 PROCEDURE — 80069 RENAL FUNCTION PANEL: CPT | Performed by: HOSPITALIST

## 2022-03-18 PROCEDURE — 83010 ASSAY OF HAPTOGLOBIN QUANT: CPT | Performed by: HOSPITALIST

## 2022-03-18 PROCEDURE — 86850 RBC ANTIBODY SCREEN: CPT | Performed by: HOSPITALIST

## 2022-03-18 PROCEDURE — 99232 SBSQ HOSP IP/OBS MODERATE 35: CPT | Performed by: INTERNAL MEDICINE

## 2022-03-18 PROCEDURE — 85014 HEMATOCRIT: CPT | Performed by: HOSPITALIST

## 2022-03-18 PROCEDURE — 25010000002 PIPERACILLIN SOD-TAZOBACTAM PER 1 G: Performed by: INTERNAL MEDICINE

## 2022-03-18 PROCEDURE — 25010000002 CEFTRIAXONE PER 250 MG: Performed by: INTERNAL MEDICINE

## 2022-03-18 PROCEDURE — 25010000002 ENOXAPARIN PER 10 MG: Performed by: HOSPITALIST

## 2022-03-18 PROCEDURE — 85025 COMPLETE CBC W/AUTO DIFF WBC: CPT | Performed by: HOSPITALIST

## 2022-03-18 PROCEDURE — 86923 COMPATIBILITY TEST ELECTRIC: CPT

## 2022-03-18 PROCEDURE — 84466 ASSAY OF TRANSFERRIN: CPT | Performed by: HOSPITALIST

## 2022-03-18 PROCEDURE — 83540 ASSAY OF IRON: CPT | Performed by: HOSPITALIST

## 2022-03-18 PROCEDURE — 83735 ASSAY OF MAGNESIUM: CPT | Performed by: HOSPITALIST

## 2022-03-18 PROCEDURE — 82747 ASSAY OF FOLIC ACID RBC: CPT | Performed by: HOSPITALIST

## 2022-03-18 PROCEDURE — 85045 AUTOMATED RETICULOCYTE COUNT: CPT | Performed by: HOSPITALIST

## 2022-03-18 PROCEDURE — 97110 THERAPEUTIC EXERCISES: CPT

## 2022-03-18 PROCEDURE — 86901 BLOOD TYPING SEROLOGIC RH(D): CPT | Performed by: HOSPITALIST

## 2022-03-18 PROCEDURE — 97162 PT EVAL MOD COMPLEX 30 MIN: CPT

## 2022-03-18 PROCEDURE — 85007 BL SMEAR W/DIFF WBC COUNT: CPT | Performed by: HOSPITALIST

## 2022-03-18 PROCEDURE — 82607 VITAMIN B-12: CPT | Performed by: HOSPITALIST

## 2022-03-18 PROCEDURE — 82728 ASSAY OF FERRITIN: CPT | Performed by: HOSPITALIST

## 2022-03-18 RX ADMIN — SODIUM CHLORIDE 100 ML/HR: 9 INJECTION, SOLUTION INTRAVENOUS at 06:56

## 2022-03-18 RX ADMIN — ATORVASTATIN CALCIUM 10 MG: 20 TABLET, FILM COATED ORAL at 09:18

## 2022-03-18 RX ADMIN — METOPROLOL SUCCINATE 25 MG: 25 TABLET, EXTENDED RELEASE ORAL at 09:17

## 2022-03-18 RX ADMIN — TAZOBACTAM SODIUM AND PIPERACILLIN SODIUM 3.38 G: 375; 3 INJECTION, SOLUTION INTRAVENOUS at 06:56

## 2022-03-18 RX ADMIN — ACETAMINOPHEN 650 MG: 325 TABLET ORAL at 09:18

## 2022-03-18 RX ADMIN — PANTOPRAZOLE SODIUM 40 MG: 40 TABLET, DELAYED RELEASE ORAL at 09:18

## 2022-03-18 RX ADMIN — DOCUSATE SODIUM 50MG AND SENNOSIDES 8.6MG 2 TABLET: 8.6; 5 TABLET, FILM COATED ORAL at 20:30

## 2022-03-18 RX ADMIN — ENOXAPARIN SODIUM 30 MG: 30 INJECTION SUBCUTANEOUS at 20:28

## 2022-03-18 RX ADMIN — CEFTRIAXONE 2 G: 2 INJECTION, POWDER, FOR SOLUTION INTRAMUSCULAR; INTRAVENOUS at 09:17

## 2022-03-18 RX ADMIN — Medication 10 ML: at 20:28

## 2022-03-18 RX ADMIN — Medication 10 ML: at 09:18

## 2022-03-18 NOTE — PLAN OF CARE
Goal Outcome Evaluation:  Plan of Care Reviewed With: patient        Progress: no change  Outcome Evaluation: Afebrile, pt oriented to self and hospital. Pleasant and cooperative, IVF's and Abx as ordered. NO s/s of bleeding noted.

## 2022-03-18 NOTE — PROGRESS NOTES
[facility] Hospital Care    [phone]   LOS: 2 days     Name: Ama Billy  Age/Sex: 88 y.o. female  :  1933        PCP: Con Canas MD  Chief Complaint   Patient presents with   • Fever   • Altered Mental Status      Subjective   She is afebrile over the last 24 hours.  Denies new symptoms or complaints other than some pain in her right leg.  She remains intermittently confused but this is not far from her baseline.  General: No Fever or Chills, Cardiac: No Chest Pain or Palpitations, Resp: No Cough or SOA, GI: No Nausea, Vomiting, or Diarrhea and Other: No bleeding    atorvastatin, 10 mg, Oral, Daily  cefTRIAXone, 2 g, Intravenous, Q24H  enoxaparin, 30 mg, Subcutaneous, Nightly  metoprolol succinate XL, 25 mg, Oral, Q24H  pantoprazole, 40 mg, Oral, Daily  senna-docusate sodium, 2 tablet, Oral, Nightly  sodium chloride, 10 mL, Intravenous, Q12H           Objective   Vital Signs  Temp:  [97.3 °F (36.3 °C)-98.7 °F (37.1 °C)] 98.7 °F (37.1 °C)  Heart Rate:  [67-75] 67  Resp:  [18] 18  BP: ()/(46-58) 141/58  Body mass index is 27.55 kg/m².    Intake/Output Summary (Last 24 hours) at 3/18/2022 1023  Last data filed at 3/18/2022 0809  Gross per 24 hour   Intake 1320 ml   Output 1250 ml   Net 70 ml       Physical Exam  Vitals and nursing note reviewed.   Constitutional:       Appearance: Normal appearance.   HENT:      Head: Normocephalic and atraumatic.   Pulmonary:      Effort: Pulmonary effort is normal.      Breath sounds: Normal breath sounds.   Abdominal:      General: Bowel sounds are normal. There is no distension.      Palpations: Abdomen is soft.   Neurological:      General: No focal deficit present.      Mental Status: She is alert and oriented to person, place, and time.   Psychiatric:         Mood and Affect: Mood normal.         Behavior: Behavior normal.           Results Review:       I reviewed the patient's new clinical results.  Results from last 7 days   Lab Units  03/17/22  1150 03/16/22  0606 03/16/22  0256   WBC 10*3/mm3 9.30 24.15* 17.86*   HEMOGLOBIN g/dL 7.1* 7.8* 9.1*   PLATELETS 10*3/mm3 212 238 292     Results from last 7 days   Lab Units 03/17/22  1150 03/16/22  0606 03/16/22  0256   SODIUM mmol/L 134* 136 136   POTASSIUM mmol/L 4.0 4.2 4.7   CHLORIDE mmol/L 102 103 99   CO2 mmol/L 22.0 21.0* 21.8*   BUN mg/dL 22 28* 27*   CREATININE mg/dL 1.47* 1.70* 1.89*   CALCIUM mg/dL 8.0* 8.2* 9.1   MAGNESIUM mg/dL  --   --  1.5*   Estimated Creatinine Clearance: 29.7 mL/min (A) (by C-G formula based on SCr of 1.47 mg/dL (H)).      Assessment/Plan   Active Hospital Problems    Diagnosis  POA   • **Sepsis with acute renal failure without septic shock (HCC) [A41.9, R65.20, N17.9]  Yes   • Acute pyelonephritis [N10]  Unknown   • E. coli bacteremia [R78.81, B96.20]  Yes   • Acute UTI (urinary tract infection) [N39.0]  Yes   • AMS (altered mental status) [R41.82]  Yes   • Paroxysmal atrial fibrillation (HCC) [I48.0]  Yes   • Anemia [D64.9]  Yes   • Hypertension [I10]  Yes      Resolved Hospital Problems   No resolved problems to display.       PLAN  Ms. Billy is a 88 y.o. never smoker with a history of hypertension, hyperlipidemia, paroxysmal atrial fibrillation, chronic venous insufficiency, anemia who presents to [facility] ER chief complaint of fever, altered mental status admitted for sepsis with acute renal failure secondary to UTI and pyelonephritis with bacteremia     Met Encephalopathy  -resolving secondary to below issues    Sepsis  -resolved after appropriate IV abx and appropriate resuscitation    BENNIE  -Cr pending this AM but suspect continued improvement     UTI/Pyelo  -E.coli on cx, sensitivities reviewed  -Antibiotics adjusted to Rocephin    Bactermia  -repeat cultures negative at 24 hours    Anemia  -hgb down secondary to volume expansion labs yesterday did show further drop.  -no symptoms of anemia  -We will order anemia labs with today's work-up awaiting a.m.  hemoglobin today    pAfib  -HR and VS stable presently      Disposition  Likely back to SNF over the weekend once final plans for antibiotics are made      Demarco Joe MD  [facility]  03/18/22  10:23 EDT

## 2022-03-18 NOTE — PLAN OF CARE
Goal Outcome Evaluation:  Plan of Care Reviewed With: patient        Progress: no change  Outcome Evaluation: VSS; complaint of shoulder pain this morning; seen by PT; IV antibiotics; waiting for blood cultures; hopefully d/c soon; continue to monitor  Problem: Adult Inpatient Plan of Care  Goal: Plan of Care Review  Outcome: Ongoing, Progressing  Flowsheets (Taken 3/18/2022 2014)  Progress: no change  Plan of Care Reviewed With: patient  Outcome Evaluation:   VSS   complaint of shoulder pain this morning   seen by PT   IV antibiotics   waiting for blood cultures   hopefully d/c soon   continue to monitor

## 2022-03-18 NOTE — PROGRESS NOTES
ID note for pyelonephritis  Subjective: She denies any pain.  No fevers or chills or night sweats overnight.  CT abdomen pelvis obtained which showed left pyelonephritis without obstruction or abscess    Objective: Ill-appearing but nontoxic, responds appropriately to questions.  Currently afebrile with a T-max of 103.6 over the past 24 hours, heart rate 80, respirations 18, blood pressure 124/47 and breathing comfortably on ambient air, abdomen is soft and nontender    3/16 blood and urine cultures grew E. Coli, susceptible to all tested agents  3/17 blood cultures pending  Creatinine 1.47  White count 9 point    Assessment and plan  1.  E. coli septicemia from #2  2.  Left pyelonephritis  3.  Acute kidney injury    She seems to be improving.  Having some pain from her humerus fracture and we will get her some Tylenol.  No fevers or chills or night sweats.  No abdominal pain.  Change Zosyn to ceftriaxone 2 g IV every 24 hours as sensitivities have returned.  Seems to have pretty severe anemia--albeit asymptomatic-- and awaiting labs from today  We will continue the IV antibiotics while inpatient but can be changed over to levofloxacin 750 mg p.o. every 48 hours (renal dose), through March 24, 2022 when ready for discharge from medical perspective.

## 2022-03-18 NOTE — THERAPY EVALUATION
Patient Name: Ama Billy  : 1933    MRN: [id number]                              Today's Date: 3/18/2022       Admit Date: 3/16/2022    Visit Dx:     ICD-10-CM ICD-9-CM   1. Sepsis with acute renal failure without septic shock, due to unspecified organism, unspecified acute renal failure type ([facility])  A41.9 038.9    R65.20 995.92    N17.9 584.9   2. Acute urinary tract infection  N39.0 599.0   3. Metabolic encephalopathy  G93.41 348.31   4. BENNIE (acute kidney injury) ([facility])  N17.9 584.9   5. Elevated troponin  R77.8 790.6   6. Primary hypertension  I10 401.9   7. Hyperlipidemia, unspecified hyperlipidemia type  E78.5 272.4     Patient Active Problem List   Diagnosis   • Normocytic anemia   • Abdominal cramping   • LLQ pain   • Colon polyps   • Bursitis of hip   • Diarrhea   • Fatigue   • Generalized osteoarthritis   • Hyperlipidemia   • Hypertension   • Irritable bowel syndrome   • Muscle strain of lower extremity   • Tendinitis   • Chronic venous insufficiency   • Status post total left knee replacement   • Hip joint replacement status   • Left retinal detachment   • Hypovitaminosis D   • Weakness of extremity   • Closed wedge compression fracture of first lumbar vertebra ([facility])   • Fall   • Constipation   • Nausea & vomiting   • Leukocytosis   • Anemia   • Symptomatic anemia   • Esophagitis   • GI bleed   • Acute blood loss anemia   • Iron deficiency anemia   • Paroxysmal atrial fibrillation ([facility])   • Rectal bleeding   • Iron deficiency anemia due to chronic blood loss   • Swelling of lower extremity   • Canales's esophagus without dysplasia   • Dyspnea on exertion   • Closed fracture of right hip ([facility])   • Fx humeral neck, right, closed, initial encounter   • Acute urinary retention   • Sepsis with acute renal failure without septic shock ([facility])   • BENNIE (acute kidney injury) ([facility])   • Acute UTI (urinary tract infection)   • AMS (altered mental status)   • Acute pyelonephritis   • E. coli bacteremia      Past Medical History:   Diagnosis Date   • Anemia     Normocytic anemia of unclear ediology   • Arthritis     Osteoarthritis involving knees and left shoulder   • Chronic venous insufficiency    • Eosinophilia     Persistent mild eosinophilia of unknown etiology   • History of colonic polyps    • Hyperlipidemia    • Hypertension    • Paroxysmal atrial fibrillation (HCC)      Past Surgical History:   Procedure Laterality Date   • COLONOSCOPY      H/O colonic polyps with regular colonoscopies at 5 year intervals.    • COLONOSCOPY  2013    By Dr. Gudino, no polyps identified.   • COLONOSCOPY N/A 7/3/2018    IH, one TA w/low grade dysplasi   • ENDOSCOPY N/A 11/15/2017    Small HH, LA Grade B reflux, acute and erosive ulcerative esophagitis,    • ENDOSCOPY N/A 2/27/2018    LA Grade B esophagitis, HH, gastritis, Path: Canales's w/out dysplasia   • HEMORRHOIDECTOMY  1965   • HIP ENDOPROSTHESIS Right 2/16/2022    Procedure: RIGHT HIP BIPOLAR HEMIARTHROPLASTY;  Surgeon: Miguel Ya MD;  Location: [facility];  Service: Orthopedics;  Laterality: Right;   • HYSTERECTOMY  1971    Partial, secondary to endometriosis   • JOINT REPLACEMENT  2014    Left total hip arthroplasy   • KNEE SURGERY  1997    Arthroscopy of left knee 1997; right knee 2003      General Information     Row Name 03/18/22 1133          Physical Therapy Time and Intention    Document Type evaluation  -EJ     Mode of Treatment physical therapy  -EJ     Row Name 03/18/22 1133          General Information    Patient Profile Reviewed yes  -EJ     Prior Level of Function --  pt admitted from [facility]. She is a poor historian and unsure what she was doing while at rehab. Pt w recent fall and R hip and R shoulder fracture.  -EJ     Existing Precautions/Restrictions fall   WBAT RLE, previously NWB to RUE but RN to check on status  -EJ     Barriers to Rehab cognitive status;previous functional deficit;medically complex  -EJ     Row Name  03/18/22 1133          Living Environment    People in Home spouse  recently at rehab  -[facility] Name 03/18/22 1133          Cognition    Orientation Status (Cognition) oriented to;person  very confused, but pleasant, requires frequent reorientation  -[facility] Name 03/18/22 1133          Safety Issues, Functional Mobility    Impairments Affecting Function (Mobility) balance;cognition;endurance/activity tolerance;strength;pain;range of motion (ROM);postural/trunk control  -           User Key  (r) = Recorded By, (t) = Taken By, (c) = Cosigned By    Initials Name Provider Type     Renata Adams, PT Physical Therapist               Mobility     [facility] Name 03/18/22 1145          Bed Mobility    Bed Mobility supine-sit;sit-supine  -     Supine-Sit [place] (Bed Mobility) verbal cues;nonverbal cues (demo/gesture);moderate assist (50% patient effort);maximum assist (25% patient effort);2 person assist  -     Sit-Supine [place] (Bed Mobility) verbal cues;nonverbal cues (demo/gesture);maximum assist (25% patient effort);2 person assist  -     Assistive Device (Bed Mobility) head of bed elevated;bed rails  -EJ     Comment, (Bed Mobility) good static sitting balance at EOB  -[facility] Name 03/18/22 1145          Sit-Stand Transfer    Sit-Stand [place] (Transfers) verbal cues;nonverbal cues (demo/gesture);moderate assist (50% patient effort);2 person assist  -     Assistive Device (Sit-Stand Transfers) walker, front-wheeled  -     Comment, (Sit-Stand Transfer) pt not using RUE on walker at this time (unsure if she is able to bear weight through R UE)  -[facility] Name 03/18/22 1145          Gait/Stairs (Locomotion)    [place] Level (Gait) unable to assess  -[facility] Name 03/18/22 1145          Mobility    Extremity Weight-bearing Status right lower extremity;right upper extremity  -     Right Upper Extremity (Weight-bearing Status) other (see comments)   unsure of WB status,  previously NWB at last admission  -EJ     Right Lower Extremity (Weight-bearing Status) weight-bearing as tolerated (WBAT)  -EJ           User Key  (r) = Recorded By, (t) = Taken By, (c) = Cosigned By    Initials Name Provider Type    Renata Guallpa, PT Physical Therapist               Obj/Interventions     Row Name 03/18/22 1147          Range of Motion Comprehensive    General Range of Motion no range of motion deficits identified  -EJ     Comment, General Range of Motion x RUE  -EJ     Row Name 03/18/22 1147          Strength Comprehensive (MMT)    Comment, General Manual Muscle Testing (MMT) Assessment generalized weakness, RLE weaker due to recent R hip surgery  -EJ     Row Name 03/18/22 1147          Balance    Balance Assessment sitting static balance  -EJ     Static Sitting Balance supervision;contact guard  -EJ     Position, Sitting Balance sitting edge of bed  -EJ           User Key  (r) = Recorded By, (t) = Taken By, (c) = Cosigned By    Initials Name Provider Type    EJ Renata Adams, PT Physical Therapist               Goals/Plan     Row Name 03/18/22 1154          Bed Mobility Goal 1 (PT)    Activity/Assistive Device (Bed Mobility Goal 1, PT) bed mobility activities, all  -EJ     [place] Level/Cues Needed (Bed Mobility Goal 1, PT) minimum assist (75% or more patient effort)  -EJ     Time Frame (Bed Mobility Goal 1, PT) 1 week  -EJ     [facility] Name 03/18/22 1154          Transfer Goal 1 (PT)    Activity/Assistive Device (Transfer Goal 1, PT) transfers, all;walker, rolling  -EJ     [place] Level/Cues Needed (Transfer Goal 1, PT) minimum assist (75% or more patient effort)  -EJ     Time Frame (Transfer Goal 1, PT) 1 week  -EJ     [facility] Name 03/18/22 1154          Gait Training Goal 1 (PT)    Activity/Assistive Device (Gait Training Goal 1, PT) gait (walking locomotion);walker, rolling  -EJ     [place] Level (Gait Training Goal 1, PT) minimum assist (75% or more patient effort)  -EJ      Distance (Gait Training Goal 1, PT) 50  -EJ     Time Frame (Gait Training Goal 1, PT) 1 week  -EJ           User Key  (r) = Recorded By, (t) = Taken By, (c) = Cosigned By    Initials Name Provider Type    Renata Guallpa, PT Physical Therapist               Clinical Impression     Row Name 03/18/22 1147          Pain    Pretreatment Pain Rating 0/10 - no pain  -EJ     Row Name 03/18/22 1147          Plan of Care Review    Plan of Care Reviewed With patient  -EJ     Progress improving  -EJ     Outcome Evaluation Pt agreeable to PT eval this am. She was admited to [facility] on 3/16/22 w fever and AMS. Pt found to have sepsis and renal failure. She has hx of HTN, HLD, and A fib. Pt w recent hospital after falling at home resulting in R hip fx and R shoulder fx. She is s/p R anterior bipolar hip approx 1 month ago. No surgery to RUE. She is WBAT on RLE and was previously NWB on RUE. [facility]/WB restrictions to RUE at this time. Pt is pleasant and cooperative, but confused. She presents w generalized weakness and decreased functional mobility. She is requiring mod/max A x 2 for bed mobility. She did attempt to stand w mod A x 2 using LUE on walker. Activity limited due to BR needs and needing to be cleaned up. Pt will need SNU again upon DC. She will continue to benefit from skilled PT to maximize safety, strength, and independence with mobility.  -EJ     Row Name 03/18/22 1147          Therapy Assessment/Plan (PT)    Patient/Family Therapy Goals Statement (PT) get better  -EJ     Rehab Potential (PT) good, to achieve stated therapy goals  -EJ     Criteria for Skilled Interventions Met (PT) yes  -EJ     Row Name 03/18/22 1147          Positioning and Restraints    Pre-Treatment Position in bed  -EJ     Post Treatment Position bed  -EJ     In Bed notified nsg;supine;call light within reach;encouraged to call for assist;exit alarm on  -EJ           User Key  (r) = Recorded By, (t) = Taken By, (c) =  Cosigned By    Initials Name Provider Type     Renata Adams, PT Physical Therapist               Outcome Measures     Row Name 03/18/22 1154          How much help from another person do you currently need...    Turning from your back to your side while in flat bed without using bedrails? 2  -EJ     Moving from lying on back to sitting on the side of a flat bed without bedrails? 2  -EJ     Moving to and from a bed to a chair (including a wheelchair)? 2  -EJ     Standing up from a chair using your arms (e.g., wheelchair, bedside chair)? 2  -EJ     Climbing 3-5 steps with a railing? 1  -EJ     To walk in hospital room? 2  -EJ     AM-PAC 6 Clicks Score (PT) 11  -EJ     Row Name 03/18/22 1154          Functional Assessment    Outcome Measure Options AM-PAC 6 Clicks Basic Mobility (PT)  -           User Key  (r) = Recorded By, (t) = Taken By, (c) = Cosigned By    Initials Name Provider Type     Renata Adams, PT Physical Therapist                             Physical Therapy Education                 Title: PT OT SLP Therapies (In Progress)     Topic: Physical Therapy (In Progress)     Point: Mobility training (Done)     Learning Progress Summary           Patient Acceptance, E,TB,D, VU,NR by  at 3/18/2022 1155                   Point: Home exercise program (Not Started)     Learner Progress:  Not documented in this visit.          Point: Body mechanics (Not Started)     Learner Progress:  Not documented in this visit.          Point: Precautions (Not Started)     Learner Progress:  Not documented in this visit.                      User Key     Initials Effective Dates Name Provider Type Discipline     06/16/21 -  Renata Aadms, PT Physical Therapist PT              PT Recommendation and Plan  Planned Therapy Interventions (PT): balance training, bed mobility training, gait training, home exercise program, patient/family education, strengthening, ROM (range of motion), transfer training  Plan  of Care Reviewed With: patient  Progress: improving  Outcome Evaluation: Pt agreeable to PT eval this am. She was admited to [facility] on 3/16/22 w fever and AMS. Pt found to have sepsis and renal failure. She has hx of HTN, HLD, and A fib. Pt w recent hospital after falling at home resulting in R hip fx and R shoulder fx. She is s/p R anterior bipolar hip approx 1 month ago. No surgery to RUE. She is WBAT on RLE and was previously NWB on RUE. [facility]/WB restrictions to RUE at this time. Pt is pleasant and cooperative, but confused. She presents w generalized weakness and decreased functional mobility. She is requiring mod/max A x 2 for bed mobility. She did attempt to stand w mod A x 2 using LUE on walker. Activity limited due to BR needs and needing to be cleaned up. Pt will need SNU again upon DC. She will continue to benefit from skilled PT to maximize safety, strength, and independence with mobility.     Time Calculation:    PT Charges     Row Name 03/18/22 1155             Time Calculation    Start Time 1015  -EJ      Stop Time 1032  -EJ      Time Calculation (min) 17 min  -EJ      PT Received On 03/18/22  -EJ      PT - Next Appointment 03/19/22  -EJ      PT Goal Re-Cert Due Date 03/25/22  -EJ              Time Calculation- PT    Total Timed Code Minutes- PT 12 minute(s)  -EJ            User Key  (r) = Recorded By, (t) = Taken By, (c) = Cosigned By    Initials Name Provider Type    EJ Renata Adams, PT Physical Therapist              Therapy Charges for Today     Code Description Service Date Service Provider Modifiers Qty    58557630298 HC PT EVAL MOD COMPLEXITY 3 3/18/2022 Renata Adams, PT GP 1    85946700627 HC PT THER PROC EA 15 MIN 3/18/2022 Renata Adams, PT GP 1    63311304604 HC PT THER SUPP EA 15 MIN 3/18/2022 Renata Adams, PT GP 1          PT G-Codes  Outcome Measure Options: AM-PAC 6 Clicks Basic Mobility (PT)  AM-PAC 6 Clicks Score (PT): 11    Renata Adams  PT  3/18/2022

## 2022-03-18 NOTE — PLAN OF CARE
Goal Outcome Evaluation:  Plan of Care Reviewed With: patient        Progress: improving  Outcome Evaluation: Pt agreeable to PT eval this am. She was admited to [facility] on 3/16/22 w fever and AMS. Pt found to have sepsis and renal failure. She has hx of HTN, HLD, and A fib. Pt w recent hospital after falling at home resulting in R hip fx and R shoulder fx. She is s/p R anterior bipolar hip approx 1 month ago. No surgery to RUE. She is WBAT on RLE and was previously NWB on RUE. [facility] to [facility] at this time. Pt is pleasant and cooperative, but confused. She presents w generalized weakness and decreased functional mobility. She is requiring mod/max A x 2 for bed mobility. She did attempt to stand w mod A x 2 using LUE on walker. Activity limited due to BR needs and needing to be cleaned up. Pt will need SNU again upon DC. She will continue to benefit from skilled PT to maximize safety, strength, and independence with mobility.

## 2022-03-19 PROCEDURE — 99232 SBSQ HOSP IP/OBS MODERATE 35: CPT | Performed by: INTERNAL MEDICINE

## 2022-03-19 PROCEDURE — 25010000002 ENOXAPARIN PER 10 MG: Performed by: HOSPITALIST

## 2022-03-19 PROCEDURE — P9016 RBC LEUKOCYTES REDUCED: HCPCS

## 2022-03-19 PROCEDURE — 25010000002 CEFTRIAXONE PER 250 MG: Performed by: INTERNAL MEDICINE

## 2022-03-19 PROCEDURE — 36430 TRANSFUSION BLD/BLD COMPNT: CPT

## 2022-03-19 PROCEDURE — 86900 BLOOD TYPING SEROLOGIC ABO: CPT

## 2022-03-19 RX ADMIN — ATORVASTATIN CALCIUM 10 MG: 20 TABLET, FILM COATED ORAL at 08:13

## 2022-03-19 RX ADMIN — PANTOPRAZOLE SODIUM 40 MG: 40 TABLET, DELAYED RELEASE ORAL at 08:13

## 2022-03-19 RX ADMIN — METOPROLOL SUCCINATE 25 MG: 25 TABLET, EXTENDED RELEASE ORAL at 08:13

## 2022-03-19 RX ADMIN — DOCUSATE SODIUM 50MG AND SENNOSIDES 8.6MG 2 TABLET: 8.6; 5 TABLET, FILM COATED ORAL at 20:09

## 2022-03-19 RX ADMIN — Medication 10 ML: at 20:10

## 2022-03-19 RX ADMIN — ENOXAPARIN SODIUM 30 MG: 30 INJECTION SUBCUTANEOUS at 20:09

## 2022-03-19 RX ADMIN — Medication 10 ML: at 08:13

## 2022-03-19 RX ADMIN — CEFTRIAXONE 2 G: 2 INJECTION, POWDER, FOR SOLUTION INTRAMUSCULAR; INTRAVENOUS at 08:13

## 2022-03-19 NOTE — PLAN OF CARE
Problem: Adult Inpatient Plan of Care  Goal: Plan of Care Review  Outcome: Ongoing, Progressing      VSS. No complaints of pain. Transfused 1 unit of PRBCs, no reaction. New IV placed. Possible discharge tomorrow. Safety maintained. Will continue to monitor.

## 2022-03-19 NOTE — PROGRESS NOTES
ID note for pyelonephritis  Subjective: She denies any pain.  No fevers or chills or night sweats overnight.    Objective:   Temp:  [97.9 °F (36.6 °C)-98.2 °F (36.8 °C)] 98.2 °F (36.8 °C)  Heart Rate:  [61-75] 75  Resp:  [18] 18  BP: (123-141)/(48-67) 141/54    nad, responds appropriately to questions.  breathing comfortably on ambient air, abdomen is soft and nontender    3/16 blood and urine cultures grew E. Coli, susceptible to all tested agents  3/17 blood cultures ngtd  Creatinine 1.25  White count 7.15  hgb 7.3  plts 248    Assessment and plan  1.  E. coli septicemia from #2  2.  Left pyelonephritis  3.  Acute kidney injury    Cont ceftriaxone 2 g IV every 24 hours. WBC now normal, repeat cx negative and afebrile  We will continue the IV antibiotics while inpatient but can be changed over to levofloxacin 750 mg p.o. every 48 hours (renal dose), through March 24, 2022 when ready for discharge from medical perspective.

## 2022-03-19 NOTE — PROGRESS NOTES
[facility]    [phone]   LOS: 3 days     Name: Ama Billy  Age/Sex: 88 y.o. female  :  1933        PCP: Con Canas MD  Chief Complaint   Patient presents with   • Fever   • Altered Mental Status      Subjective     No events overnight. Pt has no complaints. Repeat blood cultures negative at 48 hours. I am told that there will not be transportation available to rehab today.    atorvastatin, 10 mg, Oral, Daily  cefTRIAXone, 2 g, Intravenous, Q24H  enoxaparin, 30 mg, Subcutaneous, Nightly  metoprolol succinate XL, 25 mg, Oral, Q24H  pantoprazole, 40 mg, Oral, Daily  senna-docusate sodium, 2 tablet, Oral, Nightly  sodium chloride, 10 mL, Intravenous, Q12H           Objective   Vital Signs  Temp:  [98 °F (36.7 °C)-98.2 °F (36.8 °C)] 98.2 °F (36.8 °C)  Heart Rate:  [62-75] 75  Resp:  [18] 18  BP: (137-141)/(54-67) 141/54  Body mass index is 27.55 kg/m².    Intake/Output Summary (Last 24 hours) at 3/19/2022 1321  Last data filed at 3/19/2022 0920  Gross per 24 hour   Intake 340 ml   Output 1200 ml   Net -860 ml       Physical Exam  Vitals and nursing note reviewed.   Constitutional:       Appearance: Normal appearance.   HENT:      Head: Normocephalic and atraumatic.   Pulmonary:      Effort: Pulmonary effort is normal.      Breath sounds: Normal breath sounds.   Abdominal:      General: Bowel sounds are normal. There is no distension.      Palpations: Abdomen is soft.   Neurological:      General: No focal deficit present.      Mental Status: She is alert and oriented to person, place, and time.   Psychiatric:         Mood and Affect: Mood normal.         Behavior: Behavior normal.           Results Review:       I reviewed the patient's new clinical results.  Results from last 7 days   Lab Units 22  1150 22  0606 22  0256   WBC 10*3/mm3 7.15 9.30 24.15* 17.86*   HEMOGLOBIN g/dL 7.3* 7.1* 7.8* 9.1*   PLATELETS 10*3/mm3 248 212 238 292     Results from  last 7 days   Lab Units 03/18/22  1144 03/17/22  1150 03/16/22  0606 03/16/22  0256   SODIUM mmol/L 138 134* 136 136   POTASSIUM mmol/L 3.6 4.0 4.2 4.7   CHLORIDE mmol/L 107 102 103 99   CO2 mmol/L 20.0* 22.0 21.0* 21.8*   BUN mg/dL 16 22 28* 27*   CREATININE mg/dL 1.25* 1.47* 1.70* 1.89*   CALCIUM mg/dL 8.0* 8.0* 8.2* 9.1   MAGNESIUM mg/dL 1.9  --   --  1.5*   PHOSPHORUS mg/dL 2.6  --   --   --    Estimated Creatinine Clearance: 35 mL/min (A) (by C-G formula based on SCr of 1.25 mg/dL (H)).      Assessment/Plan   Active Hospital Problems    Diagnosis  POA   • **Sepsis with acute renal failure without septic shock (HCC) [A41.9, R65.20, N17.9]  Yes   • Acute pyelonephritis [N10]  Unknown   • E. coli bacteremia [R78.81, B96.20]  Yes   • Acute UTI (urinary tract infection) [N39.0]  Yes   • AMS (altered mental status) [R41.82]  Yes   • Paroxysmal atrial fibrillation (HCC) [I48.0]  Yes   • Anemia [D64.9]  Yes   • Hypertension [I10]  Yes      Resolved Hospital Problems   No resolved problems to display.       PLAN  Ms. Billy is a 88 y.o. never smoker with a history of hypertension, hyperlipidemia, paroxysmal atrial fibrillation, chronic venous insufficiency, anemia who presents to [facility] [place] [facility] chief complaint of fever, altered mental status admitted for sepsis with acute renal failure secondary to UTI and pyelonephritis with bacteremia     Met Encephalopathy  -resolved secondary to below issues    Sepsis  -resolved after appropriate IV abx and appropriate resuscitation    BENNIE on ckd3  -Cr improved back to baseline    UTI/Pyelo  -E.coli on cx, sensitivities reviewed  -Antibiotics adjusted to Rocephin  -ID recommends levaquin 750 mg q48 hours (renal dose) at discharge through March 24, 2022    Bactermia  -repeat cultures negative at 48 hours    Anemia of chronic disease/inflammation  -hgb down secondary to volume expansion  -no symptoms of anemia, but her hgb is right at 7.3 and with her infection, she may continue to drop.  Will transfuse a unit to tank her up for discharge    pAfib  -HR and VS stable presently    Disposition  Back to SNF tomorrow/once arrangements have been made      Patrick Beasley MD  [facility]  03/19/22  13:21 EDT

## 2022-03-19 NOTE — CASE MANAGEMENT/SOCIAL WORK
Continued Stay Note  [facility]     Patient Name: Ama Billy  MRN: [id number]  Today's Date: 3/19/2022    Admit Date: 3/16/2022     Discharge Plan     Row Name 03/19/22 1450       Plan    Plan Discharge to [facility].    Provided Post Acute Provider List? N/A    Provided Post Acute Provider Quality & Resource List? N/A    Patient/Family in Agreement with Plan unable to assess    Plan Comments [facility] still awaiting precert from insurance might have precert Monday. [email or web address] confirmed patient is private pay. CCP to follow. Cristela NIELSON RN CCP.               Discharge Codes    No documentation.               Expected Discharge Date and Time     Expected Discharge Date Expected Discharge Time    Mar 20, 2022             Samanta Ruth RN

## 2022-03-20 LAB
ANION GAP SERPL CALCULATED.3IONS-SCNC: 11.9 MMOL/L (ref 5–15)
BH BB BLOOD EXPIRATION DATE: NORMAL
BH BB BLOOD TYPE BARCODE: 600
BH BB DISPENSE STATUS: NORMAL
BH BB PRODUCT CODE: NORMAL
BH BB UNIT NUMBER: NORMAL
BUN SERPL-MCNC: 9 MG/DL (ref 8–23)
BUN/CREAT SERPL: 9.6 (ref 7–25)
CALCIUM SPEC-SCNC: 8.6 MG/DL (ref 8.6–10.5)
CHLORIDE SERPL-SCNC: 106 MMOL/L (ref 98–107)
CO2 SERPL-SCNC: 21.1 MMOL/L (ref 22–29)
CREAT SERPL-MCNC: 0.94 MG/DL (ref 0.57–1)
CROSSMATCH INTERPRETATION: NORMAL
DEPRECATED RDW RBC AUTO: 56.6 FL (ref 37–54)
EGFRCR SERPLBLD CKD-EPI 2021: 58.5 ML/MIN/1.73
ERYTHROCYTE [DISTWIDTH] IN BLOOD BY AUTOMATED COUNT: 18 % (ref 12.3–15.4)
FOLATE BLD-MCNC: 351 NG/ML
FOLATE RBC-MCNC: 1519 NG/ML
GLUCOSE SERPL-MCNC: 84 MG/DL (ref 65–99)
HCT VFR BLD AUTO: 23.1 % (ref 34–46.6)
HCT VFR BLD AUTO: 29.1 % (ref 34–46.6)
HGB BLD-MCNC: 9 G/DL (ref 12–15.9)
MCH RBC QN AUTO: 27.3 PG (ref 26.6–33)
MCHC RBC AUTO-ENTMCNC: 30.9 G/DL (ref 31.5–35.7)
MCV RBC AUTO: 88.2 FL (ref 79–97)
PLATELET # BLD AUTO: 304 10*3/MM3 (ref 140–450)
PMV BLD AUTO: 10.7 FL (ref 6–12)
POTASSIUM SERPL-SCNC: 3.6 MMOL/L (ref 3.5–5.2)
RBC # BLD AUTO: 3.3 10*6/MM3 (ref 3.77–5.28)
SODIUM SERPL-SCNC: 139 MMOL/L (ref 136–145)
UNIT  ABO: NORMAL
UNIT  RH: NORMAL
WBC NRBC COR # BLD: 7.48 10*3/MM3 (ref 3.4–10.8)

## 2022-03-20 PROCEDURE — 25010000002 ENOXAPARIN PER 10 MG: Performed by: HOSPITALIST

## 2022-03-20 PROCEDURE — 25010000002 CEFTRIAXONE PER 250 MG: Performed by: INTERNAL MEDICINE

## 2022-03-20 PROCEDURE — 99232 SBSQ HOSP IP/OBS MODERATE 35: CPT | Performed by: INTERNAL MEDICINE

## 2022-03-20 PROCEDURE — 85027 COMPLETE CBC AUTOMATED: CPT | Performed by: STUDENT IN AN ORGANIZED HEALTH CARE EDUCATION/TRAINING PROGRAM

## 2022-03-20 PROCEDURE — 80048 BASIC METABOLIC PNL TOTAL CA: CPT | Performed by: STUDENT IN AN ORGANIZED HEALTH CARE EDUCATION/TRAINING PROGRAM

## 2022-03-20 RX ADMIN — CEFTRIAXONE 2 G: 2 INJECTION, POWDER, FOR SOLUTION INTRAMUSCULAR; INTRAVENOUS at 08:24

## 2022-03-20 RX ADMIN — ENOXAPARIN SODIUM 30 MG: 30 INJECTION SUBCUTANEOUS at 20:29

## 2022-03-20 RX ADMIN — Medication 10 ML: at 20:29

## 2022-03-20 RX ADMIN — PANTOPRAZOLE SODIUM 40 MG: 40 TABLET, DELAYED RELEASE ORAL at 08:23

## 2022-03-20 RX ADMIN — ATORVASTATIN CALCIUM 10 MG: 20 TABLET, FILM COATED ORAL at 08:22

## 2022-03-20 RX ADMIN — Medication 10 ML: at 08:24

## 2022-03-20 NOTE — PROGRESS NOTES
ID note for pyelonephritis  Subjective: feels sickly this am.  She is having nausea but no vomiting.  She says that she is having diarrhea which she classifies as 2 bowel movements no fevers or chills or night sweats overnight.    Objective:   Temp:  [97.2 °F (36.2 °C)-98.3 °F (36.8 °C)] 97.2 °F (36.2 °C)  Heart Rate:  [52-69] 52  Resp:  [14-18] 14  BP: (132-169)/(55-69) 169/60    nad, responds appropriately to questions.  breathing comfortably on ambient air, abdomen is soft and nontender    3/16 blood and urine cultures grew E. Coli, susceptible to all tested agents  3/17 blood cultures ngtd      Assessment and plan  1.  E. coli septicemia from #2  2.  Left pyelonephritis  3.  Acute kidney injury    Cont ceftriaxone 2 g IV every 24 hours.  We will continue the IV antibiotics while inpatient but can be changed over to levofloxacin 750 mg p.o. every 48 hours (renal dose), through March 24, 2022 when ready for discharge from medical perspective.  Hopefully to SNF soon.

## 2022-03-20 NOTE — PLAN OF CARE
VSS. Has moments of confusion, short-term memory. Hgb trending up after 1 unit of PRBCs yesterday.  at bedside throughout the shift. Purewick in place. No complaints of pain. Up to the BSC this shift for BM. Safety maintained. Will continue to monitor

## 2022-03-20 NOTE — PROGRESS NOTES
[facility] to Hospital Care    [phone]   LOS: 4 days     Name: Ama Billy  Age/Sex: 88 y.o. female  :  1933        PCP: Con Canas MD  Chief Complaint   Patient presents with   • Fever   • Altered Mental Status      Subjective     No events overnight. She reports that she feels a lot better since getting the blood transfusion. Awaiting precertification to return to rehab. Hopefully tomorrow.    atorvastatin, 10 mg, Oral, Daily  cefTRIAXone, 2 g, Intravenous, Q24H  enoxaparin, 30 mg, Subcutaneous, Nightly  metoprolol succinate XL, 25 mg, Oral, Q24H  pantoprazole, 40 mg, Oral, Daily  senna-docusate sodium, 2 tablet, Oral, Nightly  sodium chloride, 10 mL, Intravenous, Q12H           Objective   Vital Signs  Temp:  [97.2 °F (36.2 °C)-98.3 °F (36.8 °C)] 97.2 °F (36.2 °C)  Heart Rate:  [52-69] 52  Resp:  [14-18] 14  BP: (132-169)/(55-69) 169/60  Body mass index is 27.55 kg/m².    Intake/Output Summary (Last 24 hours) at 3/20/2022 1315  Last data filed at 3/20/2022 1140  Gross per 24 hour   Intake 1660 ml   Output 1050 ml   Net 610 ml       Physical Exam  Vitals and nursing note reviewed.   Constitutional:       Appearance: Normal appearance.   HENT:      Head: Normocephalic and atraumatic.   Pulmonary:      Effort: Pulmonary effort is normal.      Breath sounds: Normal breath sounds.   Abdominal:      General: Bowel sounds are normal. There is no distension.      Palpations: Abdomen is soft.   Neurological:      General: No focal deficit present.      Mental Status: She is alert and oriented to person, place, and time.   Psychiatric:         Mood and Affect: Mood normal.         Behavior: Behavior normal.           Results Review:       I reviewed the patient's new clinical results.  Results from last 7 days   Lab Units 22  0944 224 22  1150 22  0606 22  0256   WBC 10*3/mm3 7.48 7.15 9.30 24.15* 17.86*   HEMOGLOBIN g/dL 9.0* 7.3* 7.1* 7.8* 9.1*   PLATELETS 10*3/mm3  304 248 212 238 292     Results from last 7 days   Lab Units 03/20/22  0944 03/18/22  1144 03/17/22  1150 03/16/22  0606 03/16/22  0256   SODIUM mmol/L 139 138 134* 136 136   POTASSIUM mmol/L 3.6 3.6 4.0 4.2 4.7   CHLORIDE mmol/L 106 107 102 103 99   CO2 mmol/L 21.1* 20.0* 22.0 21.0* 21.8*   BUN mg/dL 9 16 22 28* 27*   CREATININE mg/dL 0.94 1.25* 1.47* 1.70* 1.89*   CALCIUM mg/dL 8.6 8.0* 8.0* 8.2* 9.1   MAGNESIUM mg/dL  --  1.9  --   --  1.5*   PHOSPHORUS mg/dL  --  2.6  --   --   --    Estimated Creatinine Clearance: 46.5 mL/min (by C-G formula based on SCr of 0.94 mg/dL).      Assessment/Plan   Active Hospital Problems    Diagnosis  POA   • **Sepsis with acute renal failure without septic shock (HCC) [A41.9, R65.20, N17.9]  Yes   • Acute pyelonephritis [N10]  Unknown   • E. coli bacteremia [R78.81, B96.20]  Yes   • Acute UTI (urinary tract infection) [N39.0]  Yes   • AMS (altered mental status) [R41.82]  Yes   • Paroxysmal atrial fibrillation (HCC) [I48.0]  Yes   • Anemia [D64.9]  Yes   • Hypertension [I10]  Yes      Resolved Hospital Problems   No resolved problems to display.       PLAN  Ms. Billy is a 88 y.o. never smoker with a history of hypertension, hyperlipidemia, paroxysmal atrial fibrillation, chronic venous insufficiency, anemia who presents to [facility] ER chief complaint of fever, altered mental status admitted for sepsis with acute renal failure secondary to UTI and pyelonephritis with bacteremia     Met Encephalopathy  -resolved secondary to below issues    Sepsis  -resolved after appropriate IV abx and appropriate resuscitation    BENNIE on ckd3  -Cr improved back to baseline    UTI/Pyelo  -E.coli on cx, sensitivities reviewed  -Antibiotics adjusted to Rocephin  -ID recommends levaquin 750 mg q48 hours (renal dose) at discharge through March 24, 2022    Bactermia  -repeat cultures negative at 48 hours    Anemia of chronic disease/inflammation  -hgb down secondary to volume expansion  -s/p 1 unit prbc  yesterday with better than expected response in hemoglobin    pAfib  -HR and VS stable presently    Disposition  Back to SNF tomorrow/once arrangements have been made (awaiting precertification), hopefully tomorrow      Patrick Beasley MD  [facility]  03/20/22  13:15 EDT

## 2022-03-21 PROBLEM — R41.82 AMS (ALTERED MENTAL STATUS): Status: RESOLVED | Noted: 2022-03-16 | Resolved: 2022-03-21

## 2022-03-21 PROBLEM — A41.9 SEPSIS WITH ACUTE RENAL FAILURE WITHOUT SEPTIC SHOCK (HCC): Status: RESOLVED | Noted: 2022-03-16 | Resolved: 2022-03-21

## 2022-03-21 PROBLEM — B96.20 E. COLI BACTEREMIA: Status: RESOLVED | Noted: 2022-03-17 | Resolved: 2022-03-21

## 2022-03-21 PROBLEM — N10 ACUTE PYELONEPHRITIS: Status: RESOLVED | Noted: 2022-03-17 | Resolved: 2022-03-21

## 2022-03-21 PROBLEM — R78.81 E. COLI BACTEREMIA: Status: RESOLVED | Noted: 2022-03-17 | Resolved: 2022-03-21

## 2022-03-21 PROBLEM — N39.0 ACUTE UTI (URINARY TRACT INFECTION): Status: RESOLVED | Noted: 2022-03-16 | Resolved: 2022-03-21

## 2022-03-21 PROBLEM — R65.20 SEPSIS WITH ACUTE RENAL FAILURE WITHOUT SEPTIC SHOCK: Status: RESOLVED | Noted: 2022-03-16 | Resolved: 2022-03-21

## 2022-03-21 PROBLEM — N17.9 SEPSIS WITH ACUTE RENAL FAILURE WITHOUT SEPTIC SHOCK: Status: RESOLVED | Noted: 2022-03-16 | Resolved: 2022-03-21

## 2022-03-21 PROCEDURE — 25010000002 CEFTRIAXONE PER 250 MG: Performed by: INTERNAL MEDICINE

## 2022-03-21 PROCEDURE — 97530 THERAPEUTIC ACTIVITIES: CPT

## 2022-03-21 PROCEDURE — 99232 SBSQ HOSP IP/OBS MODERATE 35: CPT | Performed by: INTERNAL MEDICINE

## 2022-03-21 PROCEDURE — 25010000002 ENOXAPARIN PER 10 MG: Performed by: HOSPITALIST

## 2022-03-21 RX ORDER — LEVOFLOXACIN 750 MG/1
750 TABLET ORAL EVERY OTHER DAY
Refills: 0
Start: 2022-03-22 | End: 2022-03-25

## 2022-03-21 RX ADMIN — ENOXAPARIN SODIUM 30 MG: 30 INJECTION SUBCUTANEOUS at 20:01

## 2022-03-21 RX ADMIN — Medication 10 ML: at 08:42

## 2022-03-21 RX ADMIN — DOCUSATE SODIUM 50MG AND SENNOSIDES 8.6MG 2 TABLET: 8.6; 5 TABLET, FILM COATED ORAL at 20:01

## 2022-03-21 RX ADMIN — CEFTRIAXONE 2 G: 2 INJECTION, POWDER, FOR SOLUTION INTRAMUSCULAR; INTRAVENOUS at 08:41

## 2022-03-21 RX ADMIN — PANTOPRAZOLE SODIUM 40 MG: 40 TABLET, DELAYED RELEASE ORAL at 08:41

## 2022-03-21 RX ADMIN — ATORVASTATIN CALCIUM 10 MG: 20 TABLET, FILM COATED ORAL at 08:41

## 2022-03-21 RX ADMIN — Medication 10 ML: at 20:01

## 2022-03-21 RX ADMIN — METOPROLOL SUCCINATE 25 MG: 25 TABLET, EXTENDED RELEASE ORAL at 08:41

## 2022-03-21 NOTE — THERAPY TREATMENT NOTE
Patient Name: Ama Billy  : 1933    MRN: [id number]                              Today's Date: 3/21/2022       Admit Date: 3/16/2022    Visit Dx:     ICD-10-CM ICD-9-CM   1. Sepsis with acute renal failure without septic shock, due to unspecified organism, unspecified acute renal failure type ([facility])  A41.9 038.9    R65.20 995.92    N17.9 584.9   2. Acute urinary tract infection  N39.0 599.0   3. Metabolic encephalopathy  G93.41 348.31   4. BENNIE (acute kidney injury) ([facility])  N17.9 584.9   5. Elevated troponin  R77.8 790.6   6. Primary hypertension  I10 401.9   7. Hyperlipidemia, unspecified hyperlipidemia type  E78.5 272.4     Patient Active Problem List   Diagnosis   • Normocytic anemia   • Abdominal cramping   • LLQ pain   • Colon polyps   • Bursitis of hip   • Diarrhea   • Fatigue   • Generalized osteoarthritis   • Hyperlipidemia   • Hypertension   • Irritable bowel syndrome   • Muscle strain of lower extremity   • Tendinitis   • Chronic venous insufficiency   • Status post total left knee replacement   • Hip joint replacement status   • Left retinal detachment   • Hypovitaminosis D   • Weakness of extremity   • Closed wedge compression fracture of first lumbar vertebra ([facility])   • Fall   • Constipation   • Nausea & vomiting   • Leukocytosis   • Anemia   • Symptomatic anemia   • Esophagitis   • GI bleed   • Acute blood loss anemia   • Iron deficiency anemia   • Paroxysmal atrial fibrillation ([facility])   • Rectal bleeding   • Iron deficiency anemia due to chronic blood loss   • Swelling of lower extremity   • Canales's esophagus without dysplasia   • Dyspnea on exertion   • Closed fracture of right hip ([facility])   • Fx humeral neck, right, closed, initial encounter   • Acute urinary retention   • Sepsis with acute renal failure without septic shock ([facility])   • BENNIE (acute kidney injury) ([facility])   • Acute UTI (urinary tract infection)   • AMS (altered mental status)   • Acute pyelonephritis   • E. coli bacteremia      Past Medical History:   Diagnosis Date   • Anemia     Normocytic anemia of unclear ediology   • Arthritis     Osteoarthritis involving knees and left shoulder   • Chronic venous insufficiency    • Eosinophilia     Persistent mild eosinophilia of unknown etiology   • History of colonic polyps    • Hyperlipidemia    • Hypertension    • Paroxysmal atrial fibrillation (HCC)      Past Surgical History:   Procedure Laterality Date   • COLONOSCOPY      H/O colonic polyps with regular colonoscopies at 5 year intervals.    • COLONOSCOPY  2013    By Dr. Gudino, no polyps identified.   • COLONOSCOPY N/A 7/3/2018    IH, one TA w/low grade dysplasi   • ENDOSCOPY N/A 11/15/2017    Small HH, LA Grade B reflux, acute and erosive ulcerative esophagitis,    • ENDOSCOPY N/A 2/27/2018    LA Grade B esophagitis, HH, gastritis, Path: Canales's w/out dysplasia   • HEMORRHOIDECTOMY  1965   • HIP ENDOPROSTHESIS Right 2/16/2022    Procedure: RIGHT HIP BIPOLAR HEMIARTHROPLASTY;  Surgeon: Miguel Ya MD;  Location: [facility];  Service: Orthopedics;  Laterality: Right;   • HYSTERECTOMY  1971    Partial, secondary to endometriosis   • JOINT REPLACEMENT  2014    Left total hip arthroplasy   • KNEE SURGERY  1997    Arthroscopy of left knee 1997; right knee 2003      General Information     Row Name 03/21/22 1237          Physical Therapy Time and Intention    Document Type therapy note (daily note) (P)   -     Mode of Treatment physical therapy (P)   -     Row Name 03/21/22 1237          General Information    Patient Profile Reviewed yes (P)   -SH     Existing Precautions/Restrictions fall (P)   WBAT RLE, NWB RUE  -     Barriers to Rehab cognitive status;previous functional deficit;medically complex (P)   -     Row Name 03/21/22 1237          Living Environment    People in Home spouse (P)   -     Row Name 03/21/22 1237          Cognition    Orientation Status (Cognition) person;oriented to (P)   -     Row Name 03/21/22  1237          Safety Issues, Functional Mobility    Impairments Affecting Function (Mobility) balance;cognition;endurance/activity tolerance;strength;pain;range of motion (ROM);postural/trunk control (P)   -           User Key  (r) = Recorded By, (t) = Taken By, (c) = Cosigned By    Initials Name Provider Type     Christian Bar, PT Student PT Student               Mobility     Row Name 03/21/22 1238          Bed Mobility    Supine-Sit [place] (Bed Mobility) verbal cues;nonverbal cues (demo/gesture);moderate assist (50% patient effort);maximum assist (25% patient effort);1 person assist (P)   -     Assistive Device (Bed Mobility) head of bed elevated;bed rails (P)   -     Comment, (Bed Mobility) 1 person assist today for bed mobility (P)   -     Row Name 03/21/22 1238          Transfers    Comment, (Transfers) STS from EOB modA x 2 (P)   -     Row Name 03/21/22 1238          Sit-Stand Transfer    Sit-Stand [place] (Transfers) verbal cues;nonverbal cues (demo/gesture);moderate assist (50% patient effort);2 person assist (P)   -     Assistive Device (Sit-Stand Transfers) walker, front-wheeled (P)   -     Comment, (Sit-Stand Transfer) No RUE on walker d/t WB status (P)   -     Row Name 03/21/22 1238          Gait/Stairs (Locomotion)    [place] Level (Gait) moderate assist (50% patient effort);2 person assist;verbal cues;nonverbal cues (demo/gesture) (P)   -     Assistive Device (Gait) walker, front-wheeled (P)   -     Distance in Feet (Gait) 5 (P)   -SH     Deviations/Abnormal Patterns (Gait) base of support, narrow;brigitte decreased;stride length decreased;weight shifting decreased (P)   -SH     Bilateral Gait Deviations forward flexed posture;heel strike decreased (P)   -     [place] Level (Stairs) unable to assess;2 person assist (P)   -     Comment, (Gait/Stairs) Took steps from bed to chair, able to walk 4 steps backwards, sidestep left - required modA for balance  and AD management (P)   [facility] Name 03/21/22 1238          Mobility    Extremity Weight-bearing Status right lower extremity;right upper extremity (P)   -     Right Upper Extremity (Weight-bearing Status) other (see comments) (P)   NWB RUE. WBAT RLE  -     Right Lower Extremity (Weight-bearing Status) weight-bearing as tolerated (WBAT) (P)   -           User Key  (r) = Recorded By, (t) = Taken By, (c) = Cosigned By    Initials Name Provider Type     Christian Bar, PT Student PT Student               Obj/Interventions     [facility] Name 03/21/22 1241          Motor Skills    Therapeutic Exercise other (see comments) (P)   1x10 ankle DF, LAQ, seated marching, 1x5 SLR  [facility] Name 03/21/22 1241          Balance    Balance Assessment sitting static balance;sitting dynamic balance;standing static balance;standing dynamic balance (P)   -     Static Sitting Balance supervision;contact guard (P)   -     Dynamic Sitting Balance supervision;contact guard (P)   -     Position, Sitting Balance sitting edge of bed;unsupported (P)   -     Static Standing Balance minimal assist;contact guard (P)   -     Dynamic Standing Balance contact guard;minimal assist (P)   -     Position/Device Used, Standing Balance supported;walker, rolling (P)   -     Comment, Balance No LOB, marked posterior lean required modA to maintain erect posture (P)   [facility] Name 03/21/22 1241          Sensory Assessment (Somatosensory)    Sensory Assessment (Somatosensory) sensation intact (P)   -           User Key  (r) = Recorded By, (t) = Taken By, (c) = Cosigned By    Initials Name Provider Type    Christian Heller, PT Student PT Student               Goals/Plan    No documentation.                Clinical Impression     [facility] Name 03/21/22 1242          Pain    Pretreatment Pain Rating 0/10 - no pain (P)   -     Posttreatment Pain Rating 0/10 - no pain (P)   [facility] Name 03/21/22 1242          Plan of Care Review     Plan of Care Reviewed With patient;spouse (P)   -SH     Progress improving (P)   -SH     Outcome Evaluation Pt seen this AM for PT, was alert and agreeable to tx upon approach in bed. Pt was able to perform bed mobility with modA x 1 today, requiring less assist and one less person. Pt continued to need modA x 2 for STS and gait today. Pt was able to ambulate from bed to chair today demonstrating improved tolerance to activity. Pt will continue to benefit from acute therapy. At this time PT recommends DC to SNU. (P)   -SH     Row Name 03/21/22 1242          Positioning and Restraints    Pre-Treatment Position in bed (P)   -SH     Post Treatment Position chair (P)   -SH     In Chair notified nsg;reclined;call light within reach;encouraged to call for assist;exit alarm on;with family/caregiver (P)   -SH           User Key  (r) = Recorded By, (t) = Taken By, (c) = Cosigned By    Initials Name Provider Type    Christian Heller PT Student PT Student               Outcome Measures     Row Name 03/21/22 1244          How much help from another person do you currently need...    Turning from your back to your side while in flat bed without using bedrails? 2 (P)   -SH     Moving from lying on back to sitting on the side of a flat bed without bedrails? 2 (P)   -SH     Moving to and from a bed to a chair (including a wheelchair)? 2 (P)   -SH     Standing up from a chair using your arms (e.g., wheelchair, bedside chair)? 2 (P)   -SH     Climbing 3-5 steps with a railing? 1 (P)   -SH     To walk in hospital room? 2 (P)   -SH     AM-PAC 6 Clicks Score (PT) 11 (P)   -SH           User Key  (r) = Recorded By, (t) = Taken By, (c) = Cosigned By    Initials Name Provider Type    Christian Heller, PT Student PT Student                             Physical Therapy Education                 Title: PT OT SLP Therapies (In Progress)     Topic: Physical Therapy (In Progress)     Point: Mobility training (Done)     Learning  Progress Summary           Patient Acceptance, E,TB, VU,Bed IU by  at 3/21/2022 1245    Acceptance, E,TB,D, VU,NR by  at 3/18/2022 1155   Significant Other Acceptance, E,TB, VU,Bed IU by  at 3/21/2022 1245                   Point: Home exercise program (Done)     Learning Progress Summary           Patient Acceptance, E,TB, VU,Bed IU by  at 3/21/2022 1245   Significant Other Acceptance, E,TB, VU,Bed IU by  at 3/21/2022 1245                   Point: Body mechanics (Done)     Learning Progress Summary           Patient Acceptance, E,TB, VU,Bed IU by  at 3/21/2022 1245   Significant Other Acceptance, E,TB, VU,Bed IU by  at 3/21/2022 1245                   Point: Precautions (Not Started)     Learner Progress:  Not documented in this visit.                      User Key     Initials Effective Dates Name Provider Type Discipline     06/16/21 -  Renata Adams, PT Physical Therapist PT     01/28/22 -  Christian Bar PT Student PT Student PT              PT Recommendation and Plan     Plan of Care Reviewed With: (P) patient, spouse  Progress: (P) improving  Outcome Evaluation: (P) Pt seen this AM for PT, was alert and agreeable to tx upon approach in bed. Pt was able to perform bed mobility with modA x 1 today, requiring less assist and one less person. Pt continued to need modA x 2 for STS and gait today. Pt was able to ambulate from bed to chair today demonstrating improved tolerance to activity. Pt will continue to benefit from acute therapy. At this time PT recommends DC to SNU.     Time Calculation:    PT Charges     Row Name 03/21/22 1245             Time Calculation    Start Time 1137 (P)   -      Stop Time 1151 (P)   -      Time Calculation (min) 14 min (P)   -      PT Received On 03/21/22 (P)   -      PT - Next Appointment 03/22/22 (P)   -              Time Calculation- PT    Total Timed Code Minutes- PT 14 minute(s) (P)   -              Timed Charges    43299 - PT Therapeutic  Activity Minutes 14 (P)   -              Total Minutes    Timed Charges Total Minutes 14 (P)   -SH       Total Minutes 14 (P)   -SH            User Key  (r) = Recorded By, (t) = Taken By, (c) = Cosigned By    Initials Name Provider Type    Christian Heller, PT Student PT Student              Therapy Charges for Today     Code Description Service Date Service Provider Modifiers Qty    [id number]  PT THERAPEUTIC ACT EA 15 MIN 3/21/2022 Christian Bar, PT Student GP 1          PT G-Codes  Outcome Measure Options: AM-PAC 6 Clicks Basic Mobility (PT)  AM-PAC 6 Clicks Score (PT): (P) 11    Christian Bar PT Student  3/21/2022

## 2022-03-21 NOTE — PLAN OF CARE
Goal Outcome Evaluation:  Plan of Care Reviewed With: (P) patient, spouse        Progress: (P) improving  Outcome Evaluation: (P) Pt seen this AM for PT, was alert and agreeable to tx upon approach in bed. Pt was able to perform bed mobility with modA x 1 today, requiring less assist and one less person. Pt continued to need modA x 2 for STS and gait today. Pt was able to ambulate from bed to chair today demonstrating improved tolerance to activity. Pt will continue to benefit from acute therapy. At this time PT recommends DC to SNU.

## 2022-03-21 NOTE — CASE MANAGEMENT/SOCIAL WORK
Continued Stay Note  [facility]     Patient Name: Ama Billy  MRN: [id number]  Today's Date: 3/21/2022    Admit Date: 3/16/2022     Discharge Plan     Row Name 03/21/22 1223       Plan    Plan Return to [place] [facility] via [facility] on 3/22/22    Patient/Family in Agreement with Plan yes    Plan Comments CCP noted discharge orders. [facility] fully booked, [facility] fully booked, and [facility] does not have EMS in the [place] area today. [facility] arranged for tomorrow, 3/22/22 at 1pm. Notified /[place], [facility], and Dr. Beasley. Packet in [facility]. Mechelle SOUZA RN/CCP               Discharge Codes    No documentation.               Expected Discharge Date and Time     Expected Discharge Date Expected Discharge Time    Mar 21, 2022             Hortensia Clark

## 2022-03-21 NOTE — PROGRESS NOTES
ID note for pyelonephritis  Subjective: tolerating abx. No pain, n/v/d, rash.  no fevers or chills or night sweats overnight.    Objective:   Temp:  [97.3 °F (36.3 °C)-97.9 °F (36.6 °C)] 97.3 °F (36.3 °C)  Heart Rate:  [52-84] 84  Resp:  [16-18] 18  BP: (134-166)/(54-89) 166/68  nad, torres mood and affect.  breathing comfortably on ambient air, abdomen is soft and nontender    3/16 blood and urine cultures grew E. Coli, susceptible to all tested agents  3/17 blood cultures ngtd  Cr 0.94, wbc 7.48, hgb 9, plt 304    Assessment and plan  1.  E. coli septicemia from #2  2.  Left pyelonephritis  3.  Acute kidney injury    She is responding nicely to the antibiotics.  Her acute kidney injury is improved.  Platelets normal.  White count normal.  Anemia much better after transfusion hopeful to transition to SNF today  Cont ceftriaxone 2 g IV every 24 hours.  We will continue the IV antibiotics while inpatient but can be changed over to levofloxacin 750 mg p.o. every 48 hours (renal dose), through March 24, 2022 when ready for discharge from medical perspective.

## 2022-03-21 NOTE — DISCHARGE SUMMARY
Patient Name: Ama Billy  : 1933  MRN: [id number]    Date of Admission: 3/16/2022  Date of Discharge:  3/22/2022  Primary Care Physician: Con Canas MD      Chief Complaint:   Fever and Altered Mental Status      Discharge Diagnoses     Active Hospital Problems    Diagnosis  POA   • Paroxysmal atrial fibrillation (HCC) [I48.0]  Yes   • Anemia [D64.9]  Yes   • Hypertension [I10]  Yes      Resolved Hospital Problems    Diagnosis Date Resolved POA   • **Sepsis with acute renal failure without septic shock (HCC) [A41.9, R65.20, N17.9] 2022 Yes   • Acute pyelonephritis [N10] 2022 Yes   • E. coli bacteremia [R78.81, B96.20] 2022 Yes   • Acute UTI (urinary tract infection) [N39.0] 2022 Yes   • AMS (altered mental status) [R41.82] 2022 Yes        Hospital Course     Ms. Billy is a 88 y.o. female with a history of hypertension, hyperlipidemia, paroxysmal afib not on AC due to falls and iron deficiency anemia, chronic venous insufficiency, recent hospitalization for a hip fracture in February who presented to [facility] initially complaining of fever, confusion.  Please see the admitting history and physical for further details.  She was found to have sepsis with BENNIE from e coli UTI/pyelonephritis with bacteremia and was admitted to the hospital for further evaluation and treatment.      She was seen in consultation by infectious disease. Started initially on broad spectrum abx which were tailored as speciation/sensitivies became available. Her BENNIE improved with fluids and her sepsis and encephalopathy improved with antibiotics. She did receive a unit of blood while hospitalized as her hemoglobin had drifted down slowly due to inflammation. She will be discharged on levofloxacin 750 mg q48h (renally adjusted) through . She will be discharged to rehab on 3/22/22 once transportation is available.    ADDENDUM: case reviewed care team this AM in huddle.  No new developments. Pt to be dc'd to facility later this afternoon.  Electronically signed by Manuel Cordova MD, 03/22/22, 11:24 AM EDT.      Day of Discharge     Subjective:  Doing well. No complaints. I am told that there is no transportation available today, but she will be able to go tomorrow.    Physical Exam:  Temp:  [97.3 °F (36.3 °C)-97.9 °F (36.6 °C)] 97.9 °F (36.6 °C)  Heart Rate:  [58-84] 58  Resp:  [16-18] 16  BP: (146-166)/(53-72) 153/53  Body mass index is 27.55 kg/m².  Physical Exam  Constitutional:       General: She is not in acute distress.  Cardiovascular:      Rate and Rhythm: Normal rate and regular rhythm.      Heart sounds: Normal heart sounds.   Pulmonary:      Effort: Pulmonary effort is normal.      Breath sounds: Normal breath sounds.   Abdominal:      General: Bowel sounds are normal.      Palpations: Abdomen is soft.   Musculoskeletal:         General: No tenderness.      Right lower leg: No edema.      Left lower leg: No edema.   Neurological:      Mental Status: She is alert.   Psychiatric:         Mood and Affect: Mood normal.         Behavior: Behavior normal.         Consultants     Consult Orders (all) (From admission, onward)     Start     Ordered    03/16/22 1921  Inpatient Case Management  Consult  Once        Provider:  (Not yet assigned)    03/16/22 1922 03/16/22 1500  Inpatient Infectious Diseases Consult  Once        Specialty:  Infectious Diseases  Provider:  Kenny Kohler,     03/16/22 1500    03/16/22 0409  [facility] (on-call MD unless specified) Details  Once        Specialty:  Hospitalist  Provider:  (Not yet assigned)    03/16/22 0408              Procedures     Imaging Results (All)     Procedure Component Value Units Date/Time    CT Abdomen Pelvis Without Contrast [[id number]] Collected: 03/16/22 1845     Updated: 03/16/22 1855    Narrative:      CT ABDOMEN PELVIS WO CONTRAST-     CLINICAL HISTORY: Fever. Suspect pyelonephritis.     TECHNIQUE:  Spiral CT images were obtained through the abdomen and pelvis  with no oral or IV contrast and were reconstructed in 3 mm thick slices.     Radiation dose reduction techniques were utilized, including automated  exposure control and exposure modulation based on body size.     COMPARISON: CT scan of the abdomen and pelvis dated 11/14/2017.     FINDINGS: No radiopaque calculi are present within either renal  collecting system, and there is no hydronephrosis or hydroureter.  However, there is moderate stranding of the perirenal fat on the left  and there is thickening of Gerota's fascia that is new since the  preceding CT scan. This is consistent with edema due to pyelonephritis.  The right kidney is unremarkable. No focal perirenal abscess is  identified. The liver, spleen, pancreas, and adrenal glands appear  within normal limits. . The stomach and small and large bowel were not  optimally evaluated due to the lack of oral and IV contrast. A small  hiatal hernia is present. There are several diverticuli in the sigmoid  colon. There is no CT evidence of acute diverticulitis. There is no  bowel distention. Images through the inferior aspect of the pelvis are  severely degraded due to beam hardening artifact from bilateral hip  arthroplasties. This obscures visualization of the urinary bladder and  expected location of the uterus. No abnormal masses or fluid collections  are identified in the abdomen or pelvis. Images through the lung bases  demonstrate no significant abnormality.       Impression:      Perirenal edema on the left compatible with pyelonephritis.  There is no evidence of hydronephrosis. Mild sigmoid diverticulosis  without evidence of diverticulitis.     This report was finalized on 3/16/2022 6:52 PM by Dr. Miguel Madden M.D.       XR Chest 1 View [[id number]] Collected: 03/16/22 0450     Updated: 03/16/22 0450    Narrative:        Patient: LISA PANTOJA  Time Out: 04:49  Exam(s): FILM CXR 1 VIEW      EXAM:    XR Chest, 1 View    CLINICAL HISTORY:     Reason for exam: Fever.    TECHNIQUE:    Frontal view of the chest.    COMPARISON:      2 14 2022    FINDINGS:    Lungs:  No consolidation or mass.  Unchanged 6 mm left upper lobe   nodule.    Pleural space:  No acute findings    Heart:  No cardiomegaly.    Bones joints:  No acute findings.    IMPRESSION:         No acute cardiopulmonary process. Unchanged 6 mm left upper lobe nodule.      Impression:          Electronically signed by Gilma Kumar MD on 03-16-22 at 0449          Pertinent Labs     Results from last 7 days   Lab Units 03/20/22  0944 03/18/22  2054 03/17/22  1150 03/16/22  0606   WBC 10*3/mm3 7.48 7.15 9.30 24.15*   HEMOGLOBIN g/dL 9.0* 7.3* 7.1* 7.8*   PLATELETS 10*3/mm3 304 248 212 238     Results from last 7 days   Lab Units 03/20/22  0944 03/18/22  1144 03/17/22  1150 03/16/22  0606   SODIUM mmol/L 139 138 134* 136   POTASSIUM mmol/L 3.6 3.6 4.0 4.2   CHLORIDE mmol/L 106 107 102 103   CO2 mmol/L 21.1* 20.0* 22.0 21.0*   BUN mg/dL 9 16 22 28*   CREATININE mg/dL 0.94 1.25* 1.47* 1.70*   GLUCOSE mg/dL 84 105* 83 101*   Estimated Creatinine Clearance: 46.5 mL/min (by C-G formula based on SCr of 0.94 mg/dL).  Results from last 7 days   Lab Units 03/18/22  1144 03/16/22  0256   ALBUMIN g/dL 2.80* 3.80   BILIRUBIN mg/dL  --  1.1   ALK PHOS U/L  --  162*   AST (SGOT) U/L  --  24   ALT (SGPT) U/L  --  12     Results from last 7 days   Lab Units 03/20/22  0944 03/18/22  1144 03/17/22  1150 03/16/22  0606 03/16/22  0256   CALCIUM mg/dL 8.6 8.0* 8.0* 8.2* 9.1   ALBUMIN g/dL  --  2.80*  --   --  3.80   MAGNESIUM mg/dL  --  1.9  --   --  1.5*   PHOSPHORUS mg/dL  --  2.6  --   --   --      Results from last 7 days   Lab Units 03/16/22  0256   LIPASE U/L 40     Results from last 7 days   Lab Units 03/16/22  0256   TROPONIN T ng/mL 0.041*           Invalid input(s): LDLCALC  Results from last 7 days   Lab Units 03/17/22  1150 03/16/22  0428 03/16/22  0350  03/16/22  0307   BLOODCX  No growth at 4 days  No growth at 4 days Escherichia coli* Escherichia coli*  --    URINECX   --   --   --  >100,000 CFU/mL Escherichia coli*   BCIDPCR   --  Eschericia coli. Identification by BCID2 PCR.*  --   --        Test Results Pending at Discharge     Pending Labs     Order Current Status    Blood Culture - Blood, Arm, Left Preliminary result    Blood Culture - Blood, Hand, Left Preliminary result          Discharge Details        Discharge Medications      New Medications      Instructions Start Date   levoFLOXacin 750 MG tablet  Commonly known as: Levaquin   750 mg, Oral, Every Other Day   Start Date: March 22, 2022        Changes to Medications      Instructions Start Date   amLODIPine 5 MG tablet  Commonly known as: NORVASC  What changed: See the new instructions.   TAKE 1 TABLET DAILY ( MAKE APPOINTMENT FOR FURTHER REFILLS )         Continue These Medications      Instructions Start Date   ascorbic acid 500 MG tablet  Commonly known as: VITAMIN C   500 mg, Oral, 2 Times Daily      aspirin 81 MG EC tablet   81 mg, Oral, 2 Times Daily      atorvastatin 10 MG tablet  Commonly known as: LIPITOR   10 mg, Oral, Daily      bisacodyl 10 MG suppository  Commonly known as: DULCOLAX   10 mg, Rectal, Daily PRN      cyanocobalamin 1000 MCG tablet  Commonly known as: VITAMIN B-12   1,000 mcg, Oral, Daily      docusate sodium 100 MG capsule   100 mg, Oral, 2 Times Daily PRN      ferrous sulfate 325 (65 FE) MG tablet   325 mg, Oral, 2 Times Daily With Meals      folic acid 1 MG tablet  Commonly known as: FOLVITE   1 mg, Oral, Daily      metoprolol succinate XL 25 MG 24 hr tablet  Commonly known as: TOPROL-XL   25 mg, Oral, Every 24 Hours Scheduled      pantoprazole 20 MG EC tablet  Commonly known as: PROTONIX   20 mg, Oral, Daily         Stop These Medications    HYDROcodone-acetaminophen 5-325 MG per tablet  Commonly known as: NORCO     lisinopril 20 MG tablet  Commonly known as:  PRINIVIL,ZESTRIL            Allergies   Allergen Reactions   • Diclofenac Sodium Unknown (See Comments)     Is unaware         Discharge Disposition:  Skilled Nursing Facility (DC - External)    Discharge Diet:  Diet Order   Procedures   • Diet Regular; Cardiac       Discharge Activity:   Activity Instructions     Activity as Tolerated            CODE STATUS:    Code Status and Medical Interventions:   Ordered at: 03/16/22 0501     Code Status (Patient has no pulse and is not breathing):    CPR (Attempt to Resuscitate)     Medical Interventions (Patient has pulse or is breathing):    Full Support       Future Appointments   Date Time Provider Department Center   4/18/2022  2:20 PM LAB CHAIR 6 [facility]  [facility]   4/18/2022  3:20 PM Jonah Livingston II, MD [facility]     Additional Instructions for the Follow-ups that You Need to Schedule     Call MD With Problems / Concerns   As directed      Instructions: call your primary care physician if you experience chest pain, shortness of breath, abdominal pain, nausea, vomiting, fevers, sweats, chills, or worsening of your symptoms    Order Comments: Instructions: call your primary care physician if you experience chest pain, shortness of breath, abdominal pain, nausea, vomiting, fevers, sweats, chills, or worsening of your symptoms          Discharge Follow-up with PCP   As directed       Currently Documented PCP:    Con Canas MD    PCP Phone Number:    [phone]     Follow Up Details: 2 weeks            Follow-up Information     Con Canas MD .    Specialty: Family Medicine  Why: 2 weeks  Contact information:  [address] [place], [place] [address] [address]  [phone]                         Additional Instructions for the Follow-ups that You Need to Schedule     Call MD With Problems / Concerns   As directed      Instructions: call your primary care physician if you experience chest pain, shortness of breath, abdominal pain, nausea,  vomiting, fevers, sweats, chills, or worsening of your symptoms    Order Comments: Instructions: call your primary care physician if you experience chest pain, shortness of breath, abdominal pain, nausea, vomiting, fevers, sweats, chills, or worsening of your symptoms          Discharge Follow-up with PCP   As directed       Currently Documented PCP:    Con Canas MD    PCP Phone Number:    [phone]     Follow Up Details: 2 weeks           Time Spent on Discharge:  Greater than 30 minutes      Patrick Beasley MD  [facility]  03/21/22  14:25 EDT

## 2022-03-22 VITALS
WEIGHT: 181.22 LBS | HEIGHT: 68 IN | HEART RATE: 70 BPM | OXYGEN SATURATION: 91 % | SYSTOLIC BLOOD PRESSURE: 152 MMHG | RESPIRATION RATE: 18 BRPM | TEMPERATURE: 97.6 F | BODY MASS INDEX: 27.47 KG/M2 | DIASTOLIC BLOOD PRESSURE: 69 MMHG

## 2022-03-22 LAB
BACTERIA SPEC AEROBE CULT: NORMAL
BACTERIA SPEC AEROBE CULT: NORMAL

## 2022-03-22 PROCEDURE — 99232 SBSQ HOSP IP/OBS MODERATE 35: CPT | Performed by: INTERNAL MEDICINE

## 2022-03-22 PROCEDURE — 25010000002 CEFTRIAXONE PER 250 MG: Performed by: INTERNAL MEDICINE

## 2022-03-22 RX ORDER — LEVOFLOXACIN 750 MG/1
750 TABLET ORAL ONCE
Status: COMPLETED | OUTPATIENT
Start: 2022-03-22 | End: 2022-03-22

## 2022-03-22 RX ADMIN — METOPROLOL SUCCINATE 25 MG: 25 TABLET, EXTENDED RELEASE ORAL at 08:55

## 2022-03-22 RX ADMIN — LEVOFLOXACIN 750 MG: 750 TABLET, FILM COATED ORAL at 11:00

## 2022-03-22 RX ADMIN — PANTOPRAZOLE SODIUM 40 MG: 40 TABLET, DELAYED RELEASE ORAL at 08:55

## 2022-03-22 RX ADMIN — Medication 10 ML: at 08:50

## 2022-03-22 RX ADMIN — ATORVASTATIN CALCIUM 10 MG: 20 TABLET, FILM COATED ORAL at 08:55

## 2022-03-22 NOTE — CASE MANAGEMENT/SOCIAL WORK
"Physicians Statement of Medical Necessity for  Ambulance Transportation    GENERAL INFORMATION     Name: Ama Billy  YOB: 1933  Medicare #: [id number]  Transport Date: 3/22/2022 (Valid for round trips this date, or for scheduled repetitive trips for 60 days from the date signed below.)  Origin: [facility] [address] [place] [address]  Destination:   [facility] [address] [place] [address]  Is the Patient's stay covered under Medicare Part A (PPS/DRG?)Yes  Closest appropriate facility? Yes  If this a hosp-hosp transfer? No  Is this a hospice patient? No    MEDICAL NECESSITY QUESTIONAIRE    Ambulance Transportation is medically necessary only if other means of transportation are contraindicated or would be potentially harmful to the patient.  To meet this requirement, the patient must be either \"bed confined\" or suffer from a condition such that transport by means other than an ambulance is contraindicated by the patient's condition.  The following questions must be answered by the healthcare professional signing below for this form to be valid:     1) Describe the MEDICAL CONDITION (physical and/or mental) of this patient AT THE TIME OF AMBULANCE TRANSPORT that requires the patient to be transported in an ambulance, and why transport by other means is contraindicated by the patient's condition: paroxysmal atrial fibrillation, anemia, hypertension, acute UTI, altered mental status  Past Medical History:   Diagnosis Date   • Anemia     Normocytic anemia of unclear ediology   • Arthritis     Osteoarthritis involving knees and left shoulder   • Chronic venous insufficiency    • Eosinophilia     Persistent mild eosinophilia of unknown etiology   • History of colonic polyps    • Hyperlipidemia    • Hypertension    • Paroxysmal atrial fibrillation (HCC)       Past Surgical History:   Procedure Laterality Date   • COLONOSCOPY      H/O colonic polyps with " "regular colonoscopies at 5 year intervals.    • COLONOSCOPY  2013    By Dr. Gudino, no polyps identified.   • COLONOSCOPY N/A 7/3/2018    IH, one TA w/low grade dysplasi   • ENDOSCOPY N/A 11/15/2017    Small HH, LA Grade B reflux, acute and erosive ulcerative esophagitis,    • ENDOSCOPY N/A 2/27/2018    LA Grade B esophagitis, HH, gastritis, Path: Canales's w/out dysplasia   • HEMORRHOIDECTOMY  1965   • HIP ENDOPROSTHESIS Right 2/16/2022    Procedure: RIGHT HIP BIPOLAR HEMIARTHROPLASTY;  Surgeon: Miguel Ya MD;  Location: [facility] OR;  Service: Orthopedics;  Laterality: Right;   • HYSTERECTOMY  1971    Partial, secondary to endometriosis   • JOINT REPLACEMENT  2014    Left total hip arthroplasy   • KNEE SURGERY  1997    Arthroscopy of left knee 1997; right knee 2003      2) Is this patient \"bed confined\" as defined below?No    To be \"bed confined\" the patient must satisfy all three of the following criteria:  (1) unable to get up from bed without assistance; AND (2) unable to ambulate;  AND (3) unable to sit in a chair or wheelchair.  3) Can this patient safely be transported by car or wheelchair van (I.e., may safely sit during transport, without an attendant or monitoring?)No   4. In addition to completing questions 1-3 above, please check any of the following conditions that apply*:          *Note: supporting documentation for any boxes checked must be maintained in the patient's medical records Patient is confused and Other patient is 2 person assist for transfers, falls risk      SIGNATURE OF PHYSICIAN OR OTHER AUTHORIZED HEALTHCARE PROFESSIONAL    I certify that the above information is true and correct based on my evaluation of this patient, and represent that the patient requires transport by ambulance and that other forms of transport are contraindicated.  I understand that this information will be used by the Centers for Medicare and Medicaid Services (CMS) to support the determiniation of medical " necessity for ambulance services, and I represent that I have personal knowledge of the patient's condition at the time of transport.    x   If this box is checked, I also certify that the patient is physically or mentally incapable of signing the ambulance service's claim form and that the institution with which I am affiliated has furnished care, services or assistance to the patient.  My signature below is made on behalf of the patient pursuant to 42 .36(b)(4). In accordance with 42 .37, the specific reason(s) that the patient is physically or mentally incapable of signing the claim for is as follows: Patient is confused    Signature of Physician or Healthcare Professional  Date/Time:   3/22/2022 10:13 EDT      (For Scheduled repetitive transport, this form is not valid for transports performed more than 60 days after this date).                                                                                                                                            --------------------------------------------------------------------------------------------  Printed Name and Credentials of Physician or Authorized Healthcare Professional     *Form must be signed by patient's attending physician for scheduled, repetitive transports,.  For non-repetitive ambulance transports, if unable to obtain the signature of the attending physician, any of the following may sign (please select below):     Physician  Clinical Nurse Specialist  Registered Nurse     Physician Assistant  Discharge Planner  Licensed Practical Nurse     Nurse Practitioner

## 2022-03-22 NOTE — PLAN OF CARE
Goal Outcome Evaluation:  Plan of Care Reviewed With: patient        Progress: no change  Outcome Evaluation: Patient alert to self this shift. Pleasantly confused. Up in recliner at start of shift. Assisted to bed with one staff member. Max assist, with patient only pivoting. Patient is scheduled for discharge Tuesday 03.22.2022 at 1pm. Voiding adequately. Vital signs stable. Call light in reach. Safety maintained.

## 2022-03-22 NOTE — PROGRESS NOTES
ID note for pyelonephritis  Subjective: Doing okay. No f/c/ns, No n/v/d. Lost PIV this AM    Objective:   Temp:  [97.6 °F (36.4 °C)-97.9 °F (36.6 °C)] 97.6 °F (36.4 °C)  Heart Rate:  [58-82] 70  Resp:  [16-18] 18  BP: (152-168)/(53-69) 152/69  nad, torres mood and affect.  breathing comfortably on ambient air, abdomen is soft and nontender    3/16 blood and urine cultures grew E. Coli, susceptible to all tested agents  3/17 blood cultures ngtd       Assessment and plan  1.  E. coli septicemia from #2  2.  Left pyelonephritis  3.  Acute kidney injury    She lost IV access today and is planning discharge today  We will therefore stop rocephin and give a renal dose of levaquin 750mg po today and thrusday 3/24 to complete abx therapy. D/w RN  We will see prn

## 2022-03-22 NOTE — PLAN OF CARE
Goal Outcome Evaluation:  Plan of Care Reviewed With: patient        Progress: improving  Outcome Evaluation: Up in chair a long time and татьяна well. Pleasantly confused and repeats same statement in conversation. No falls or injury. Tolerating antibiotic. Discharge paperwork started for d/c at 1300 tomorrow.  No new skin issues.

## 2022-03-22 NOTE — NURSING NOTE
EMS crew picking up patient.   at bedside, has gathered up all patient's belongings and will transport them himself.

## 2022-03-24 NOTE — CASE MANAGEMENT/SOCIAL WORK
Case Management Discharge Note      Final Note: The patient was d/c to a skilled bed at [facility] and was transported by [facility].  CLEM FABIAN    Provided Post Acute Provider List?: N/A  Provided Post Acute Provider Quality & Resource List?: N/A    Selected Continued Care - Discharged on 3/22/2022 Admission date: 3/16/2022 - Discharge disposition: Skilled Nursing Facility (DC - External)    Destination Coordination complete.    Service Provider Selected Services Address Phone Fax Patient [facility] [place]  Skilled Nursing [address] , [facility] [address] [phone] --       Internal Comment last updated by Aishwarya Ellis, RN 3/17/2022 1429    [facility]- patient has a private pay bed at this time but may qualify for a new precert with new hospitalization- CCP to follow. Aishwarya Ellis RN                         Durable Medical Equipment    No services have been selected for the patient.              Dialysis/Infusion    No services have been selected for the patient.              Home Medical Care    No services have been selected for the patient.              Therapy    No services have been selected for the patient.              Community Resources    No services have been selected for the patient.              Community & DME    No services have been selected for the patient.                Selected Continued Care - Episodes Includes selections from active Coordinated Care Management episodes    High Risk Care Management Episode start date: 3/23/2022 (Paused)   There are no active outsourced providers for this episode.             Selected Continued Care - Prior Encounters Includes selections from prior encounters from 12/16/2021 to 3/22/2022    Discharged on 2/22/2022 Admission date: 2/14/2022 - Discharge disposition: Skilled Nursing Facility (DC - External)    Destination     Service Provider Selected Services Address Phone Fax Patient Preferred     [facility] [place]  Skilled Nursing [address] , [facility] [address] [phone] --                    Transportation Services  Ambulance: [facility]    Final Discharge Disposition Code: 03 - skilled nursing facility (SNF)

## 2022-04-08 ENCOUNTER — READMISSION MANAGEMENT (OUTPATIENT)
Dept: CALL CENTER | Facility: HOSPITAL | Age: 87
End: 2022-04-08

## 2022-04-08 ENCOUNTER — TELEPHONE (OUTPATIENT)
Dept: PEDIATRICS | Facility: OTHER | Age: 87
End: 2022-04-08

## 2022-04-08 ENCOUNTER — TRANSITIONAL CARE MANAGEMENT TELEPHONE ENCOUNTER (OUTPATIENT)
Dept: CALL CENTER | Facility: HOSPITAL | Age: 87
End: 2022-04-08

## 2022-04-08 NOTE — TELEPHONE ENCOUNTER
PAT[facility]AS[facility] ON [facility]. MAKENZIE IS ASKING FOR VERBAL ORDERS FOR A PHYSICAL THERAPY AND OCCUPATIONAL THERAPY EVALUATION.      EITAN/MAKENZIE  [phone]

## 2022-04-08 NOTE — OUTREACH NOTE
Call Center TCM Note    Flowsheet Row Responses   [facility] patient discharged from? Non-BH   Does the patient have one of the following disease processes/diagnoses(primary or secondary)? Non-BH Discharge   TCM attempt successful? Yes   Call start time 1555   Call end time 1558   Discharge diagnosis Unavailable    Person spoke with today (if not patient) and relationship    Does the patient have all medications ordered at discharge? Yes   Is the patient taking all medications as directed (includes completed medication regime)? Yes   Does the patient have a primary care provider?  Yes   Does the patient have an appointment with their PCP within 7 days of discharge? No   Comments regarding PCP Declines HOSP/REHAB DC FU appt  at this time.  states he will call to schedule the appt.    Nursing Interventions Educated patient on importance of making appointment, Advised patient to make appointment   Has the patient kept scheduled appointments due by today? N/A   Has home health visited the patient within 72 hours of discharge? Yes   Psychosocial issues? No   Comments Has broken shoulder   Did the patient receive a copy of their discharge instructions? Yes   What is the patient's perception of their health status since discharge? Improving   Is the patient/caregiver able to teach back signs and symptoms related to disease process for when to call PCP? Yes   Is the patient/caregiver able to teach back signs and symptoms related to disease process for when to call 911? Yes   Is the patient/caregiver able to teach back the hierarchy of who to call/visit for symptoms/problems? PCP, Specialist, Home health nurse, Urgent Care, ED, 911 Yes   TCM call completed? Yes   Wrap up additional comments  state Pt is doing better and HH is coming in. Declines HOSP DC FU / REhab appt at this time and states that he will call for the appt.           Manda Cohen RN    4/8/2022, 16:00 EDT

## 2022-04-08 NOTE — OUTREACH NOTE
Prep Survey    Flowsheet Row Responses   [facility] facility patient discharged from? Non-BH   Is LACE score < 7 ? Non-BH Discharge   Emergency Room discharge w/ pulse ox? No   Eligibility [facility]   Date of Discharge 04/07/22   Discharge diagnosis Unavailable    Does the patient have one of the following disease processes/diagnoses(primary or secondary)? Non-BH Discharge   Prep survey completed? Yes          JOSE GUADALUPE LA - Registered Nurse

## 2022-04-11 ENCOUNTER — TELEPHONE (OUTPATIENT)
Dept: INTERNAL MEDICINE | Facility: CLINIC | Age: 87
End: 2022-04-11

## 2022-04-11 NOTE — TELEPHONE ENCOUNTER
Caller: [facility]    Relationship: Home Health    Best call back number: [phone]    What orders are you requesting (i.e. lab or imaging): VERBAL TO ADD DR LEAL TO PLAN OF CARE  PT FOR 2 WEEK 4 AND 1 WEEK 4    Additional notes: PLEASE CALL TO GIVE VERBAL ORDERS

## 2022-04-14 ENCOUNTER — TELEPHONE (OUTPATIENT)
Dept: INTERNAL MEDICINE | Facility: CLINIC | Age: 87
End: 2022-04-14

## 2022-04-14 NOTE — TELEPHONE ENCOUNTER
Caller: YONI    Relationship to patient: OCCUPATIONAL THERAPY    Best call back number:     Patient is needing:  [facility] THAT PATIENT'S  AND SON REFUSED A VISIT FOR OCCUPATIONAL THERAPY FOR PATIENT.  HE STATES  THEY TOLD YONI THAT PATIENT HAS MOVED PAST OCCUPATIONAL THERAPY.    PLEASE ADVISE.

## 2022-04-26 ENCOUNTER — TELEPHONE (OUTPATIENT)
Dept: INTERNAL MEDICINE | Facility: CLINIC | Age: 87
End: 2022-04-26

## 2022-04-26 NOTE — TELEPHONE ENCOUNTER
Anna from [facility] calling stating patient needing Right ankle brace for right foot drop.  Faxed to [facility] to Tony Willett@ [phone]    Anna [phone]    Prescription needs to say RIght EZ stride carbon fiber for right foot drop.

## 2022-04-29 ENCOUNTER — OFFICE VISIT (OUTPATIENT)
Dept: INTERNAL MEDICINE | Facility: CLINIC | Age: 87
End: 2022-04-29

## 2022-04-29 ENCOUNTER — TELEPHONE (OUTPATIENT)
Dept: INTERNAL MEDICINE | Facility: CLINIC | Age: 87
End: 2022-04-29

## 2022-04-29 VITALS
DIASTOLIC BLOOD PRESSURE: 69 MMHG | SYSTOLIC BLOOD PRESSURE: 120 MMHG | TEMPERATURE: 98.6 F | HEART RATE: 77 BPM | BODY MASS INDEX: 27.56 KG/M2 | OXYGEN SATURATION: 96 % | HEIGHT: 68 IN

## 2022-04-29 DIAGNOSIS — E53.8 FOLIC ACID DEFICIENCY: ICD-10-CM

## 2022-04-29 DIAGNOSIS — D62 ACUTE BLOOD LOSS ANEMIA: ICD-10-CM

## 2022-04-29 DIAGNOSIS — E78.2 MIXED HYPERLIPIDEMIA: ICD-10-CM

## 2022-04-29 DIAGNOSIS — R41.3 MEMORY CHANGE: ICD-10-CM

## 2022-04-29 DIAGNOSIS — M21.371 FOOT DROP, RIGHT FOOT: ICD-10-CM

## 2022-04-29 DIAGNOSIS — S72.001S CLOSED FRACTURE OF RIGHT HIP, SEQUELA: ICD-10-CM

## 2022-04-29 DIAGNOSIS — I10 PRIMARY HYPERTENSION: Primary | ICD-10-CM

## 2022-04-29 DIAGNOSIS — E53.8 B12 DEFICIENCY: ICD-10-CM

## 2022-04-29 DIAGNOSIS — K22.70 BARRETT'S ESOPHAGUS WITHOUT DYSPLASIA: ICD-10-CM

## 2022-04-29 PROCEDURE — 1170F FXNL STATUS ASSESSED: CPT | Performed by: FAMILY MEDICINE

## 2022-04-29 PROCEDURE — G0439 PPPS, SUBSEQ VISIT: HCPCS | Performed by: FAMILY MEDICINE

## 2022-04-29 PROCEDURE — 99211 OFF/OP EST MAY X REQ PHY/QHP: CPT | Performed by: FAMILY MEDICINE

## 2022-04-29 PROCEDURE — 1159F MED LIST DOCD IN RCRD: CPT | Performed by: FAMILY MEDICINE

## 2022-04-29 RX ORDER — METOPROLOL SUCCINATE 25 MG/1
25 TABLET, EXTENDED RELEASE ORAL
Qty: 90 TABLET | Refills: 3 | Status: SHIPPED | OUTPATIENT
Start: 2022-04-29 | End: 2023-03-02

## 2022-04-29 RX ORDER — ATORVASTATIN CALCIUM 10 MG/1
10 TABLET, FILM COATED ORAL DAILY
Qty: 90 TABLET | Refills: 3 | Status: SHIPPED | OUTPATIENT
Start: 2022-04-29 | End: 2023-04-03

## 2022-04-29 RX ORDER — PANTOPRAZOLE SODIUM 20 MG/1
20 TABLET, DELAYED RELEASE ORAL DAILY
Qty: 90 TABLET | Refills: 3 | Status: SHIPPED | OUTPATIENT
Start: 2022-04-29

## 2022-04-29 RX ORDER — LISINOPRIL 40 MG/1
TABLET ORAL
COMMUNITY
Start: 2022-04-22 | End: 2022-04-29

## 2022-04-29 RX ORDER — FOLIC ACID 1 MG/1
1 TABLET ORAL DAILY
Qty: 90 TABLET | Refills: 3 | Status: SHIPPED | OUTPATIENT
Start: 2022-04-29

## 2022-04-29 NOTE — TELEPHONE ENCOUNTER
Caller: KITMONIQUE    Relationship: DAUGHTER IN LAW    Best call back number: [phone]*    What is the best time to reach you: ANYTIME    Who are you requesting to speak with (clinical staff, provider,  specific staff member): CLINICAL    What was the call regarding: PATIENT'S DAUGHTER-IN-LAW REQUEST A CALL BACK TO GO OVER THE PATIENT'S MEDICATIONS. MONIQUE STATES SHE TAKES CARE OF THE PATIENT'S MEDICATIONS AND WANTS TO MAKE SURE SHE HAS EVERYTHING CORRECT.    Do you require a callback: YES, ASK ASAP.

## 2022-04-29 NOTE — PROGRESS NOTES
"Chief Complaint  Medicare Wellness-subsequent    Subjective          Ama Billy presents to [facility] PRIMARY CARE  History of Present Illness  The patient is seen accompanied by her  she has had difficulty few months here.  She did have a fall at home down the basement steps with fracture of the right shoulder and also fracture of the right hip requiring operative repair of the right hip.  She did develop urosepsis after discharge from the hospital to nursing and had to be readmitted.  She does not recall much about the past few months.  She seems to be aware of her environment and more lucid as far as her care.  She does seem to have some degree of memory deficit however.  She does not have any delirium or delusions.    Active management of hypertension hyperlipidemia is reviewed as well as GERD reflux esophagitis.  Supplements include B12 and folic acid.  She had postoperative anemia but will hold off on ferrous sulfate and vitamin C for now.      Objective   Vital Signs:   /69 (BP Location: Left arm, Patient Position: Sitting, Cuff Size: Adult)   Pulse 77   Temp 98.6 °F (37 °C) (Temporal)   Ht 172.7 cm (67.99\")   SpO2 96%   BMI 27.56 kg/m²     Physical Exam  Vitals reviewed.   Constitutional:       Appearance: She is well-developed.   HENT:      Head: Normocephalic and atraumatic.      Right Ear: Tympanic membrane and external ear normal.      Left Ear: Tympanic membrane and external ear normal.   Eyes:      Conjunctiva/sclera: Conjunctivae normal.      Pupils: Pupils are equal, round, and reactive to light.   Neck:      Thyroid: No thyromegaly.      Vascular: No JVD.   Cardiovascular:      Rate and Rhythm: Normal rate and regular rhythm.      Heart sounds: Normal heart sounds.   Pulmonary:      Effort: Pulmonary effort is normal.      Breath sounds: Normal breath sounds.   Abdominal:      General: Bowel sounds are normal.      Palpations: Abdomen is soft. "   Musculoskeletal:      Right shoulder: Decreased range of motion.      Cervical back: Normal range of motion and neck supple.      Right hip: Decreased range of motion.   Lymphadenopathy:      Cervical: No cervical adenopathy.   Skin:     General: Skin is warm and dry.      Findings: No rash.   Neurological:      Mental Status: She is alert and oriented to person, place, and time.      Cranial Nerves: No cranial nerve deficit.      Motor: Weakness present.      Coordination: Coordination abnormal.      Gait: Gait abnormal.   Psychiatric:         Attention and Perception: Attention normal.         Mood and Affect: Mood normal.         Speech: Speech normal.         Behavior: Behavior normal.         Thought Content: Thought content normal.         Cognition and Memory: Memory is impaired.         Judgment: Judgment normal.        Result Review :   The following data was reviewed by: Con Canas MD on 04/29/2022:  Common labs    Common Labsle 3/17/22 3/17/22 3/18/22 3/18/22 3/18/22 3/20/22 3/20/22    1150 1150 1144 2054 2054 0944 0944   Glucose  83 105 (A)    84   BUN  22 16    9   Creatinine  1.47 (A) 1.25 (A)    0.94   Sodium  134 (A) 138    139   Potassium  4.0 3.6    3.6   Chloride  102 107    106   Calcium  8.0 (A) 8.0 (A)    8.6   Albumin   2.80 (A)       WBC 9.30   7.15  7.48    Hemoglobin 7.1 (A)   7.3 (A)  9.0 (A)    Hematocrit 22.5 (A)   23.6 (A) 23.1 (A) 29.1 (A)    Platelets 212   248  304    (A) Abnormal value            Reviewed hospitalization [facility]         Assessment and Plan    Diagnoses and all orders for this visit:    1. Primary hypertension (Primary)  Assessment & Plan:  Hypertension is improving with treatment.  Continue current treatment regimen.  Blood pressure will be reassessed at the next regular appointment.    Amlodipine 5 mg daily metoprolol XL 25 mg daily    Orders:  -     CBC & Differential  -     Comprehensive Metabolic Panel  -     Iron and TIBC  -      Ferritin  -     Lipid Panel With / Chol / HDL Ratio  -     Folate  -     Vitamin B12  -     Urinalysis With Culture If Indicated - Urine, Clean Catch  -     TSH    2. Mixed hyperlipidemia  Assessment & Plan:  Atorvastatin 10 mg      Orders:  -     CBC & Differential  -     Comprehensive Metabolic Panel  -     Iron and TIBC  -     Ferritin  -     Lipid Panel With / Chol / HDL Ratio  -     Folate  -     Vitamin B12  -     Urinalysis With Culture If Indicated - Urine, Clean Catch  -     TSH    3. Canales's esophagus without dysplasia  Assessment & Plan:  Pantoprazole 20 mg daily    Orders:  -     CBC & Differential  -     Comprehensive Metabolic Panel  -     Iron and TIBC  -     Ferritin  -     Lipid Panel With / Chol / HDL Ratio  -     Folate  -     Vitamin B12  -     Urinalysis With Culture If Indicated - Urine, Clean Catch  -     TSH    4. B12 deficiency  -     CBC & Differential  -     Comprehensive Metabolic Panel  -     Iron and TIBC  -     Ferritin  -     Lipid Panel With / Chol / HDL Ratio  -     Folate  -     Vitamin B12  -     Urinalysis With Culture If Indicated - Urine, Clean Catch  -     TSH    5. Folic acid deficiency  -     CBC & Differential  -     Comprehensive Metabolic Panel  -     Iron and TIBC  -     Ferritin  -     Lipid Panel With / Chol / HDL Ratio  -     Folate  -     Vitamin B12  -     Urinalysis With Culture If Indicated - Urine, Clean Catch  -     TSH    6. Memory change  -     CBC & Differential  -     Comprehensive Metabolic Panel  -     Iron and TIBC  -     Ferritin  -     Lipid Panel With / Chol / HDL Ratio  -     Folate  -     Vitamin B12  -     Urinalysis With Culture If Indicated - Urine, Clean Catch  -     TSH    7. Closed fracture of right hip, sequela    8. Acute blood loss anemia  -     CBC & Differential  -     Comprehensive Metabolic Panel  -     Iron and TIBC  -     Ferritin  -     Lipid Panel With / Chol / HDL Ratio  -     Folate  -     Vitamin B12  -     Urinalysis With  Culture If Indicated - Urine, Clean Catch  -     TSH    9. Foot drop, right foot  Assessment & Plan:  Right EZ Stride canbon fiber AFO  For foot drop [phone] Fax [phone]  [facility].      Other orders  -     pantoprazole (PROTONIX) 20 MG EC tablet; Take 1 tablet by mouth Daily.  Dispense: 90 tablet; Refill: 3  -     metoprolol succinate XL (TOPROL-XL) 25 MG 24 hr tablet; Take 1 tablet by mouth Daily.  Dispense: 90 tablet; Refill: 3  -     atorvastatin (LIPITOR) 10 MG tablet; Take 1 tablet by mouth Daily.  Dispense: 90 tablet; Refill: 3  -     folic acid (FOLVITE) 1 MG tablet; Take 1 tablet by mouth Daily.  Dispense: 90 tablet; Refill: 3  -     cyanocobalamin (VITAMIN B-12) 1000 MCG tablet; Take 1 tablet by mouth Daily.  Dispense: 90 tablet; Refill: 3             Follow Up   No follow-ups on file.  Patient was given instructions and counseling regarding her condition or for health maintenance advice. Please see specific information pulled into the AVS if appropriate.

## 2022-04-29 NOTE — PROGRESS NOTES
The ABCs of the Annual Wellness Visit  Subsequent Medicare Wellness Visit    Chief Complaint   Patient presents with   • Medicare Wellness-subsequent      Subjective    History of Present Illness:  Ama Billy is a 88 y.o. female who presents for a Subsequent Medicare Wellness Visit.    The following portions of the patient's history were reviewed and   updated as appropriate: allergies, current medications, past family history, past medical history, past social history, past surgical history and problem list.    Compared to one year ago, the patient feels her physical   health is worse.    Compared to one year ago, the patient feels her mental   health is worse.    Recent Hospitalizations:  This patient has had a [facility] admission record on file within the last 365 days.    Current Medical Providers:  Patient Care Team:  Con Canas MD as PCP - General (Family Medicine)  Charissa Rachel RN as Ambulatory  (Population Health)  Zee Mckeon MD as Referring Physician (Hospitalist)    Outpatient Medications Prior to Visit   Medication Sig Dispense Refill   • amLODIPine (NORVASC) 5 MG tablet TAKE 1 TABLET DAILY ( MAKE APPOINTMENT FOR FURTHER REFILLS ) (Patient taking differently: Take 5 mg by mouth Daily.) 90 tablet 3   • aspirin 81 MG EC tablet Take 81 mg by mouth 2 (Two) Times a Day.     • ascorbic acid (VITAMIN C) 500 MG tablet Take 500 mg by mouth 2 (Two) Times a Day.     • atorvastatin (LIPITOR) 10 MG tablet Take 1 tablet by mouth Daily.     • ferrous sulfate 325 (65 FE) MG tablet Take 1 tablet by mouth 2 (Two) Times a Day With Meals.     • folic acid (FOLVITE) 1 MG tablet Take 1 tablet by mouth Daily.     • metoprolol succinate XL (TOPROL-XL) 25 MG 24 hr tablet Take 1 tablet by mouth Daily.     • pantoprazole (PROTONIX) 20 MG EC tablet Take 1 tablet by mouth Daily. 90 tablet 3   • vitamin B-12 (VITAMIN B-12) 1000 MCG tablet Take 1 tablet by mouth Daily.     • bisacodyl  (DULCOLAX) 10 MG suppository Insert 1 suppository into the rectum Daily As Needed for Constipation.     • docusate sodium 100 MG capsule Take 1 capsule by mouth 2 (Two) Times a Day As Needed for Constipation.     • lisinopril (PRINIVIL,ZESTRIL) 40 MG tablet        No facility-administered medications prior to visit.       No opioid medication identified on active medication list. I have reviewed chart for other potential  high risk medication/s and harmful drug interactions in the elderly.          Aspirin is on active medication list. Aspirin use is indicated based on review of current medical condition/s. Pros and cons of this therapy have been discussed today. Benefits of this medication outweigh potential harm.  Patient has been encouraged to continue taking this medication.  .      Patient Active Problem List   Diagnosis   • Normocytic anemia   • Abdominal cramping   • LLQ pain   • Colon polyps   • Bursitis of hip   • Diarrhea   • Fatigue   • Generalized osteoarthritis   • Hyperlipidemia   • Hypertension   • Irritable bowel syndrome   • Muscle strain of lower extremity   • Tendinitis   • Chronic venous insufficiency   • Status post total left knee replacement   • Hip joint replacement status   • Left retinal detachment   • Hypovitaminosis D   • Weakness of extremity   • Closed wedge compression fracture of first lumbar vertebra (HCC)   • Fall   • Constipation   • Nausea & vomiting   • Leukocytosis   • Anemia   • Symptomatic anemia   • Esophagitis   • GI bleed   • Acute blood loss anemia   • Iron deficiency anemia   • Paroxysmal atrial fibrillation ([facility])   • Rectal bleeding   • Iron deficiency anemia due to chronic blood loss   • Swelling of lower extremity   • Canales's esophagus without dysplasia   • Dyspnea on exertion   • Closed fracture of right hip ([facility])   • Fx humeral neck, right, closed, initial encounter   • Acute urinary retention   • BENNIE (acute kidney injury) ([facility])     Advance Care Planning  Advance  "Directive is not on file.  ACP discussion was held with the patient during this visit. Patient has an advance directive (not in EMR), copy requested.          Objective    Vitals:    04/29/22 1113   BP: 120/69   BP Location: Left arm   Patient Position: Sitting   Cuff Size: Adult   Pulse: 77   Temp: 98.6 °F (37 °C)   TempSrc: Temporal   SpO2: 96%   Weight: Comment: Pt did not feel like weighing today.   Height: 172.7 cm (67.99\")     BMI Readings from Last 1 Encounters:   04/29/22 27.56 kg/m²   BMI is above normal parameters. Recommendations include: none (medical contraindication)    Does the patient have evidence of cognitive impairment? Yes    Physical Exam  Lab Results   Component Value Date    HGBA1C 4.90 02/15/2022            HEALTH RISK ASSESSMENT    Smoking Status:  Social History     Tobacco Use   Smoking Status Never Smoker   Smokeless Tobacco Never Used     Alcohol Consumption:  Social History     Substance and Sexual Activity   Alcohol Use No     Fall Risk Screen:    STEADI Fall Risk Assessment was completed, and patient is at MODERATE risk for falls. Assessment completed on:4/29/2022    Depression Screening:  PHQ-2/PHQ-9 Depression Screening 4/29/2022   Retired Total Score -   Little Interest or Pleasure in Doing Things 0-->not at all   Feeling Down, Depressed or Hopeless 0-->not at all   PHQ-9: Brief Depression Severity Measure Score 0       Health Habits and Functional and Cognitive Screening:  Functional & Cognitive Status 4/29/2022   Do you have difficulty preparing food and eating? No   Do you have difficulty bathing yourself, getting dressed or grooming yourself? Yes   Do you have difficulty using the toilet? Yes   Do you have difficulty moving around from place to place? Yes   Do you have trouble with steps or getting out of a bed or a chair? Yes   Current Diet Well Balanced Diet   Dental Exam Up to date   Eye Exam Up to date   Exercise (times per week) 2 times per week   Current Exercises Include " -   Current Exercise Activities Include -   Do you need help using the phone?  Yes   Are you deaf or do you have serious difficulty hearing?  Yes   Do you need help with transportation? Yes   Do you need help shopping? Yes   Do you need help preparing meals?  Yes   Do you need help with housework?  Yes   Do you need help with laundry? Yes   Do you need help taking your medications? Yes   Do you need help managing money? Yes   Do you ever drive or ride in a car without wearing a seat belt? No   Have you felt unusual stress, anger or loneliness in the last month? Yes   Who do you live with? Spouse   If you need help, do you have trouble finding someone available to you? No   Have you been bothered in the last four weeks by sexual problems? No   Do you have difficulty concentrating, remembering or making decisions? Yes       Age-appropriate Screening Schedule:  Refer to the list below for future screening recommendations based on patient's age, sex and/or medical conditions. Orders for these recommended tests are listed in the plan section. The patient has been provided with a written plan.    Health Maintenance   Topic Date Due   • DXA SCAN  Never done   • TDAP/TD VACCINES (1 - Tdap) Never done   • ZOSTER VACCINE (1 of 2) Never done   • MAMMOGRAM  11/21/2016   • LIPID PANEL  02/09/2022   • INFLUENZA VACCINE  08/01/2022              Assessment/Plan   CMS Preventative Services Quick Reference  Risk Factors Identified During Encounter  Cardiovascular Disease  Dementia/Memory   Fall Risk-High or Moderate  The above risks/problems have been discussed with the patient.  Follow up actions/plans if indicated are seen below in the Assessment/Plan Section.  Pertinent information has been shared with the patient in the After Visit Summary.    There are no diagnoses linked to this encounter.    Follow Up:   No follow-ups on file.     An After Visit Summary and PPPS were made available to the patient.

## 2022-04-29 NOTE — ASSESSMENT & PLAN NOTE
Hypertension is improving with treatment.  Continue current treatment regimen.  Blood pressure will be reassessed at the next regular appointment.    Amlodipine 5 mg daily metoprolol XL 25 mg daily

## 2022-05-16 ENCOUNTER — TRANSCRIBE ORDERS (OUTPATIENT)
Dept: ADMINISTRATIVE | Facility: HOSPITAL | Age: 87
End: 2022-05-16

## 2022-05-16 DIAGNOSIS — S72.001A CLOSED DISPLACED FRACTURE OF RIGHT FEMORAL NECK: Primary | ICD-10-CM

## 2022-05-26 ENCOUNTER — HOSPITAL ENCOUNTER (OUTPATIENT)
Dept: BONE DENSITY | Facility: HOSPITAL | Age: 87
Discharge: HOME | End: 2022-05-26
Admitting: ORTHOPAEDIC SURGERY

## 2022-05-26 DIAGNOSIS — S72.001A CLOSED DISPLACED FRACTURE OF RIGHT FEMORAL NECK: ICD-10-CM

## 2022-05-26 PROCEDURE — 77080 DXA BONE DENSITY AXIAL: CPT

## 2022-05-27 ENCOUNTER — TELEPHONE (OUTPATIENT)
Dept: INTERNAL MEDICINE | Facility: CLINIC | Age: 87
End: 2022-05-27

## 2022-05-27 NOTE — TELEPHONE ENCOUNTER
Caller: JUAN WALLACE     Relationship: Home Health    Best call back number: [phone]    What was the call regarding: GIORGIO WITH [facility] IS CALLING TO REPORT A MISSED VISIT. GIORGIO STATED THAT PATIENT MISSED HER PHYISCAL THERAPY VISIT YESTERDAY, PATIENT WAS NOT AVAILABLE.     Do you require a callback: IF ANY QUESTIONS

## 2022-06-17 ENCOUNTER — TELEPHONE (OUTPATIENT)
Dept: INTERNAL MEDICINE | Facility: CLINIC | Age: 87
End: 2022-06-17

## 2022-07-07 ENCOUNTER — TELEPHONE (OUTPATIENT)
Dept: INTERNAL MEDICINE | Facility: CLINIC | Age: 87
End: 2022-07-07

## 2022-07-07 NOTE — TELEPHONE ENCOUNTER
Caller: Trino Billy    Relationship: Emergency Contact    Best call back number: [phone]    What medication are you requesting:SOMETHING FOR SWELLING OF LEGS AND FEET     What are your current symptoms: BOTH LEGS AND FEET SWELLING TO THE POINT SHE CAN NO LONGER PUT HER DROP FOOT BRACE ON     How long have you been experiencing symptoms: SINCE OPERATION ON HIP BACK IN February BUT GETTING WORSE NOW      If a prescription is needed, what is your preferred pharmacy and phone number: [id number] [address] - [phone]  - [phone]

## 2022-07-08 ENCOUNTER — OFFICE VISIT (OUTPATIENT)
Dept: INTERNAL MEDICINE | Facility: CLINIC | Age: 87
End: 2022-07-08

## 2022-07-08 VITALS
OXYGEN SATURATION: 99 % | SYSTOLIC BLOOD PRESSURE: 110 MMHG | HEART RATE: 64 BPM | HEIGHT: 67 IN | DIASTOLIC BLOOD PRESSURE: 83 MMHG | TEMPERATURE: 98.4 F | WEIGHT: 174 LBS | BODY MASS INDEX: 27.31 KG/M2

## 2022-07-08 DIAGNOSIS — I87.2 CHRONIC VENOUS INSUFFICIENCY: Primary | ICD-10-CM

## 2022-07-08 PROCEDURE — 99213 OFFICE O/P EST LOW 20 MIN: CPT | Performed by: NURSE PRACTITIONER

## 2022-07-08 NOTE — ASSESSMENT & PLAN NOTE
Gave her prescription for compression socks. 20-30 mm hg, knee high     She is to apply in am and remove at night.     Elevate legs during the day (twice) higher than heart for about 15 mins.     Limit salt intake.

## 2022-07-08 NOTE — PROGRESS NOTES
"        Chief Complaint  Foot Swelling     Subjective:      History of Present Illness {CC  Problem List  Visit  Diagnosis   Encounters  Notes  Medications  Labs  Result Review Imaging  Media :23}     Ama Billy is a patient of Con Canas MD and presents to [facility] for       Leg swelling: states since she was young.    States when she wakes up, swelling is gone.  Throughout the day, it gets worse.  No injury.  She states her father had the same problem.     No SOA, no leg pain.   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     She is new to me.     I have reviewed patient's medical history, any new submitted information provided by patient or medical assistant and updated medical record.      Objective:      Physical Exam  Constitutional:       Comments: Ambulates with rollator    Neck:      Vascular: No JVD.   Cardiovascular:      Rate and Rhythm: Normal rate.      Pulses: Normal pulses.   Pulmonary:      Effort: Pulmonary effort is normal.      Breath sounds: Normal breath sounds.      Comments: No C/W  Musculoskeletal:      Comments: BL lower extremity edema 1+, no redness, equal, not weeping        Result Review  Data Reviewed:{ Labs  Result Review  Imaging  Med Tab  Media :23}                Vital Signs:   /83 (BP Location: Left arm, Patient Position: Sitting, Cuff Size: Adult)   Pulse 64   Temp 98.4 °F (36.9 °C) (Temporal)   Ht 170.2 cm (67\")   Wt 78.9 kg (174 lb)   SpO2 99%   BMI 27.25 kg/m²         Requested Prescriptions      No prescriptions requested or ordered in this encounter       Routine medications provided by this office will also be refilled via pharmacy request.       Current Outpatient Medications:   •  amLODIPine (NORVASC) 5 MG tablet, TAKE 1 TABLET DAILY ( MAKE APPOINTMENT FOR FURTHER REFILLS ) (Patient taking differently: Take 5 mg by mouth Daily.), Disp: 90 tablet, Rfl: 3  •  aspirin 81 MG EC tablet, " Take 81 mg by mouth 2 (Two) Times a Day., Disp: , Rfl:   •  atorvastatin (LIPITOR) 10 MG tablet, Take 1 tablet by mouth Daily., Disp: 90 tablet, Rfl: 3  •  bisacodyl (DULCOLAX) 10 MG suppository, Insert 1 suppository into the rectum Daily As Needed for Constipation., Disp: , Rfl:   •  cyanocobalamin (VITAMIN B-12) 1000 MCG tablet, Take 1 tablet by mouth Daily., Disp: 90 tablet, Rfl: 3  •  docusate sodium 100 MG capsule, Take 1 capsule by mouth 2 (Two) Times a Day As Needed for Constipation., Disp: , Rfl:   •  folic acid (FOLVITE) 1 MG tablet, Take 1 tablet by mouth Daily., Disp: 90 tablet, Rfl: 3  •  metoprolol succinate XL (TOPROL-XL) 25 MG 24 hr tablet, Take 1 tablet by mouth Daily., Disp: 90 tablet, Rfl: 3  •  pantoprazole (PROTONIX) 20 MG EC tablet, Take 1 tablet by mouth Daily., Disp: 90 tablet, Rfl: 3     Assessment and Plan:      Assessment and Plan {CC Problem List  Visit Diagnosis  ROS  Review (Popup)  Health Maintenance  Quality  BestPractice  Medications  SmartSets  SnapShot Encounters  Media :23}     Problem List Items Addressed This Visit        Cardiac and Vasculature    Chronic venous insufficiency - Primary (Chronic)    Current Assessment & Plan     Gave her prescription for compression socks. 20-30 mm hg, knee high     She is to apply in am and remove at night.     Elevate legs during the day (twice) higher than heart for about 15 mins.     Limit salt intake.                    Follow Up {Instructions Charge Capture  Follow-up Communications :23}     Return if symptoms worsen or fail to improve, for Next scheduled follow up.    Follow up with PCP as scheduled.     Patient was given instructions and counseling regarding her condition or for health maintenance advice. Please see specific information pulled into the AVS if appropriate.    Dragon disclaimer:   Much of this encounter note is an electronic transcription/translation of spoken language to printed text. The electronic  translation of spoken language may permit erroneous, or at times, nonsensical words or phrases to be inadvertently transcribed; Although I have reviewed the note for such errors, some may still exist.     Additional Patient Counseling:       There are no Patient Instructions on file for this visit.

## 2022-07-10 RX ORDER — HYDROCHLOROTHIAZIDE 12.5 MG/1
12.5 TABLET ORAL DAILY
Qty: 30 TABLET | Refills: 2 | Status: SHIPPED | OUTPATIENT
Start: 2022-07-10 | End: 2022-08-25

## 2022-08-25 ENCOUNTER — OFFICE VISIT (OUTPATIENT)
Dept: INTERNAL MEDICINE | Facility: CLINIC | Age: 87
End: 2022-08-25

## 2022-08-25 VITALS
SYSTOLIC BLOOD PRESSURE: 178 MMHG | HEART RATE: 58 BPM | BODY MASS INDEX: 27.25 KG/M2 | WEIGHT: 174 LBS | TEMPERATURE: 98.4 F | DIASTOLIC BLOOD PRESSURE: 80 MMHG | OXYGEN SATURATION: 96 %

## 2022-08-25 DIAGNOSIS — I87.2 CHRONIC VENOUS INSUFFICIENCY: Primary | Chronic | ICD-10-CM

## 2022-08-25 DIAGNOSIS — R22.43 LOCALIZED SWELLING OF BOTH LOWER LEGS: ICD-10-CM

## 2022-08-25 DIAGNOSIS — I10 PRIMARY HYPERTENSION: ICD-10-CM

## 2022-08-25 DIAGNOSIS — E78.2 MIXED HYPERLIPIDEMIA: ICD-10-CM

## 2022-08-25 DIAGNOSIS — D50.0 IRON DEFICIENCY ANEMIA DUE TO CHRONIC BLOOD LOSS: ICD-10-CM

## 2022-08-25 DIAGNOSIS — K22.70 BARRETT'S ESOPHAGUS WITHOUT DYSPLASIA: ICD-10-CM

## 2022-08-25 PROCEDURE — 99214 OFFICE O/P EST MOD 30 MIN: CPT | Performed by: FAMILY MEDICINE

## 2022-08-25 RX ORDER — FUROSEMIDE 20 MG/1
20 TABLET ORAL DAILY PRN
Qty: 90 TABLET | Refills: 2 | Status: SHIPPED | OUTPATIENT
Start: 2022-08-25 | End: 2023-03-02 | Stop reason: SDUPTHER

## 2022-08-25 RX ORDER — ASPIRIN 81 MG/1
81 TABLET ORAL
Status: SHIPPED | COMMUNITY
Start: 2022-08-25

## 2022-08-25 RX ORDER — FUROSEMIDE 20 MG/1
20 TABLET ORAL DAILY PRN
Qty: 90 TABLET | Refills: 2 | Status: SHIPPED | OUTPATIENT
Start: 2022-08-25 | End: 2022-08-25 | Stop reason: SDUPTHER

## 2022-08-25 NOTE — ASSESSMENT & PLAN NOTE
She will start furosemide 20 mg daily as needed for edema and they will let me know if this works well for her.

## 2022-08-25 NOTE — ASSESSMENT & PLAN NOTE
H&P updated. The patient was examined and the following changes are noted:  Stone appears to move distally.  I discussed in depth with the patient.  We will change the plan to include left ureteroscopy laser lithotripsy and left ureteral stent.        The patient will continue her Lipitor.

## 2022-08-25 NOTE — PROGRESS NOTES
"Chief Complaint  Hypertension, Hyperlipidemia, Fatigue, and Anemia    Subjective        Ama Billy presents to [facility]  The patient is an 88-year-old female who presents today for follow-up. She is accompanied by her  today. Her  inquired if medications to help with her lower extremity edema is possible since the patient has not been compliant with compression socks. The patient notes her lower extremities have been swollen for years so she is used to it. She denies taking a stronger diuretic for her edema.      She was in the hospital in 05/2022 with a bad urinary infection and anemia. She is seeing Dr. Livingston but her last visit with him was in 04/2022 while she was in a rehabilitation center.     Ms. Billy is able to eat and urinate okay, but she has had intermittent issues with constipation. When she had her menstrual cycle, she notes it was regular, but she would experience cramps. The patient has had a colonoscopy prior. She denies undergoing any abdominal surgeries.       Objective   Vital Signs:  /80 (BP Location: Left arm, Patient Position: Sitting, Cuff Size: Adult)   Pulse 58   Temp 98.4 °F (36.9 °C) (Tympanic)   Wt 78.9 kg (174 lb)   SpO2 96%   BMI 27.25 kg/m²   Estimated body mass index is 27.25 kg/m² as calculated from the following:    Height as of 7/8/22: 170.2 cm (67\").    Weight as of this encounter: 78.9 kg (174 lb).          Physical Exam  Vitals reviewed.   Constitutional:       Appearance: She is well-developed.   HENT:      Head: Normocephalic and atraumatic.      Right Ear: Tympanic membrane and external ear normal.      Left Ear: Tympanic membrane and external ear normal.      Ears:      Comments: Mild amounts of cerumen in the left ear.      Nose: Nose normal.   Eyes:      Conjunctiva/sclera: Conjunctivae normal.      Pupils: Pupils are equal, round, and reactive to light.   Neck:      Thyroid: No thyromegaly.      Vascular: " Normal carotid pulses. No carotid bruit or JVD.   Cardiovascular:      Rate and Rhythm: Regular rhythm. Bradycardia present.      Heart sounds: Normal heart sounds. No murmur heard.     Comments: 1 to 2+ nonpitting edema bilaterally  Pulmonary:      Effort: Pulmonary effort is normal.      Breath sounds: Normal breath sounds.   Abdominal:      General: Bowel sounds are normal.      Palpations: Abdomen is soft.   Musculoskeletal:         General: Normal range of motion.      Cervical back: Normal range of motion and neck supple.      Right lower leg: Edema present.      Left lower leg: Edema present.   Lymphadenopathy:      Cervical: No cervical adenopathy.   Skin:     General: Skin is warm and dry.      Findings: No rash.   Neurological:      Mental Status: She is alert and oriented to person, place, and time.      Cranial Nerves: No cranial nerve deficit.      Coordination: Coordination normal.   Psychiatric:         Behavior: Behavior normal.         Thought Content: Thought content normal.         Judgment: Judgment normal.        Result Review :      Data reviewed: GI studies Her colonoscopy in 2013 was performed by Dr. Gudino was normal.           Assessment and Plan   Diagnoses and all orders for this visit:    1. Chronic venous insufficiency (Primary)  -     Urinalysis With Microscopic If Indicated (No Culture) - Urine, Clean Catch  -     TSH  -     T4, Free  -     Lipid Panel With / Chol / HDL Ratio  -     CBC & Differential  -     Comprehensive Metabolic Panel  -     Iron and TIBC  -     Ferritin  -     Vitamin B12  -     Folate    2. Primary hypertension  Assessment & Plan:  Continue taking Toprol-XL 25 mg.    Orders:  -     Urinalysis With Microscopic If Indicated (No Culture) - Urine, Clean Catch  -     TSH  -     T4, Free  -     Lipid Panel With / Chol / HDL Ratio  -     CBC & Differential  -     Comprehensive Metabolic Panel  -     Iron and TIBC  -     Ferritin  -     Vitamin B12  -     Folate    3.  Localized swelling of both lower legs  Assessment & Plan:  She will start furosemide 20 mg daily as needed for edema and they will let me know if this works well for her.     Orders:  -     Urinalysis With Microscopic If Indicated (No Culture) - Urine, Clean Catch  -     TSH  -     T4, Free  -     Lipid Panel With / Chol / HDL Ratio  -     CBC & Differential  -     Comprehensive Metabolic Panel  -     Iron and TIBC  -     Ferritin  -     Vitamin B12  -     Folate    4. Canales's esophagus without dysplasia  Assessment & Plan:  She will continue taking pantoprazole.     Orders:  -     Urinalysis With Microscopic If Indicated (No Culture) - Urine, Clean Catch  -     TSH  -     T4, Free  -     Lipid Panel With / Chol / HDL Ratio  -     CBC & Differential  -     Comprehensive Metabolic Panel  -     Iron and TIBC  -     Ferritin  -     Vitamin B12  -     Folate    5. Iron deficiency anemia due to chronic blood loss  -     Urinalysis With Microscopic If Indicated (No Culture) - Urine, Clean Catch  -     TSH  -     T4, Free  -     Lipid Panel With / Chol / HDL Ratio  -     CBC & Differential  -     Comprehensive Metabolic Panel  -     Iron and TIBC  -     Ferritin  -     Vitamin B12  -     Folate    6. Mixed hyperlipidemia  Assessment & Plan:  The patient will continue her Lipitor.     Orders:  -     Urinalysis With Microscopic If Indicated (No Culture) - Urine, Clean Catch  -     TSH  -     T4, Free  -     Lipid Panel With / Chol / HDL Ratio  -     CBC & Differential  -     Comprehensive Metabolic Panel  -     Iron and TIBC  -     Ferritin  -     Vitamin B12  -     Folate    Other orders  -     Discontinue: furosemide (Lasix) 20 MG tablet; Take 1 tablet by mouth Daily As Needed (leg swelling).  Dispense: 90 tablet; Refill: 2  -     furosemide (Lasix) 20 MG tablet; Take 1 tablet by mouth Daily As Needed (leg swelling).  Dispense: 90 tablet; Refill: 2         I spent 22 minutes caring for Ama on this date of service. This  time includes time spent by me in the following activities:preparing for the visit, reviewing tests, performing a medically appropriate examination and/or evaluation , counseling and educating the patient/family/caregiver, ordering medications, tests, or procedures and documenting information in the medical record     Follow Up   Return in about 6 months (around 2/25/2023) for Medicare Wellness.  Patient was given instructions and counseling regarding her condition or for health maintenance advice. Please see specific information pulled into the AVS if appropriate.     Transcribed from ambient dictation for Con Canas MD by Shilpa Gilmore.  08/25/22   13:58 EDT    Patient verbalized consent to the visit recording.  I have personally performed the services described in this document as transcribed by the above individual, and it is both accurate and complete.  Con Canas MD  8/26/2022  17:24 EDT

## 2022-08-29 ENCOUNTER — TELEPHONE (OUTPATIENT)
Dept: INTERNAL MEDICINE | Facility: CLINIC | Age: 87
End: 2022-08-29

## 2022-08-29 NOTE — TELEPHONE ENCOUNTER
Hub staff attempted to follow warm transfer process and was unsuccessful     Caller: Trino Billy    Relationship to patient: Emergency Contact    Best call back number: [phone]     Patient is needing: PATIENTS SPOUSE CALLING TO RESCHEDULE LABS TO ANY Thursday MORNING

## 2022-08-29 NOTE — TELEPHONE ENCOUNTER
Hub staff attempted to follow warm transfer process and was unsuccessful     Caller: Trino Billy    Relationship to patient: Emergency Contact    Best call back number: [phone]     Patient is needing: RESCHEDULE LAB APPOINTMENT

## 2022-09-02 LAB
ALBUMIN SERPL-MCNC: 4.7 G/DL (ref 3.5–5.2)
ALBUMIN/GLOB SERPL: 2 G/DL
ALP SERPL-CCNC: 111 U/L (ref 39–117)
ALT SERPL-CCNC: 10 U/L (ref 1–33)
APPEARANCE UR: ABNORMAL
AST SERPL-CCNC: 15 U/L (ref 1–32)
BACTERIA #/AREA URNS HPF: ABNORMAL /HPF
BASOPHILS # BLD AUTO: 0.07 10*3/MM3 (ref 0–0.2)
BASOPHILS NFR BLD AUTO: 0.7 % (ref 0–1.5)
BILIRUB SERPL-MCNC: 1 MG/DL (ref 0–1.2)
BILIRUB UR QL STRIP: NEGATIVE
BUN SERPL-MCNC: 24 MG/DL (ref 8–23)
BUN/CREAT SERPL: 16.6 (ref 7–25)
CALCIUM SERPL-MCNC: 10 MG/DL (ref 8.6–10.5)
CASTS URNS MICRO: ABNORMAL
CHLORIDE SERPL-SCNC: 103 MMOL/L (ref 98–107)
CHOLEST SERPL-MCNC: 136 MG/DL (ref 0–200)
CHOLEST/HDLC SERPL: 2.19 {RATIO}
CO2 SERPL-SCNC: 27 MMOL/L (ref 22–29)
COLOR UR: YELLOW
CREAT SERPL-MCNC: 1.45 MG/DL (ref 0.57–1)
EGFRCR-CYS SERPLBLD CKD-EPI 2021: 34.8 ML/MIN/1.73
EOSINOPHIL # BLD AUTO: 0.51 10*3/MM3 (ref 0–0.4)
EOSINOPHIL NFR BLD AUTO: 5.4 % (ref 0.3–6.2)
EPI CELLS #/AREA URNS HPF: ABNORMAL /HPF
ERYTHROCYTE [DISTWIDTH] IN BLOOD BY AUTOMATED COUNT: 18.7 % (ref 12.3–15.4)
FERRITIN SERPL-MCNC: 820 NG/ML (ref 13–150)
FOLATE SERPL-MCNC: >20 NG/ML (ref 4.78–24.2)
GLOBULIN SER CALC-MCNC: 2.4 GM/DL
GLUCOSE SERPL-MCNC: 96 MG/DL (ref 65–99)
GLUCOSE UR QL STRIP: NEGATIVE
HCT VFR BLD AUTO: 32.6 % (ref 34–46.6)
HDLC SERPL-MCNC: 62 MG/DL (ref 40–60)
HGB BLD-MCNC: 10.6 G/DL (ref 12–15.9)
HGB UR QL STRIP: NEGATIVE
IMM GRANULOCYTES # BLD AUTO: 0.07 10*3/MM3 (ref 0–0.05)
IMM GRANULOCYTES NFR BLD AUTO: 0.7 % (ref 0–0.5)
IRON SATN MFR SERPL: 27 % (ref 20–50)
IRON SERPL-MCNC: 96 MCG/DL (ref 37–145)
KETONES UR QL STRIP: NEGATIVE
LDLC SERPL CALC-MCNC: 61 MG/DL (ref 0–100)
LEUKOCYTE ESTERASE UR QL STRIP: ABNORMAL
LYMPHOCYTES # BLD AUTO: 1.32 10*3/MM3 (ref 0.7–3.1)
LYMPHOCYTES NFR BLD AUTO: 14 % (ref 19.6–45.3)
MCH RBC QN AUTO: 27.6 PG (ref 26.6–33)
MCHC RBC AUTO-ENTMCNC: 32.5 G/DL (ref 31.5–35.7)
MCV RBC AUTO: 84.9 FL (ref 79–97)
MONOCYTES # BLD AUTO: 0.85 10*3/MM3 (ref 0.1–0.9)
MONOCYTES NFR BLD AUTO: 9 % (ref 5–12)
NEUTROPHILS # BLD AUTO: 6.62 10*3/MM3 (ref 1.7–7)
NEUTROPHILS NFR BLD AUTO: 70.2 % (ref 42.7–76)
NITRITE UR QL STRIP: NEGATIVE
NRBC BLD AUTO-RTO: 0.6 /100 WBC (ref 0–0.2)
PH UR STRIP: 5.5 [PH] (ref 5–8)
PLATELET # BLD AUTO: 333 10*3/MM3 (ref 140–450)
POTASSIUM SERPL-SCNC: 4.3 MMOL/L (ref 3.5–5.2)
PROT SERPL-MCNC: 7.1 G/DL (ref 6–8.5)
PROT UR QL STRIP: ABNORMAL
RBC # BLD AUTO: 3.84 10*6/MM3 (ref 3.77–5.28)
RBC #/AREA URNS HPF: ABNORMAL /HPF
SODIUM SERPL-SCNC: 140 MMOL/L (ref 136–145)
SP GR UR STRIP: 1.02 (ref 1–1.03)
T4 FREE SERPL-MCNC: 1.28 NG/DL (ref 0.93–1.7)
TIBC SERPL-MCNC: 358 MCG/DL
TRIGL SERPL-MCNC: 65 MG/DL (ref 0–150)
TSH SERPL DL<=0.005 MIU/L-ACNC: 1.67 UIU/ML (ref 0.27–4.2)
UIBC SERPL-MCNC: 262 MCG/DL (ref 112–346)
UROBILINOGEN UR STRIP-MCNC: ABNORMAL MG/DL
VIT B12 SERPL-MCNC: 1380 PG/ML (ref 211–946)
VLDLC SERPL CALC-MCNC: 13 MG/DL (ref 5–40)
WBC # BLD AUTO: 9.44 10*3/MM3 (ref 3.4–10.8)
WBC #/AREA URNS HPF: ABNORMAL /HPF
YEAST #/AREA URNS HPF: ABNORMAL /HPF

## 2023-03-02 ENCOUNTER — OFFICE VISIT (OUTPATIENT)
Dept: INTERNAL MEDICINE | Facility: CLINIC | Age: 88
End: 2023-03-02
Payer: MEDICARE

## 2023-03-02 VITALS
WEIGHT: 172 LBS | OXYGEN SATURATION: 98 % | HEART RATE: 49 BPM | DIASTOLIC BLOOD PRESSURE: 76 MMHG | BODY MASS INDEX: 26.94 KG/M2 | SYSTOLIC BLOOD PRESSURE: 138 MMHG | TEMPERATURE: 97.1 F

## 2023-03-02 DIAGNOSIS — D50.0 IRON DEFICIENCY ANEMIA DUE TO CHRONIC BLOOD LOSS: ICD-10-CM

## 2023-03-02 DIAGNOSIS — I10 PRIMARY HYPERTENSION: ICD-10-CM

## 2023-03-02 DIAGNOSIS — K22.70 BARRETT'S ESOPHAGUS WITHOUT DYSPLASIA: ICD-10-CM

## 2023-03-02 DIAGNOSIS — D64.9 NORMOCYTIC ANEMIA: ICD-10-CM

## 2023-03-02 DIAGNOSIS — E78.2 MIXED HYPERLIPIDEMIA: Primary | ICD-10-CM

## 2023-03-02 PROCEDURE — 99214 OFFICE O/P EST MOD 30 MIN: CPT | Performed by: FAMILY MEDICINE

## 2023-03-02 RX ORDER — METOPROLOL SUCCINATE 25 MG/1
12.5 TABLET, EXTENDED RELEASE ORAL
Qty: 90 TABLET | Refills: 3 | Status: SHIPPED | OUTPATIENT
Start: 2023-03-02

## 2023-03-02 RX ORDER — FUROSEMIDE 20 MG/1
20 TABLET ORAL DAILY PRN
Qty: 90 TABLET | Refills: 2 | Status: SHIPPED | OUTPATIENT
Start: 2023-03-02

## 2023-03-02 NOTE — PROGRESS NOTES
"Chief Complaint  Hyperlipidemia, Hypertension, Heartburn, Vitamin D Deficiency, and Anemia    Subjective        Ama Billy presents to [facility]  History of Present Illness  Mrs. Billy is overall doing pretty well.  She walks with a rolling walker.  Her heart rate is steadily dropped over the past year now down into the 40s.  She has had no syncope.  Given relatively low blood pressure and low heart rate will cut metoprolol succinate in half to take 12.5 mg daily.  Also renew furosemide 20 mg daily as needed for swelling.  Her swelling has been pretty well recently.  Treatment of hyperlipidemia continues as well and will monitor anemia which is improved on recent blood tests.  Hyperlipidemia  This is a chronic problem. The problem is controlled.   Hypertension    Heartburn    Anemia        Objective   Vital Signs:  /76 (BP Location: Left arm, Patient Position: Sitting, Cuff Size: Large Adult)   Pulse (!) 49   Temp 97.1 °F (36.2 °C) (Tympanic)   Wt 78 kg (172 lb)   SpO2 98%   BMI 26.94 kg/m²   Estimated body mass index is 26.94 kg/m² as calculated from the following:    Height as of 7/8/22: 170.2 cm (67\").    Weight as of this encounter: 78 kg (172 lb).             Physical Exam  Vitals reviewed.   Constitutional:       Appearance: She is well-developed.   HENT:      Head: Normocephalic and atraumatic.      Right Ear: Tympanic membrane and external ear normal.      Left Ear: Tympanic membrane and external ear normal.      Nose: Nose normal.   Eyes:      Conjunctiva/sclera: Conjunctivae normal.      Pupils: Pupils are equal, round, and reactive to light.   Neck:      Thyroid: No thyromegaly.      Vascular: No JVD.   Cardiovascular:      Rate and Rhythm: Regular rhythm. Bradycardia present.      Heart sounds: Normal heart sounds.   Pulmonary:      Effort: Pulmonary effort is normal.      Breath sounds: Normal breath sounds.   Abdominal:      General: Bowel sounds are " normal.      Palpations: Abdomen is soft.   Musculoskeletal:         General: Normal range of motion.      Cervical back: Normal range of motion and neck supple.   Lymphadenopathy:      Cervical: No cervical adenopathy.   Skin:     General: Skin is warm and dry.      Findings: No rash.   Neurological:      Mental Status: She is alert and oriented to person, place, and time.      Cranial Nerves: No cranial nerve deficit.      Coordination: Coordination normal.   Psychiatric:         Behavior: Behavior normal.         Thought Content: Thought content normal.         Judgment: Judgment normal.        Result Review :  The following data was reviewed by: Con Canas MD on 03/02/2023:  Common labs    Common Labs 3/18/22 3/18/22 3/18/22 3/20/22 3/20/22 9/1/22 9/1/22 9/1/22    1144 2054 2054 0944 0944 0950 0950 0950   Glucose 105 (A)    84   96   BUN 16    9   24 (A)   Creatinine 1.25 (A)    0.94   1.45 (A)   Sodium 138    139   140   Potassium 3.6    3.6   4.3   Chloride 107    106   103   Calcium 8.0 (A)    8.6   10.0   Total Protein        7.1   Albumin 2.80 (A)       4.70   Total Bilirubin        1.0   Alkaline Phosphatase        111   AST (SGOT)        15   ALT (SGPT)        10   WBC  7.15  7.48   9.44    Hemoglobin  7.3 (A)  9.0 (A)   10.6 (A)    Hematocrit  23.6 (A) 23.1 (A) 29.1 (A)   32.6 (A)    Platelets  248  304   333    Total Cholesterol      136     Triglycerides      65     HDL Cholesterol      62 (A)     LDL Cholesterol       61     (A) Abnormal value       Comments are available for some flowsheets but are not being displayed.                        Assessment and Plan   Diagnoses and all orders for this visit:    1. Mixed hyperlipidemia (Primary)  -     Urinalysis With Microscopic If Indicated (No Culture) - Urine, Clean Catch  -     TSH  -     T4, Free  -     T3, Free  -     Lipid Panel With / Chol / HDL Ratio  -     CBC & Differential  -     Comprehensive Metabolic Panel  -     Vitamin B12  -     Iron  and TIBC  -     Ferritin    2. Primary hypertension  Comments:  Decrease metoprolol succinate to 12.5 mg daily based on bradycardia.  Orders:  -     Urinalysis With Microscopic If Indicated (No Culture) - Urine, Clean Catch  -     TSH  -     T4, Free  -     T3, Free  -     Lipid Panel With / Chol / HDL Ratio  -     CBC & Differential  -     Comprehensive Metabolic Panel  -     Vitamin B12  -     Iron and TIBC  -     Ferritin    3. Canales's esophagus without dysplasia  -     Urinalysis With Microscopic If Indicated (No Culture) - Urine, Clean Catch  -     TSH  -     T4, Free  -     T3, Free  -     Lipid Panel With / Chol / HDL Ratio  -     CBC & Differential  -     Comprehensive Metabolic Panel  -     Vitamin B12  -     Iron and TIBC  -     Ferritin    4. Normocytic anemia  -     Urinalysis With Microscopic If Indicated (No Culture) - Urine, Clean Catch  -     TSH  -     T4, Free  -     T3, Free  -     Lipid Panel With / Chol / HDL Ratio  -     CBC & Differential  -     Comprehensive Metabolic Panel  -     Vitamin B12  -     Iron and TIBC  -     Ferritin    5. Iron deficiency anemia due to chronic blood loss  -     Urinalysis With Microscopic If Indicated (No Culture) - Urine, Clean Catch  -     TSH  -     T4, Free  -     T3, Free  -     Lipid Panel With / Chol / HDL Ratio  -     CBC & Differential  -     Comprehensive Metabolic Panel  -     Vitamin B12  -     Iron and TIBC  -     Ferritin    Other orders  -     metoprolol succinate XL (TOPROL-XL) 25 MG 24 hr tablet; Take 0.5 tablets by mouth Daily.  Dispense: 90 tablet; Refill: 3  -     furosemide (Lasix) 20 MG tablet; Take 1 tablet by mouth Daily As Needed (leg swelling).  Dispense: 90 tablet; Refill: 2             Follow Up   No follow-ups on file.  Patient was given instructions and counseling regarding her condition or for health maintenance advice. Please see specific information pulled into the AVS if appropriate.

## 2023-03-03 LAB
ALBUMIN SERPL-MCNC: 4.4 G/DL (ref 3.5–5.2)
ALBUMIN/GLOB SERPL: 1.6 G/DL
ALP SERPL-CCNC: 103 U/L (ref 39–117)
ALT SERPL-CCNC: 8 U/L (ref 1–33)
APPEARANCE UR: CLEAR
AST SERPL-CCNC: 16 U/L (ref 1–32)
BACTERIA #/AREA URNS HPF: ABNORMAL /HPF
BASOPHILS # BLD AUTO: 0.07 10*3/MM3 (ref 0–0.2)
BASOPHILS NFR BLD AUTO: 0.6 % (ref 0–1.5)
BILIRUB SERPL-MCNC: 1.1 MG/DL (ref 0–1.2)
BILIRUB UR QL STRIP: NEGATIVE
BUN SERPL-MCNC: 26 MG/DL (ref 8–23)
BUN/CREAT SERPL: 16.3 (ref 7–25)
CALCIUM SERPL-MCNC: 9.3 MG/DL (ref 8.6–10.5)
CASTS URNS MICRO: ABNORMAL
CHLORIDE SERPL-SCNC: 105 MMOL/L (ref 98–107)
CHOLEST SERPL-MCNC: 137 MG/DL (ref 0–200)
CHOLEST/HDLC SERPL: 2.25 {RATIO}
CO2 SERPL-SCNC: 25.1 MMOL/L (ref 22–29)
COLOR UR: YELLOW
CREAT SERPL-MCNC: 1.6 MG/DL (ref 0.57–1)
EGFRCR SERPLBLD CKD-EPI 2021: 30.7 ML/MIN/1.73
EOSINOPHIL # BLD AUTO: 0.51 10*3/MM3 (ref 0–0.4)
EOSINOPHIL NFR BLD AUTO: 4.2 % (ref 0.3–6.2)
EPI CELLS #/AREA URNS HPF: ABNORMAL /HPF
ERYTHROCYTE [DISTWIDTH] IN BLOOD BY AUTOMATED COUNT: 19.5 % (ref 12.3–15.4)
FERRITIN SERPL-MCNC: 756 NG/ML (ref 13–150)
GLOBULIN SER CALC-MCNC: 2.8 GM/DL
GLUCOSE SERPL-MCNC: 86 MG/DL (ref 65–99)
GLUCOSE UR QL STRIP: NEGATIVE
HCT VFR BLD AUTO: 32.5 % (ref 34–46.6)
HDLC SERPL-MCNC: 61 MG/DL (ref 40–60)
HGB BLD-MCNC: 10.6 G/DL (ref 12–15.9)
HGB UR QL STRIP: NEGATIVE
IMM GRANULOCYTES # BLD AUTO: 0.04 10*3/MM3 (ref 0–0.05)
IMM GRANULOCYTES NFR BLD AUTO: 0.3 % (ref 0–0.5)
IRON SATN MFR SERPL: 28 % (ref 20–50)
IRON SERPL-MCNC: 99 MCG/DL (ref 37–145)
KETONES UR QL STRIP: NEGATIVE
LDLC SERPL CALC-MCNC: 61 MG/DL (ref 0–100)
LEUKOCYTE ESTERASE UR QL STRIP: ABNORMAL
LYMPHOCYTES # BLD AUTO: 2.04 10*3/MM3 (ref 0.7–3.1)
LYMPHOCYTES NFR BLD AUTO: 17 % (ref 19.6–45.3)
MCH RBC QN AUTO: 28 PG (ref 26.6–33)
MCHC RBC AUTO-ENTMCNC: 32.6 G/DL (ref 31.5–35.7)
MCV RBC AUTO: 86 FL (ref 79–97)
MONOCYTES # BLD AUTO: 1 10*3/MM3 (ref 0.1–0.9)
MONOCYTES NFR BLD AUTO: 8.3 % (ref 5–12)
NEUTROPHILS # BLD AUTO: 8.36 10*3/MM3 (ref 1.7–7)
NEUTROPHILS NFR BLD AUTO: 69.6 % (ref 42.7–76)
NITRITE UR QL STRIP: NEGATIVE
NRBC BLD AUTO-RTO: 0.3 /100 WBC (ref 0–0.2)
PH UR STRIP: 5.5 [PH] (ref 5–8)
PLATELET # BLD AUTO: 323 10*3/MM3 (ref 140–450)
POTASSIUM SERPL-SCNC: 4.7 MMOL/L (ref 3.5–5.2)
PROT SERPL-MCNC: 7.2 G/DL (ref 6–8.5)
PROT UR QL STRIP: NEGATIVE
RBC # BLD AUTO: 3.78 10*6/MM3 (ref 3.77–5.28)
RBC #/AREA URNS HPF: ABNORMAL /HPF
SODIUM SERPL-SCNC: 142 MMOL/L (ref 136–145)
SP GR UR STRIP: 1.01 (ref 1–1.03)
T3FREE SERPL-MCNC: 2.8 PG/ML (ref 2–4.4)
T4 FREE SERPL-MCNC: 1.34 NG/DL (ref 0.93–1.7)
TIBC SERPL-MCNC: 349 MCG/DL
TRIGL SERPL-MCNC: 74 MG/DL (ref 0–150)
TSH SERPL DL<=0.005 MIU/L-ACNC: 1.37 UIU/ML (ref 0.27–4.2)
UIBC SERPL-MCNC: 250 MCG/DL (ref 112–346)
UROBILINOGEN UR STRIP-MCNC: ABNORMAL MG/DL
VIT B12 SERPL-MCNC: 1897 PG/ML (ref 211–946)
VLDLC SERPL CALC-MCNC: 15 MG/DL (ref 5–40)
WBC # BLD AUTO: 12.02 10*3/MM3 (ref 3.4–10.8)
WBC #/AREA URNS HPF: ABNORMAL /HPF

## 2023-04-03 RX ORDER — ATORVASTATIN CALCIUM 10 MG/1
TABLET, FILM COATED ORAL
Qty: 90 TABLET | Refills: 3 | Status: SHIPPED | OUTPATIENT
Start: 2023-04-03

## 2023-04-06 ENCOUNTER — TELEPHONE (OUTPATIENT)
Dept: INTERNAL MEDICINE | Facility: CLINIC | Age: 88
End: 2023-04-06
Payer: MEDICARE

## 2023-04-06 NOTE — TELEPHONE ENCOUNTER
----- Message from Con Canas MD sent at 4/2/2023 11:42 PM EDT -----  Labs look stable.  She appears to have a chronic anemia not related to iron.  To further evaluate would need consult with Dr. Monique Hematology.

## 2023-04-24 RX ORDER — PANTOPRAZOLE SODIUM 20 MG/1
TABLET, DELAYED RELEASE ORAL
Qty: 90 TABLET | Refills: 3 | Status: SHIPPED | OUTPATIENT
Start: 2023-04-24

## 2023-05-09 RX ORDER — METOPROLOL SUCCINATE 25 MG/1
TABLET, EXTENDED RELEASE ORAL
Qty: 90 TABLET | Refills: 3 | Status: SHIPPED | OUTPATIENT
Start: 2023-05-09

## 2023-09-07 ENCOUNTER — OFFICE VISIT (OUTPATIENT)
Dept: INTERNAL MEDICINE | Facility: CLINIC | Age: 88
End: 2023-09-07
Payer: MEDICARE

## 2023-09-07 VITALS
BODY MASS INDEX: 27.57 KG/M2 | HEART RATE: 50 BPM | SYSTOLIC BLOOD PRESSURE: 142 MMHG | DIASTOLIC BLOOD PRESSURE: 86 MMHG | WEIGHT: 176 LBS | OXYGEN SATURATION: 98 %

## 2023-09-07 DIAGNOSIS — E53.8 B12 DEFICIENCY: ICD-10-CM

## 2023-09-07 DIAGNOSIS — D64.9 NORMOCYTIC ANEMIA: ICD-10-CM

## 2023-09-07 DIAGNOSIS — I10 PRIMARY HYPERTENSION: ICD-10-CM

## 2023-09-07 DIAGNOSIS — D50.0 IRON DEFICIENCY ANEMIA DUE TO CHRONIC BLOOD LOSS: ICD-10-CM

## 2023-09-07 DIAGNOSIS — E78.2 MIXED HYPERLIPIDEMIA: Primary | ICD-10-CM

## 2023-09-07 DIAGNOSIS — Z00.00 MEDICARE ANNUAL WELLNESS VISIT, SUBSEQUENT: ICD-10-CM

## 2023-09-07 NOTE — PROGRESS NOTES
The ABCs of the Annual Wellness Visit  Subsequent Medicare Wellness Visit    Subjective      Ama Billy is a 89 y.o. female who presents for a Subsequent Medicare Wellness Visit.    The following portions of the patient's history were reviewed and   updated as appropriate: allergies, current medications, past family history, past medical history, past social history, past surgical history, and problem list.    Compared to one year ago, the patient feels her physical   health is the same.    Compared to one year ago, the patient feels her mental   health is the same.    Recent Hospitalizations:  She was not admitted to the hospital during the last year.       Current Medical Providers:  Patient Care Team:  Con Canas MD as PCP - General (Family Medicine)  [facility]erring [facility] (H[facility]ist)    Outpatient Medications Prior to Visit   Medication Sig Dispense Refill    aspirin 81 MG EC tablet Take 1 tablet by mouth every night at bedtime.      atorvastatin (LIPITOR) 10 MG tablet TAKE 1 TABLET DAILY 90 tablet 3    bisacodyl (DULCOLAX) 10 MG suppository Insert 1 suppository into the rectum Daily As Needed for Constipation.      cyanocobalamin (VITAMIN B-12) 1000 MCG tablet Take 1 tablet by mouth Daily. 90 tablet 3    folic acid (FOLVITE) 1 MG tablet Take 1 tablet by mouth Daily. 90 tablet 3    furosemide (Lasix) 20 MG tablet Take 1 tablet by mouth Daily As Needed (leg swelling). 90 tablet 2    metoprolol succinate XL (TOPROL-XL) 25 MG 24 hr tablet TAKE 1 TABLET DAILY 90 tablet 3    pantoprazole (PROTONIX) 20 MG EC tablet TAKE 1 TABLET DAILY 90 tablet 3     No facility-administered medications prior to visit.       No opioid medication identified on active medication list. I have reviewed chart for other potential  high risk medication/s and harmful drug interactions in the elderly.        Aspirin is on active medication list. Aspirin use is indicated based on review of current medical  "condition/s. Pros and cons of this therapy have been discussed today. Benefits of this medication outweigh potential harm.  Patient has been encouraged to continue taking this medication.  .      Patient Active Problem List   Diagnosis    Normocytic anemia    Abdominal cramping    LLQ pain    Colon polyps    Bursitis of hip    Diarrhea    Fatigue    Generalized osteoarthritis    Hyperlipidemia    Hypertension    Irritable bowel syndrome    Muscle strain of lower extremity    Tendinitis    Chronic venous insufficiency    Status post total left knee replacement    Hip joint replacement status    Left retinal detachment    Hypovitaminosis D    Weakness of extremity    Closed wedge compression fracture of first lumbar vertebra    Fall    Constipation    Nausea & vomiting    Leukocytosis    Anemia    Symptomatic anemia    Esophagitis    GI bleed    Acute blood loss anemia    Iron deficiency anemia    Paroxysmal atrial fibrillation    Rectal bleeding    Iron deficiency anemia due to chronic blood loss    Swelling of lower extremity    Canales's esophagus without dysplasia    Dyspnea on exertion    Closed fracture of right hip    Fx humeral neck, right, closed, initial encounter    Acute urinary retention    BENNIE (acute kidney injury)    Foot drop, right foot    Localized swelling of both lower legs     Advance Care Planning   Advance Care Planning     Advance Directive is not on file.  ACP discussion was held with the patient during this visit. Patient has an advance directive (not in EMR), copy requested.     Objective    Vitals:    09/07/23 1146   BP: 142/86   BP Location: Left arm   Patient Position: Sitting   Cuff Size: Adult   Pulse: 50   SpO2: 98%   Weight: 79.8 kg (176 lb)   PainSc: 0-No pain     Estimated body mass index is 27.57 kg/m² as calculated from the following:    Height as of 7/8/22: 170.2 cm (67\").    Weight as of this encounter: 79.8 kg (176 lb).    BMI is >= 25 and <30. (Overweight) The following " options were offered after discussion;: exercise counseling/recommendations      Does the patient have evidence of cognitive impairment?   No            HEALTH RISK ASSESSMENT    Smoking Status:  Social History     Tobacco Use   Smoking Status Never   Smokeless Tobacco Never     Alcohol Consumption:  Social History     Substance and Sexual Activity   Alcohol Use No     Fall Risk Screen:    AMADOR Fall Risk Assessment was completed, and patient is at MODERATE risk for falls. Assessment completed on:2023    Depression Screenin/7/2023    11:52 AM   PHQ-2/PHQ-9 Depression Screening   Little Interest or Pleasure in Doing Things 0-->not at all   Feeling Down, Depressed or Hopeless 0-->not at all   PHQ-9: Brief Depression Severity Measure Score 0       Health Habits and Functional and Cognitive Screenin/7/2023    11:50 AM   Functional & Cognitive Status   Do you have difficulty preparing food and eating? No   Do you have difficulty bathing yourself, getting dressed or grooming yourself? No   Do you have difficulty using the toilet? No   Do you have difficulty moving around from place to place? Yes   Do you have trouble with steps or getting out of a bed or a chair? Yes   Current Diet Well Balanced Diet   Dental Exam Up to date   Eye Exam Up to date   Exercise (times per week) 0 times per week   Current Exercises Include No Regular Exercise   Do you need help using the phone?  No   Are you deaf or do you have serious difficulty hearing?  No   Do you need help to go to places out of walking distance? No   Do you need help shopping? No   Do you need help preparing meals?  No   Do you need help with housework?  No   Do you need help with laundry? No   Do you need help taking your medications? Yes   Do you need help managing money? No   Do you ever drive or ride in a car without wearing a seat belt? No   Have you felt unusual stress, anger or loneliness in the last month? No   Who do you live with? Spouse    If you need help, do you have trouble finding someone available to you? No   Have you been bothered in the last four weeks by sexual problems? No   Do you have difficulty concentrating, remembering or making decisions? Yes       Age-appropriate Screening Schedule:  Refer to the list below for future screening recommendations based on patient's age, sex and/or medical conditions. Orders for these recommended tests are listed in the plan section. The patient has been provided with a written plan.    Health Maintenance   Topic Date Due    ANNUAL WELLNESS VISIT  04/29/2023    ZOSTER VACCINE (1 of 2) 09/07/2024 (Originally 11/12/1983)    TDAP/TD VACCINES (1 - Tdap) 09/25/2024 (Originally 11/12/1952)    COVID-19 Vaccine (5 - Pfizer series) 11/06/2024 (Originally 4/3/2023)    Pneumococcal Vaccine 65+ (2 - PPSV23 or PCV20) 11/27/2024 (Originally 1/16/2019)    INFLUENZA VACCINE  10/01/2023    LIPID PANEL  03/02/2024    DXA SCAN  05/26/2024                  CMS Preventative Services Quick Reference  Risk Factors Identified During Encounter:    None Identified    The above risks/problems have been discussed with the patient.  Pertinent information has been shared with the patient in the After Visit Summary.    Diagnoses and all orders for this visit:    1. Mixed hyperlipidemia (Primary)  -     Urinalysis With Microscopic If Indicated (No Culture) - Urine, Clean Catch  -     TSH  -     T4, Free  -     Lipid Panel With / Chol / HDL Ratio  -     CBC & Differential  -     Comprehensive Metabolic Panel  -     Iron and TIBC  -     Ferritin  -     Vitamin B12    2. Primary hypertension  -     Urinalysis With Microscopic If Indicated (No Culture) - Urine, Clean Catch  -     TSH  -     T4, Free  -     Lipid Panel With / Chol / HDL Ratio  -     CBC & Differential  -     Comprehensive Metabolic Panel  -     Iron and TIBC  -     Ferritin  -     Vitamin B12    3. Normocytic anemia  -     Urinalysis With Microscopic If Indicated (No  Culture) - Urine, Clean Catch  -     TSH  -     T4, Free  -     Lipid Panel With / Chol / HDL Ratio  -     CBC & Differential  -     Comprehensive Metabolic Panel  -     Iron and TIBC  -     Ferritin  -     Vitamin B12    4. Iron deficiency anemia due to chronic blood loss  -     Urinalysis With Microscopic If Indicated (No Culture) - Urine, Clean Catch  -     TSH  -     T4, Free  -     Lipid Panel With / Chol / HDL Ratio  -     CBC & Differential  -     Comprehensive Metabolic Panel  -     Iron and TIBC  -     Ferritin  -     Vitamin B12    5. B12 deficiency  -     Urinalysis With Microscopic If Indicated (No Culture) - Urine, Clean Catch  -     TSH  -     T4, Free  -     Lipid Panel With / Chol / HDL Ratio  -     CBC & Differential  -     Comprehensive Metabolic Panel  -     Iron and TIBC  -     Ferritin  -     Vitamin B12        Follow Up:   Next Medicare Wellness visit to be scheduled in 1 year.      An After Visit Summary and PPPS were made available to the patient.

## 2023-09-07 NOTE — PROGRESS NOTES
"Chief Complaint  Medicare Wellness-subsequent    Subjective        Ama Billy presents to [facility]  History of Present Illness  Ama is a delightful lady accompanied by her .  She had a major setback in her function when she fell 2 years ago breaking hip and right shoulder.  Subsequent to that she was admitted with urinary sepsis.  She had a slow recovery but is doing overall better but with some unsteadiness on gait but does not require cane or walker.  We discussed fall risk.  Treatment of hyperlipidemia with her current atorvastatin 10 mg daily is working well.  We have her on folic acid and B12 as well and also some occasional furosemide for swelling.  Toprol-XL 25 mg daily is continued for hypertension as well as distant history of paroxysmal atrial fibrillation.  She is not a good candidate for anticoagulant given history of falling.    Objective   Vital Signs:  /86 (BP Location: Left arm, Patient Position: Sitting, Cuff Size: Adult)   Pulse 50   Wt 79.8 kg (176 lb)   SpO2 98%   BMI 27.57 kg/m²   Estimated body mass index is 27.57 kg/m² as calculated from the following:    Height as of 7/8/22: 170.2 cm (67\").    Weight as of this encounter: 79.8 kg (176 lb).             Physical Exam  Vitals reviewed.   Constitutional:       Appearance: She is well-developed.   HENT:      Head: Normocephalic and atraumatic.      Right Ear: Tympanic membrane and external ear normal.      Left Ear: Tympanic membrane and external ear normal.      Nose: Nose normal.   Eyes:      Conjunctiva/sclera: Conjunctivae normal.      Pupils: Pupils are equal, round, and reactive to light.   Neck:      Thyroid: No thyromegaly.      Vascular: No JVD.   Cardiovascular:      Rate and Rhythm: Normal rate and regular rhythm.      Heart sounds: Normal heart sounds.   Pulmonary:      Effort: Pulmonary effort is normal.      Breath sounds: Normal breath sounds.   Abdominal:      General: Bowel " sounds are normal.      Palpations: Abdomen is soft.   Musculoskeletal:         General: Normal range of motion.      Cervical back: Normal range of motion and neck supple.   Lymphadenopathy:      Cervical: No cervical adenopathy.   Skin:     General: Skin is warm and dry.      Findings: No rash.   Neurological:      Mental Status: She is alert and oriented to person, place, and time.      Cranial Nerves: No cranial nerve deficit.      Motor: Weakness present.      Coordination: Coordination abnormal.      Gait: Gait is intact.   Psychiatric:         Behavior: Behavior normal.         Thought Content: Thought content normal.         Judgment: Judgment normal.      Result Review :                   Assessment and Plan   Diagnoses and all orders for this visit:    1. Mixed hyperlipidemia (Primary)  Comments:  Atorvastatin 10 mg daily  Orders:  -     Urinalysis With Microscopic If Indicated (No Culture) - Urine, Clean Catch  -     TSH  -     T4, Free  -     Lipid Panel With / Chol / HDL Ratio  -     CBC & Differential  -     Comprehensive Metabolic Panel  -     Iron and TIBC  -     Ferritin  -     Vitamin B12    2. Primary hypertension  Comments:  Metoprolol XL 25 mg daily  Orders:  -     Urinalysis With Microscopic If Indicated (No Culture) - Urine, Clean Catch  -     TSH  -     T4, Free  -     Lipid Panel With / Chol / HDL Ratio  -     CBC & Differential  -     Comprehensive Metabolic Panel  -     Iron and TIBC  -     Ferritin  -     Vitamin B12    3. Normocytic anemia  -     Urinalysis With Microscopic If Indicated (No Culture) - Urine, Clean Catch  -     TSH  -     T4, Free  -     Lipid Panel With / Chol / HDL Ratio  -     CBC & Differential  -     Comprehensive Metabolic Panel  -     Iron and TIBC  -     Ferritin  -     Vitamin B12    4. Iron deficiency anemia due to chronic blood loss  Comments:  Recheck iron studies and CBC  Orders:  -     Urinalysis With Microscopic If Indicated (No Culture) - Urine, Clean  Catch  -     TSH  -     T4, Free  -     Lipid Panel With / Chol / HDL Ratio  -     CBC & Differential  -     Comprehensive Metabolic Panel  -     Iron and TIBC  -     Ferritin  -     Vitamin B12    5. B12 deficiency  Comments:  Check folate and B12  Orders:  -     Urinalysis With Microscopic If Indicated (No Culture) - Urine, Clean Catch  -     TSH  -     T4, Free  -     Lipid Panel With / Chol / HDL Ratio  -     CBC & Differential  -     Comprehensive Metabolic Panel  -     Iron and TIBC  -     Ferritin  -     Vitamin B12    6. Medicare annual wellness visit, subsequent             Follow Up   Return in about 6 months (around 3/7/2024) for Recheck.  Patient was given instructions and counseling regarding her condition or for health maintenance advice. Please see specific information pulled into the AVS if appropriate.

## 2023-09-08 LAB
ALBUMIN SERPL-MCNC: 4.6 G/DL (ref 3.5–5.2)
ALBUMIN/GLOB SERPL: 1.6 G/DL
ALP SERPL-CCNC: 106 U/L (ref 39–117)
ALT SERPL-CCNC: 10 U/L (ref 1–33)
APPEARANCE UR: CLEAR
AST SERPL-CCNC: 20 U/L (ref 1–32)
BACTERIA #/AREA URNS HPF: NORMAL /HPF
BASOPHILS # BLD AUTO: 0.07 10*3/MM3 (ref 0–0.2)
BASOPHILS NFR BLD AUTO: 0.8 % (ref 0–1.5)
BILIRUB SERPL-MCNC: 1.3 MG/DL (ref 0–1.2)
BILIRUB UR QL STRIP: NEGATIVE
BUN SERPL-MCNC: 22 MG/DL (ref 8–23)
BUN/CREAT SERPL: 14.4 (ref 7–25)
CALCIUM SERPL-MCNC: 9.8 MG/DL (ref 8.6–10.5)
CASTS URNS MICRO: NORMAL
CHLORIDE SERPL-SCNC: 104 MMOL/L (ref 98–107)
CHOLEST SERPL-MCNC: 144 MG/DL (ref 0–200)
CHOLEST/HDLC SERPL: 2.12 {RATIO}
CO2 SERPL-SCNC: 25.3 MMOL/L (ref 22–29)
COLOR UR: YELLOW
CREAT SERPL-MCNC: 1.53 MG/DL (ref 0.57–1)
EGFRCR SERPLBLD CKD-EPI 2021: 32.4 ML/MIN/1.73
EOSINOPHIL # BLD AUTO: 0.51 10*3/MM3 (ref 0–0.4)
EOSINOPHIL NFR BLD AUTO: 5.6 % (ref 0.3–6.2)
EPI CELLS #/AREA URNS HPF: NORMAL /HPF
ERYTHROCYTE [DISTWIDTH] IN BLOOD BY AUTOMATED COUNT: 19.8 % (ref 12.3–15.4)
FERRITIN SERPL-MCNC: 717 NG/ML (ref 13–150)
GLOBULIN SER CALC-MCNC: 2.8 GM/DL
GLUCOSE SERPL-MCNC: 88 MG/DL (ref 65–99)
GLUCOSE UR QL STRIP: NEGATIVE
HCT VFR BLD AUTO: 34.6 % (ref 34–46.6)
HDLC SERPL-MCNC: 68 MG/DL (ref 40–60)
HGB BLD-MCNC: 11 G/DL (ref 12–15.9)
HGB UR QL STRIP: NEGATIVE
IMM GRANULOCYTES # BLD AUTO: 0.05 10*3/MM3 (ref 0–0.05)
IMM GRANULOCYTES NFR BLD AUTO: 0.5 % (ref 0–0.5)
IRON SATN MFR SERPL: 36 % (ref 20–50)
IRON SERPL-MCNC: 120 MCG/DL (ref 37–145)
KETONES UR QL STRIP: NEGATIVE
LDLC SERPL CALC-MCNC: 61 MG/DL (ref 0–100)
LEUKOCYTE ESTERASE UR QL STRIP: ABNORMAL
LYMPHOCYTES # BLD AUTO: 1.66 10*3/MM3 (ref 0.7–3.1)
LYMPHOCYTES NFR BLD AUTO: 18.1 % (ref 19.6–45.3)
MCH RBC QN AUTO: 27.6 PG (ref 26.6–33)
MCHC RBC AUTO-ENTMCNC: 31.8 G/DL (ref 31.5–35.7)
MCV RBC AUTO: 86.9 FL (ref 79–97)
MONOCYTES # BLD AUTO: 0.87 10*3/MM3 (ref 0.1–0.9)
MONOCYTES NFR BLD AUTO: 9.5 % (ref 5–12)
NEUTROPHILS # BLD AUTO: 5.99 10*3/MM3 (ref 1.7–7)
NEUTROPHILS NFR BLD AUTO: 65.5 % (ref 42.7–76)
NITRITE UR QL STRIP: NEGATIVE
NRBC BLD AUTO-RTO: 0.4 /100 WBC (ref 0–0.2)
PH UR STRIP: 6 [PH] (ref 5–8)
PLATELET # BLD AUTO: 338 10*3/MM3 (ref 140–450)
POTASSIUM SERPL-SCNC: 4.9 MMOL/L (ref 3.5–5.2)
PROT SERPL-MCNC: 7.4 G/DL (ref 6–8.5)
PROT UR QL STRIP: NEGATIVE
RBC # BLD AUTO: 3.98 10*6/MM3 (ref 3.77–5.28)
RBC #/AREA URNS HPF: NORMAL /HPF
SODIUM SERPL-SCNC: 144 MMOL/L (ref 136–145)
SP GR UR STRIP: 1.01 (ref 1–1.03)
T4 FREE SERPL-MCNC: 1.36 NG/DL (ref 0.93–1.7)
TIBC SERPL-MCNC: 335 MCG/DL
TRIGL SERPL-MCNC: 76 MG/DL (ref 0–150)
TSH SERPL DL<=0.005 MIU/L-ACNC: 1.4 UIU/ML (ref 0.27–4.2)
UIBC SERPL-MCNC: 215 MCG/DL (ref 112–346)
UROBILINOGEN UR STRIP-MCNC: ABNORMAL MG/DL
VIT B12 SERPL-MCNC: 1673 PG/ML (ref 211–946)
VLDLC SERPL CALC-MCNC: 15 MG/DL (ref 5–40)
WBC # BLD AUTO: 9.15 10*3/MM3 (ref 3.4–10.8)
WBC #/AREA URNS HPF: NORMAL /HPF

## 2023-11-14 RX ORDER — FUROSEMIDE 20 MG/1
TABLET ORAL
Qty: 90 TABLET | Refills: 1 | Status: SHIPPED | OUTPATIENT
Start: 2023-11-14

## 2024-02-26 DIAGNOSIS — E78.2 MIXED HYPERLIPIDEMIA: Primary | ICD-10-CM

## 2024-02-26 RX ORDER — ATORVASTATIN CALCIUM 10 MG/1
TABLET, FILM COATED ORAL
Qty: 90 TABLET | Refills: 1 | Status: SHIPPED | OUTPATIENT
Start: 2024-02-26

## 2024-04-18 RX ORDER — PANTOPRAZOLE SODIUM 20 MG/1
TABLET, DELAYED RELEASE ORAL
Qty: 90 TABLET | Refills: 3 | OUTPATIENT
Start: 2024-04-18

## 2024-05-03 RX ORDER — METOPROLOL SUCCINATE 25 MG/1
TABLET, EXTENDED RELEASE ORAL
Qty: 90 TABLET | Refills: 0 | Status: SHIPPED | OUTPATIENT
Start: 2024-05-03

## 2024-05-03 NOTE — TELEPHONE ENCOUNTER
Rx Refill Note  Requested Prescriptions     Pending Prescriptions Disp Refills    metoprolol succinate XL (TOPROL-XL) 25 MG 24 hr tablet [Pharmacy Med Name: METOPROLOL SUCCINATE ER TABS 25MG] 90 tablet 3     Sig: TAKE 1 TABLET DAILY      Last office visit with prescribing clinician: 9/7/2023   Last telemedicine visit with prescribing clinician: Visit date not found   Next office visit with prescribing clinician: 7/8/2024

## 2024-05-13 RX ORDER — FUROSEMIDE 20 MG/1
TABLET ORAL
Qty: 90 TABLET | Refills: 3 | Status: SHIPPED | OUTPATIENT
Start: 2024-05-13

## 2024-07-08 ENCOUNTER — OFFICE VISIT (OUTPATIENT)
Dept: INTERNAL MEDICINE | Facility: CLINIC | Age: 89
End: 2024-07-08
Payer: MEDICARE

## 2024-07-08 VITALS
TEMPERATURE: 97.8 F | WEIGHT: 179.8 LBS | DIASTOLIC BLOOD PRESSURE: 80 MMHG | BODY MASS INDEX: 28.22 KG/M2 | SYSTOLIC BLOOD PRESSURE: 161 MMHG | OXYGEN SATURATION: 97 % | HEIGHT: 67 IN | HEART RATE: 47 BPM

## 2024-07-08 DIAGNOSIS — K20.90 ESOPHAGITIS: ICD-10-CM

## 2024-07-08 DIAGNOSIS — I10 PRIMARY HYPERTENSION: Primary | ICD-10-CM

## 2024-07-08 DIAGNOSIS — D50.8 OTHER IRON DEFICIENCY ANEMIA: ICD-10-CM

## 2024-07-08 DIAGNOSIS — E78.2 MIXED HYPERLIPIDEMIA: ICD-10-CM

## 2024-07-08 PROCEDURE — 1159F MED LIST DOCD IN RCRD: CPT | Performed by: FAMILY MEDICINE

## 2024-07-08 PROCEDURE — 1126F AMNT PAIN NOTED NONE PRSNT: CPT | Performed by: FAMILY MEDICINE

## 2024-07-08 PROCEDURE — 1160F RVW MEDS BY RX/DR IN RCRD: CPT | Performed by: FAMILY MEDICINE

## 2024-07-08 PROCEDURE — G2211 COMPLEX E/M VISIT ADD ON: HCPCS | Performed by: FAMILY MEDICINE

## 2024-07-08 PROCEDURE — 99214 OFFICE O/P EST MOD 30 MIN: CPT | Performed by: FAMILY MEDICINE

## 2024-07-08 RX ORDER — METOPROLOL SUCCINATE 25 MG/1
25 TABLET, EXTENDED RELEASE ORAL DAILY
Qty: 90 TABLET | Refills: 3 | Status: SHIPPED | OUTPATIENT
Start: 2024-07-08

## 2024-07-08 RX ORDER — FUROSEMIDE 20 MG/1
20 TABLET ORAL DAILY
Qty: 90 TABLET | Refills: 3 | Status: SHIPPED | OUTPATIENT
Start: 2024-07-08

## 2024-07-08 RX ORDER — ATORVASTATIN CALCIUM 10 MG/1
10 TABLET, FILM COATED ORAL DAILY
Qty: 90 TABLET | Refills: 1 | Status: SHIPPED | OUTPATIENT
Start: 2024-07-08

## 2024-07-08 NOTE — PROGRESS NOTES
"Chief Complaint  Hyperlipidemia (6 months f/u)    Subjective        Ama Billy presents to [facility]  History of Present Illness  Mrs. Billy appears to be doing pretty well after recovery of her fall resulting in hip fracture and shoulder fracture.  The previous anemia resolved her labs look very good aside from a slight elevation of creatinine from some dehydration relating to furosemide.  Symptomatically she is back to baseline pretty stable.  Examination is fairly normal and heart sounds regular.  We discussed RSV vaccine and she will consider that.  Hyperlipidemia        Objective   Vital Signs:  /80 (BP Location: Left arm, Patient Position: Sitting, Cuff Size: Adult)   Pulse (!) 47   Temp 97.8 °F (36.6 °C) (Oral)   Ht 170.2 cm (67\")   Wt 81.6 kg (179 lb 12.8 oz)   SpO2 97%   BMI 28.16 kg/m²   Estimated body mass index is 28.16 kg/m² as calculated from the following:    Height as of this encounter: 170.2 cm (67\").    Weight as of this encounter: 81.6 kg (179 lb 12.8 oz).               Physical Exam  Vitals reviewed.   Constitutional:       Appearance: She is well-developed.   HENT:      Head: Normocephalic and atraumatic.      Right Ear: Tympanic membrane and external ear normal.      Left Ear: Tympanic membrane and external ear normal.      Nose: Nose normal.   Eyes:      Conjunctiva/sclera: Conjunctivae normal.      Pupils: Pupils are equal, round, and reactive to light.   Neck:      Thyroid: No thyromegaly.      Vascular: No JVD.   Cardiovascular:      Rate and Rhythm: Normal rate and regular rhythm.      Heart sounds: Normal heart sounds.   Pulmonary:      Effort: Pulmonary effort is normal.      Breath sounds: Normal breath sounds.   Abdominal:      General: Bowel sounds are normal.      Palpations: Abdomen is soft.   Musculoskeletal:         General: Normal range of motion.      Cervical back: Normal range of motion and neck supple.   Lymphadenopathy:      " Cervical: No cervical adenopathy.   Skin:     General: Skin is warm and dry.      Findings: No rash.   Neurological:      Mental Status: She is alert and oriented to person, place, and time.      Cranial Nerves: No cranial nerve deficit.      Coordination: Coordination normal.      Gait: Gait is intact.   Psychiatric:         Behavior: Behavior normal.         Thought Content: Thought content normal.         Judgment: Judgment normal.        Result Review :    The following data was reviewed by: Con Canas MD on 07/08/2024:  Common labs          9/7/2023    12:47   Common Labs   Glucose 88    BUN 22    Creatinine 1.53    Sodium 144    Potassium 4.9    Chloride 104    Calcium 9.8    Total Protein 7.4    Albumin 4.6    Total Bilirubin 1.3    Alkaline Phosphatase 106    AST (SGOT) 20    ALT (SGPT) 10    WBC 9.15    Hemoglobin 11.0    Hematocrit 34.6    Platelets 338    Total Cholesterol 144    Triglycerides 76    HDL Cholesterol 68    LDL Cholesterol  61                   Assessment and Plan     Assessment & Plan  Primary hypertension   continue current furosemide 20 mg daily as needed for swelling metoprolol XL 25 mg daily.  Mixed hyperlipidemia    atorvastatin 10 mg daily.  Esophagitis  Symptomatically stable off medications  Other iron deficiency anemia  Resolved     New Medications Ordered This Visit   Medications    atorvastatin (LIPITOR) 10 MG tablet     Sig: Take 1 tablet by mouth Daily.     Dispense:  90 tablet     Refill:  1    furosemide (LASIX) 20 MG tablet     Sig: Take 1 tablet by mouth Daily.     Dispense:  90 tablet     Refill:  3    metoprolol succinate XL (TOPROL-XL) 25 MG 24 hr tablet     Sig: Take 1 tablet by mouth Daily.     Dispense:  90 tablet     Refill:  3               Follow Up     No follow-ups on file.  Patient was given instructions and counseling regarding her condition or for health maintenance advice. Please see specific information pulled into the AVS if appropriate.

## 2025-01-13 ENCOUNTER — OFFICE VISIT (OUTPATIENT)
Dept: INTERNAL MEDICINE | Facility: CLINIC | Age: OVER 89
End: 2025-01-13
Payer: MEDICARE

## 2025-01-13 VITALS
WEIGHT: 172 LBS | HEART RATE: 63 BPM | BODY MASS INDEX: 26.94 KG/M2 | DIASTOLIC BLOOD PRESSURE: 72 MMHG | OXYGEN SATURATION: 96 % | SYSTOLIC BLOOD PRESSURE: 156 MMHG

## 2025-01-13 DIAGNOSIS — D50.0 IRON DEFICIENCY ANEMIA DUE TO CHRONIC BLOOD LOSS: ICD-10-CM

## 2025-01-13 DIAGNOSIS — I48.0 PAROXYSMAL ATRIAL FIBRILLATION: ICD-10-CM

## 2025-01-13 DIAGNOSIS — N30.00 ACUTE CYSTITIS WITHOUT HEMATURIA: ICD-10-CM

## 2025-01-13 DIAGNOSIS — I10 PRIMARY HYPERTENSION: ICD-10-CM

## 2025-01-13 DIAGNOSIS — M15.9 GENERALIZED OSTEOARTHRITIS: ICD-10-CM

## 2025-01-13 DIAGNOSIS — E78.2 MIXED HYPERLIPIDEMIA: Primary | ICD-10-CM

## 2025-01-13 PROCEDURE — G0439 PPPS, SUBSEQ VISIT: HCPCS | Performed by: FAMILY MEDICINE

## 2025-01-13 PROCEDURE — 1126F AMNT PAIN NOTED NONE PRSNT: CPT | Performed by: FAMILY MEDICINE

## 2025-01-13 PROCEDURE — 99214 OFFICE O/P EST MOD 30 MIN: CPT | Performed by: FAMILY MEDICINE

## 2025-01-13 PROCEDURE — 1170F FXNL STATUS ASSESSED: CPT | Performed by: FAMILY MEDICINE

## 2025-01-13 PROCEDURE — 1159F MED LIST DOCD IN RCRD: CPT | Performed by: FAMILY MEDICINE

## 2025-01-13 PROCEDURE — 1160F RVW MEDS BY RX/DR IN RCRD: CPT | Performed by: FAMILY MEDICINE

## 2025-01-13 RX ORDER — FUROSEMIDE 20 MG/1
10 TABLET ORAL DAILY
Qty: 90 TABLET | Refills: 3 | Status: SHIPPED | OUTPATIENT
Start: 2025-01-13

## 2025-01-13 NOTE — ASSESSMENT & PLAN NOTE
Metoprolol XL 25 mg daily furosemide 20 mg half tablet daily    Orders:    TSH    T4, Free    Iron and TIBC    Ferritin    Comprehensive Metabolic Panel    CBC & Differential    Urinalysis With Culture If Indicated - Urine, Clean Catch    Lipid Panel With / Chol / HDL Ratio

## 2025-01-13 NOTE — Clinical Note
January 13, 2025     Patient: Ama Billy   YOB: 1933   Date of Visit: 1/13/2025       To Whom It May Concern:    It is my medical opinion that Ama Billy may return to work in two days.         Sincerely,        Con Canas MD    CC: No Recipients

## 2025-01-13 NOTE — PROGRESS NOTES
Subjective   The ABCs of the Annual Wellness Visit  Medicare Wellness Visit      Ama Billy is a 89 y.o. patient who presents for a Medicare Wellness Visit.    The following portions of the patient's history were reviewed and   updated as appropriate: allergies, current medications, past family history, past medical history, past social history, past surgical history, and problem list.    Compared to one year ago, the patient's physical   health is the same.  Compared to one year ago, the patient's mental   health is the same.    Recent Hospitalizations:  She was not admitted to the hospital during the last year.     Current Medical Providers:  Patient Care Team:  Con Canas MD as PCP - General (Family Medicine)  [facility] ([facility])    Outpatient Medications Prior to Visit   Medication Sig Dispense Refill    aspirin 81 MG EC tablet Take 1 tablet by mouth every night at bedtime.      atorvastatin (LIPITOR) 10 MG tablet Take 1 tablet by mouth Daily. 90 tablet 1    bisacodyl (DULCOLAX) 10 MG suppository Insert 1 suppository into the rectum Daily As Needed for Constipation.      cyanocobalamin (VITAMIN B-12) 1000 MCG tablet Take 1 tablet by mouth Daily. 90 tablet 3    folic acid (FOLVITE) 1 MG tablet Take 1 tablet by mouth Daily. 90 tablet 3    furosemide (LASIX) 20 MG tablet Take 1 tablet by mouth Daily. 90 tablet 3    metoprolol succinate XL (TOPROL-XL) 25 MG 24 hr tablet Take 1 tablet by mouth Daily. 90 tablet 3     No facility-administered medications prior to visit.     No opioid medication identified on active medication list. I have reviewed chart for other potential  high risk medication/s and harmful drug interactions in the elderly.      Aspirin is on active medication list. Aspirin use is indicated based on review of current medical condition/s. Pros and cons of this therapy have been discussed today. Benefits of this medication outweigh potential harm.   "Patient has been encouraged to continue taking this medication.  .      Patient Active Problem List   Diagnosis    Normocytic anemia    Abdominal cramping    LLQ pain    Colon polyps    Bursitis of hip    Diarrhea    Fatigue    Generalized osteoarthritis    Hyperlipidemia    Hypertension    Irritable bowel syndrome    Muscle strain of lower extremity    Tendinitis    Chronic venous insufficiency    Status post total left knee replacement    Hip joint replacement status    Left retinal detachment    Hypovitaminosis D    Weakness of extremity    Closed wedge compression fracture of first lumbar vertebra    Fall    Constipation    Nausea & vomiting    Leukocytosis    Anemia    Symptomatic anemia    Esophagitis    GI bleed    Acute blood loss anemia    Iron deficiency anemia    Paroxysmal atrial fibrillation    Rectal bleeding    Iron deficiency anemia due to chronic blood loss    Swelling of lower extremity    Canales's esophagus without dysplasia    Dyspnea on exertion    Closed fracture of right hip    Fx humeral neck, right, closed, initial encounter    Acute urinary retention    BENNIE (acute kidney injury)    Foot drop, right foot    Localized swelling of both lower legs     Advance Care Planning Advance Directive is not on file.  ACP discussion was held with the patient during this visit. Patient has an advance directive (not in EMR), copy requested.            Objective   Vitals:    01/13/25 1441   BP: 156/72   BP Location: Left arm   Patient Position: Sitting   Cuff Size: Adult   Pulse: 63   SpO2: 96%   Weight: 78 kg (172 lb)   PainSc: 0-No pain       Estimated body mass index is 26.94 kg/m² as calculated from the following:    Height as of 7/8/24: 170.2 cm (67\").    Weight as of this encounter: 78 kg (172 lb).    BMI is >= 25 and <30. (Overweight) The following options were offered after discussion;: weight loss educational material (shared in after visit summary)           Does the patient have evidence of " cognitive impairment? Yes                                                                                                Health  Risk Assessment    Smoking Status:  Social History     Tobacco Use   Smoking Status Never   Smokeless Tobacco Never     Alcohol Consumption:  Social History     Substance and Sexual Activity   Alcohol Use No       Fall Risk Screen  IVISADI Fall Risk Assessment was completed, and patient is at LOW risk for falls.Assessment completed on:2025    Depression Screening   Little interest or pleasure in doing things? Not at all   Feeling down, depressed, or hopeless? Not at all   PHQ-2 Total Score 0      Health Habits and Functional and Cognitive Screenin/13/2025     2:43 PM   Functional & Cognitive Status   Do you have difficulty preparing food and eating? No   Do you have difficulty bathing yourself, getting dressed or grooming yourself? No   Do you have difficulty using the toilet? No   Do you have difficulty moving around from place to place? No   Do you have trouble with steps or getting out of a bed or a chair? No   Current Diet Well Balanced Diet   Dental Exam Up to date   Eye Exam Up to date   Exercise (times per week) 0 times per week   Current Exercises Include No Regular Exercise   Do you need help using the phone?  No   Are you deaf or do you have serious difficulty hearing?  No   Do you need help to go to places out of walking distance? Yes   Do you need help shopping? Yes   Do you need help preparing meals?  Yes   Do you need help with housework?  Yes   Do you need help with laundry? Yes   Do you need help taking your medications? Yes   Do you need help managing money? Yes   Do you ever drive or ride in a car without wearing a seat belt? No   Have you felt unusual stress, anger or loneliness in the last month? No   Who do you live with? Spouse   If you need help, do you have trouble finding someone available to you? No   Have you been bothered in the last four weeks by  sexual problems? No   Do you have difficulty concentrating, remembering or making decisions? Yes           Age-appropriate Screening Schedule:  Refer to the list below for future screening recommendations based on patient's age, sex and/or medical conditions. Orders for these recommended tests are listed in the plan section. The patient has been provided with a written plan.    Health Maintenance List  Health Maintenance   Topic Date Due    TDAP/TD VACCINES (1 - Tdap) Never done    ZOSTER VACCINE (1 of 2) Never done    RSV Vaccine - Adults (1 - 1-dose 75+ series) Never done    Pneumococcal Vaccine 65+ (2 of 2 - PPSV23 or PCV20) 01/16/2019    DXA SCAN  05/26/2024    ANNUAL WELLNESS VISIT  09/07/2024    LIPID PANEL  09/07/2024    BMI FOLLOWUP  09/07/2024    COVID-19 Vaccine  Completed    INFLUENZA VACCINE  Completed    MAMMOGRAM  Discontinued                                                                                                                                                CMS Preventative Services Quick Reference  Risk Factors Identified During Encounter  Fall Risk-High or Moderate: Discussed Fall Prevention in the home    The above risks/problems have been discussed with the patient.  Pertinent information has been shared with the patient in the After Visit Summary.  An After Visit Summary and PPPS were made available to the patient.    Follow Up:   Next Medicare Wellness visit to be scheduled in 1 year.         Additional E&M Note during same encounter follows:  Patient has additional, significant, and separately identifiable condition(s)/problem(s) that require work above and beyond the Medicare Wellness Visit     Chief Complaint  Medicare Wellness-subsequent    Subjective   Overall doing pretty well with history of paroxysmal A-fib now normal sinus rhythm on metoprolol XL and on aspirin 81 mg daily in lieu of anticoagulation given fall risk.  Also atorvastatin 10 mg daily to reduce cardiovascular risk.   She is on B12 and folic acid supplement.  She appears stable on other regards of the moving slowly.    In order to prevent dehydration furosemide 20 mg is now cut in half to 10 mg daily.      Ama is also being seen today for an annual adult preventative physical exam.  and Ama is also being seen today for additional medical problem/s.    Review of Systems   All other systems reviewed and are negative.             Objective   Vital Signs:  /72 (BP Location: Left arm, Patient Position: Sitting, Cuff Size: Adult)   Pulse 63   Wt 78 kg (172 lb)   SpO2 96%   BMI 26.94 kg/m²   Physical Exam  Vitals reviewed.   Constitutional:       Appearance: She is well-developed.   HENT:      Head: Normocephalic and atraumatic.      Right Ear: Tympanic membrane and external ear normal.      Left Ear: Tympanic membrane and external ear normal.      Nose: Nose normal.   Eyes:      Conjunctiva/sclera: Conjunctivae normal.      Pupils: Pupils are equal, round, and reactive to light.   Neck:      Thyroid: No thyromegaly.      Vascular: No JVD.   Cardiovascular:      Rate and Rhythm: Normal rate and regular rhythm.      Heart sounds: Normal heart sounds.   Pulmonary:      Effort: Pulmonary effort is normal.      Breath sounds: Normal breath sounds.   Abdominal:      General: Bowel sounds are normal.      Palpations: Abdomen is soft.   Musculoskeletal:         General: Normal range of motion.      Cervical back: Normal range of motion and neck supple.   Lymphadenopathy:      Cervical: No cervical adenopathy.   Skin:     General: Skin is warm and dry.      Findings: No rash.   Neurological:      Mental Status: She is alert and oriented to person, place, and time.      Cranial Nerves: No cranial nerve deficit.      Coordination: Coordination normal.   Psychiatric:         Behavior: Behavior normal.         Thought Content: Thought content normal.         Judgment: Judgment normal.         The following data was reviewed by: Con  ERWIN Canas MD on 01/13/2025:              Assessment and Plan            Mixed hyperlipidemia       Orders:    TSH    T4, Free    Iron and TIBC    Ferritin    Comprehensive Metabolic Panel    CBC & Differential    Urinalysis With Culture If Indicated - Urine, Clean Catch    Lipid Panel With / Chol / HDL Ratio    Primary hypertension  Metoprolol XL 25 mg daily furosemide 20 mg half tablet daily    Orders:    TSH    T4, Free    Iron and TIBC    Ferritin    Comprehensive Metabolic Panel    CBC & Differential    Urinalysis With Culture If Indicated - Urine, Clean Catch    Lipid Panel With / Chol / HDL Ratio    Paroxysmal atrial fibrillation    Orders:    TSH    T4, Free    Iron and TIBC    Ferritin    Comprehensive Metabolic Panel    CBC & Differential    Urinalysis With Culture If Indicated - Urine, Clean Catch    Lipid Panel With / Chol / HDL Ratio    Generalized osteoarthritis         Iron deficiency anemia due to chronic blood loss    Orders:    TSH    T4, Free    Iron and TIBC    Ferritin    Comprehensive Metabolic Panel    CBC & Differential    Urinalysis With Culture If Indicated - Urine, Clean Catch    Lipid Panel With / Chol / HDL Ratio    Acute cystitis without hematuria    Orders:    Urinalysis With Culture If Indicated - Urine, Clean Catch            Follow Up   No follow-ups on file.  Patient was given instructions and counseling regarding her condition or for health maintenance advice. Please see specific information pulled into the AVS if appropriate.

## 2025-01-13 NOTE — PATIENT INSTRUCTIONS
Medicare Wellness  Personal Prevention Plan of Service     Date of Office Visit:    Encounter Provider:  Con Canas MD  Place of Service:  [facility]  Patient Name: Ama Billy  :  1933    As part of the Medicare Wellness portion of your visit today, we are providing you with this personalized preventive plan of services (PPPS). This plan is based upon recommendations of the [place] Preventive Services Task Force (USPSTF) and the Advisory Committee on Immunization Practices (ACIP).    This lists the preventive care services that should be considered, and provides dates of when you are due. Items listed as completed are up-to-date and do not require any further intervention.    Health Maintenance   Topic Date Due    TDAP/TD VACCINES (1 - Tdap) Never done    ZOSTER VACCINE (1 of 2) Never done    RSV Vaccine - Adults (1 - 1-dose 75+ series) Never done    Pneumococcal Vaccine 65+ (2 of 2 - PPSV23 or PCV20) 2019    DXA SCAN  2024    ANNUAL WELLNESS VISIT  2024    LIPID PANEL  2024    BMI FOLLOWUP  2024    COVID-19 Vaccine  Completed    INFLUENZA VACCINE  Completed    MAMMOGRAM  Discontinued       No orders of the defined types were placed in this encounter.      No follow-ups on file.

## 2025-01-13 NOTE — LETTER
January 13, 2025     Patient: Ama Billy   YOB: 1933   Date of Visit: 1/13/2025       Your current medicine list is below:        []  aspirin 81 MG EC tablet 81 mg, Oral, Every Night at Bedtime        Summary:  Take 1 tablet by mouth every night at bedtime., Starting Thu 8/25/2022, Historical Med        []  atorvastatin (LIPITOR) 10 MG tablet 10 mg, Oral, Daily        Summary:  Take 1 tablet by mouth Daily., Starting Mon 7/8/2024, Normal        []  bisacodyl (DULCOLAX) 10 MG suppository 10 mg, Rectal, Daily PRN        Summary:  Insert 1 suppository into the rectum Daily As Needed for Constipation., Starting Tue 2/22/2022, No Print        []  cyanocobalamin (VITAMIN B-12) 1000 MCG tablet 1,000 mcg, Oral, Daily        Summary:  Take 1 tablet by mouth Daily., Starting Fri 4/29/2022, Normal        []  folic acid (FOLVITE) 1 MG tablet 1 mg, Oral, Daily        Summary:  Take 1 tablet by mouth Daily., Starting Fri 4/29/2022, Normal        []  furosemide (LASIX) 20 MG tablet 20 mg, Oral, Daily        Summary:  Take 1 tablet by mouth Daily., Starting Mon 7/8/2024, Normal        []  metoprolol succinate XL (TOPROL-XL) 25 MG 24 hr tablet 25 mg, Oral, Daily        Summary:  Take 1 tablet by                  Sincerely,        Con Canas MD    CC: No Recipients

## 2025-01-13 NOTE — ASSESSMENT & PLAN NOTE
Orders:    TSH    T4, Free    Iron and TIBC    Ferritin    Comprehensive Metabolic Panel    CBC & Differential    Urinalysis With Culture If Indicated - Urine, Clean Catch    Lipid Panel With / Chol / HDL Ratio

## 2025-01-15 LAB
ALBUMIN SERPL-MCNC: 4.5 G/DL (ref 3.5–5.2)
ALBUMIN/GLOB SERPL: 1.5 G/DL
ALP SERPL-CCNC: 107 U/L (ref 39–117)
ALT SERPL-CCNC: 13 U/L (ref 1–33)
APPEARANCE UR: CLEAR
AST SERPL-CCNC: 18 U/L (ref 1–32)
BACTERIA #/AREA URNS HPF: ABNORMAL /[HPF]
BACTERIA UR CULT: NORMAL
BACTERIA UR CULT: NORMAL
BASOPHILS # BLD AUTO: 0.09 10*3/MM3 (ref 0–0.2)
BASOPHILS NFR BLD AUTO: 1 % (ref 0–1.5)
BILIRUB SERPL-MCNC: 1.3 MG/DL (ref 0–1.2)
BILIRUB UR QL STRIP: NEGATIVE
BUN SERPL-MCNC: 16 MG/DL (ref 8–23)
BUN/CREAT SERPL: 11.9 (ref 7–25)
CALCIUM SERPL-MCNC: 9.8 MG/DL (ref 8.2–9.6)
CASTS URNS QL MICRO: ABNORMAL /LPF
CHLORIDE SERPL-SCNC: 104 MMOL/L (ref 98–107)
CHOLEST SERPL-MCNC: 140 MG/DL (ref 0–200)
CHOLEST/HDLC SERPL: 2.12 {RATIO}
CO2 SERPL-SCNC: 28 MMOL/L (ref 22–29)
COLOR UR: YELLOW
CREAT SERPL-MCNC: 1.34 MG/DL (ref 0.57–1)
EGFRCR SERPLBLD CKD-EPI 2021: 37.5 ML/MIN/1.73
EOSINOPHIL # BLD AUTO: 0.49 10*3/MM3 (ref 0–0.4)
EOSINOPHIL NFR BLD AUTO: 5.5 % (ref 0.3–6.2)
EPI CELLS #/AREA URNS HPF: >10 /HPF (ref 0–10)
ERYTHROCYTE [DISTWIDTH] IN BLOOD BY AUTOMATED COUNT: 19.6 % (ref 12.3–15.4)
FERRITIN SERPL-MCNC: 834 NG/ML (ref 13–150)
GLOBULIN SER CALC-MCNC: 3 GM/DL
GLUCOSE SERPL-MCNC: 91 MG/DL (ref 65–99)
GLUCOSE UR QL STRIP: NEGATIVE
HCT VFR BLD AUTO: 34.5 % (ref 34–46.6)
HDLC SERPL-MCNC: 66 MG/DL (ref 40–60)
HGB BLD-MCNC: 11.3 G/DL (ref 12–15.9)
HGB UR QL STRIP: NEGATIVE
IMM GRANULOCYTES # BLD AUTO: 0.04 10*3/MM3 (ref 0–0.05)
IMM GRANULOCYTES NFR BLD AUTO: 0.4 % (ref 0–0.5)
IRON SATN MFR SERPL: 31 % (ref 20–50)
IRON SERPL-MCNC: 103 MCG/DL (ref 37–145)
KETONES UR QL STRIP: NEGATIVE
LDLC SERPL CALC-MCNC: 59 MG/DL (ref 0–100)
LEUKOCYTE ESTERASE UR QL STRIP: ABNORMAL
LYMPHOCYTES # BLD AUTO: 1.94 10*3/MM3 (ref 0.7–3.1)
LYMPHOCYTES NFR BLD AUTO: 21.7 % (ref 19.6–45.3)
MCH RBC QN AUTO: 28.4 PG (ref 26.6–33)
MCHC RBC AUTO-ENTMCNC: 32.8 G/DL (ref 31.5–35.7)
MCV RBC AUTO: 86.7 FL (ref 79–97)
MICRO URNS: ABNORMAL
MONOCYTES # BLD AUTO: 1 10*3/MM3 (ref 0.1–0.9)
MONOCYTES NFR BLD AUTO: 11.2 % (ref 5–12)
NEUTROPHILS # BLD AUTO: 5.39 10*3/MM3 (ref 1.7–7)
NEUTROPHILS NFR BLD AUTO: 60.2 % (ref 42.7–76)
NITRITE UR QL STRIP: NEGATIVE
NRBC BLD AUTO-RTO: 0.6 /100 WBC (ref 0–0.2)
PH UR STRIP: 5.5 [PH] (ref 5–7.5)
PLATELET # BLD AUTO: 328 10*3/MM3 (ref 140–450)
POTASSIUM SERPL-SCNC: 4.6 MMOL/L (ref 3.5–5.2)
PROT SERPL-MCNC: 7.5 G/DL (ref 6–8.5)
PROT UR QL STRIP: ABNORMAL
RBC # BLD AUTO: 3.98 10*6/MM3 (ref 3.77–5.28)
RBC #/AREA URNS HPF: ABNORMAL /HPF (ref 0–2)
SODIUM SERPL-SCNC: 142 MMOL/L (ref 136–145)
SP GR UR STRIP: 1.02 (ref 1–1.03)
T4 FREE SERPL-MCNC: 1.19 NG/DL (ref 0.92–1.68)
TIBC SERPL-MCNC: 331 MCG/DL
TRIGL SERPL-MCNC: 77 MG/DL (ref 0–150)
TSH SERPL DL<=0.005 MIU/L-ACNC: 1.49 UIU/ML (ref 0.27–4.2)
UIBC SERPL-MCNC: 228 MCG/DL (ref 112–346)
URINALYSIS REFLEX: ABNORMAL
UROBILINOGEN UR STRIP-MCNC: 1 MG/DL (ref 0.2–1)
VLDLC SERPL CALC-MCNC: 15 MG/DL (ref 5–40)
WBC # BLD AUTO: 8.95 10*3/MM3 (ref 3.4–10.8)
WBC #/AREA URNS HPF: ABNORMAL /HPF (ref 0–5)

## 2025-01-28 DIAGNOSIS — E78.2 MIXED HYPERLIPIDEMIA: ICD-10-CM

## 2025-01-28 RX ORDER — ATORVASTATIN CALCIUM 10 MG/1
10 TABLET, FILM COATED ORAL DAILY
Qty: 90 TABLET | Refills: 1 | Status: SHIPPED | OUTPATIENT
Start: 2025-01-28

## 2025-07-09 RX ORDER — FUROSEMIDE 20 MG/1
20 TABLET ORAL DAILY
Qty: 90 TABLET | Refills: 3 | Status: SHIPPED | OUTPATIENT
Start: 2025-07-09

## (undated) DEVICE — DRAPE,HIP,W/POUCHES,STERILE: Brand: MEDLINE

## (undated) DEVICE — TBG 02 CRUSH RESIST LF CLR 7FT

## (undated) DEVICE — CONTAINER,SPECIMEN,OR STERILE,4OZ: Brand: MEDLINE

## (undated) DEVICE — 3M™ IOBAN™ 2 ANTIMICROBIAL INCISE DRAPE 6650EZ: Brand: IOBAN™ 2

## (undated) DEVICE — BITEBLOCK OMNI BLOC

## (undated) DEVICE — Device: Brand: DEFENDO AIR/WATER/SUCTION AND BIOPSY VALVE

## (undated) DEVICE — POOLE SUCTION HANDLE: Brand: CARDINAL HEALTH

## (undated) DEVICE — TOTAL TRAY, 16FR 10ML SIL FOLEY, URN: Brand: MEDLINE

## (undated) DEVICE — SINGLE-USE BIOPSY FORCEPS: Brand: RADIAL JAW 4

## (undated) DEVICE — FRCP BX RADJAW4 NDL 2.8 240CM LG OG BX40

## (undated) DEVICE — IMMOB KN 3PNL DLX CANVS 22IN BLU

## (undated) DEVICE — CANN NASL CO2 TRULINK W/O2 A/

## (undated) DEVICE — ADHS SKIN SURG TISS VISC PREMIERPRO EXOFIN HI/VISC FAST/DRY

## (undated) DEVICE — ANTIBACTERIAL UNDYED BRAIDED (POLYGLACTIN 910), SYNTHETIC ABSORBABLE SUTURE: Brand: COATED VICRYL

## (undated) DEVICE — SUT VICRYL 1 CT1 27IN  JJ40G

## (undated) DEVICE — DRAPE,U/ SHT,SPLIT,PLAS,STERIL: Brand: MEDLINE

## (undated) DEVICE — GLV SURG BIOGEL LTX PF 8

## (undated) DEVICE — DRAPE,REIN 53X77,STERILE: Brand: MEDLINE

## (undated) DEVICE — CEMENT MIXING SYSTEM WITH FEMORAL BREAKWAY NOZZLE: Brand: REVOLUTION

## (undated) DEVICE — SYRINGE, LUER LOCK, 60ML: Brand: MEDLINE

## (undated) DEVICE — DRSNG SLVR/ANTIBAC PRIMASEAL POST/OP ADHS 3.5X12IN

## (undated) DEVICE — TRAP FLD MINIVAC MEGADYNE 100ML

## (undated) DEVICE — TUBING, SUCTION, 1/4" X 10', STRAIGHT: Brand: MEDLINE

## (undated) DEVICE — PENCL E/S ULTRAVAC TELESCP NOSE HOLSTR 10FT

## (undated) DEVICE — CMT BONE REFOBACIN R W/GENT 1X40: Type: IMPLANTABLE DEVICE | Status: NON-FUNCTIONAL

## (undated) DEVICE — 1010 S-DRAPE TOWEL DRAPE 10/BX: Brand: STERI-DRAPE™

## (undated) DEVICE — THE TORRENT IRRIGATION SCOPE CONNECTOR IS USED WITH THE TORRENT IRRIGATION TUBING TO PROVIDE IRRIGATION FLUIDS SUCH AS STERILE WATER DURING GASTROINTESTINAL ENDOSCOPIC PROCEDURES WHEN USED IN CONJUNCTION WITH AN IRRIGATION PUMP (OR ELECTROSURGICAL UNIT).: Brand: TORRENT

## (undated) DEVICE — GLV SURG SENSICARE PI LF PF 8 GRN STRL

## (undated) DEVICE — PATIENT RETURN ELECTRODE, SINGLE-USE, CONTACT QUALITY MONITORING, ADULT, WITH 9FT CORD, FOR PATIENTS WEIGING OVER 33LBS. (15KG): Brand: MEGADYNE

## (undated) DEVICE — PK HIP TOTL 40

## (undated) DEVICE — APPL CHLORAPREP HI/LITE 26ML ORNG

## (undated) DEVICE — SUT VIC 1 CTX 36IN J977H